# Patient Record
Sex: FEMALE | Race: BLACK OR AFRICAN AMERICAN | NOT HISPANIC OR LATINO | Employment: PART TIME | URBAN - METROPOLITAN AREA
[De-identification: names, ages, dates, MRNs, and addresses within clinical notes are randomized per-mention and may not be internally consistent; named-entity substitution may affect disease eponyms.]

---

## 2017-10-22 ENCOUNTER — OFFICE VISIT - HEALTHEAST (OUTPATIENT)
Dept: FAMILY MEDICINE | Facility: CLINIC | Age: 22
End: 2017-10-22

## 2017-10-22 DIAGNOSIS — R07.81 RIB PAIN ON RIGHT SIDE: ICD-10-CM

## 2017-12-15 ENCOUNTER — PRENATAL OFFICE VISIT - HEALTHEAST (OUTPATIENT)
Dept: MIDWIFE SERVICES | Facility: CLINIC | Age: 22
End: 2017-12-15

## 2017-12-15 ENCOUNTER — RECORDS - HEALTHEAST (OUTPATIENT)
Dept: ADMINISTRATIVE | Facility: OTHER | Age: 22
End: 2017-12-15

## 2017-12-15 DIAGNOSIS — Z34.81 ENCOUNTER FOR SUPERVISION OF OTHER NORMAL PREGNANCY IN FIRST TRIMESTER: ICD-10-CM

## 2017-12-15 LAB
HBV SURFACE AG SERPL QL IA: NEGATIVE
HIV 1+2 AB+HIV1 P24 AG SERPL QL IA: NEGATIVE

## 2017-12-15 ASSESSMENT — MIFFLIN-ST. JEOR: SCORE: 1261.48

## 2017-12-18 LAB — SYPHILIS RPR SCREEN - HISTORICAL: NORMAL

## 2018-01-24 ENCOUNTER — PRENATAL OFFICE VISIT - HEALTHEAST (OUTPATIENT)
Dept: MIDWIFE SERVICES | Facility: CLINIC | Age: 23
End: 2018-01-24

## 2018-01-24 DIAGNOSIS — Z34.82 ENCOUNTER FOR SUPERVISION OF OTHER NORMAL PREGNANCY IN SECOND TRIMESTER: ICD-10-CM

## 2018-01-24 DIAGNOSIS — Z87.59 HISTORY OF POSTPARTUM HEMORRHAGE: ICD-10-CM

## 2018-01-24 ASSESSMENT — MIFFLIN-ST. JEOR: SCORE: 1279.62

## 2018-02-05 ENCOUNTER — HOSPITAL ENCOUNTER (OUTPATIENT)
Dept: ULTRASOUND IMAGING | Facility: CLINIC | Age: 23
Discharge: HOME | End: 2018-02-05
Attending: ADVANCED PRACTICE MIDWIFE

## 2018-02-05 ENCOUNTER — AMBULATORY - HEALTHEAST (OUTPATIENT)
Dept: MIDWIFE SERVICES | Facility: CLINIC | Age: 23
End: 2018-02-05

## 2018-02-05 DIAGNOSIS — Z34.82 ENCOUNTER FOR SUPERVISION OF OTHER NORMAL PREGNANCY IN SECOND TRIMESTER: ICD-10-CM

## 2018-02-07 ENCOUNTER — COMMUNICATION - HEALTHEAST (OUTPATIENT)
Dept: OBGYN | Facility: CLINIC | Age: 23
End: 2018-02-07

## 2018-02-14 ENCOUNTER — PRENATAL OFFICE VISIT - HEALTHEAST (OUTPATIENT)
Dept: MIDWIFE SERVICES | Facility: CLINIC | Age: 23
End: 2018-02-14

## 2018-02-14 DIAGNOSIS — O28.3 ABNORMAL ANTENATAL ULTRASOUND: ICD-10-CM

## 2018-02-14 DIAGNOSIS — Z34.82 ENCOUNTER FOR SUPERVISION OF OTHER NORMAL PREGNANCY IN SECOND TRIMESTER: ICD-10-CM

## 2018-02-14 ASSESSMENT — MIFFLIN-ST. JEOR: SCORE: 1306.84

## 2018-03-14 ENCOUNTER — PRENATAL OFFICE VISIT - HEALTHEAST (OUTPATIENT)
Dept: MIDWIFE SERVICES | Facility: CLINIC | Age: 23
End: 2018-03-14

## 2018-03-14 DIAGNOSIS — Z34.82 ENCOUNTER FOR SUPERVISION OF OTHER NORMAL PREGNANCY IN SECOND TRIMESTER: ICD-10-CM

## 2018-03-14 DIAGNOSIS — O28.3 ABNORMAL ANTENATAL ULTRASOUND: ICD-10-CM

## 2018-03-14 ASSESSMENT — MIFFLIN-ST. JEOR: SCORE: 1329.52

## 2018-03-28 ENCOUNTER — PRENATAL OFFICE VISIT - HEALTHEAST (OUTPATIENT)
Dept: MIDWIFE SERVICES | Facility: CLINIC | Age: 23
End: 2018-03-28

## 2018-03-28 DIAGNOSIS — Z34.83 ENCOUNTER FOR SUPERVISION OF OTHER NORMAL PREGNANCY IN THIRD TRIMESTER: ICD-10-CM

## 2018-03-28 DIAGNOSIS — R73.09 ELEVATED GLUCOSE: ICD-10-CM

## 2018-03-28 DIAGNOSIS — O99.013 ANEMIA DURING PREGNANCY IN THIRD TRIMESTER: ICD-10-CM

## 2018-03-28 LAB
FASTING STATUS PATIENT QL REPORTED: NO
GLUCOSE 1H P 50 G GLC PO SERPL-MCNC: 164 MG/DL (ref 70–139)
HGB BLD-MCNC: 8.6 G/DL (ref 12–16)

## 2018-03-28 ASSESSMENT — MIFFLIN-ST. JEOR: SCORE: 1343.13

## 2018-03-29 LAB — T PALLIDUM AB SER QL: NEGATIVE

## 2018-04-04 ENCOUNTER — AMBULATORY - HEALTHEAST (OUTPATIENT)
Dept: LAB | Facility: CLINIC | Age: 23
End: 2018-04-04

## 2018-04-04 ENCOUNTER — AMBULATORY - HEALTHEAST (OUTPATIENT)
Dept: OBGYN | Facility: CLINIC | Age: 23
End: 2018-04-04

## 2018-04-04 DIAGNOSIS — R73.09 ELEVATED GLUCOSE: ICD-10-CM

## 2018-04-04 DIAGNOSIS — O24.410 DIET CONTROLLED GESTATIONAL DIABETES MELLITUS (GDM) IN THIRD TRIMESTER: ICD-10-CM

## 2018-04-04 LAB
FASTING STATUS PATIENT QL REPORTED: YES
GLUCOSE 1H P 100 G GLC PO SERPL-MCNC: 176 MG/DL
GLUCOSE 2H P 100 G GLC PO SERPL-MCNC: 158 MG/DL
GLUCOSE 3H P 100 G GLC PO SERPL-MCNC: 149 MG/DL
GLUCOSE P FAST SERPL-MCNC: 94 MG/DL

## 2018-04-12 ENCOUNTER — AMBULATORY - HEALTHEAST (OUTPATIENT)
Dept: EDUCATION SERVICES | Facility: CLINIC | Age: 23
End: 2018-04-12

## 2018-04-12 DIAGNOSIS — O24.410 DIET CONTROLLED GESTATIONAL DIABETES MELLITUS (GDM) IN SECOND TRIMESTER: ICD-10-CM

## 2018-04-16 ENCOUNTER — COMMUNICATION - HEALTHEAST (OUTPATIENT)
Dept: ENDOCRINOLOGY | Facility: CLINIC | Age: 23
End: 2018-04-16

## 2018-04-17 ENCOUNTER — OFFICE VISIT - HEALTHEAST (OUTPATIENT)
Dept: EDUCATION SERVICES | Facility: CLINIC | Age: 23
End: 2018-04-17

## 2018-04-24 ENCOUNTER — AMBULATORY - HEALTHEAST (OUTPATIENT)
Dept: EDUCATION SERVICES | Facility: CLINIC | Age: 23
End: 2018-04-24

## 2018-04-24 DIAGNOSIS — O24.410 DIET CONTROLLED GESTATIONAL DIABETES MELLITUS (GDM) IN THIRD TRIMESTER: ICD-10-CM

## 2018-04-25 ENCOUNTER — PRENATAL OFFICE VISIT - HEALTHEAST (OUTPATIENT)
Dept: MIDWIFE SERVICES | Facility: CLINIC | Age: 23
End: 2018-04-25

## 2018-04-25 DIAGNOSIS — O28.3 ABNORMAL ANTENATAL ULTRASOUND: ICD-10-CM

## 2018-04-25 DIAGNOSIS — R73.09 ELEVATED GLUCOSE: ICD-10-CM

## 2018-04-25 DIAGNOSIS — O99.013 ANEMIA DURING PREGNANCY IN THIRD TRIMESTER: ICD-10-CM

## 2018-04-25 DIAGNOSIS — Z34.83 ENCOUNTER FOR SUPERVISION OF OTHER NORMAL PREGNANCY IN THIRD TRIMESTER: ICD-10-CM

## 2018-04-25 DIAGNOSIS — O24.410 DIET CONTROLLED GESTATIONAL DIABETES MELLITUS (GDM) IN THIRD TRIMESTER: ICD-10-CM

## 2018-04-25 LAB — HGB BLD-MCNC: 10.1 G/DL (ref 12–16)

## 2018-04-25 ASSESSMENT — MIFFLIN-ST. JEOR: SCORE: 1352.2

## 2018-05-09 ENCOUNTER — PRENATAL OFFICE VISIT - HEALTHEAST (OUTPATIENT)
Dept: MIDWIFE SERVICES | Facility: CLINIC | Age: 23
End: 2018-05-09

## 2018-05-09 DIAGNOSIS — O24.410 DIET CONTROLLED GESTATIONAL DIABETES MELLITUS (GDM) IN THIRD TRIMESTER: ICD-10-CM

## 2018-05-09 DIAGNOSIS — O99.013 ANEMIA DURING PREGNANCY IN THIRD TRIMESTER: ICD-10-CM

## 2018-05-09 DIAGNOSIS — Z34.83 ENCOUNTER FOR SUPERVISION OF OTHER NORMAL PREGNANCY IN THIRD TRIMESTER: ICD-10-CM

## 2018-05-09 ASSESSMENT — MIFFLIN-ST. JEOR: SCORE: 1334.06

## 2018-05-14 ENCOUNTER — COMMUNICATION - HEALTHEAST (OUTPATIENT)
Dept: ENDOCRINOLOGY | Facility: CLINIC | Age: 23
End: 2018-05-14

## 2018-05-23 ENCOUNTER — PRENATAL OFFICE VISIT - HEALTHEAST (OUTPATIENT)
Dept: MIDWIFE SERVICES | Facility: CLINIC | Age: 23
End: 2018-05-23

## 2018-05-23 DIAGNOSIS — O99.013 ANEMIA DURING PREGNANCY IN THIRD TRIMESTER: ICD-10-CM

## 2018-05-23 DIAGNOSIS — Z34.83 ENCOUNTER FOR SUPERVISION OF OTHER NORMAL PREGNANCY IN THIRD TRIMESTER: ICD-10-CM

## 2018-05-23 DIAGNOSIS — Z87.59 HISTORY OF POSTPARTUM HEMORRHAGE: ICD-10-CM

## 2018-05-23 DIAGNOSIS — O24.410 DIET CONTROLLED GESTATIONAL DIABETES MELLITUS (GDM) IN THIRD TRIMESTER: ICD-10-CM

## 2018-05-23 LAB — HGB BLD-MCNC: 11.6 G/DL (ref 12–16)

## 2018-05-23 ASSESSMENT — MIFFLIN-ST. JEOR: SCORE: 1338.59

## 2018-05-24 ENCOUNTER — AMBULATORY - HEALTHEAST (OUTPATIENT)
Dept: EDUCATION SERVICES | Facility: CLINIC | Age: 23
End: 2018-05-24

## 2018-05-24 DIAGNOSIS — O24.410 DIET CONTROLLED GESTATIONAL DIABETES MELLITUS (GDM) IN THIRD TRIMESTER: ICD-10-CM

## 2018-05-24 LAB
ALLERGIC TO PENICILLIN: NO
GP B STREP DNA SPEC QL NAA+PROBE: NEGATIVE

## 2018-05-30 ENCOUNTER — PRENATAL OFFICE VISIT - HEALTHEAST (OUTPATIENT)
Dept: MIDWIFE SERVICES | Facility: CLINIC | Age: 23
End: 2018-05-30

## 2018-05-30 DIAGNOSIS — O24.410 DIET CONTROLLED GESTATIONAL DIABETES MELLITUS (GDM) IN THIRD TRIMESTER: ICD-10-CM

## 2018-05-30 DIAGNOSIS — Z34.83 ENCOUNTER FOR SUPERVISION OF OTHER NORMAL PREGNANCY IN THIRD TRIMESTER: ICD-10-CM

## 2018-05-30 DIAGNOSIS — O99.013 ANEMIA DURING PREGNANCY IN THIRD TRIMESTER: ICD-10-CM

## 2018-05-30 ASSESSMENT — MIFFLIN-ST. JEOR: SCORE: 1353.33

## 2018-06-06 ENCOUNTER — PRENATAL OFFICE VISIT - HEALTHEAST (OUTPATIENT)
Dept: MIDWIFE SERVICES | Facility: CLINIC | Age: 23
End: 2018-06-06

## 2018-06-06 DIAGNOSIS — Z34.83 ENCOUNTER FOR SUPERVISION OF OTHER NORMAL PREGNANCY IN THIRD TRIMESTER: ICD-10-CM

## 2018-06-06 DIAGNOSIS — O24.410 DIET CONTROLLED GESTATIONAL DIABETES MELLITUS (GDM) IN THIRD TRIMESTER: ICD-10-CM

## 2018-06-06 ASSESSMENT — MIFFLIN-ST. JEOR: SCORE: 1356.74

## 2018-06-13 ENCOUNTER — COMMUNICATION - HEALTHEAST (OUTPATIENT)
Dept: OBGYN | Facility: CLINIC | Age: 23
End: 2018-06-13

## 2018-06-14 ENCOUNTER — HOME CARE/HOSPICE - HEALTHEAST (OUTPATIENT)
Dept: HOME HEALTH SERVICES | Facility: HOME HEALTH | Age: 23
End: 2018-06-14

## 2018-06-15 ENCOUNTER — HOME CARE/HOSPICE - HEALTHEAST (OUTPATIENT)
Dept: HOME HEALTH SERVICES | Facility: HOME HEALTH | Age: 23
End: 2018-06-15

## 2018-06-21 ENCOUNTER — COMMUNICATION - HEALTHEAST (OUTPATIENT)
Dept: MIDWIFE SERVICES | Facility: CLINIC | Age: 23
End: 2018-06-21

## 2018-06-27 ENCOUNTER — OFFICE VISIT - HEALTHEAST (OUTPATIENT)
Dept: MIDWIFE SERVICES | Facility: CLINIC | Age: 23
End: 2018-06-27

## 2018-06-27 ASSESSMENT — MIFFLIN-ST. JEOR: SCORE: 1302.3

## 2018-07-31 ENCOUNTER — OFFICE VISIT - HEALTHEAST (OUTPATIENT)
Dept: MIDWIFE SERVICES | Facility: CLINIC | Age: 23
End: 2018-07-31

## 2018-07-31 ENCOUNTER — HOSPITAL ENCOUNTER (OUTPATIENT)
Dept: ULTRASOUND IMAGING | Facility: CLINIC | Age: 23
Discharge: HOME | End: 2018-07-31
Attending: ADVANCED PRACTICE MIDWIFE

## 2018-07-31 DIAGNOSIS — Z12.4 CERVICAL CANCER SCREENING: ICD-10-CM

## 2018-07-31 DIAGNOSIS — R10.2 POSTPARTUM PERINEAL PAIN: ICD-10-CM

## 2018-07-31 DIAGNOSIS — Z86.32 HISTORY OF GESTATIONAL DIABETES: ICD-10-CM

## 2018-07-31 ASSESSMENT — MIFFLIN-ST. JEOR: SCORE: 1315.91

## 2018-08-08 ENCOUNTER — AMBULATORY - HEALTHEAST (OUTPATIENT)
Dept: LAB | Facility: CLINIC | Age: 23
End: 2018-08-08

## 2018-08-08 DIAGNOSIS — Z86.32 HISTORY OF GESTATIONAL DIABETES: ICD-10-CM

## 2018-08-08 LAB
FASTING STATUS PATIENT QL REPORTED: YES
GLUCOSE 2H P 75 G GLC PO SERPL-MCNC: 106 MG/DL
NON GESTATIONAL GTT FASTING: 89 MG/DL

## 2019-03-13 ENCOUNTER — OFFICE VISIT - HEALTHEAST (OUTPATIENT)
Dept: FAMILY MEDICINE | Facility: CLINIC | Age: 24
End: 2019-03-13

## 2019-03-13 DIAGNOSIS — H66.001 ACUTE SUPPURATIVE OTITIS MEDIA OF RIGHT EAR WITHOUT SPONTANEOUS RUPTURE OF TYMPANIC MEMBRANE, RECURRENCE NOT SPECIFIED: ICD-10-CM

## 2019-03-13 DIAGNOSIS — B37.31 VAGINAL CANDIDIASIS: ICD-10-CM

## 2019-03-13 DIAGNOSIS — N30.01 ACUTE CYSTITIS WITH HEMATURIA: ICD-10-CM

## 2019-03-13 LAB
ALBUMIN UR-MCNC: ABNORMAL MG/DL
APPEARANCE UR: CLEAR
BACTERIA #/AREA URNS HPF: ABNORMAL HPF
BILIRUB UR QL STRIP: NEGATIVE
CLUE CELLS: ABNORMAL
COLOR UR AUTO: YELLOW
GLUCOSE UR STRIP-MCNC: NEGATIVE MG/DL
HGB UR QL STRIP: ABNORMAL
KETONES UR STRIP-MCNC: NEGATIVE MG/DL
LEUKOCYTE ESTERASE UR QL STRIP: NEGATIVE
MUCOUS THREADS #/AREA URNS LPF: ABNORMAL LPF
NITRATE UR QL: NEGATIVE
PH UR STRIP: 6 [PH] (ref 5–8)
RBC #/AREA URNS AUTO: ABNORMAL HPF
SP GR UR STRIP: >=1.03 (ref 1–1.03)
SQUAMOUS #/AREA URNS AUTO: ABNORMAL LPF
TRICHOMONAS, WET PREP: ABNORMAL
UROBILINOGEN UR STRIP-ACNC: ABNORMAL
WBC #/AREA URNS AUTO: ABNORMAL HPF
YEAST, WET PREP: ABNORMAL

## 2019-10-08 ENCOUNTER — OFFICE VISIT - HEALTHEAST (OUTPATIENT)
Dept: MIDWIFE SERVICES | Facility: CLINIC | Age: 24
End: 2019-10-08

## 2019-10-08 ENCOUNTER — HOSPITAL ENCOUNTER (OUTPATIENT)
Dept: ULTRASOUND IMAGING | Facility: CLINIC | Age: 24
Discharge: HOME | End: 2019-10-08
Attending: ADVANCED PRACTICE MIDWIFE

## 2019-10-08 DIAGNOSIS — Z32.00 POSSIBLE PREGNANCY: ICD-10-CM

## 2019-10-08 LAB — HCG UR QL: POSITIVE

## 2019-10-08 ASSESSMENT — MIFFLIN-ST. JEOR: SCORE: 1234.26

## 2019-11-12 ENCOUNTER — COMMUNICATION - HEALTHEAST (OUTPATIENT)
Dept: MIDWIFE SERVICES | Facility: CLINIC | Age: 24
End: 2019-11-12

## 2019-11-26 ENCOUNTER — COMMUNICATION - HEALTHEAST (OUTPATIENT)
Dept: MIDWIFE SERVICES | Facility: CLINIC | Age: 24
End: 2019-11-26

## 2019-11-26 ENCOUNTER — PRENATAL OFFICE VISIT - HEALTHEAST (OUTPATIENT)
Dept: MIDWIFE SERVICES | Facility: CLINIC | Age: 24
End: 2019-11-26

## 2019-11-26 DIAGNOSIS — Z34.80 SUPERVISION OF OTHER NORMAL PREGNANCY, ANTEPARTUM: ICD-10-CM

## 2019-11-26 DIAGNOSIS — Z87.59 HISTORY OF POSTPARTUM HEMORRHAGE: ICD-10-CM

## 2019-11-26 DIAGNOSIS — Z86.32 HISTORY OF GESTATIONAL DIABETES: ICD-10-CM

## 2019-11-26 LAB
BASOPHILS # BLD AUTO: 0 THOU/UL (ref 0–0.2)
BASOPHILS NFR BLD AUTO: 0 % (ref 0–2)
EOSINOPHIL # BLD AUTO: 0 THOU/UL (ref 0–0.4)
EOSINOPHIL NFR BLD AUTO: 0 % (ref 0–6)
ERYTHROCYTE [DISTWIDTH] IN BLOOD BY AUTOMATED COUNT: 14.2 % (ref 11–14.5)
HCT VFR BLD AUTO: 35.3 % (ref 35–47)
HGB BLD-MCNC: 11.3 G/DL (ref 12–16)
HIV 1+2 AB+HIV1 P24 AG SERPL QL IA: NEGATIVE
LYMPHOCYTES # BLD AUTO: 1 THOU/UL (ref 0.8–4.4)
LYMPHOCYTES NFR BLD AUTO: 15 % (ref 20–40)
MCH RBC QN AUTO: 25.6 PG (ref 27–34)
MCHC RBC AUTO-ENTMCNC: 32 G/DL (ref 32–36)
MCV RBC AUTO: 80 FL (ref 80–100)
MONOCYTES # BLD AUTO: 0.3 THOU/UL (ref 0–0.9)
MONOCYTES NFR BLD AUTO: 5 % (ref 2–10)
NEUTROPHILS # BLD AUTO: 5.3 THOU/UL (ref 2–7.7)
NEUTROPHILS NFR BLD AUTO: 80 % (ref 50–70)
PLATELET # BLD AUTO: 238 THOU/UL (ref 140–440)
PMV BLD AUTO: 10.5 FL (ref 8.5–12.5)
RBC # BLD AUTO: 4.42 MILL/UL (ref 3.8–5.4)
WBC: 6.7 THOU/UL (ref 4–11)

## 2019-11-26 ASSESSMENT — EDINBURGH POSTNATAL DEPRESSION SCALE (EPDS): TOTAL SCORE: 0

## 2019-11-26 ASSESSMENT — MIFFLIN-ST. JEOR: SCORE: 1252.41

## 2019-11-27 LAB
ABO/RH(D): NORMAL
ABORH REPEAT: NORMAL
ANTIBODY SCREEN: NEGATIVE
BACTERIA SPEC CULT: NO GROWTH
COLLECTION METHOD: NORMAL
HBA1C MFR BLD: 5.3 % (ref 4.2–6.1)
HBV SURFACE AG SERPL QL IA: NEGATIVE
LEAD BLD-MCNC: NORMAL UG/DL
RUBV IGG SERPL QL IA: POSITIVE
T PALLIDUM AB SER QL: NEGATIVE

## 2019-11-29 LAB — LEAD BLDV-MCNC: <2 UG/DL (ref 0–4.9)

## 2019-12-23 ENCOUNTER — PRENATAL OFFICE VISIT - HEALTHEAST (OUTPATIENT)
Dept: MIDWIFE SERVICES | Facility: CLINIC | Age: 24
End: 2019-12-23

## 2019-12-23 DIAGNOSIS — Z34.80 SUPERVISION OF OTHER NORMAL PREGNANCY, ANTEPARTUM: ICD-10-CM

## 2019-12-23 ASSESSMENT — MIFFLIN-ST. JEOR: SCORE: 1296.86

## 2019-12-24 LAB
BKR LAB AP ABNORMAL BLEEDING: NO
BKR LAB AP BIRTH CONTROL/HORMONES: NORMAL
BKR LAB AP CERVICAL APPEARANCE: NORMAL
BKR LAB AP GYN ADEQUACY: NORMAL
BKR LAB AP GYN INTERPRETATION: NORMAL
BKR LAB AP GYN OTHER FINDINGS: NORMAL
BKR LAB AP HPV REFLEX: NORMAL
BKR LAB AP LMP: NORMAL
BKR LAB AP PATIENT STATUS: NORMAL
BKR LAB AP PREVIOUS ABNORMAL: NORMAL
BKR LAB AP PREVIOUS NORMAL: NORMAL
HIGH RISK?: NO
PATH REPORT.COMMENTS IMP SPEC: NORMAL
RESULT FLAG (HE HISTORICAL CONVERSION): NORMAL

## 2020-01-16 ENCOUNTER — HOSPITAL ENCOUNTER (OUTPATIENT)
Dept: ULTRASOUND IMAGING | Facility: CLINIC | Age: 25
Discharge: HOME | End: 2020-01-16
Attending: ADVANCED PRACTICE MIDWIFE

## 2020-01-16 ENCOUNTER — AMBULATORY - HEALTHEAST (OUTPATIENT)
Dept: MIDWIFE SERVICES | Facility: CLINIC | Age: 25
End: 2020-01-16

## 2020-01-16 DIAGNOSIS — Z34.80 SUPERVISION OF OTHER NORMAL PREGNANCY, ANTEPARTUM: ICD-10-CM

## 2020-01-22 ENCOUNTER — PRENATAL OFFICE VISIT - HEALTHEAST (OUTPATIENT)
Dept: MIDWIFE SERVICES | Facility: CLINIC | Age: 25
End: 2020-01-22

## 2020-01-22 DIAGNOSIS — Z34.80 SUPERVISION OF OTHER NORMAL PREGNANCY, ANTEPARTUM: ICD-10-CM

## 2020-01-22 DIAGNOSIS — Z86.32 HISTORY OF GESTATIONAL DIABETES: ICD-10-CM

## 2020-01-22 ASSESSMENT — MIFFLIN-ST. JEOR: SCORE: 1311.38

## 2020-02-19 ENCOUNTER — PRENATAL OFFICE VISIT - HEALTHEAST (OUTPATIENT)
Dept: MIDWIFE SERVICES | Facility: CLINIC | Age: 25
End: 2020-02-19

## 2020-02-19 DIAGNOSIS — O99.012 ANEMIA DURING PREGNANCY IN SECOND TRIMESTER: ICD-10-CM

## 2020-02-19 DIAGNOSIS — O99.712: ICD-10-CM

## 2020-02-19 DIAGNOSIS — L81.1: ICD-10-CM

## 2020-02-19 DIAGNOSIS — Z86.32 HISTORY OF GESTATIONAL DIABETES: ICD-10-CM

## 2020-02-19 DIAGNOSIS — Z87.59 HISTORY OF POSTPARTUM HEMORRHAGE: ICD-10-CM

## 2020-02-19 DIAGNOSIS — Z34.80 SUPERVISION OF OTHER NORMAL PREGNANCY, ANTEPARTUM: ICD-10-CM

## 2020-02-19 LAB
FASTING STATUS PATIENT QL REPORTED: NO
GLUCOSE 1H P 50 G GLC PO SERPL-MCNC: 116 MG/DL (ref 70–139)
HGB BLD-MCNC: 8.7 G/DL (ref 12–16)

## 2020-02-19 ASSESSMENT — MIFFLIN-ST. JEOR: SCORE: 1324.98

## 2020-02-20 LAB — T PALLIDUM AB SER QL: NEGATIVE

## 2020-03-18 ENCOUNTER — PRENATAL OFFICE VISIT - HEALTHEAST (OUTPATIENT)
Dept: MIDWIFE SERVICES | Facility: CLINIC | Age: 25
End: 2020-03-18

## 2020-03-18 DIAGNOSIS — O99.713: ICD-10-CM

## 2020-03-18 DIAGNOSIS — Z34.80 SUPERVISION OF OTHER NORMAL PREGNANCY, ANTEPARTUM: ICD-10-CM

## 2020-03-18 DIAGNOSIS — L81.1: ICD-10-CM

## 2020-03-18 DIAGNOSIS — Z86.32 HISTORY OF GESTATIONAL DIABETES: ICD-10-CM

## 2020-03-18 DIAGNOSIS — Z87.59 HISTORY OF POSTPARTUM HEMORRHAGE: ICD-10-CM

## 2020-03-18 ASSESSMENT — MIFFLIN-ST. JEOR: SCORE: 1338.59

## 2020-04-01 ENCOUNTER — PRENATAL OFFICE VISIT - HEALTHEAST (OUTPATIENT)
Dept: MIDWIFE SERVICES | Facility: CLINIC | Age: 25
End: 2020-04-01

## 2020-04-01 DIAGNOSIS — O99.013 ANEMIA DURING PREGNANCY IN THIRD TRIMESTER: ICD-10-CM

## 2020-04-01 DIAGNOSIS — Z34.80 SUPERVISION OF OTHER NORMAL PREGNANCY, ANTEPARTUM: ICD-10-CM

## 2020-04-01 DIAGNOSIS — O99.019 ANEMIA DURING PREGNANCY: ICD-10-CM

## 2020-04-01 LAB — HGB BLD-MCNC: 9.3 G/DL (ref 12–16)

## 2020-04-01 ASSESSMENT — MIFFLIN-ST. JEOR: SCORE: 1348.12

## 2020-04-15 ENCOUNTER — OFFICE VISIT - HEALTHEAST (OUTPATIENT)
Dept: MIDWIFE SERVICES | Facility: CLINIC | Age: 25
End: 2020-04-15

## 2020-04-15 DIAGNOSIS — O99.019 ANEMIA DURING PREGNANCY: ICD-10-CM

## 2020-04-15 DIAGNOSIS — O99.713: ICD-10-CM

## 2020-04-15 DIAGNOSIS — Z34.80 SUPERVISION OF OTHER NORMAL PREGNANCY, ANTEPARTUM: ICD-10-CM

## 2020-04-15 DIAGNOSIS — L81.1: ICD-10-CM

## 2020-04-15 ASSESSMENT — EDINBURGH POSTNATAL DEPRESSION SCALE (EPDS): TOTAL SCORE: 0

## 2020-04-20 ENCOUNTER — COMMUNICATION - HEALTHEAST (OUTPATIENT)
Dept: ADMINISTRATIVE | Facility: CLINIC | Age: 25
End: 2020-04-20

## 2020-04-20 DIAGNOSIS — O99.012 ANEMIA DURING PREGNANCY IN SECOND TRIMESTER: ICD-10-CM

## 2020-04-29 ENCOUNTER — PRENATAL OFFICE VISIT - HEALTHEAST (OUTPATIENT)
Dept: MIDWIFE SERVICES | Facility: CLINIC | Age: 25
End: 2020-04-29

## 2020-04-29 DIAGNOSIS — Z86.32 HISTORY OF GESTATIONAL DIABETES: ICD-10-CM

## 2020-04-29 DIAGNOSIS — Z87.59 HISTORY OF POSTPARTUM HEMORRHAGE: ICD-10-CM

## 2020-04-29 DIAGNOSIS — Z34.80 SUPERVISION OF OTHER NORMAL PREGNANCY, ANTEPARTUM: ICD-10-CM

## 2020-04-29 LAB — HGB BLD-MCNC: 10.6 G/DL (ref 12–16)

## 2020-04-29 ASSESSMENT — MIFFLIN-ST. JEOR: SCORE: 1360.36

## 2020-05-01 LAB
ALLERGIC TO PENICILLIN: NO
GP B STREP DNA SPEC QL NAA+PROBE: NEGATIVE

## 2020-05-06 ENCOUNTER — OFFICE VISIT - HEALTHEAST (OUTPATIENT)
Dept: MIDWIFE SERVICES | Facility: CLINIC | Age: 25
End: 2020-05-06

## 2020-05-06 DIAGNOSIS — Z34.80 SUPERVISION OF OTHER NORMAL PREGNANCY, ANTEPARTUM: ICD-10-CM

## 2020-05-06 DIAGNOSIS — O99.019 ANEMIA DURING PREGNANCY: ICD-10-CM

## 2020-05-14 ENCOUNTER — PRENATAL OFFICE VISIT - HEALTHEAST (OUTPATIENT)
Dept: MIDWIFE SERVICES | Facility: CLINIC | Age: 25
End: 2020-05-14

## 2020-05-14 DIAGNOSIS — O99.019 ANEMIA DURING PREGNANCY: ICD-10-CM

## 2020-05-14 DIAGNOSIS — Z34.80 SUPERVISION OF OTHER NORMAL PREGNANCY, ANTEPARTUM: ICD-10-CM

## 2020-05-14 ASSESSMENT — MIFFLIN-ST. JEOR: SCORE: 1361.27

## 2020-05-19 ENCOUNTER — PRENATAL OFFICE VISIT - HEALTHEAST (OUTPATIENT)
Dept: MIDWIFE SERVICES | Facility: CLINIC | Age: 25
End: 2020-05-19

## 2020-05-19 ASSESSMENT — MIFFLIN-ST. JEOR: SCORE: 1361.73

## 2020-05-27 ENCOUNTER — PRENATAL OFFICE VISIT - HEALTHEAST (OUTPATIENT)
Dept: MIDWIFE SERVICES | Facility: CLINIC | Age: 25
End: 2020-05-27

## 2020-05-27 DIAGNOSIS — O48.0 POST-TERM PREGNANCY, 40-42 WEEKS OF GESTATION: ICD-10-CM

## 2020-05-27 DIAGNOSIS — Z87.59 HISTORY OF POSTPARTUM HEMORRHAGE: ICD-10-CM

## 2020-05-27 DIAGNOSIS — O99.019 ANEMIA DURING PREGNANCY: ICD-10-CM

## 2020-05-27 DIAGNOSIS — Z34.80 SUPERVISION OF OTHER NORMAL PREGNANCY, ANTEPARTUM: ICD-10-CM

## 2020-05-27 ASSESSMENT — MIFFLIN-ST. JEOR: SCORE: 1377.6

## 2020-05-29 ENCOUNTER — HOSPITAL ENCOUNTER (OUTPATIENT)
Dept: ULTRASOUND IMAGING | Facility: CLINIC | Age: 25
Discharge: HOME | End: 2020-05-29
Attending: ADVANCED PRACTICE MIDWIFE

## 2020-05-29 DIAGNOSIS — O48.0 POST-TERM PREGNANCY, 40-42 WEEKS OF GESTATION: ICD-10-CM

## 2020-06-01 ENCOUNTER — HOME CARE/HOSPICE - HEALTHEAST (OUTPATIENT)
Dept: HOME HEALTH SERVICES | Facility: HOME HEALTH | Age: 25
End: 2020-06-01

## 2020-06-02 ENCOUNTER — HOME CARE/HOSPICE - HEALTHEAST (OUTPATIENT)
Dept: HOME HEALTH SERVICES | Facility: HOME HEALTH | Age: 25
End: 2020-06-02

## 2020-07-09 ENCOUNTER — OFFICE VISIT - HEALTHEAST (OUTPATIENT)
Dept: MIDWIFE SERVICES | Facility: CLINIC | Age: 25
End: 2020-07-09

## 2020-07-09 ENCOUNTER — COMMUNICATION - HEALTHEAST (OUTPATIENT)
Dept: MIDWIFE SERVICES | Facility: CLINIC | Age: 25
End: 2020-07-09

## 2020-07-09 DIAGNOSIS — B37.2 YEAST INFECTION OF THE SKIN: ICD-10-CM

## 2020-07-09 DIAGNOSIS — R42 DIZZINESS: ICD-10-CM

## 2020-07-09 LAB — HGB BLD-MCNC: 11.7 G/DL (ref 12–16)

## 2020-07-09 ASSESSMENT — EDINBURGH POSTNATAL DEPRESSION SCALE (EPDS): TOTAL SCORE: 0

## 2020-07-09 ASSESSMENT — MIFFLIN-ST. JEOR: SCORE: 1337.23

## 2020-07-11 ENCOUNTER — COMMUNICATION - HEALTHEAST (OUTPATIENT)
Dept: MIDWIFE SERVICES | Facility: CLINIC | Age: 25
End: 2020-07-11

## 2020-09-18 ENCOUNTER — OFFICE VISIT - HEALTHEAST (OUTPATIENT)
Dept: MIDWIFE SERVICES | Facility: CLINIC | Age: 25
End: 2020-09-18

## 2020-09-18 ENCOUNTER — RECORDS - HEALTHEAST (OUTPATIENT)
Dept: ADMINISTRATIVE | Facility: OTHER | Age: 25
End: 2020-09-18

## 2020-09-18 ENCOUNTER — COMMUNICATION - HEALTHEAST (OUTPATIENT)
Dept: MIDWIFE SERVICES | Facility: CLINIC | Age: 25
End: 2020-09-18

## 2020-09-18 DIAGNOSIS — R30.0 DYSURIA: ICD-10-CM

## 2020-09-18 DIAGNOSIS — M54.6 ACUTE MIDLINE THORACIC BACK PAIN: ICD-10-CM

## 2020-09-18 LAB
ALBUMIN UR-MCNC: NEGATIVE MG/DL
APPEARANCE UR: CLEAR
BILIRUB UR QL STRIP: NEGATIVE
COLOR UR AUTO: YELLOW
GLUCOSE UR STRIP-MCNC: NEGATIVE MG/DL
HGB UR QL STRIP: NEGATIVE
KETONES UR STRIP-MCNC: NEGATIVE MG/DL
LEUKOCYTE ESTERASE UR QL STRIP: NEGATIVE
NITRATE UR QL: NEGATIVE
PH UR STRIP: 7 [PH] (ref 5–8)
SP GR UR STRIP: 1.02 (ref 1–1.03)
UROBILINOGEN UR STRIP-ACNC: NORMAL

## 2020-09-18 ASSESSMENT — MIFFLIN-ST. JEOR: SCORE: 1331.79

## 2020-09-29 ENCOUNTER — THERAPY VISIT (OUTPATIENT)
Dept: PHYSICAL THERAPY | Facility: CLINIC | Age: 25
End: 2020-09-29
Payer: COMMERCIAL

## 2020-09-29 DIAGNOSIS — M54.6 PAIN IN THORACIC SPINE: ICD-10-CM

## 2020-09-29 DIAGNOSIS — M54.50 LUMBAGO: Primary | ICD-10-CM

## 2020-09-29 PROCEDURE — 97110 THERAPEUTIC EXERCISES: CPT | Mod: GP | Performed by: PHYSICAL THERAPIST

## 2020-09-29 PROCEDURE — 97161 PT EVAL LOW COMPLEX 20 MIN: CPT | Mod: GP | Performed by: PHYSICAL THERAPIST

## 2020-09-29 NOTE — PROGRESS NOTES
Physical Therapy Initial Evaluation  September 29, 2020     Precautions/Restrictions/MD instructions: PT eval and treat.     Subjective:   Date of Onset: 3 months ago. MD order on 9/18/20.  C/C: bilateral lower thoracic pain. Quality of pain is aching. Patient reports bilateral numbness and tingling down to her feet. Pains are described as constant in nature. Pain is worse: with repetitive activity. Pain is rated 5/10.   History of symptoms: Pains began gradually as the result of the birth of her third child. Since onset, symptoms are unchanged. Previous episodes: after the birth of her second child she experienced the same pain for 3-4 months which disappeared with time. Patient reports that she always carries her child on her right hip.   Worsened by: prolonged standing and sitting, bending, lifting,  Alleviated by: exercises from midwife, rest, lying supine   Pertinent medical/surgical history: Lower thoracic pain after the birth of her second child. Patient denies night pain, unexplained weight loss, significant current illness, recent hospital admission, any regional implanted devices or history of surgery in the area.    Imaging: none. Current occupational status: CNA. Patient's goals are: decrease pain, prolonged standing, household chores. Return to MD:  PRN.       Objective:  Gait:  increased trunk lean to right side and pelvic swing     THORACIC:  Thoracic AROM: (Major, Moderate, Minimal or Nil loss)  Movement Loss Venkat Mod Min Nil Pain   Flexion    x Present mid back    Extension  x          LUMBAR:    Neurological: All results within normal limits unless otherwise noted.    Motor Deficit:  Myotomes L R   L1-2 (hip flexion) 4+/5 4+/5   L3 (knee extension) 5/5 5/5   L4 (ankle DF) 5/5 5/5   L5 (g. toe ext) 5/5 5/5   S1 (ankle PF or knee flex) 5/5 5/5     Sensory Deficit, Reflexes: Intact to light touch sensation in all LE dermatomes.       AROM: (Major, Moderate, Minimal or Nil loss)  Movement Loss Venkat Mod  Min Nil Pain   Flexion   x  Present    Extension   x  present   Side Gliding L  x   tightness   Side Gliding R   x  tightness       Sacroiliac Special Tests  Distraction +   Compression -   Sacral Thrust Unable to press due to upper back pain   Femoral Sheer + right side   ELTON + on right with pain in upper back     Palpation: tightness in bilateral paraspinals in thoracic and lumbar spine      Assessment/Plan:    The patient is a 25 year old female with chief complaint of low thoracic pain.    The patient has the following significant findings with corresponding treatment plan.  Diagnosis 1:  Acute midline thoracic back pain     Pain -  hot/cold therapy and manual therapy  Decreased ROM/flexibility - manual therapy and therapeutic exercise  Decreased joint mobility - manual therapy and therapeutic exercise  Decreased strength - therapeutic exercise and therapeutic activities  Impaired muscle performance - neuro re-education  Decreased function - therapeutic activities  Impaired posture - neuro re-education      Therapy Evaluation Codes:   1) History comprised of:   Personal factors that impact the plan of care:      Past/current experiences, Profession and Time since onset of symptoms.    Comorbidity factors that impact the plan of care are:      None.     Medications impacting care: None.  2) Examination of Body Systems comprised of:   Body structures and functions that impact the plan of care:      Lumbar spine, Sacral illiac joint and Thoracic Spine.   Activity limitations that impact the plan of care are:      Cooking, Lifting, Standing and Working.   Clinical presentation characteristics are:    Stable/Uncomplicated.  3) Presentation comprised of:   Presentation scored as Low complexity with uncomplicated characteristics..  4) Decision-Making    Low complexity using standardized patient assessment instrument and/or measureable assessment of functional outcome.  Cumulative Therapy Evaluation is: Low  complexity.    Previous and current functional limitations:  (See Goal Flow Sheet for this information)    Short term and Long term goals: (See Goal Flow Sheet for this information)     Communication ability:  Patient appears to be able to clearly communicate and understand verbal and written communication and follow directions correctly.  Treatment Explanation - The following has been discussed with the patient: RX ordered/plan of care, anticipated outcomes, and possible risks and side effects.  This patient would benefit from PT intervention to resume normal activities.   Rehab potential is good.    Frequency:  1x week, once daily  Duration:  for 4 weeks tapering to 2x a month over 4 weeks  Discharge Plan: Achieve all LTGs, be independent in home treatment program, and reach maximal therapeutic benefit.    Please refer to the daily flowsheet for treatment today, total treatment time and time spent performing 1:1 timed codes.

## 2020-09-29 NOTE — LETTER
[facility] PHYSICAL [facility]  [address] [place] [address]  [phone]    2020    Re: Bob Olvera   :   1995  MRN:  [id number]   REFERRING PHYSICIAN:   Dena Troy    [facility]    Date of Initial Evaluation:  2020  Visits:  Rxs Used: 1  Reason for Referral:     Lumbago  Pain in thoracic spine    EVALUATION SUMMARY    Precautions/Restrictions/MD instructions: PT eval and treat.   Subjective:   Date of Onset: 3 months ago. MD order on 20.  C/C: bilateral lower thoracic pain. Quality of pain is aching. Patient reports bilateral numbness and tingling down to her feet. Pains are described as constant in nature. Pain is worse: with repetitive activity. Pain is rated 5/10.   History of symptoms: Pains began gradually as the result of the birth of her third child. Since onset, symptoms are unchanged. Previous episodes: after the birth of her second child she experienced the same pain for 3-4 months which disappeared with time. Patient reports that she always carries her child on her right hip.   Worsened by: prolonged standing and sitting, bending, lifting,  Alleviated by: exercises from midwife, rest, lying supine   Pertinent medical/surgical history: Lower thoracic pain after the birth of her second child. Patient denies night pain, unexplained weight loss, significant current illness, recent hospital admission, any regional implanted devices or history of surgery in the area.Imaging: none. Current occupational status: CNA. Patient's goals are: decrease pain, prolonged standing, household chores. Return to MD:  PRN.     Objective:  Gait:  increased trunk lean to right side and pelvic swing   THORACIC:  Thoracic AROM: (Major, Moderate, Minimal or Nil loss)  Movement Loss Venkat Mod Min Nil Pain   Flexion    x Present mid back    Extension  x      LUMBAR:  Neurological: All results within  normal limits unless otherwise noted.  Motor Deficit:  Myotomes L R   L1-2 (hip flexion) 4+/5 4+/5   L3 (knee extension) 5/5 5/5   L4 (ankle DF) 5/5 5/5   L5 (g. toe ext) 5/5 5/5   S1 (ankle PF or knee flex) 5/5 5/5   Sensory Deficit, Reflexes: Intact to light touch sensation in all LE dermatomes.   AROM: (Major, Moderate, Minimal or Nil loss)  Movement Loss Venkat Mod Min Nil Pain   Flexion   x  Present    Extension   x  present   Side Gliding L  x   tightness   Side Gliding R   x  tightness   Sacroiliac Special Tests  Distraction +   Compression -   Sacral Thrust Unable to press due to upper back pain   Femoral Sheer + right side   ELTON + on right with pain in upper back     Palpation: tightness in bilateral paraspinals in thoracic and lumbar spine    Assessment/Plan:    The patient is a 25 year old female with chief complaint of low thoracic pain.    The patient has the following significant findings with corresponding treatment plan.  Diagnosis 1:  Acute midline thoracic back pain     Pain -  hot/cold therapy and manual therapy  Decreased ROM/flexibility - manual therapy and therapeutic exercise  Decreased joint mobility - manual therapy and therapeutic exercise  Decreased strength - therapeutic exercise and therapeutic activities  Impaired muscle performance - neuro re-education  Decreased function - therapeutic activities  Impaired posture - neuro re-education  Therapy Evaluation Codes:   1) History comprised of:   Personal factors that impact the plan of care:      Past/current experiences, Profession and Time since onset of symptoms.    Comorbidity factors that impact the plan of care are:      None.     Medications impacting care: None.  2) Examination of Body Systems comprised of:   Body structures and functions that impact the plan of care:      Lumbar spine, Sacral illiac joint and Thoracic Spine.   Activity limitations that impact the plan of care are:      Cooking, Lifting, Standing and  Working.   Clinical presentation characteristics are:    Stable/Uncomplicated.  3) Presentation comprised of:   Presentation scored as Low complexity with uncomplicated characteristics..  4) Decision-Making    Low complexity using standardized patient assessment instrument and/or measureable assessment of functional outcome.  Cumulative Therapy Evaluation is: Low complexity.  Previous and current functional limitations:  (See Goal Flow Sheet for this information)    Short term and Long term goals: (See Goal Flow Sheet for this information)   Communication ability:  Patient appears to be able to clearly communicate and understand verbal and written communication and follow directions correctly.  Treatment Explanation - The following has been discussed with the patient: RX ordered/plan of care, anticipated outcomes, and possible risks and side effects.  This patient would benefit from PT intervention to resume normal activities.   Rehab potential is good.  Frequency:  1x week, once daily  Duration:  for 4 weeks tapering to 2x a month over 4 weeks  Discharge Plan: Achieve all LTGs, be independent in home treatment program, and reach maximal therapeutic benefit.    Thank you for your referral.    INQUIRIES  Therapist: Chani Carlisle DPT   [facility]  [address] [place] [address]  Phone: [phone]  Fax: [phone]

## 2020-10-13 ENCOUNTER — THERAPY VISIT (OUTPATIENT)
Dept: PHYSICAL THERAPY | Facility: CLINIC | Age: 25
End: 2020-10-13
Payer: COMMERCIAL

## 2020-10-13 DIAGNOSIS — M54.50 LUMBAGO: ICD-10-CM

## 2020-10-13 DIAGNOSIS — M54.6 PAIN IN THORACIC SPINE: ICD-10-CM

## 2020-10-13 PROCEDURE — 97110 THERAPEUTIC EXERCISES: CPT | Mod: GP | Performed by: PHYSICAL THERAPIST

## 2020-10-13 PROCEDURE — 97112 NEUROMUSCULAR REEDUCATION: CPT | Mod: GP | Performed by: PHYSICAL THERAPIST

## 2020-10-19 ENCOUNTER — THERAPY VISIT (OUTPATIENT)
Dept: PHYSICAL THERAPY | Facility: CLINIC | Age: 25
End: 2020-10-19
Payer: COMMERCIAL

## 2020-10-19 DIAGNOSIS — M54.6 PAIN IN THORACIC SPINE: ICD-10-CM

## 2020-10-19 DIAGNOSIS — M54.50 LUMBAGO: ICD-10-CM

## 2020-10-19 PROCEDURE — 97140 MANUAL THERAPY 1/> REGIONS: CPT | Mod: GP | Performed by: PHYSICAL THERAPIST

## 2020-10-19 PROCEDURE — 97112 NEUROMUSCULAR REEDUCATION: CPT | Mod: GP | Performed by: PHYSICAL THERAPIST

## 2020-10-19 PROCEDURE — 97110 THERAPEUTIC EXERCISES: CPT | Mod: GP | Performed by: PHYSICAL THERAPIST

## 2020-10-27 ENCOUNTER — THERAPY VISIT (OUTPATIENT)
Dept: PHYSICAL THERAPY | Facility: CLINIC | Age: 25
End: 2020-10-27
Payer: COMMERCIAL

## 2020-10-27 DIAGNOSIS — M54.6 PAIN IN THORACIC SPINE: ICD-10-CM

## 2020-10-27 DIAGNOSIS — M54.50 LUMBAGO: ICD-10-CM

## 2020-10-27 PROCEDURE — 97112 NEUROMUSCULAR REEDUCATION: CPT | Mod: GP | Performed by: PHYSICAL THERAPIST

## 2020-10-27 PROCEDURE — 97110 THERAPEUTIC EXERCISES: CPT | Mod: GP | Performed by: PHYSICAL THERAPIST

## 2021-01-07 PROBLEM — M54.50 LUMBAGO: Status: RESOLVED | Noted: 2020-09-29 | Resolved: 2021-01-07

## 2021-01-07 PROBLEM — M54.6 PAIN IN THORACIC SPINE: Status: RESOLVED | Noted: 2020-09-29 | Resolved: 2021-01-07

## 2021-01-07 NOTE — PROGRESS NOTES
Discharge Note    Progress reporting period is from initial evaluation date (please see noted date below) to Oct 27, 2020.  No linked episodes      Bob failed to follow up and current status is unknown.  Please see information below for last relevant information on current status.  Patient seen for 4 visits.    SUBJECTIVE  Subjective changes noted by patient:  Bob reports pain is still getting better. She continues to get pain with prolonged sitting, when sitting >1 hour  .  Current pain level is (0/10 at rest, moderate pain when sitting >1 hour ).     Previous pain level was  5/10.   Changes in function:  Yes (See Goal flowsheet attached for changes in current functional level)  Adverse reaction to treatment or activity: None    OBJECTIVE  Changes noted in objective findings: Full and pain free lumbar ROM today     ASSESSMENT/PLAN  Diagnosis: acute thoracic pain    Updated problem list and treatment plan:   Pain - HEP  Decreased ROM/flexibility - HEP  Decreased function - HEP  Decreased strength - HEP  STG/LTGs have been met or progress has been made towards goals:  Yes, please see goal flowsheet for most current information  Assessment of Progress: current status is unknown.    Last current status: Pt is progressing as expected   Self Management Plans:  HEP  I have re-evaluated this patient and find that the nature, scope, duration and intensity of the therapy is appropriate for the medical condition of the patient.  Bob continues to require the following intervention to meet STG and LTG's:  HEP.    Recommendations:  Discharge with current home program.  Patient to follow up with MD as needed.    Please refer to the daily flowsheet for treatment today, total treatment time and time spent performing 1:1 timed codes.

## 2021-04-22 ENCOUNTER — COMMUNICATION - HEALTHEAST (OUTPATIENT)
Dept: MIDWIFE SERVICES | Facility: CLINIC | Age: 26
End: 2021-04-22

## 2021-04-22 ENCOUNTER — RECORDS - HEALTHEAST (OUTPATIENT)
Dept: ADMINISTRATIVE | Facility: OTHER | Age: 26
End: 2021-04-22

## 2021-04-22 ENCOUNTER — PRENATAL OFFICE VISIT - HEALTHEAST (OUTPATIENT)
Dept: MIDWIFE SERVICES | Facility: CLINIC | Age: 26
End: 2021-04-22

## 2021-04-22 DIAGNOSIS — Z86.32 HISTORY OF GESTATIONAL DIABETES: ICD-10-CM

## 2021-04-22 DIAGNOSIS — Z87.59 HISTORY OF POSTPARTUM HEMORRHAGE: ICD-10-CM

## 2021-04-22 DIAGNOSIS — Z87.59 HISTORY OF THIRD DEGREE PERINEAL LACERATION: ICD-10-CM

## 2021-04-22 DIAGNOSIS — Z34.81 ENCOUNTER FOR SUPERVISION OF OTHER NORMAL PREGNANCY, FIRST TRIMESTER: ICD-10-CM

## 2021-04-22 LAB
BASOPHILS # BLD AUTO: 0 THOU/UL (ref 0–0.2)
BASOPHILS NFR BLD AUTO: 0 % (ref 0–2)
EOSINOPHIL # BLD AUTO: 0 THOU/UL (ref 0–0.4)
EOSINOPHIL NFR BLD AUTO: 1 % (ref 0–6)
ERYTHROCYTE [DISTWIDTH] IN BLOOD BY AUTOMATED COUNT: 14.2 % (ref 11–14.5)
HCT VFR BLD AUTO: 33.2 % (ref 35–47)
HGB BLD-MCNC: 10.7 G/DL (ref 12–16)
HIV 1+2 AB+HIV1 P24 AG SERPL QL IA: NEGATIVE
IMM GRANULOCYTES # BLD: 0 THOU/UL
IMM GRANULOCYTES NFR BLD: 0 %
LYMPHOCYTES # BLD AUTO: 1.6 THOU/UL (ref 0.8–4.4)
LYMPHOCYTES NFR BLD AUTO: 30 % (ref 20–40)
MCH RBC QN AUTO: 24.9 PG (ref 27–34)
MCHC RBC AUTO-ENTMCNC: 32.2 G/DL (ref 32–36)
MCV RBC AUTO: 77 FL (ref 80–100)
MONOCYTES # BLD AUTO: 0.3 THOU/UL (ref 0–0.9)
MONOCYTES NFR BLD AUTO: 5 % (ref 2–10)
NEUTROPHILS # BLD AUTO: 3.4 THOU/UL (ref 2–7.7)
NEUTROPHILS NFR BLD AUTO: 63 % (ref 50–70)
PLATELET # BLD AUTO: 265 THOU/UL (ref 140–440)
PMV BLD AUTO: 10.8 FL (ref 8.5–12.5)
RBC # BLD AUTO: 4.29 MILL/UL (ref 3.8–5.4)
WBC: 5.4 THOU/UL (ref 4–11)

## 2021-04-22 ASSESSMENT — EDINBURGH POSTNATAL DEPRESSION SCALE (EPDS): TOTAL SCORE: 0

## 2021-04-22 ASSESSMENT — MIFFLIN-ST. JEOR: SCORE: 1288.7

## 2021-04-23 LAB
ABO/RH(D): NORMAL
ABORH REPEAT: NORMAL
ANTIBODY SCREEN: NEGATIVE
BACTERIA SPEC CULT: NO GROWTH
HBV SURFACE AG SERPL QL IA: NEGATIVE
RUBV IGG SERPL QL IA: POSITIVE
T PALLIDUM AB SER QL: NEGATIVE

## 2021-04-26 ENCOUNTER — AMBULATORY - HEALTHEAST (OUTPATIENT)
Dept: OBGYN | Facility: CLINIC | Age: 26
End: 2021-04-26

## 2021-04-26 ENCOUNTER — HOSPITAL ENCOUNTER (OUTPATIENT)
Dept: ULTRASOUND IMAGING | Facility: CLINIC | Age: 26
Discharge: HOME | End: 2021-04-26
Attending: ADVANCED PRACTICE MIDWIFE
Payer: COMMERCIAL

## 2021-04-26 DIAGNOSIS — Z34.81 ENCOUNTER FOR SUPERVISION OF OTHER NORMAL PREGNANCY, FIRST TRIMESTER: ICD-10-CM

## 2021-04-30 ENCOUNTER — COMMUNICATION - HEALTHEAST (OUTPATIENT)
Dept: ADMINISTRATIVE | Facility: CLINIC | Age: 26
End: 2021-04-30

## 2021-05-27 VITALS
SYSTOLIC BLOOD PRESSURE: 102 MMHG | HEART RATE: 148 BPM | TEMPERATURE: 98.2 F | DIASTOLIC BLOOD PRESSURE: 64 MMHG | RESPIRATION RATE: 18 BRPM

## 2021-05-31 VITALS — BODY MASS INDEX: 24.35 KG/M2 | HEIGHT: 61 IN | WEIGHT: 129 LBS

## 2021-05-31 VITALS — BODY MASS INDEX: 23.79 KG/M2 | WEIGHT: 129 LBS

## 2021-05-31 VITALS — BODY MASS INDEX: 25.11 KG/M2 | HEIGHT: 61 IN | WEIGHT: 133 LBS

## 2021-06-01 ENCOUNTER — AMBULATORY - HEALTHEAST (OUTPATIENT)
Dept: MIDWIFE SERVICES | Facility: CLINIC | Age: 26
End: 2021-06-01

## 2021-06-01 ENCOUNTER — PRENATAL OFFICE VISIT - HEALTHEAST (OUTPATIENT)
Dept: MIDWIFE SERVICES | Facility: CLINIC | Age: 26
End: 2021-06-01

## 2021-06-01 VITALS — WEIGHT: 144 LBS | BODY MASS INDEX: 27.19 KG/M2 | HEIGHT: 61 IN

## 2021-06-01 VITALS — BODY MASS INDEX: 28.32 KG/M2 | WEIGHT: 150 LBS | HEIGHT: 61 IN

## 2021-06-01 VITALS — BODY MASS INDEX: 28.13 KG/M2 | HEIGHT: 61 IN | WEIGHT: 149 LBS

## 2021-06-01 VITALS — HEIGHT: 61 IN | BODY MASS INDEX: 26.06 KG/M2 | WEIGHT: 138 LBS

## 2021-06-01 VITALS — HEIGHT: 61 IN | WEIGHT: 139 LBS | BODY MASS INDEX: 26.24 KG/M2

## 2021-06-01 VITALS — HEIGHT: 61 IN | WEIGHT: 149.25 LBS | BODY MASS INDEX: 28.18 KG/M2

## 2021-06-01 VITALS — WEIGHT: 141 LBS | HEIGHT: 61 IN | BODY MASS INDEX: 26.62 KG/M2

## 2021-06-01 VITALS — BODY MASS INDEX: 27.91 KG/M2 | WEIGHT: 148.9 LBS

## 2021-06-01 VITALS — HEIGHT: 61 IN | WEIGHT: 147 LBS | BODY MASS INDEX: 27.75 KG/M2

## 2021-06-01 VITALS — BODY MASS INDEX: 27.38 KG/M2 | HEIGHT: 61 IN | WEIGHT: 145 LBS

## 2021-06-01 VITALS — BODY MASS INDEX: 28.26 KG/M2 | WEIGHT: 150.8 LBS

## 2021-06-01 VITALS — WEIGHT: 146 LBS | BODY MASS INDEX: 27.56 KG/M2 | HEIGHT: 61 IN

## 2021-06-01 DIAGNOSIS — Z34.81 ENCOUNTER FOR SUPERVISION OF OTHER NORMAL PREGNANCY, FIRST TRIMESTER: ICD-10-CM

## 2021-06-01 DIAGNOSIS — M54.50 LOW BACK PAIN DURING PREGNANCY IN SECOND TRIMESTER: ICD-10-CM

## 2021-06-01 DIAGNOSIS — O26.892 LOW BACK PAIN DURING PREGNANCY IN SECOND TRIMESTER: ICD-10-CM

## 2021-06-01 RX ORDER — FERROUS SULFATE 325(65) MG
1 TABLET ORAL
Qty: 90 TABLET | Refills: 3 | Status: SHIPPED | OUTPATIENT
Start: 2021-06-01 | End: 2021-10-14 | Stop reason: ALTCHOICE

## 2021-06-01 ASSESSMENT — MIFFLIN-ST. JEOR: SCORE: 1311.38

## 2021-06-02 VITALS — WEIGHT: 131.19 LBS | BODY MASS INDEX: 24.59 KG/M2

## 2021-06-02 NOTE — PROGRESS NOTES
"  Assessment:     , 7 2/ by LMP  Hx PPH, with first pregnancy  Hx GDM, second pregnancy  Hx 3rd degree laceration, with second pregnancy     Plan:   1. Discussed working Estimated Date of Delivery: 20. Will get US to confirm dating. Referral given.   2. Oriented to HE CNM care; group and practice info reviewed.   3. 1st trimester teaching: encouraged well-balanced diet, pre-lindsay vitamins, vitamin D3 and omega 3 supplements, daily and walking for exercise. Discussed warning signs and when to call.   4. She will schedule her initial OB visit in 4 weeks.   5. Unisom and vitamin B6 regimen given    Total time spent with patient 20 minutes, >50% counseling, education and coordination of care.   Subjective:     Bob is a 24 y.o. y.o.  who presents for pregnancy confirmation. pregnancy is planned/desired.  She is here with her .  Contraception: none.      Current symptoms also include: fatigue, morning sickness, nausea and positive home pregnancy test.  Reviewed dietary changes to help reduce nausea as well as Unisom and vitamin B6.  She is not wanting to eat very much due to her nausea.  Encouraged small meals more frequently containing some protein.  Also encouraged good hydration.    History of gestational diabetes.  Discussed diet recommendations and exercise to help prevent recurrence of diabetes with this pregnancy.     Patient's last menstrual period was 2019.. She is certain of this date. Menstrual cycles are regular, occuring every 28-30 days.  Last period was normal.      Review of Systems  Denies bleeding, pain or cramping, abnormal vaginal discharge or dysuria.  Objective:     BP 94/56 (Patient Site: Left Arm, Patient Position: Sitting, Cuff Size: Adult Regular)   Pulse 62   Resp 14   Ht 5' 1.25\" (1.556 m)   Wt 123 lb (55.8 kg)   LMP 2019   Breastfeeding? No   BMI 23.05 kg/m     General: alert and no acute distress      Lab Review  Urine HCG: positive      "

## 2021-06-02 NOTE — PATIENT INSTRUCTIONS - HE
Welcome to [facility] and thank you for choosing us for your maternity care provider!  Congratulations!    [facility] Nurse Midwives - Contact information:  Appointment line and to get a hold of CNM in clinic Monday-Friday 8 am - 5 pm:  [phone].  There are some clinics with early start times (1st appointment 7:40 am) and others with evening hours (last appointment 6:20 pm).  Most are typically open from 8 am to 5 pm.    CNM on call answering service: [phone].  Specify your hospital of choice and leave a brief message for CNM;  will then page CNM who is on call at your specified hospital and you should receive a call back with 15 minutes.  Be sure that your ringer is audible and that you can accept blocked calls so that we can get back in touch with you! This number should be reserved for urgent needs if during the day, before 8 am, after 5 pm, weekends, holidays.      Pregnancy: Body Changes  From conception (fertilization) until after the birth of your child, you and your baby will change every day. To help you understand what is happening, we ve outlined how pregnancy begins and some of the changes you may notice.  How Pregnancy Begins  Conception is the union of a sperm and an egg. When it occurs, your baby s genetic makeup is complete, even its sex. Fertilization takes place in the fallopian tube. The fertilized egg then travels down this tube to the uterus (womb). The egg attaches to the lining of the uterus about a week later. There it grows and is nourished.    Your Changing Body  Pregnancy affects almost every part of your body. You may notice some of the following physical and emotional changes:    Your uterus expands outward and upward as your baby grows. You may feel pressure on your bladder, stomach, and other organs.    You may notice skin color changes on your forehead, nose, and cheeks. A dark line may form from your bellybutton down to your pubic area. The skin color around your  nipples and thighs may also change.    Pink stretch marks may appear on your abdomen, breasts, or hips.    Your hair may seem thicker. You lose less hair during pregnancy.    You may feel fine one day and weepy the next. This is caused by changes in your body, such as increased hormones (chemicals that affect the function of certain organs and also your moods).      Adapting to Pregnancy: First Trimester  As your body adjusts, you may have to change or limit your daily activities. You ll need more rest. You may also need to use the energy you have more wisely.  Eat stomach-friendly foods like cottage cheese, crackers, or bread throughout the day.    Your Changing Body  Almost every part of your body is affected as you adapt to pregnancy. The uterus and cervix will begin to soften right away. You may not look very pregnant during the first three months. But you are likely to have some common signs of early pregnancy:    Nausea    Fatigue    Frequent urination    Mood swings    Bloating of the abdomen    Missed or light periods (first trimester bleeding)    Nipple or breast tenderness, breast swelling      It s Not Too Late to Start Good Habits  What matters most is protecting your baby from this moment on. If you smoke, drink alcohol, or use drugs, now is the time to stop. If you need help, talk with your health care provider.    Smoking increases the risk of stillbirth or having a low-birth-weight baby. If you smoke, quit now.    Alcohol and drugs have been linked with miscarriage, birth defects, intellectual disability, and low birth weight. Do not drink alcohol or take drugs.    Tips to Relieve Nausea  Although nausea can occur at any time of the day, it may be worse in the morning. To help prevent nausea:    Eat small, light meals at frequent intervals.    Get up slowly. Eat a few unsalted crackers before you get out of bed.    Drink water with lemon slices.    Eat an ice pop in your favorite flavor.    Ask your  health care provider about taking abelardo or vitamin B6 for nausea and vomiting.    Talk with your health care provider if you take vitamins that upset your stomach.    Work Concerns  The end of the first trimester is a good time to discuss working during pregnancy with your employer. Follow your health care provider s advice if your job requires you to stand for a long time, work with hazardous tools, or even sit at a desk all day. Your workspace, workload, or scheduled hours may need to be adjusted. Perhaps you can change body postures more often or take an extra break.  Advice for Travel  Talk to your health care provider first, but the second trimester may be the best time for any travel. You may be advised to avoid certain trips while you re pregnant. Food and water can be concerns in developing countries. Travel by car is a good choice, as you can stop, get out, and stretch. Bring snacks and water along. Fasten the lap belt below your belly, low over your hips. Also be sure to wear the shoulder harness.  Intimacy  Unless your health care provider tells you to, there is no reason to stop having sex while you re pregnant. You or your partner may notice changes in desire. Desire may be less in the first trimester, due to nausea and fatigue. In the second trimester, sex may be very enjoyable. The third trimester can be a challenge comfort-wise. Try different positions and see what s best for you both.      Pregnancy: Your First Trimester Changes  The first trimester is a time of rapid development for your baby. Because your baby is growing so quickly, it is important that you start a healthy lifestyle right away. By the end of the first trimester, your baby has formed all of its major body organs and weighs just over an ounce.    Month 1 (Weeks 1-4)  The placenta (the organ that nourishes your baby) begins to form. The heart and lungs begin to develop. Your baby is about 1/4 inch long by the end of the first  month.    Month 2 (Weeks 5-8)  All of your baby s major body organs form. The face, fingers, toes, ears, and eyes appear. By the end of the month, your baby is about 1 inch long.    Month 3 (Weeks 9-12)  Your baby can open and close its fists and mouth. The sexual organs begin to form. As the first trimester ends, your baby is about 4 inches long.      Pregnancy: Your Weight  Being a healthy weight is important for both you and your baby. The weight you gain now is not just extra fat. It is also the weight of your baby. And it is the increased blood and fluids to support the baby. A slow, steady rate of gain is best. How much you should gain depends on your weight before getting pregnant. Check with your health care provider to find out what is right for you.    If You Gain Too Much  Gaining too much weight might cause you to feel tired or you could have a harder pregnancy or birth. If you and your health care provider decide you re gaining too much:    Eat fewer fats and sugars. Instead, eat fruit, vegetables, and whole-grain foods.    Drink plenty of water between meals.    Get at least 20 minutes of light exercise, such as walking, each day.    Don t diet. You might not get enough of the nutrients you or your baby needs.    Keep a diet diary to help you gauge what and how much you are eating .    If You re Not Gaining Enough  If you don t gain enough, your baby could be too small or have health problems. Women tend to gain most of their weight in the second and third trimesters. For now:    Eat many types of foods. Make sure you get enough calcium, protein, and carbohydrates.    Don t skip meals.    Eat healthy snacks.    Pick nutrient-dense, high calorie healthy food like trail mix or protein shakes.    See a dietitian for help.    Talk to your healthcare provider if you have had an eating disorder or problems with certain foods.      Pregnancy: Common Questions  There are plenty of myths and  old wives  tales   surrounding pregnancy. You may need help  fact from fiction. On this sheet, you ll find answers to a few common questions. If you have other questions, talk with a midwife.    Will Working Harm My Baby?  In most cases, working throughout your pregnancy is not harmful at all. There may be concerns if the job involves dangerous machinery or chemicals, lifting, or standing for very long periods of time. Talk to your health care provider and employer about your particular job and pregnancy.  Why Can t I Change the Cat Litter Box?  Cats carry a disease called toxoplasmosis. In adult humans, it shows up as a mild infection of the blood and organs. If you are infected during pregnancy, the baby s brain and eyes could be damaged. To be safe, have someone else change the litter. If you must handle it, wear a paper mask over your nose and mouth. Also, wear gloves and wash your hands afterward.  Which Medications Are Safe?  No prescription or over-the-counter drug is safe for everyone all of the time. But sometimes medications are needed. Be sure your health care provider knows you are pregnant. Then use only the medications he or she advises you to take. Please refer to the below resources for further information and discuss concern and questions with your midwife.  Is It True That I Can Overheat My Baby?  Yes. To avoid making your baby too warm:    Don t sit in a jacuzzi. A long, warm bath is fine, but not in water over 100 F.    Exercise less intensely if you feel fatigued. Base your workout on how you feel, not your heart rate. Heart rates aren t a good way to measure effort during pregnancy.  Can I Lift and Carry Safely?  Yes, if your health care provider doesn t tell you otherwise. Learn to lift and carry safely to avoid injury and reduce back pain during pregnancy. To protect your back:    Bend at the knees to bring the load nearer.    Get a good . Test the weight of the load.    Tighten your abdomen.  Exhale as you lift.    Lift with your legs, not with your back.    Carry the load close to your body.    Hold the load so you can see where you are going.  What If I Get Sick?  Most women get sick at least once during pregnancy. Talk with your health care provider if you do. Most likely it will not affect your pregnancy. Get plenty of rest and fluids, and eat what you can. Talk to your health care provider before taking any medications.        HEALTHY PREGNANCY CARE: 10-14 WEEKS PREGNANT     By weeks 10 to 14 of your pregnancy, the placenta has formed inside your uterus. It may be possible to hear your baby's heartbeat with a doppler ultrasound device. Your baby's eyes can and do move. The arms and legs can bend.    GENETIC SCREENING OPTIONS AT [facility]                All testing is optional. We don t recommend or discourage any test; it is totally up to you and your partner. Some couples wish to know their risk of having a baby with a genetic defect and others do not. We will support your decision. Abnormal results may lead to a discussion of options for further testing.    Accurate dating of your pregnancy is important for all testing so an ultrasound may be done prior to referral or testing.    No testing provides certainty; there are false positives and negatives associated with all testing, some more than others.    Most genetic testing is non-invasive (requires only a blood sample and sometimes an ultrasound or both).    It is always wise to check with your insurance carrier before proceeding.    Some testing can be done at our lab and some require a referral.    If you decide to do no testing, the 20 week ultrasound scan, which is a routine or standard ultrasound, has some ability to detect abnormalities in the baby, and identifies obstetric problems.    If you are over 35, you will have the option of a Level II ultrasound, which is a more detailed and targeting scan, that helps detect fetal anomalies as  well as obstetric problems.      If you have a more complex family history of chromosomal abnormalities, a referral to a genetic counselor and/or a Maternal Fetal Medicine specialist, to help identify available tests, may be recommended.    TYPES OF GENETIC TESTING AVAILABLE INCLUDE:    Carrier Screening/Testing for Genetic Conditions    There are many inherited conditions for which testing or carrier testing is available. Carrier screening can test for conditions like Cystic Fibrosis, Thalassemia, Ruben Sachs, Sickle Cell, Hemophilia, Muscular Dystrophy, Gerald s disease and many others.     Cystic Fibrosis (CF) affects both males and females and people from all racial and ethnic groups. However, the disease is most common among Caucasians of Northern  descent. CF is also common among Latinos and American Indians. The disease is less common among  Americans and  Americans. More than 10 million Americans are carriers of a faulty CF gene and many of them don't know that they are CF carriers. One or both parents can be tested any time before or during pregnancy.    Talk to your care provider about your family history and whether you should be screened. A referral to a genetic counselor at [facility] or [facility] can be made by your care provider at any time.    This is also tested for in the [place] Metabolic Screen that your infant receives 24 hours after birth.     Fetal DNA cell Testing: [place] or Innatal (Non-Invasive Prenatal Testing)    At 10 wk or greater, a blood sample can be drawn here at clinic or at any of our referral offices. It will provide highly accurate results with low false positive rates for trisomy 18, trisomy 21 (Down Syndrome), and trisomy 13 (> 99% trisomy detection rate at a false positive rate of <0.1%). Gender identification can also be obtained if desired (98% accuracy).  Can also detect some sex-linked chromosomal abnormalities (80-90% accuracy).   This is often done if one of the other screening tests is abnormal.        1st Trimester Screening:     Everyone has an age related risk of having a baby with a genetic abnormality. This testing provides a risk profile which is better than assigning risk based solely on your age.    Refines your risk of having a baby with a chromosomal abnormality such as Trisomy 21 & 18.    This test with detect a fetus with one of these disorders about 85% of the time.    A thickened nuchal fold can also be associated with cardiac defects.    This test requires a blood collection combined with ultrasound to obtain a measurement of fluid at back of baby s neck (nuchal translucency).    False positive results occur approximately 5% of cases.    An additional blood sample may be necessary in order to calculate your risk of having a baby with a neural tube defect (e.g. spina bifida).  This is called the AFP test (alpha fetal protein).  An ultrasound should be able to  any spinal defect as well.    Follow-up of an abnormal test may include a more extensive ultrasound study and you may be offered an amniocentesis (a small sample of amniotic fluid is withdrawn and studied) for a definitive diagnosis    Quad Screen (4-marker screen)    Between 15 and 21 weeks, a sample of blood can be drawn at our lab to assess your risk of having a baby with Down Syndrome, Trisomy 18 and neural tube defects. Such testing is able to detect these conditions in 80% of cases and the false-positive rate is approximately 5%.     Follow-up of an abnormal test may include a more extensive ultrasound study and you may be offered an amniocentesis (a small sample of amniotic fluid is withdrawn and studied) for a definitive diagnosis      Referrals opportunities include:    [facility],  [facility]  o Offers nuchal translucency ultrasound; does not offer genetic counseling.    [place]  [facility] and [facility]  [facility] ([facility]):   o Approximately an hour long visit includes 30 min with genetic counselor who discusses all testing available and which ones might be beneficial to you based on age, personal and family history.    o Blood will be drawn and the nuchal translucency ultrasound will be discussed and performed if desired. The Free Fetal DNA testing ([place]/[employer]) can be drawn also.  o Targeted or detailed Level II ultrasounds are also available with these perinatology groups.        Breastfeeding: a Healthy Option for You and Your Baby  Consider breastfeeding for the healthiest way to feed your baby. Ask your midwife or physician for more information.     The choice of how you will feed your baby is important.  Before your baby s birth, you ll want to learn about the benefits of breastfeeding.  [facility] have been designated Baby Friendly; an initiative that was created by the World Health Organization and UNICEF.  This helps give you and your baby the best start in feeding their baby.    Why should I breastfeed my baby?    Babies are less likely to develop childhood obesity or diabetes    Babies are less likely to suffer from recurrent ear infections    Babies are less likely to be hospitalized for respiratory conditions    Breast milk is rich in nutrients and antibodies-it is easy to digest    How does it benefit me?    Lowers the risk for diabetes, breast and ovarian cancer and postpartum depression    Moms can lose  baby weight  more quickly    Cost savings - formula can cost well over $1,500 per year    Convenient - no bottles and nipples to sterilize, no measuring and mixing formula    The physical contact with breastfeeding can make babies feel secure, warm and comforted     What about formula?  While you and your baby are staying with us at [facility], we will support whatever feeding choice you make for your baby.    Some important considerations:      The American Academy of  Pediatrics, the World Health Organization, and many more organizations recommend exclusive breastfeeding for 6 months and continued breastfeeding while adding other foods for the first 1-2 years.      Any amount of breastmilk has benefits to both baby and mother.    Giving formula in replacement of breastfeeding can affect mother s milk supply.  If formula is needed, hospital staff will work with you on a plan to help develop your milk supply.    Formula alters the natural growth of good bacteria in the  stomach.     Research has found that first time mothers who offer formula in the hospital have a shorter duration of breastfeeding.    How can I start to prepare?     Start by having a conversation with your medical provider.     Talk with your partner, family and friends.     Attend a prenatal class that includes breastfeeding preparation. Birth and breastfeeding classes are offered by [employer]. Visit [employer] for class information.     After your baby s birth, hospital staff and lactation consultants will help you and your baby get off to a great start with breastfeeding.    As your center of gravity and weight changes, use good body mechanics when changing positions and lifting. For example, use a straight back and your legs for support when lifting instead of bending over. Maintain good posture to prevent straining your muscles. Now is a good time to continue or restart your exercise program. Walking 30-60 minutes daily is an excellent way to keep fit. Yoga and swimming also offer many benefits.    The nausea and fatigue of early pregnancy have usually started to let up, so this is a good time to focus on nutrition. Consider attending a nutrition class. A healthy diet includes about 60 grams of protein each day (3-4 servings of dairy, 2-3 servings of meat/fish/poultry/nuts), 4-6 servings of whole grain foods, and 5-6 servings of fruits and vegetables. Remember to drink 6-8  glasses of water daily.    Watch for warning signs, such as     vaginal bleeding    fluid leaking from your vagina    severe abdominal pain    nausea and vomiting more than 4-5 times a day, or if you are unable to keep anything down    fever more than 100.4 degrees F.       RESOURCES   You can refer to the Starting Out Right book or find it online at http://[email or web address].org/images/stories/maternity/[facility]-Starting-Out-Right.pdf or http://www.[facility].org/images/stories/flipbooks/[facility]-starting-out-right/[facility]-starting-out-right.html#p=8    You can sign up for a weekly parenting e-mail that gives support, tips and advice from health care professionals that starts with pregnancy and continues through the toddler years. To register, go to [email or web address] at any time during your pregnancy.    Breastfeeding:    OUTPATIENT LACTATION RESOURCES    [facility] and [facility]: [email or web address]       -Schedule an appointment with a [facility] CNM who is also a Lactation Consultant by calling [phone]     -Schedule an appointment with a [facility] CNM who is also a Lactation Consultant by calling [phone]. We see women for breastfeeding visits at [facility] and [facility] Tuesday - Thursday.     -Schedule an outpatient appointment with one of the [facility] Lactation Consultants at [facility], or [facility] by calling [phone]    -Attend a baby weigh in at [facility].  Lactation consultants are available to answer questions  [facility]: Tuesdays 1:00 - 2:00  [facility]: Mondays 1:00 - 2:00   [email or web address] [email or web address] [email or web address]    -Attend one of the New Mama groups at [facility] in [facility].  [facility] also offers one-on-one in home and in office lactation consults.   [email or web address]    -Attend a LeLeche League meeting.  Multiple groups in several locations throughout  [facility]. The meetings are no-cost and always informative breastfeeding education session through [employer] La Leche League  Www.lllofmndas.org/    Childbirth and Parenting Education:   [facility]: http://[facility] HospitalGlobal [employer] Media/   (095) 335-KJER  [place]: (education, yoga & wellness) www.nap- Naturally Attached Parentsa.[employer]  Enlightened Mama: www.enlightenedmama.[employer]   Childbirth collective: (Parent topic nights)  www.childbirthcollective.org/  Hypnobabies:  www.hypnobabiestwincities.com/  Hypnobirthing:  Http://hypnobirthing.com/    Book Recommendations:   Yudith Brady's Birthing From Within--first few chapters include a new-age tone, you may prefer to skip it and keep going, because there is good stuff later.  This book recommendation covers emotional preparation, but does cover coping with pain, and use of both pharmacological and nonpharmacological methods.    Dr. Rivera' The Pregnancy Book and The Birth Book--the pregnancy book goes month-by month    Womanly Art of Breastfeeding by La Leche League International   Bestfeeding by Alma Sierra--great pictures    Mothering Your Nursing Toddler, by Sandra Peralta.   Addresses dealing with so many of the challenging behaviors of a nursing toddler.  How Weaning Happens, by La Leche League.  Discusses weaning at all ages, from medically necessary weaning of an infant, all the way up to age 5 (or older), with why/why not, and strategies.  Very empowering book both for deciding to wean and deciding not to.    American College of Nurse-Midwives (ACNM) http://www.midwife.org/; look at the informational handouts at http://www.midwife.org/Share-With-Women     www.mymidwife.org    Mother to Baby (Medication and Herbal guidance in pregnancy): http://www.mothertobaby.org  Toll-Free Hotline: 671.967.5485  LactMed (Medication use while breastfeeding): http://toxnet.nlm.nih.gov/newtoxnet/lactmed.htm    Women's Health.gov:  http://www.womenshealth.gov/a-z-topics/index.html    [facility]  "pregnancy association - http://americanpregnancy.org    Centering Pregnancy (group prenatal care option): http://centeringhealthcare.org    Information about doulas:  Childbirth collective: http://www.childbirthcollective.org/  [facility] [place] ([place]):  www.melissa.org  [facility]  project: http://twincitiesdoulaproject.com/     Early Childhood and Family Education (ECFE):  ECFE offers parents hands-on learning experiences that will nourish a lifetime of teachable moments.  http://ecfe.info/ecfe-home/    [employer] www.[employer]     FDA - Nutrition  www.mypyramid.gov  Under \"For Consumers,\" click on \"pregnant and breastfeeding women.\"      Centers for Disease Control and Prevention (CDC) - Vaccines : http://www.cdc.gov/vaccines/       When researching information on the web, question the validity of websites.  The [employer] .gov, [employer] and[employer] tend to be more reliable information.  If there are a lot of advertisements, be cautious of the information provided. Stay away from blogs and chat rooms please!      Nutrition & supplements:     Prenatal vitamin (those with 600-1000 mcg folic acid and 27 mg of iron are enough).  Take with food or Juice     4-5 servings of dairy or other calcium rich foods (fish, leafy greens, soy) per day - if not, take 500-1000 mg additional calcium (Tums, pills, chews). Take with dairy     Vitamin D3 6526-7509 IU geltab daily.  Take with fattiest meal.  Look for fortified foods also (Dairy, Juice)     2-3 (4) oz servings of fish, seafood, nuts (walnuts & almonds), oils, avocado per week - if not, take Omega 3 Fatty acids: DHA & EDE 8627-5091 mg per day.  Other names: cod liver oil, fish oil. Take with fattiest meal.  Some prenatals have DHA, but typically not a sufficient dose.    Fish: Do not eat shark, swordfish, catia mackerel, or tilefish when you are pregnant or breastfeeding.  They contain high levels of mercury.  Limit white (albacore) tuna to no more than 6 ounces " per week. Http://www.fda.gov/downloads/ForConsumers/ConsumerUpdates/WOS919053.pdf         [facility]  To schedule a tour at either [facility] or [facility], please do so online using the following links:  [facility] - [email or web address]  [facility] - [email or web address]     You are invited to  Meet the [facility] Nurse-Midwives  A way to tour the hospital Labor and Delivery unit and meet the midwives that attend births since you may not have the opportunity to meet them during your prenatal care.  Some sessions are informal meet and greet type social hours, others address a specific concern or topic.    Tuesday, Februrary 12, 2019 7-8pm  [facility]    Wednesday, April 17, 2019 7-8pm  [facility], [facility]    Thursday, August 15, 2019 7-8pm  [facility]    Wednesday November 13, 2019 7-8 pm  [facility], [facility]    Please call [phone] to register

## 2021-06-03 VITALS
SYSTOLIC BLOOD PRESSURE: 94 MMHG | BODY MASS INDEX: 23.22 KG/M2 | HEIGHT: 61 IN | HEART RATE: 62 BPM | RESPIRATION RATE: 14 BRPM | WEIGHT: 123 LBS | DIASTOLIC BLOOD PRESSURE: 56 MMHG

## 2021-06-03 NOTE — PATIENT INSTRUCTIONS - HE
Welcome to [facility] and thank you for choosing us for your maternity care provider!  Congratulations!    [facility] Nurse Midwives - Contact information:  Appointment line and to get a hold of CNM in clinic Monday-Friday 8 am - 5 pm:  [phone].  There are some clinics with early start times (1st appointment 7:40 am) and others with evening hours (last appointment 6:20 pm).  Most are typically open from 8 am to 5 pm.    CNM on call answering service: [phone].  Specify your hospital of choice and leave a brief message for CNM;  will then page CNM who is on call at your specified hospital and you should receive a call back with 15 minutes.  Be sure that your ringer is audible and that you can accept blocked calls so that we can get back in touch with you! This number should be reserved for urgent needs if during the day, before 8 am, after 5 pm, weekends, holidays.      Pregnancy: Body Changes  From conception (fertilization) until after the birth of your child, you and your baby will change every day. To help you understand what is happening, we ve outlined how pregnancy begins and some of the changes you may notice.  How Pregnancy Begins  Conception is the union of a sperm and an egg. When it occurs, your baby s genetic makeup is complete, even its sex. Fertilization takes place in the fallopian tube. The fertilized egg then travels down this tube to the uterus (womb). The egg attaches to the lining of the uterus about a week later. There it grows and is nourished.    Your Changing Body  Pregnancy affects almost every part of your body. You may notice some of the following physical and emotional changes:    Your uterus expands outward and upward as your baby grows. You may feel pressure on your bladder, stomach, and other organs.    You may notice skin color changes on your forehead, nose, and cheeks. A dark line may form from your bellybutton down to your pubic area. The skin color around your  nipples and thighs may also change.    Pink stretch marks may appear on your abdomen, breasts, or hips.    Your hair may seem thicker. You lose less hair during pregnancy.    You may feel fine one day and weepy the next. This is caused by changes in your body, such as increased hormones (chemicals that affect the function of certain organs and also your moods).      Adapting to Pregnancy: First Trimester  As your body adjusts, you may have to change or limit your daily activities. You ll need more rest. You may also need to use the energy you have more wisely.  Eat stomach-friendly foods like cottage cheese, crackers, or bread throughout the day.    Your Changing Body  Almost every part of your body is affected as you adapt to pregnancy. The uterus and cervix will begin to soften right away. You may not look very pregnant during the first three months. But you are likely to have some common signs of early pregnancy:    Nausea    Fatigue    Frequent urination    Mood swings    Bloating of the abdomen    Missed or light periods (first trimester bleeding)    Nipple or breast tenderness, breast swelling      It s Not Too Late to Start Good Habits  What matters most is protecting your baby from this moment on. If you smoke, drink alcohol, or use drugs, now is the time to stop. If you need help, talk with your health care provider.    Smoking increases the risk of stillbirth or having a low-birth-weight baby. If you smoke, quit now.    Alcohol and drugs have been linked with miscarriage, birth defects, intellectual disability, and low birth weight. Do not drink alcohol or take drugs.    Tips to Relieve Nausea  Although nausea can occur at any time of the day, it may be worse in the morning. To help prevent nausea:    Eat small, light meals at frequent intervals.    Get up slowly. Eat a few unsalted crackers before you get out of bed.    Drink water with lemon slices.    Eat an ice pop in your favorite flavor.    Ask your  health care provider about taking abelardo or vitamin B6 for nausea and vomiting.    Talk with your health care provider if you take vitamins that upset your stomach.    Work Concerns  The end of the first trimester is a good time to discuss working during pregnancy with your employer. Follow your health care provider s advice if your job requires you to stand for a long time, work with hazardous tools, or even sit at a desk all day. Your workspace, workload, or scheduled hours may need to be adjusted. Perhaps you can change body postures more often or take an extra break.  Advice for Travel  Talk to your health care provider first, but the second trimester may be the best time for any travel. You may be advised to avoid certain trips while you re pregnant. Food and water can be concerns in developing countries. Travel by car is a good choice, as you can stop, get out, and stretch. Bring snacks and water along. Fasten the lap belt below your belly, low over your hips. Also be sure to wear the shoulder harness.  Intimacy  Unless your health care provider tells you to, there is no reason to stop having sex while you re pregnant. You or your partner may notice changes in desire. Desire may be less in the first trimester, due to nausea and fatigue. In the second trimester, sex may be very enjoyable. The third trimester can be a challenge comfort-wise. Try different positions and see what s best for you both.      Pregnancy: Your First Trimester Changes  The first trimester is a time of rapid development for your baby. Because your baby is growing so quickly, it is important that you start a healthy lifestyle right away. By the end of the first trimester, your baby has formed all of its major body organs and weighs just over an ounce.    Month 1 (Weeks 1-4)  The placenta (the organ that nourishes your baby) begins to form. The heart and lungs begin to develop. Your baby is about 1/4 inch long by the end of the first  month.    Month 2 (Weeks 5-8)  All of your baby s major body organs form. The face, fingers, toes, ears, and eyes appear. By the end of the month, your baby is about 1 inch long.    Month 3 (Weeks 9-12)  Your baby can open and close its fists and mouth. The sexual organs begin to form. As the first trimester ends, your baby is about 4 inches long.      Pregnancy: Your Weight  Being a healthy weight is important for both you and your baby. The weight you gain now is not just extra fat. It is also the weight of your baby. And it is the increased blood and fluids to support the baby. A slow, steady rate of gain is best. How much you should gain depends on your weight before getting pregnant. Check with your health care provider to find out what is right for you.    If You Gain Too Much  Gaining too much weight might cause you to feel tired or you could have a harder pregnancy or birth. If you and your health care provider decide you re gaining too much:    Eat fewer fats and sugars. Instead, eat fruit, vegetables, and whole-grain foods.    Drink plenty of water between meals.    Get at least 20 minutes of light exercise, such as walking, each day.    Don t diet. You might not get enough of the nutrients you or your baby needs.    Keep a diet diary to help you gauge what and how much you are eating .    If You re Not Gaining Enough  If you don t gain enough, your baby could be too small or have health problems. Women tend to gain most of their weight in the second and third trimesters. For now:    Eat many types of foods. Make sure you get enough calcium, protein, and carbohydrates.    Don t skip meals.    Eat healthy snacks.    Pick nutrient-dense, high calorie healthy food like trail mix or protein shakes.    See a dietitian for help.    Talk to your healthcare provider if you have had an eating disorder or problems with certain foods.      Pregnancy: Common Questions  There are plenty of myths and  old wives  tales   surrounding pregnancy. You may need help  fact from fiction. On this sheet, you ll find answers to a few common questions. If you have other questions, talk with a midwife.    Will Working Harm My Baby?  In most cases, working throughout your pregnancy is not harmful at all. There may be concerns if the job involves dangerous machinery or chemicals, lifting, or standing for very long periods of time. Talk to your health care provider and employer about your particular job and pregnancy.  Why Can t I Change the Cat Litter Box?  Cats carry a disease called toxoplasmosis. In adult humans, it shows up as a mild infection of the blood and organs. If you are infected during pregnancy, the baby s brain and eyes could be damaged. To be safe, have someone else change the litter. If you must handle it, wear a paper mask over your nose and mouth. Also, wear gloves and wash your hands afterward.  Which Medications Are Safe?  No prescription or over-the-counter drug is safe for everyone all of the time. But sometimes medications are needed. Be sure your health care provider knows you are pregnant. Then use only the medications he or she advises you to take. Please refer to the below resources for further information and discuss concern and questions with your midwife.  Is It True That I Can Overheat My Baby?  Yes. To avoid making your baby too warm:    Don t sit in a jacuzzi. A long, warm bath is fine, but not in water over 100 F.    Exercise less intensely if you feel fatigued. Base your workout on how you feel, not your heart rate. Heart rates aren t a good way to measure effort during pregnancy.  Can I Lift and Carry Safely?  Yes, if your health care provider doesn t tell you otherwise. Learn to lift and carry safely to avoid injury and reduce back pain during pregnancy. To protect your back:    Bend at the knees to bring the load nearer.    Get a good . Test the weight of the load.    Tighten your abdomen.  Exhale as you lift.    Lift with your legs, not with your back.    Carry the load close to your body.    Hold the load so you can see where you are going.  What If I Get Sick?  Most women get sick at least once during pregnancy. Talk with your health care provider if you do. Most likely it will not affect your pregnancy. Get plenty of rest and fluids, and eat what you can. Talk to your health care provider before taking any medications.        HEALTHY PREGNANCY CARE: 10-14 WEEKS PREGNANT     By weeks 10 to 14 of your pregnancy, the placenta has formed inside your uterus. It may be possible to hear your baby's heartbeat with a doppler ultrasound device. Your baby's eyes can and do move. The arms and legs can bend.    GENETIC SCREENING OPTIONS AT [facility]                All testing is optional. We don t recommend or discourage any test; it is totally up to you and your partner. Some couples wish to know their risk of having a baby with a genetic defect and others do not. We will support your decision. Abnormal results may lead to a discussion of options for further testing.    Accurate dating of your pregnancy is important for all testing so an ultrasound may be done prior to referral or testing.    No testing provides certainty; there are false positives and negatives associated with all testing, some more than others.    Most genetic testing is non-invasive (requires only a blood sample and sometimes an ultrasound or both).    It is always wise to check with your insurance carrier before proceeding.    Some testing can be done at our lab and some require a referral.    If you decide to do no testing, the 20 week ultrasound scan, which is a routine or standard ultrasound, has some ability to detect abnormalities in the baby, and identifies obstetric problems.    If you are over 35, you will have the option of a Level II ultrasound, which is a more detailed and targeting scan, that helps detect fetal anomalies as  well as obstetric problems.      If you have a more complex family history of chromosomal abnormalities, a referral to a genetic counselor and/or a Maternal Fetal Medicine specialist, to help identify available tests, may be recommended.    TYPES OF GENETIC TESTING AVAILABLE INCLUDE:    Carrier Screening/Testing for Genetic Conditions    There are many inherited conditions for which testing or carrier testing is available. Carrier screening can test for conditions like Cystic Fibrosis, Thalassemia, Ruben Sachs, Sickle Cell, Hemophilia, Muscular Dystrophy, Gerald s disease and many others.     Cystic Fibrosis (CF) affects both males and females and people from all racial and ethnic groups. However, the disease is most common among Caucasians of Northern  descent. CF is also common among Latinos and American Indians. The disease is less common among  Americans and  Americans. More than 10 million Americans are carriers of a faulty CF gene and many of them don't know that they are CF carriers. One or both parents can be tested any time before or during pregnancy.    Talk to your care provider about your family history and whether you should be screened. A referral to a genetic counselor at [facility] or [facility] can be made by your care provider at any time.    This is also tested for in the [place] Metabolic Screen that your infant receives 24 hours after birth.     Fetal DNA cell Testing: [place] or Innatal (Non-Invasive Prenatal Testing)    At 10 wk or greater, a blood sample can be drawn here at clinic or at any of our referral offices. It will provide highly accurate results with low false positive rates for trisomy 18, trisomy 21 (Down Syndrome), and trisomy 13 (> 99% trisomy detection rate at a false positive rate of <0.1%). Gender identification can also be obtained if desired (98% accuracy).  Can also detect some sex-linked chromosomal abnormalities (80-90% accuracy).   This is often done if one of the other screening tests is abnormal.        1st Trimester Screening:     Everyone has an age related risk of having a baby with a genetic abnormality. This testing provides a risk profile which is better than assigning risk based solely on your age.    Refines your risk of having a baby with a chromosomal abnormality such as Trisomy 21 & 18.    This test with detect a fetus with one of these disorders about 85% of the time.    A thickened nuchal fold can also be associated with cardiac defects.    This test requires a blood collection combined with ultrasound to obtain a measurement of fluid at back of baby s neck (nuchal translucency).    False positive results occur approximately 5% of cases.    An additional blood sample may be necessary in order to calculate your risk of having a baby with a neural tube defect (e.g. spina bifida).  This is called the AFP test (alpha fetal protein).  An ultrasound should be able to  any spinal defect as well.    Follow-up of an abnormal test may include a more extensive ultrasound study and you may be offered an amniocentesis (a small sample of amniotic fluid is withdrawn and studied) for a definitive diagnosis    Quad Screen (4-marker screen)    Between 15 and 21 weeks, a sample of blood can be drawn at our lab to assess your risk of having a baby with Down Syndrome, Trisomy 18 and neural tube defects. Such testing is able to detect these conditions in 80% of cases and the false-positive rate is approximately 5%.     Follow-up of an abnormal test may include a more extensive ultrasound study and you may be offered an amniocentesis (a small sample of amniotic fluid is withdrawn and studied) for a definitive diagnosis      Referrals opportunities include:    [facility],  [facility]  o Offers nuchal translucency ultrasound; does not offer genetic counseling.    [place]  [facility] and [facility]  [facility] ([facility]):   o Approximately an hour long visit includes 30 min with genetic counselor who discusses all testing available and which ones might be beneficial to you based on age, personal and family history.    o Blood will be drawn and the nuchal translucency ultrasound will be discussed and performed if desired. The Free Fetal DNA testing (Robinson/Verifi) can be drawn also.  o Targeted or detailed Level II ultrasounds are also available with these perinatology groups.        Breastfeeding: a Healthy Option for You and Your Baby  Consider breastfeeding for the healthiest way to feed your baby. Ask your midwife or physician for more information.     The choice of how you will feed your baby is important.  Before your baby s birth, you ll want to learn about the benefits of breastfeeding.  [facility] have been designated Baby Friendly; an initiative that was created by the World Health Organization and UNICEF.  This helps give you and your baby the best start in feeding their baby.    Why should I breastfeed my baby?    Babies are less likely to develop childhood obesity or diabetes    Babies are less likely to suffer from recurrent ear infections    Babies are less likely to be hospitalized for respiratory conditions    Breast milk is rich in nutrients and antibodies-it is easy to digest    How does it benefit me?    Lowers the risk for diabetes, breast and ovarian cancer and postpartum depression    Moms can lose  baby weight  more quickly    Cost savings - formula can cost well over $1,500 per year    Convenient - no bottles and nipples to sterilize, no measuring and mixing formula    The physical contact with breastfeeding can make babies feel secure, warm and comforted     What about formula?  While you and your baby are staying with us at [facility], we will support whatever feeding choice you make for your baby.    Some important considerations:      The American Academy of  Pediatrics, the World Health Organization, and many more organizations recommend exclusive breastfeeding for 6 months and continued breastfeeding while adding other foods for the first 1-2 years.      Any amount of breastmilk has benefits to both baby and mother.    Giving formula in replacement of breastfeeding can affect mother s milk supply.  If formula is needed, hospital staff will work with you on a plan to help develop your milk supply.    Formula alters the natural growth of good bacteria in the  stomach.     Research has found that first time mothers who offer formula in the hospital have a shorter duration of breastfeeding.    How can I start to prepare?     Start by having a conversation with your medical provider.     Talk with your partner, family and friends.     Attend a prenatal class that includes breastfeeding preparation. Birth and breastfeeding classes are offered by [employer]. Visit [employer] for class information.     After your baby s birth, hospital staff and lactation consultants will help you and your baby get off to a great start with breastfeeding.    As your center of gravity and weight changes, use good body mechanics when changing positions and lifting. For example, use a straight back and your legs for support when lifting instead of bending over. Maintain good posture to prevent straining your muscles. Now is a good time to continue or restart your exercise program. Walking 30-60 minutes daily is an excellent way to keep fit. Yoga and swimming also offer many benefits.    The nausea and fatigue of early pregnancy have usually started to let up, so this is a good time to focus on nutrition. Consider attending a nutrition class. A healthy diet includes about 60 grams of protein each day (3-4 servings of dairy, 2-3 servings of meat/fish/poultry/nuts), 4-6 servings of whole grain foods, and 5-6 servings of fruits and vegetables. Remember to drink 6-8  glasses of water daily.    Watch for warning signs, such as     vaginal bleeding    fluid leaking from your vagina    severe abdominal pain    nausea and vomiting more than 4-5 times a day, or if you are unable to keep anything down    fever more than 100.4 degrees F.       RESOURCES   You can refer to the Starting Out Right book or find it online at [email or web address]/images/stories/[email or web address]-Starting-Out-[email or web address] or [email or web address].[email or web address]/images/stories/flipbooks/[facility]-starting-out-right/[facility]-starting-out-right.html#p=8    You can sign up for a weekly parenting e-mail that gives support, tips and advice from health care professionals that starts with pregnancy and continues through the toddler years. To register, go to [email or web address] at any time during your pregnancy.    Breastfeeding:    OUTPATIENT LACTATION RESOURCES    [facility] and clinics: [email or web address]       -Schedule an appointment with a [facility] CNM who is also a Lactation Consultant by calling [phone]     -Schedule an appointment with a [facility] CNM who is also a Lactation Consultant by calling [phone]. We see women for breastfeeding visits at [facility] and [facility].     -Baby Café    Pregnant and interested in breastfeeding?  Need answers to breastfeeding questions?  Want to help breastfeeding moms?  Already breastfeeding and want to meet other moms?    Join us at the Baby Café!    Baby Cafe is a free, drop-in service offering breast-feeding support for pregnant women, breast-feeding mothers and their families.  Come share tips and socialize with other mothers.  Babies and siblings are welcome (no childcare available).    Starting April 2018, Baby Café will be at 4 locations.  Please see below for the Baby Café closest to you! Hmong, Libyan, and South Sudanese which is may be available at some  sites.      [facility]  [address] [place] [address]  1st Wednesday: 10am-12pm    [facility]  [address] [place] [address]  3rd Wednesday 4-6pm    [facility]  [address] [place] [address]  4th Wednesday 10am-12:30pm    [facility]  [address] [place] [address]  4th Wednesdays: 4-6pm    -Attend a baby weigh in at [facility].  Lactation consultants are available to answer questions  [facility]: Tuesdays 1:00 - 2:00  [facility], [facility]: Mondays 1:00 - 2:00   [email or web address]    -Attend one of the New Mama groups at [facility] in [facility].  [facility] also offers one-on-one in home and in office lactation consults.   [email or web address]    -Attend a Анна League meeting.  Multiple groups in several locations throughout the [facility]. The meetings are no-cost and always informative breastfeeding education session through [employer] La Leche [employer]  [email or web address].[email or web address]    Childbirth and Parenting Education:   [facility]: [email or web address]   (077) 868-BABY  [employer]: (education, yoga & wellness) www.[employer]way  [facility]: www.ProximetrymaMoz   Childbirth collective: (Parent topic nights)  www.childbirthcollective.org/  Hypnobabies:  www.hypnobabiestwincities.com/  Hypnobirthing:  Http://hypnobirthing.com/    Book Recommendations:   Yudith Bellevue's Birthing From Within--first few chapters include a new-age tone, you may prefer to skip it and keep going, because there is good stuff later.  This book recommendation covers emotional preparation, but does cover coping with pain, and use of both pharmacological and nonpharmacological methods.    Dr. Rivera' The Pregnancy Book and The Birth Book--the pregnancy book goes month-by month    Womanly Art of Breastfeeding by La Leche League International   Bestfeeding by Alma Sierra--great pictures    Mothering Your Nursing  "Toddler, by Sandra Peralta.   Addresses dealing with so many of the challenging behaviors of a nursing toddler.  How Weaning Happens, by La Leche League.  Discusses weaning at all ages, from medically necessary weaning of an infant, all the way up to age 5 (or older), with why/why not, and strategies.  Very empowering book both for deciding to wean and deciding not to.    American College of Nurse-Midwives (ACNM) http://www.midwife.org/; look at the informational handouts at http://www.midwife.org/Share-With-Women     www.mymidwife.org    Mother to Baby (Medication and Herbal guidance in pregnancy): http://www.mothertobaby.org  Toll-Free Hotline: 515.606.4766  LactMed (Medication use while breastfeeding): http://toxnet.nlm.nih.gov/newtoxnet/lactmed.htm    Women's Health.gov:  http://www.womenshealth.gov/a-z-topics/index.html    American pregnancy association - http://americanpregnancy.org    Centering Pregnancy (group prenatal care option): http://centeringhealthcare.org    Information about doulas:  Childbirth collective: http://www.childbirthcollective.org/  [facility] [place] ([place]):  www.gloria.org  [facility]  project: http://twincitiesdoulaproject.com/     Early Childhood and Family Education (ECFE):  ECFE offers parents hands-on learning experiences that will nourish a lifetime of teachable moments.  http://ecfe.info/ecfe-home/    March of Dimes www.marchofdimes.com     FDA - Nutrition  www.mypyramid.gov  Under \"For Consumers,\" click on \"pregnant and breastfeeding women.\"      Centers for Disease Control and Prevention (CDC) - Vaccines : http://www.cdc.gov/vaccines/       When researching information on the web, question the validity of websites.  The domains .gov, .edu and.org tend to be more reliable information.  If there are a lot of advertisements, be cautious of the information provided. Stay away from blogs and chat rooms please!      Nutrition & supplements:     Prenatal vitamin (those " with 600-1000 mcg folic acid and 27 mg of iron are enough).  Take with food or Juice     4-5 servings of dairy or other calcium rich foods (fish, leafy greens, soy) per day - if not, take 500-1000 mg additional calcium (Tums, pills, chews). Take with dairy     Vitamin D3 5294-6155 IU geltab daily.  Take with fattiest meal.  Look for fortified foods also (Dairy, Juice)     2-3 (4) oz servings of fish, seafood, nuts (walnuts & almonds), oils, avocado per week - if not, take Omega 3 Fatty acids: DHA & EDE 2718-8540 mg per day.  Other names: cod liver oil, fish oil. Take with fattiest meal.  Some prenatals have DHA, but typically not a sufficient dose.    Fish: Do not eat shark, swordfish, catia mackerel, or tilefish when you are pregnant or breastfeeding.  They contain high levels of mercury.  Limit white (albacore) tuna to no more than 6 ounces per week. Http://www.fda.gov/downloads/ForConsumers/ConsumerUpdates/FSA302481.pdf         Touring the [facility]  To schedule a tour at either [place] or [facility], please do so online using the following links:  [facility] - [email or web address]://[email or web address]&[email or web address]&[email or web address]  [facility] - [email or web address]&[email or web address]     You are invited to  Meet the [facility] Nurse-Midwives  A way to tour the hospital Labor and Delivery unit and meet the midwives that attend births since you may not have the opportunity to meet them during your prenatal care.  Some sessions are informal meet and greet type social hours, others address a specific concern or topic.    Tuesday, Februrary 12, 2019 7-8pm  [facility]    Wednesday, April 17, 2019 7-8pm  [facility], Auditorium A    Thursday, August 15, 2019 7-8pm  [facility] [place]    Wednesday November 13, 2019 7-8  pm  [facility], Auditorum A    Please call [phone] to register

## 2021-06-03 NOTE — PROGRESS NOTES
PRENATAL VISIT   FIRST OBSTETRICAL EXAM - OB    Assessment / Impression     First prenatal visit at Unknown    History of postpartum hemorrhage  History of third-degree laceration  History of gestational diabetes    Plan:     - IOB labs drawn., Declines GC/CT screening and  Pap smear screening not performed today, but is due.  Pt declines today due to feeling nauseated.  Please consider at second visit.  -Pt is interested in drawing lead level.  -Patient is not interested in waterbirth. Hep C not drawn today.  -Reviewed prenatal care schedule and discussed routine OB visits versus problem visits and referrals. Also discussed use of ultrasound in pregnancy.  -Reviewed results of early US; reviewed dating. Dating by LMP.   -Optimal nutrition and weight gain discussed. Pregnancy weight gain of 25-35 lbs (BMI 18.5-24.9) encouraged.   -Reviewed importance of regular exercise in pregnancy and help with low back pain and other pregnancy symptoms.   -Discussed importance of taking aprenatal vitamin with the goal of having 400 mcg of folic acid. Additionally taking a Vitamin D supplement (1,000-2,000 IU/day) and an Omega 3/fish oil/DHA is beneficial.   -Anticipatory guidance for common pregnancy questions and concerns reviewed.   -Danger s/sx for this trimester reviewed with patient.  -Reviewed genetic carrier screening. Patient declines: all.  -Reviewed genetic aneuploidy screening options. Patient  declines: NIPTS. Encouraged to call insurance to verify coverage.  -Reviewed MyChart, lab results, and how lab results are disseminated.   -IOB packet given and reviewed with patient, discussed standards of care, scope of practice, clinic and hospital settings, also reviewed clinic versus emergency phone number.   -CNM services and hospital options reviewed; emergency and scheduling phone numbers given to patient.  -Return to clinic 4-6 weeks    Total time spent with patient: 30 minutes, >50% time spent counseling and  coordinating care.    Subjective:      Bob Olvera is a 24 y.o.  here today for her First Obstetrical Exam.  She is a return [facility] patient..  She is here with her  and her son today.  She is feeling poorly.  Vomited 4 times during the night, and does not think it is related to pregnancy.  Denies any diarrhea.  States that she had pain in her abdomen and then she vomited.  Denies any dysuria, frequency or urgency.  Denies abnormal vaginal discharge, odor, irritation or burning.  Denies fevers, chills or aches.  States that most of what she drank she threw up.  Her son had diarrhea this morning and she thinks he is not feeling well either.  Normally, she is able to eat and drink well.  Her diet mainly includes rice, pasta, traditional bread, meat, fruit, milk and eggs.  Does not eat a lot of vegetables.  She is also having some back pain.      Exercise: No scheduled exercise.  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  Safety (seatbelt, gun access, IPV, hx abuse): Denies IPV, wears seatbelt  Tobacco/Alcohol/Drug Use: Denies  EPDS today: 0  Sexual Partners: 1, male  Last Breast Exam: 18    Education level: Some college  Occupation: Nursing assistant  Partners name: Duy    OB History    Para Term  AB Living   3 2 2     2   SAB TAB Ectopic Multiple Live Births         0 2      # Outcome Date GA Lbr Mike/2nd Weight Sex Delivery Anes PTL Lv   3 Current            2 Term 18 39w0d 04:30 / 00:14 7 lb 13 oz (3.544 kg) M Vag-Spont None N NIKI      Birth Comments: 3rd degree lac      Complications: Fetal Intolerance   1 Term 16 42w0d 08:00 / 00:35 7 lb 10 oz (3.459 kg) F Vag-Spont None N NIKI      Birth Comments: PPH due to anterior/periclitoral and perineal lacerations. 2nd degree, BF x 8 months. No PPD.        Expected Date of Delivery: 20 by LMP    Past Medical History:   Diagnosis Date     Encounter for screening for cervical cancer       NIL with  "[facility]     GERD (gastroesophageal reflux disease)      Gestational diabetes     2nd baby only     Past Surgical History:   Procedure Laterality Date     MIDDLE EAR SURGERY  10/10, 2/11     STOMACH SURGERY  age 5     Social History     Tobacco Use     Smoking status: Never Smoker     Smokeless tobacco: Never Used   Substance Use Topics     Alcohol use: No     Drug use: No     Current Outpatient Medications   Medication Sig Dispense Refill     PNV NO.95/FERROUS FUM/FOLIC AC (PRENATAL ORAL) Take by mouth.       No current facility-administered medications for this visit.      No Known Allergies       Pregnancy Risk Factors: History of gestational diabetes, history of postpartum hemorrhage, history of third-degree laceration    Review of Systems  General:  Denies problem  Eyes: Denies problem  Ears/Nose/Throat: Denies problem  Cardiovascular: Denies problem  Respiratory:  Denies problem  Gastrointestinal: Nausea with vomiting 4 times during the night  Genitourinary: Denies problem  Musculoskeletal: Low back pain  Skin: Denies problem  Neurologic: Denies problem  Psychiatric: Denies problem  Endocrine: Denies problem  Heme/Lymphatic: Denies problem   Allergic/Immunologic: Denies problem       Objective:   Objective    Vitals:    11/26/19 0943   BP: 96/57   Pulse: 72   SpO2: 98%   Weight: 127 lb (57.6 kg)   Height: 5' 1.25\" (1.556 m)     Physical Exam:  General Appearance: Alert, cooperative, no distress, appears stated age, appears mildly ill  Head: Normocephalic, without obvious abnormality, atraumatic  Eyes: Conjunctiva/corneas clear, does not wear corrective lenses  Thyroid: not enlarged, symmetric, no tenderness/mass/nodules  Back: Symmetric, no curvature, ROM normal, no CVA tenderness  Lungs: Clear to auscultation bilaterally, respirations unlabored  Heart: Regular rate and rhythm, S1 and S2 normal, no murmur, rub, or gallop  Breasts:  deferred   Abdomen: BS x 4. Gravid to 14  FHT: 150   Pelvic exam: " Declines today due to nausea  Extremities: Extremities normal, atraumatic, no cyanosis or edema  Skin: Skin color, texture, turgor normal, no rashes or lesions.  Scar from stomach surgery midline standing from a couple of inches above her umbilicus  Neurologic: Alert and oriented x 3.    Lab:   Results for orders placed or performed in visit on 10/08/19   Pregnancy (Beta-hCG, Qual), Urine   Result Value Ref Range    Pregnancy Test, Urine Positive (!) Negative

## 2021-06-03 NOTE — TELEPHONE ENCOUNTER
----- Message from NALLELY Henry CNM sent at 11/12/2019  9:44 AM CST -----  Regarding: Pregnancy letter  Would one of you please call Bob and let her know if she needs a letter for her  or insurance for proof of pregnancy, one has been written and it can be found on her My Chart?  Thanks!

## 2021-06-04 VITALS
HEART RATE: 80 BPM | WEIGHT: 140 LBS | SYSTOLIC BLOOD PRESSURE: 86 MMHG | BODY MASS INDEX: 26.43 KG/M2 | HEIGHT: 61 IN | DIASTOLIC BLOOD PRESSURE: 52 MMHG

## 2021-06-04 VITALS
BODY MASS INDEX: 27.56 KG/M2 | WEIGHT: 146 LBS | HEIGHT: 61 IN | HEART RATE: 80 BPM | DIASTOLIC BLOOD PRESSURE: 52 MMHG | SYSTOLIC BLOOD PRESSURE: 98 MMHG

## 2021-06-04 VITALS
TEMPERATURE: 98.4 F | DIASTOLIC BLOOD PRESSURE: 64 MMHG | BODY MASS INDEX: 27.51 KG/M2 | HEIGHT: 61 IN | WEIGHT: 145.7 LBS | HEART RATE: 84 BPM | SYSTOLIC BLOOD PRESSURE: 106 MMHG

## 2021-06-04 VITALS
WEIGHT: 154.6 LBS | SYSTOLIC BLOOD PRESSURE: 94 MMHG | BODY MASS INDEX: 29.19 KG/M2 | HEIGHT: 61 IN | HEART RATE: 76 BPM | DIASTOLIC BLOOD PRESSURE: 64 MMHG

## 2021-06-04 VITALS
HEIGHT: 61 IN | BODY MASS INDEX: 28.47 KG/M2 | DIASTOLIC BLOOD PRESSURE: 54 MMHG | WEIGHT: 150.8 LBS | HEART RATE: 80 BPM | SYSTOLIC BLOOD PRESSURE: 92 MMHG

## 2021-06-04 VITALS
HEIGHT: 61 IN | WEIGHT: 136.8 LBS | HEART RATE: 68 BPM | DIASTOLIC BLOOD PRESSURE: 60 MMHG | BODY MASS INDEX: 25.83 KG/M2 | SYSTOLIC BLOOD PRESSURE: 96 MMHG

## 2021-06-04 VITALS
HEART RATE: 84 BPM | WEIGHT: 148.1 LBS | BODY MASS INDEX: 27.96 KG/M2 | SYSTOLIC BLOOD PRESSURE: 96 MMHG | HEIGHT: 61 IN | DIASTOLIC BLOOD PRESSURE: 52 MMHG

## 2021-06-04 VITALS
DIASTOLIC BLOOD PRESSURE: 64 MMHG | HEIGHT: 61 IN | WEIGHT: 151.1 LBS | SYSTOLIC BLOOD PRESSURE: 100 MMHG | HEART RATE: 72 BPM | BODY MASS INDEX: 28.53 KG/M2

## 2021-06-04 VITALS
DIASTOLIC BLOOD PRESSURE: 62 MMHG | SYSTOLIC BLOOD PRESSURE: 94 MMHG | HEART RATE: 78 BPM | HEIGHT: 61 IN | OXYGEN SATURATION: 98 % | BODY MASS INDEX: 28.51 KG/M2 | WEIGHT: 151 LBS

## 2021-06-04 VITALS
WEIGHT: 143 LBS | HEART RATE: 76 BPM | HEIGHT: 61 IN | DIASTOLIC BLOOD PRESSURE: 56 MMHG | BODY MASS INDEX: 27 KG/M2 | SYSTOLIC BLOOD PRESSURE: 96 MMHG

## 2021-06-04 VITALS
SYSTOLIC BLOOD PRESSURE: 96 MMHG | HEIGHT: 61 IN | HEART RATE: 72 BPM | DIASTOLIC BLOOD PRESSURE: 57 MMHG | OXYGEN SATURATION: 98 % | BODY MASS INDEX: 23.98 KG/M2 | WEIGHT: 127 LBS

## 2021-06-04 NOTE — PROGRESS NOTES
Bob is here today with Duy for her routine prenatal appt at 18 weeks. Has screening ultrasound scheduled for 1/16/19. Accepting of pap smear today, feeling a little better then last time. Feels like the morning sickness is worse with this pregnancy. Has felt quickening already. No concerns today.

## 2021-06-05 VITALS
HEIGHT: 61 IN | WEIGHT: 144.5 LBS | HEART RATE: 68 BPM | BODY MASS INDEX: 27.28 KG/M2 | DIASTOLIC BLOOD PRESSURE: 60 MMHG | SYSTOLIC BLOOD PRESSURE: 96 MMHG

## 2021-06-05 VITALS
DIASTOLIC BLOOD PRESSURE: 66 MMHG | HEIGHT: 61 IN | SYSTOLIC BLOOD PRESSURE: 108 MMHG | BODY MASS INDEX: 25.49 KG/M2 | WEIGHT: 135 LBS | HEART RATE: 62 BPM

## 2021-06-05 NOTE — PROGRESS NOTES
Bob presents to the clinic today by herself.  Feeling wonderful.  Daily fetal movement.  Denies lower abdominal pain, loss of fluid or vaginal bleeding.  20-week fetal anatomy ultrasound results reviewed and WNL.  Second trimester teaching completed.  Danger signs and symptoms reviewed.  All questions answered.  Encouraged to call or return to clinic with any questions, concerns, or as needed.

## 2021-06-06 NOTE — PATIENT INSTRUCTIONS - HE
Patient Education   HEALTHY PREGNANCY CARE: 30-34 WEEKS PREGNANT    You have made it to the final months of your pregnancy. By now, your baby is starting to fill out with some fat under his skin, fuzzy hair on his shoulders, and is gaining 4 to 6 ounces per week.    Discuss any travel plans with your midwife or physician.    Review possible changes in sexuality during later pregnancy and discuss these with your midwife or physician, as well as your partner. Alternative love-making positions may be more comfortable.    Discuss labor and delivery issues with your midwife or physician. If you had a  birth in the past, discuss a trial of labor with your midwife or physician. He or she may ask that you sign a consent form, if you wish to have a vaginal birth after  (). Ask your midwife or physician to explain your options for managing pain during your labor and delivery. Sometimes, during the birth process, an episiotomy may need to be cut in the vagina to make the opening bigger or let the baby come out quicker. You may want to discuss the episiotomy and how often it is needed with your midwife or physician.    Plan for your baby's care by selecting a child health care provider (Family physician, Pediatrician, or Pediatric Nurse Practitioner). Practice installing an infant car seat correctly in the car. Ask for car seat information as needed and make sure it is safe and will work in the car your baby will ride in. You will need a car seat to bring your baby home from the hospital. Check the procedure for adding your baby to your health care plan. Review your decision about circumcision and ask for any information you need. As you buy and receive items for your baby, don't put a baby walker on your list. Walkers can be dangerous and can cause serious injury to your child. A safer option is a saucer-type play station, since it doesn't allow baby to travel across the floor.    Discuss your choices and  plans for birth control with your midwife or physician. Women who are breastfeeding can still become pregnant. Use a birth control method if you want to lower your pregnancy risk. Talk to your midwife or physician if you are considering permanent birth control, such as tubal ligation or Essure. You may need to complete a consent form 30 days prior to delivery in order to have this done after you deliver.    Continue to watch for signs and symptoms of preeclampsia:     Sudden swelling of your face, hands, or feet     New vision problems such as blurring, double vision, or flashing lights    A severe headache not relieved with acetaminophen (Tylenol)    Sharp or stabbing pain in your right or middle upper abdomen    Watch for signs and symptoms of premature labor:     Regular contractions. This means having about 6 or more within 1 hour, even after you have had a glass of water and are resting.     A backache that starts and stops regularly.    An increase or change in vaginal discharge, such as heavy, mucus-like, watery, or bloody discharge.     Your water breaks or leaks.    If you have any of the above symptoms or any other concerns, call your provider or their clinic staff at [facility] at Phone: [phone]. If it's after clinic hours, physician patients should call the [facility] at [phone] ([phone]); midwife patients should call their answering service at [phone].    How can you care for yourself at home?   You can refer to the Starting Out Right book or find it online at http://www.[email or web address]/images/stories/maternity/[email or web address]-Starting-Out-Right.pdf or http://www.[facility].org/images/stories/flipbooks/[facility]-starting-out-right/[facility]-starting-out-right.html#p=8     You can sign up for a weekly parenting e-mail that gives support, tips and advice from health care professionals that starts with pregnancy and continues through the toddler years. To register, go to  [email or web address] at any time during your pregnancy.     Patient Education   HEALTHY PREGNANCY CARE: 34-36 WEEKS PREGNANT    Your baby is gaining about an ounce each day, so healthy nutrition and rest are still very important. Learn about late pregnancy symptoms, such as constipation and backaches. Drinking more fluids and eating more fiber can relieve constipation. The pelvic tilt and a heating pad can ease backaches.    Continue to watch for signs and symptoms of preeclampsia:     Sudden swelling of your face, hands, or feet     New vision problems such as blurring, double vision, or flashing lights    A severe headache not relieved with acetaminophen (Tylenol)    Sharp or stabbing pain in your right or middle upper abdomen    You're almost done with your pregnancy but it's still too soon to have your baby. The goal is to have your baby after 37 weeks, so watch for signs and symptoms of premature labor:     Regular contractions. This means having about 6 or more within 1 hour, even after you have had a glass of water and are resting.     A backache that starts and stops regularly.    An increase or change in vaginal discharge, such as heavy, mucus-like, watery, or bloody discharge.     Your water breaks or leaks.    You will be tested for group B streptococcus (GBS), a type of bacteria found in 10-30% of pregnant women. A woman with GBS can pass it to her baby during delivery. Most babies who get GBS from their mothers do not have any problems, but some babies get very ill and need to be hospitalized for antibiotic treatment. Treating you with antibiotics during labor and delivery helps to prevent infection in your baby.    Review information on postpartum care that you will need after the baby is born.  Discuss your choices and plans for birth control with your midwife or physician.     Postpartum Care  During the days and weeks after the delivery of your baby (postpartum period), your body will change as  "it returns to its nonpregnant condition. As with pregnancy changes, postpartum changes are different for every woman.    Physical changes after childbirth  The changes in your body may include:    Contractions called afterpains shrink the uterus for several days after childbirth. Shrinking of the uterus to its prepregnancy size may take 6 to 8 weeks.    Sore muscles (especially in the arms, neck, or jaw) are common after childbirth. This is because of the hard work of labor and pushing your baby out. The soreness should go away in a few days.    Bleeding and vaginal discharge (lochia) may last for 2 to 8 weeks, and can come and go for about 2 months.    Vaginal soreness, including pain, discomfort, and numbness, is common after vaginal birth. Soreness may be worse if you had a perineal tear or episiotomy.    If you had a  birth (), you may have pain in your lower abdomen and may need pain medicine for 1 to 2 weeks.    Breast engorgement is common around the 3rd or 4th day after delivery, when the breasts begin to fill with milk. This can cause discomfort and swelling. If you are breast feeding, the best treatment is to feed your baby often or pump the milk out. You can also use warm compresses. If you are not breast feeding, placing ice packs on your breasts, taking a hot shower, or using warm compresses may relieve the discomfort.    Be aware of postpartum depression    \"Baby blues\" are common for the first 1 to 2 weeks after birth. You may cry or feel sad or irritable for no reason.     For some women, these feelings last longer and are more intense. This is called postpartum depression.     If your symptoms last for more than a few weeks or you feel very depressed, ask your midwife or physician for help.     Postpartum depression can be treated. Support groups and counseling can help, but sometimes medication is needed.     Discuss follow-up appointments with your midwife or physician, as well. " He or she will usually want to see you 6 weeks after the birth of your baby, sooner if you are having problems.    If you have questions about any symptoms you are experiencing or any other concerns, call your provider or their clinic staff at [facility] at Phone: [phone]. If it's after clinic hours, physician patients should call the Care Connection at [phone] ([phone]); midwife patients should call their answering service at [phone].    How can you care for yourself at home?   You can refer to the Starting Out Right book or find it online at http://www.[facility].org/images/stories/http://www.[facility].org/images/stories/maternity/[facility]-[email or web address]-Right.pdf or http://www.[facility].org/images/stories/flipbooks/[facility]-starting-out-right/[facility]-starting-out-right.html#p=8     You can sign up for a weekly parenting e-mail that gives support, tips and advice from health care professionals that starts with pregnancy and continues through the toddler years. To register, go to [email or web address] at any time during your pregnancy.    Making Early Breastfeeding or Chestfeeding Work: What's Important?  Breastfeeding/chestfeeding is important!     It helps keep babies healthy.    Parents who breastfeed/chestfeed have lower risks of breast and ovarian cancer.    It's convenient: the milk is always ready and warm, and there is nothing to mix or prepare for feeding.    Formula is harder for your baby to digest.    It helps you bond with your baby and protects against postpartum depression.  Lots of early skin-to-skin contact with your baby    Place your naked baby with baby's belly against your bare chest. Cover baby's back with a blanket    Start skin-to-skin right after birth, as soon as you are ready    Skin-to-skin:  ? Helps keep baby warm  ? Improves baby's oxygen and blood sugar levels  ? Helps your uterus contract and bleed less  ? Helps baby feel calm and  "comforted  ? Helps you feel close to baby  ? Helps get breastfeeding started. Being close makes latching on easier, and baby may move over to the nipple and latch without help  ? Baby breastfeeds better and longer when skin-to-skin  Placing baby well for good attachment to the breast or chest    Hold your baby close with baby's tummy touching your tummy.    Wait for baby to open mouth wide, then bring baby onto the breast/chest.    Baby should take a big mouthful of breast/chest, not just the nipple. This helps baby get more milk, and the suckling should feel comfortable.    When baby is latched well to the breast/chest, nipples aren't cracked or painful.  Keeping baby near (called \"rooming-in\" at the hospital)    Baby sleeps better and cries less when birth parent is near. Your room is quiet.    We will place your baby safely on their back in their bassinette. This lets you practice safe sleep for your baby while keeping them at your bedside.    Baby feeds more often, which means your milk supply increases faster, and your baby loses less weight.    Parents have an easier time getting to know and bonding with baby.    Parents feel much more confident about baby care and breastfeeding/chestfeeding.    Maternity staff can help at any time.  Feeding on cue    Feeding on cue simply means feeding whenever your baby shows signs of hunger    Crying is a late hunger sign. Feed baby whenever baby wants for as long as baby wants.    Feeding signs: mouth movements, sticking the tongue out, rooting (baby turns toward chest and may open mouth), hand-to-mouth movements    Advantages of feeding on cue:  ? Frequent breastfeeding/chestfeeding in the first weeks after birth gives you a good milk supply for months to come.  ? Babies settle into a relaxing feed more quickly. Babies enjoy feedings more when they don't have to cry to be fed.  ? Feeding is comfort as well as nutrition. Newborns love constant closeness and feeding and " "can't be held \"too much\" or \"spoiled.\"  ? Newborns need small frequent feedings in the first days of life. Just one to three teaspoons fill a new baby's stomach.  ? Responding to feeding cues helps babies gain weight.  Feeding only human milk in the first six months    Colostrum is the first milk that baby gets at birth. The amount of colostrum matches the baby's stomach, so it will not be overfilled.    The small volumes ready at birth are also easier for baby to handle.    All babies lose weight in the first few days. This is simply \"water weight.\"    Introducing food or fluids other than human milk too early can cause problems for breastfeeding/chestfeeding and for your baby's health.    Feeding only human milk maximizes the protection against disease and infections.    Your body knows how much milk to make by how often your baby feeds. If you give your baby formula, your body may not know how much milk to make.    For informational purposes only. Not to replace the advice of your health care provider. Adapted with permission from \"Getting Started with Infant Care and Breastfeeding,\" by ENRIQUE Wray, BANDAR, Kristine Kwon, RN, IBCLC, Yasmin Kaiser MD, BANDAR, and Rosalba Wilkinson MD, IBCLC. [place] [employer], 2017. Clinically reviewed by [facility]. [place] 801648 - 05/19.        [facility] Resources       Research shows that human milk offers the best  nutrition and protection for babies. So at [facility],  we care for families and babies in a way that  promotes, teaches and supports lactation. We  support all breastfeeding, chestfeeding and human  milk feeding families, as well as families who can t  breastfeed / chestfeed or who choose not to do so.  A mother or caregiver s own milk is best for a baby,  but if you are unable to breastfeed / chestfeed, we  may suggest pasteurized donor human milk for your  baby while in the hospital.    Lactation support    The " following [facility] offer  individual outpatient lactation consults. Some  locations offer phone or group lactation consults  with certified lactation consultants. Call to confirm  services and for information and appointments. You  may wish to call your insurance company first to see  if they will cover the cost of a consult.    [facility]: [phone]    [facility]: [phone]    [facility]: [phone]  Offers [place] First Days program, which includes  group lactation visits and one free information session  about delivering your baby at a designated [facility] hospital and the importance and benefits  of breastfeeding and human milk. These group visits  also help patients with feeding concerns and offer  information about other postpartum topics.                For informational purposes only. Not to replace the advice of your health care  provider. Copyright   2005 [facility]. All rights reserved. retsCloud 364942 - REV .   [facility] ([place]):  [phone]    [facility] ([place]):  [phone] or [phone]    [facility]):  [phone]    [facility]  ([place]): [phone]    [facility] ([place]):  [phone] or [phone]  Includes follow-up visits for caregivers of babies  discharged from the  intensive care unit  (NICU) at [facility].    [facility]):  [phone]    [facility] ([facility],  [facility], primary care clinics):  [phone]  Also offers weight checks, feeding discussions and support with a lactation consultant as a part of free, weekly support group.    [facility]: [phone]  Also offers a free weekly support group.                                                                                                                                                                                                       (continued)   You may also call [facility] at [phone]  for information about [facility] and [facility]  locations that offer lactation support. For information  about breastfeeding / chestfeeding and childbirth  classes in the [facility], go to [facility] at [email or web address]. For [facility], go to [email or web address] and click on  Classes and Events at the bottom of the page. Or, call  [phone].    Supplies    You can get breast pumps from the Birthplace nurses  at [facility] or [facility]  nurses at [facility]. Call your health  insurance to see if they will cover the cost of the  pump. Tell your nurse you d like a pump. They will  help you fill out the right paperwork. The pump will  be ready for you when you leave the hospital.    If you decide to get a pump after you leave the  hospital, you can get one from our partners at  [facility]. [facility] carries a range of  feeding supplies and pumps. Please call your health  insurance to see if they will cover the cost of the  pump. Then call [facility]  to find out what supplies and pumps are available.  Some stores may deliver the pump to your home.    [facility]:  [email or web address] Other lactation services    [facility]: [phone] (24 hours a day)  [email or web address]  Offers support for breastfeeding / chestfeeding and  human milk feeding families. Call to find a group in  your area.    [facility]:  [phone]  www.womenshealth.gov/breastfeeding  Offers a breastfeeding / chestfeeding information  line in English and Bel[place]ian.    WIC (Women, Infants and Children) Program:  [phone]  Offers breastfeeding /  chestfeeding counseling. Call  to find an office near you.    Milk banking    [facility]  [email or web address]  [phone]  Consider freezing your extra collected milk to donate  to babies in need. Email or call for information.                           If you are deaf or hard of hearing, please let us know. We provide many free services including  sign language interpreters, oral interpreters, TTYs, telephone amplifiers, note takers and written materials.

## 2021-06-06 NOTE — PROGRESS NOTES
"Bob presents to the clinic with her , daughter and son.  Overall, doing well.  Reports occasional headaches which are relieved with taking a nap.  She has experienced worse visual acuity but denies \"seeing stars\" or scotomata.  Recommend a thorough eye exam after delivery.  Labs today: 1 hour GCT, RPR and hemoglobin which is low 8.7 g/dL.  Recommend ferrous sulfate 325 mg 1 p.o. 3 times daily, continue prenatal vitamin, and encourage iron rich foods.  Please offer Tdap and redraw hemoglobin in 4 weeks.  Total weight gain WNL.  Baby is active, and she denies regular uterine contractions, loss of fluid, or vaginal bleeding.   labor precautions and danger signs and symptoms reviewed.  All questions answered.  Encouraged daily fetal movement counting and to call or return to clinic with any questions, concerns, or as needed.  "

## 2021-06-07 ENCOUNTER — AMBULATORY - HEALTHEAST (OUTPATIENT)
Dept: MIDWIFE SERVICES | Facility: CLINIC | Age: 26
End: 2021-06-07

## 2021-06-07 ENCOUNTER — HOSPITAL ENCOUNTER (OUTPATIENT)
Dept: ULTRASOUND IMAGING | Facility: CLINIC | Age: 26
Discharge: HOME | End: 2021-06-07
Attending: MIDWIFE
Payer: COMMERCIAL

## 2021-06-07 DIAGNOSIS — Z34.81 ENCOUNTER FOR SUPERVISION OF OTHER NORMAL PREGNANCY, FIRST TRIMESTER: ICD-10-CM

## 2021-06-07 NOTE — TELEPHONE ENCOUNTER
Return call to patient.  No answer.  Left message stating the prescription she requested has been sent to her pharmacy.  Advised that patient call back with any further questions or concerns.

## 2021-06-07 NOTE — TELEPHONE ENCOUNTER
Pt just ran out of her iron pills yesterday and is wondering if she should have something called in to pharmacy or should she wait till next visit at 36 weeks? Please call her to let her know

## 2021-06-07 NOTE — PROGRESS NOTES
Subjective:   Bob is here by herself for a routine prenatal visit at 36w2d.  She has no concerns and is feeling well.  Fetal movement is normal. Interval weight gain is approriate. Patient is experiencing no LOV, vaginal bleeding, HA or vision changes, s/s preeclampsia.     Pt does have a car seat. Pt registered for the hospital.     She is working at [employer]. Following care guidelines.   She is washing her hands frequently, cleaning surfaces at home and limiting social contact to avoid exposure from coronavirus. Denies fever, cough, shortness of breath, any contact with anyone with coronavirus-like symptoms. She is not able to work from home. She is hoping for two months off after birth.     Reviewed COVID19 hospital policies including limit of only one support person in the hospital (and that person is not allowed to leave and come back), no family visiting after the birth, early discharge if possible.       Objective:  See flowsheet.     Assessment:  1. Supervision of other normal pregnancy, antepartum  Group B Strep Screen by PCR    Hemoglobin       Plan:  1.  Group B strep and Hgb obtained today. Is taking iron but it makes her stomach upset.   2.  Fetal maturity and expectations for fetal movement in the third trimester, how and when to do fetal kick counts and when to call the CNM and contact information reviewed.  3.  Discussed Covid updates.    4.  Anticipatory guidance, signs of symptoms of labor or SROM, third trimester warning signs, when to call and CNM contact information reviewed.   5.  Encouraged to schedule weekly visits to/through her EDB.     6. Signed [place] paperwork. She has one copy with her.   7.  Fetal position verified by bedside sonogram.    NALLELY Umana,SILVA  4/28/2020 10:11 AM

## 2021-06-07 NOTE — PROGRESS NOTES
oBb is here alone today.  Feeling well.  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  They have no visitors and are not asking caregivers to mask. They are screening patients daily for illness.  Advised not to care for patients who are ill at this time.  Her  is not working.  She began taking iron 2 weeks ago, so we will check Hgb today.  Taking it three times a day.  Discussed slow controlled pushing, side lying position for birth, and perineal support to help reduce risk of repeat 3rd degree laceration. Completed 32 week checklist.  Hoping for unmedicated birth.  Feeling active FM.

## 2021-06-07 NOTE — PROGRESS NOTES
"Bob Olvera is a 25 y.o. female who is being evaluated via a billable telephone visit.      The patient has been notified of following:     \"This telephone visit will be conducted via a call between you and your physician/provider. We have found that certain health care needs can be provided without the need for a physical exam.  This service lets us provide the care you need with a short phone conversation.  If a prescription is necessary we can send it directly to your pharmacy.  If lab work is needed we can place an order for that and you can then stop by our lab to have the test done at a later time.    Telephone visits are billed at different rates depending on your insurance coverage. During this emergency period, for some insurers they may be billed the same as an in-person visit.  Please reach out to your insurance provider with any questions.    If during the course of the call the physician/provider feels a telephone visit is not appropriate, you will not be charged for this service.\"    Patient has given verbal consent to a Telephone visit? Yes    Patient would like to receive their AVS by AVS Preference: Julisa.    Additional provider notes:  Bob Olvera was contacted by phone for her routine prenatal appt at 34w2d gestation. Reviewed Hgb results: Plan to repeat Hgb at 36 weeks. Feeling baby move, no cramping, change in vaginal discharge or concerns about anything.Questions about obtaining breast pump answered.  Has next appt scheduled for 4/29/20. Discussed GBS testing at that visit.Reviewed recommendation for daily iron supplementation for all pregnant women at this time, she is currently supplementing three times daily and will continue; given list of options for supplementation  and comfort measures to prevent constipation. We discussed current recommendations for prenatal appointments with some inperson visits and telephone visits when appropriate. Reviewed changes to hospital policies regarding " COVID-19 and the maternity unit. Reviewed how to reach CNM with non-urgent and urgent concerns between visits.     Phone call duration: 15 minutes  Flora Cortes DNP,NALLELY,CNSANCHEZ Campos LPN

## 2021-06-07 NOTE — PATIENT INSTRUCTIONS - HE
[facility] Nurse Midwives - Contact information:  Appointment line and to get a hold of CNM in clinic Monday-Friday 8 am - 5 pm:  [phone].  There are some clinics with early start times (1st appointment 7:40 am) and others with evening hours (last appointment 6:20 pm).  Most are typically open from 8 am to 5 pm.    CNM on call answering service: [phone].  Specify your hospital of choice and leave a brief message for CNM;  will then page CNM who is on call at your specified hospital and you should receive a call back with 15 minutes.  Be sure that your ringer is audible and that you can accept blocked calls so that we can get back in touch with you! This number should be reserved for urgent needs if during the day, before 8 am, after 5 pm, weekends, holidays.    Contact the on-call CNM with warning signs, such as:    vaginal bleeding     Vaginal discharge and itching or pain and burning during urination    Leg/calf pain or swelling on one side    severe abdominal pain    nausea and vomiting more than 4-5 times a day, or if you are unable to keep anything down    fever more than 100.4 degrees F.        You are invited to  Meet the [facility] Nurse-Midwives  A way to tour the hospital Labor and Delivery unit and meet the midwives that attend births since you may not have the opportunity to meet them during your prenatal care.  Some sessions are informal meet and greet type social hours, others address a specific concern or topic.    Tuesday, Februrary 12, 2019 7-8pm  [facility]    Wednesday, April 17, 2019 7-8pm  [facility]    Thursday, August 15, 2019 7-8pm  [facility]    Wednesday November 13, 2019 7-8 pm  [facility]    Please call [phone] to register          Touring the Maternity Care Center  To schedule a tour at either [facility] or [facility], please do so online using  "the following links:  [facility] - [email or web address]  [facility] - [email or web address]      Pre-registration for Hospital Stay:  Sometime betweeen 30-37 weeks, it is recommended that you \"pre-register\" for your upcoming hospital stay on our website:    [email or web address]    Breastfeeding and Birth Control  How do I decide what birth control method is best for me while I am breastfeeding?  Choosing a method of birth control is very personal. First, answer the following questions:      Do you want to have more children?    How much spacing between births do you want for your children?    Do you smoke or have you had any health problems, such as liver disease or a blood clot?  Talk about the answers to each of these questions with your health care provider to help you choose the best method for you.    Can I use breastfeeding as my birth control?  Using breastfeeding as your birth control (the lactational amenorrhea method) can be a good way to keep from getting pregnant in the first months after the baby is born. Each time your baby nurses, your body releases a hormone called prolactin, which stops your body from making the hormones that cause you to ovulate (release an egg). If you are not ovulating, you cannot get pregnant.    The lactational amenorrhea method works only if:    you have not started your period yet.    you are breastfeeding only and not giving your baby any other food or drink.    you are breastfeeding at least every 4 hours during the day and every 6 hours at night.    your baby is less than 6 months old.  When any 1 of these 4 things is not happening, you no longer have good protection from getting pregnant, and you should use another form of birth control.    What birth control " methods are safe for me to use while I breastfeed?    Methods without hormones  Methods without hormones do not affect you, your baby, or your breastfeeding.  Methods without hormones that are the most effective    The copper intrauterine contraceptive device (IUD) (ParaGard) is a small, T-shaped device that is in- serted into your uterus (womb) through the vagina and cervix. The copper IUD lasts for 10 years.    Sterilization (getting your tubes tied or your partner having a vasectomy) is very effective, but it is per- manent. You should choose sterilization only if you do not want to have more children.  A method without hormones that is effective    The lactational amenorrhea method described above is effective for the first 6 months.  Methods without hormones that are less effective    Natural family planning is monitoring your body for signs of ovulation and not having sex when you think you are ovulating. This method is reliable only if you are having regular periods every month.    Barrier methods (condoms, diaphragms, sponges, and spermicides) are used at the time you have sex. These methods are effective only if you use them correctly every time.    Methods with hormones  Birth control methods that use hormones can be used while you are breastfeeding. They may have a small effect on lowering the amount of milk you make. All hormones will get into your breast milk in very small amounts, but there is no known harm to your baby from this small amount of hormone in breast milk.only methodsmethods use only 1 hormone, called progestin. You can start them right after your baby is born or wait 4 to 6 weeks to make sure your milk supply is good.     Progestin-only pills ( minipills ): If you like to take pills every day, you can use the minipill. In order forpill to work well, you have to take 1 at the same time each day. When you stop breastfeeding, you should start pills that have both estrogen and progestin  because they are better at keeping you from get- ting pregnant.     Progestin IUD (Mirena): The progestin IUD is shaped and inserted into the uterus like the copper IUD. It works for up to 5 years. Both IUDs are usually inserted 4 to 6 weeks after the baby is born.    Progestin implant (Nexplanon): The progestin implant is a small matchstick-sized flexible narciso. It is placed into the fatty tissue in the back of your arm. It works for up to 3 years.    Progestin shot (Depo-Provera): The progestin shot is given every 3 months.    estrogen and progestin methods  These methods use 2 hormones, called estrogen and progestin.     These methods increase your risk of a blood clot, which is already higher than normal after you have a baby. You should not use them until your baby is at least 6 weeks old. The combined methods are not recommended as the first choice for women who are breastfeeding. If a combined method is the one that you feel will be best for you to prevent getting pregnant, these methods are okay to use while breastfeeding.     Combined birth control pills: You take a pill each day.    Vaginal ring (NuvaRing): The ring is worn in the vagina for 3 weeks then left out for 1 week before youin a new ring.    Patch (Ortho Evra): The patch is placed on your skin and changed every week for 3 weeks then left off forweek before putting a new patch on a different area of your skin.    Pediatric Care Providers at [facility]:    Choosing the right provider is one of the most important decisions you ll make about your health care. We can help you find the right one. Remember, you re looking for a provider you can trust and work with to improve your health and well-being, so take time to think about what you need. Depending on how complicated your health care needs are, you may need to see more than one type of provider.    Primary Care Providers: You ll see a primary care provider first for most health issues. They ll work  with you to get your recommended screenings, help you manage chronic conditions, and refer you to other types of providers if you need them. Your primary care provider may be called a family physician or doctor, internist, general practitioner, nurse practitioner, or physician s assistant. Your child or teenager s provider may be called a pediatrician.  Specialists: You ll see a specialist for certain services or to treat specific conditions. Specialists include cardiologists, oncologists, psychologists, allergists, podiatrists, and orthopedists. You may need a referral from your primary care provider before you go to a specialist in order for your health plan to pay for your visit.\pardHere are some tips for finding a provider where you live:  If you already have a provider you like and want to keep working with, call their office and ask if they accept your coverage.  Call your insurance company or state Medicaid and CHIP program. Look at their website or check your member handbook to find providers in your network who take your health coverage.  Ask your friends or family if they have providers they like and use these tools to compare health care providers in your area.    Family Medicine at [facility]: https://www.[facility].org/clinics/family-medicine.html    Many of our families enjoy all seeing the same doctor, who comes to know the whole family very well. We base our practice on the knowledge of the patient in the context of family and community.  WHY CHOOSE A FAMILY MEDICINE PHYSICIAN?  Ability of the whole family to see the same doctor  Focus on the whole person, including physical and emotional health  A personal relationship with their doctor that is nurtured over time  Respect for individual and family beliefs and values  No need to change primary care providers when a certain age is reached  Coordination of care when other health care services are needed    Pediatrics at [facility]:    [email or web address].[facility].[email or web address]/clinics/[email or web address].html    Through a teaching affiliation with the [facility], [facility] staff keeps current on new developments in the field of pediatrics.     Everything we do centers around caring for children. We place special emphasis on wellness and prevention.pediatric care team includes a team of pediatricians and certified nurse practitioners who provide care to pediatric and adolescent patients ages 0 to 18, and some up to the age of 26. We offer preventive health maintenance for healthy children as well the diagnosis and treatment of common and chronic illnesses and injuries. In addition, we also offer several pediatric specialists who focus on adolescent health issues and developmental and behavioral issues.    Circumcision  What is circumcision?  At birth, baby boys have loose skin that covers the head of the penis. This skin is called the foreskin. When all or part of the foreskin of the penis is cut off, this is called circumcision.  Why is circumcision done?  Circumcision is done for many reasons including Zoroastrian, cultural, looks, and health. Some Zoroastrian groups circumcise all boys as a ephraim-based practice. Many people in the [place] choose to circumcise their baby boys because they believe it is culturally normal. It is not a common practice in [place], [place], or [place].  Some parents choose circumcision so that their son will have a penis that looks like his father s if the father was also circumcised. Other people choose circumcision because they believe it is  or will protect the boy or man from infection or cancer later in life.  Does circumcision protect against infection or cancer?  Circumcision does seem to protect against some types of infection or cancer. Cancer of the penis is one type of cancer that circumcision may prevent. However, cancer of the penis is very rare. One hundred thousand circumcisions would need to  be done to prevent one case of cancer of the penis. Circumcision may also decrease the chance of some sexually transmitted infections, such as HIV and human papilloma virus (HPV). See the next page for more information on the risks and benefits of circumcision.  What happens during a circumcision?  Babies born in the hospital are usually circumcised before they go home. Health care providers also perform circumcisions in their offices and clinics within a few weeks after birth.  Spiritism circumcisions are most often done at home or in a Rastafarian.  Before the circumcision is performed, some providers give an injection (shot) of a small amount of anesthetic (numbing medicine) at the base of the penis to block the pain or put an anesthetic cream on the penis to numb the area that will be cut.  There are 2 different ways to do a circumcision. In one type, a clamp placed around the head of the penis cuts off the blood supply to the foreskin, and the foreskin above the clamp is cut off. The clamp is left on the penis until the area heals and it falls off a few days later. In another type of circumcision, the foreskin is cut off with scissors or a scalpel.  After the circumcision, petroleum jelly and sometimes gauze may be put over the area of the penis where the skin was removed. This protects the end of the penis while it heals.  Can I keep my son s penis  if it is circumcised?  Regular washing with soap and water will keep any penis clean. Circumcision does not make the penis . Uncircumcised boys do need to be taught to clean beneath their foreskin, just like they need to be taught to wash their hands or brush their teeth.  How do I decide if I should have my son circumcised?  The American Academy of Pediatrics (AAP) says that  circumcision may have health benefits. They do not recommend circumcision for all boys as a routine procedure. The AAP recommends that you talk to your health care provider  to decide if circumcision is the right choice for your family. You may also wish to discuss the question with your family or .  What are the risks and benefits of circumcision?  We do not have a lot of good scientific information about the health risks or benefits of circumcision.  Possible Risks:  Very few baby boys (less than 1 in 100) will have a problem after circumcision, such as bleeding or mild infection of the penis. These problems are usually not serious and are easy to treat.  Less common problems are:    Removal of too much or too little foreskin  Some rare problems are:    Narrowing of the opening of the penis, which can cause problems with urination    Removal of part of the penis or death of some of the other skin on the penis   Infection that spreads to other parts of the body  People used to think babies did not really feel pain. Now we know that they do. Many baby boys appear to feel a lot of pain during circumcision if anesthesia is not used.  We do not know if circumcision affects sexual function or sensation.  Possible Benefits:    Less risk for some kinds of cancers, like cancer of the penis    Fewer urinary tract (bladder or kidney) infections for babies    Less risk for some sexually transmitted infections, such as HIV, herpes, and HPV     May protect female sexual partners from some sexually transmitted infections    For More Information  American Academy of Family Physicians: Circumcision  http://familydoctor.org/familydoctor/en/pregnancy-newborns/caring-for-newborns/infant- care/circumcision.html  MedlinePlus: Circumcision (includes a slide show on the procedure)  www.nlm.nih.gov/medlineplus/circumcision.html  American Academy of Pediatrics: Policy statement on circumcision  Http://pediatrics.aappublications.org/content/130/3/e756.abstract      Childbirth and Parenting Education:   [facility] center: http://Contests4Causes/   [phone]  [place]: (education,  yoga & wellness) www.[employer]  Enlightened Mama: www.enlightenedmama.com   Childbirth collective: (Parent topic nights)  www.childbirthcollective.org/  Hypnobabies:  www.hypnobabiestwincities.com/  Hypnobirthing:  Http://hypnobirthing.com/    Book Recommendations:   Yudith Annapolis's Birthing From Within--first few chapters include a new-age tone, you may prefer to skip it and keep going, because there is good stuff later.  This book recommendation covers emotional preparation, but does cover coping with pain, and use of both pharmacological and nonpharmacological methods.    Dr. Rivera' The Pregnancy Book and The Birth Book--the pregnancy book goes month-by month    Womanly Art of Breastfeeding by La Leche League International   Bestfeeding by Alma Sierra--great pictures    Mothering Your Nursing Toddler, by Sandra Peralta.   Addresses dealing with so many of the challenging behaviors of a nursing toddler.  How Weaning Happens, by La Leche League.  Discusses weaning at all ages, from medically necessary weaning of an infant, all the way up to age 5 (or older), with why/why not, and strategies.  Very empowering book both for deciding to wean and deciding not to.    American College of Nurse-Midwives (ACNM) http://www.midwife.org/; look at the informational handouts at http://www.midwife.org/Share-With-Women     www.mymidwife.org    Mother to Baby (Medication and Herbal guidance in pregnancy): http://www.mothertobaby.org  Toll-Free Hotline: 796.395.2076  LactMed (Medication use while breastfeeding): http://toxnet.nlm.nih.gov/newtoxnet/lactmed.htm    Women's Health.gov:  http://www.womenshealth.gov/a-z-topics/index.html    American pregnancy association - http://americanpregnancy.org    Centering Pregnancy (group prenatal care option): http://centeringhealthcare.org    Information about doulas:  Childbirth collective: http://www.childbirthcollective.org/  Doulas of [place] ([place]EL[place]):  www.melissa.org  [facility]  " project: http://MailMagtiesdoulaproject.com/     Early Childhood and Family Education (ECFE):  ECFE offers parents hands-on learning experiences that will nourish a lifetime of teachable moments.  http://ecfe.info/ecfe-home/    March of Dimes www.Weekend-a-gogo     FDA - Nutrition  www.mypyramid.gov  Under \"For Consumers,\" click on \"pregnant and breastfeeding women.\"      Centers for Disease Control and Prevention (CDC) - Vaccines : http://www.cdc.gov/vaccines/       When researching information on the web, question the validity of websites.  The MiTÃº .gov, .edu and.org tend to be more reliable information.  If there are a lot of advertisements, be cautious of the information provided. Stay away from blogs and chat rooms please!   "

## 2021-06-07 NOTE — PATIENT INSTRUCTIONS - HE
Current changes in labor and delivery due to Covid 19 precautions:    -When you arrive at the hospital for birth you are asked to enter through the front door of the hospital instead of the Emergency Room, someone will escort you to labor and delivery.     -Waterbirth is not permitted at this time, you may still labor in the tub in your room    -Nitrous oxide use is not permitted at this time, to reduce possible viral spread on the unit    -You may have one person with you in labor, that person must stay in the room with you for the duration of your stay.  If they leave they will not be allowed back into the hospital    -Please bring all items into the room with you that you will need for your stay including the carseat to take your baby home    -You may bring food into your room in a cooler    -Your birth partner may order food from the cafeteria but will need to pay with a credit card    -You may order food from an outside source and it will be brought to your room by a staff member    -If you forgot something at home, you may have someone deliver to the hospital entrance and a staff member will bring it to you    -Your and your birth partner are encouraged to wear a cloth mask while in the hospital     -Starting on 4- all women presenting to labor and delivery for birth will have a Covid 19 nasal swab completed.     https://www.cdc.gov/coronavirus/2019-ncov/prepare/pregnancy-breastfeeding.html    Frequently Asked Questions and Answers: Coronavirus Disease 2019 (COVID-19) and Pregnancy    Pregnant Women   What is the risk to pregnant women of getting COVID-19? Is it easier for pregnant women to become ill with the disease? If they become infected, will they be more sick than other people?  We do not currently know if pregnant women have a greater chance of getting sick from COVID-19 than the general public nor whether they are more likely to have serious illness as a result. Pregnant women experience  changes in their bodies that may increase their risk of some infections. With viruses from the same family as COVID-19, and other viral respiratory infections, such as influenza, women have had a higher risk of developing severe illness. It is always important for pregnant women to protect themselves from illnesses.  How can pregnant women protect themselves from getting COVID-19?  Pregnant women should do the same things as the general public to avoid infection. You can help stop the spread of COVID-19 by taking these actions:    Cover your cough (using your elbow is a good technique)     Avoid people who are sick     Clean your hands often using soap and water or alcohol-based hand   You can find additional information on preventing COVID-19 disease at [facility] s (Prevention for 2019 Novel Coronavirus).  Can COVID-19 cause problems for a pregnancy?  We do not know at this time if COVID-19 would cause problems during pregnancy or affect the health of the baby after birth.    During Pregnancy or Delivery   Can COVID-19 be passed from a pregnant woman to the fetus or ?  We still do not know if a pregnant woman with COVID-19 can pass the virus that causes COVID-19 to her fetus or baby during pregnancy or delivery. No infants born to mothers with COVID-19 have tested positive for the COVID-19 virus. In these cases, which are a small number, the virus was not found in samples of amniotic fluid or breastmilk.    Infants   If a pregnant woman has COVID-19 during pregnancy, will it hurt the baby?  We do not know at this time what if any risk is posed to infants of a pregnant woman who has COVID-19. There have been a small number of reported problems with pregnancy or delivery (e.g.  birth) in babies born to mothers who tested positive for COVID-19 during their pregnancy. However, it is not clear that these outcomes were related to maternal infection.    Breastfeeding   Interim Guidance on Breastfeeding  for a Mother Confirmed or Under Investigation For COVID-19  This interim guidance is intended for women who are confirmed to have COVID-19 or are persons-under-investigation (PUI) for COVID-19 and are currently breastfeeding. This interim guidance is based on what is currently known about COVID-19 and the transmission of other viral respiratory infections. CDC will update this interim guidance as needed as additional information becomes available. For breastfeeding guidance in the immediate postpartum setting, refer to Interim Considerations for Infection Prevention and Control of 2019 Coronavirus Disease 2019 (COVID-19) in Inpatient Obstetric Healthcare Settings.  Transmission of COVID-19 through breast milk  Much is unknown about how COVID-19 is spread. Person-to-person spread is thought to occur mainly via respiratory droplets produced when an infected person coughs or sneezes, similar to how influenza (flu) and other respiratory pathogens spread. In limited studies on women with COVID-19 and another coronavirus infection, Severe Acute Respiratory Syndrome (SARS-CoV), the virus has not been detected in breast milk; however we do not know whether mothers with COVID-19 can transmit the virus via breast milk.  CDC breastfeeding guidance for other infectious illnesses  Breast milk provides protection against many illnesses. There are rare exceptions when breastfeeding or feeding expressed breast milk is not recommended. CDC has no specific guidance for breastfeeding during infection with similar viruses like SARS-CoV or  Respiratory Syndrome (MERS-CoV).  Outside of the immediate postpartum setting, CDC recommends that a mother with flu continue breastfeeding or feeding expressed breast milk to her infant while taking precautions to avoid spreading the virus to her infant.  Guidance on breastfeeding for mothers with confirmed COVID-19 or under investigation for COVID-19  Breast milk is the best source of  nutrition for most infants. However, much is unknown about COVID-19. Whether and how to start or continue breastfeeding should be determined by the mother in coordination with her family and healthcare providers. A mother with confirmed COVID-19 or who is a symptomatic PUI should take all possible precautions to avoid spreading the virus to her infant, including washing her hands before touching the infant and wearing a face mask, if possible, while feeding at the breast. If expressing breast milk with a manual or electric breast pump, the mother should wash her hands before touching any pump or bottle parts and follow recommendations for proper pump cleaning after each use. If possible, consider having someone who is well feed the expressed breast milk to the infant.  Page last reviewed: March 17, 2020   Content source: National Center for Immunization and Respiratory Diseases (NCIRD), Division of Viral Diseases         [facility] - Contact information:  Appointment line and to get a hold of CNM in clinic Monday-Friday 8 am - 5 pm:  [phone].  There are some clinics with early start times (1st appointment 7:40 am) and others with evening hours (last appointment 6:20 pm).  Most are typically open from 8 am to 5 pm.    CNM on call answering service: [phone].  Specify your hospital of choice and leave a brief message for CNM;  will then page CNM who is on call at your specified hospital and you should receive a call back with 15 minutes.  Be sure that your ringer is audible and that you can accept blocked calls so that we can get back in touch with you! This number should be reserved for urgent needs if during the day, before 8 am, after 5 pm, weekends, holidays.    Contact the on-call CNM with warning signs, such as:    vaginal bleeding     Vaginal discharge and itching or pain and burning during urination    Leg/calf pain or swelling on one side    severe abdominal pain    nausea  and vomiting more than 4-5 times a day, or if you are unable to keep anything down    fever more than 100.4 degrees F.   Immunizations:  http://www.cdc.gov/vaccines/schedules/easy-to-read/child.html      Postpartum Depression  The first weeks of caring for a  baby are more than a full-time job. Although it is often a happy time, your feelings and moods may not be what you expected. Many women experience  baby blues.  Here are some tips to help you understand when feelings of sadness are normal, and when you should call your health care provider.    What are the baby blues?  As many as 3 of every 4 women will have short periods of mood swings, crying, or feeling cranky or restless during the first weeks after birth. These feelings can be worse when you are tired or anxious. Women who have the baby blues often say they feel like crying but don t know why. Baby blues usually happen in the first or second week postpartum (after you give birth) and last less than a week. If you are not sleeping, becoming more upset, don t feel like you can take care of your baby, or your sadness lasts 2 weeks or more, call your health care provider.    What is postpartum depression?  About one in every 5 women will develop depression during the first few months postpartum that may be mild, moderate, or severe. Women who have postpartum depression may have some of these symptoms:    Feeling guilty     Not able to enjoy your baby and feeling like you are not bonding with your baby      Not able to sleep, even when the baby is sleeping    Sleeping too much and feeling too tired to get out of bed    Feeling overwhelmed and not able to do what you need to during the day    Not able to concentrate    Don t feel like eating    Feeling like you are not normal or not yourself anymore    Not able to make decisions    Feeling like a failure as a mother    Feeling lonely or all alone    Thinking your baby might be better off without you  If  you have any of these symptoms, call your health care provider!    Which symptoms of postpartum depression are dangerous?  Sometimes a woman with postpartum depression will have thoughts of harming herself or her baby. If you find yourself thinking about hurting yourself or your baby, call your health care provider immediately.    MOTHERHOOD: THE EARLY DAYS  You prepare for the birth of your baby for many months during pregnancy, and then the first months at home after your baby is born can be a quiet, gentle time of getting to know this new person who has come to live in your home. But for most women it is not all quiet or sweet. And for some women it is a very hard time.  What Can I Expect in the First Few Months After the Baby Comes?  New mothers and their families face many challenges in the first few months:    Your body and your hormones have to get back to normal.    You and the baby will be learning to breastfeed.    Babies only sleep a few hours at a time. The entire family will have a hard time getting enough sleep.    You and your family need to learn how to parent this new family member.    If you have a partner, you have to figure out how to stay together as a couple and maybe even start to have sex again.    You may have to figure out how to keep from getting pregnant again right away.    You may need to return to work and find day care.    How Long Will it Take for My Body to Get Back to Normal?  Some changes will occur quickly. Others will not occur as quickly.    Your uterus, cervix, and vagina will all shrink to their nonpregnant size in about 2 weeks. Your vagina may be tender and dry for a few months--especially if you are breastfeeding.    If you had stitches or hemorrhoids, your   bottom   will be sore for 2 weeks or more.    For some women who have problems urinating, it can take several months for you to be able to hold your urine when you cough or sneeze or suddenly  something  heavy.    Your breast milk will   come in   2 to 3 days after the birth of your baby. It will take 6 to 8 weeks for you and the baby to get the hang of breastfeeding and find a pattern. During these first weeks, you can have engorged breasts at times and often leak milk.    Your stomach and intestines all have to fall back into place. You may have a lot of gas for a few weeks.    You may be constipated--especially if you are breastfeeding.    Your stretched stomach muscles can recover in a few weeks, but for some women it takes longer--6 months or a year--to recover.    If you had a  delivery, you may have pain or numbness around the incision for 6 months or more.    Losing the weight you gained during pregnancy will probably take 6 months to a year. Have patience! It took 40 weeks to get here. Give yourself 40 weeks to get back.    What Can I Expect When My Hormones Change?  About 75% of all women will get the   blues.   This usually starts about 3 days after the birth of your baby. You may cry easily and feel very, very tired. A few women become very depressed. If you had a  delivery or your new baby was sick, you are at a higher risk for depression.  Call your health care provider right away if you cannot care for yourself or your baby, if you feel very nervous or worried, if you cannot stop crying, or if you are having thoughts of hurting yourself or your baby.    Taking Care of Yourself  While you are still pregnant:    Talk with your partner and your family about the time ahead. Arrange for someone to help you during the first weeks at home if you can.    Talk with your health care provider about birth control options and make a plan before the baby comes.    If you are worried about how to parent a , take parenting classes. You will learn a lot about how babies act and you will make some friends who are going through the same thing at the same time. Most [facility] have these  classes.    Arrange for someone to help with baby care if you can.  After the baby comes:    Ask for help. Let other people do the cooking and cleaning and run the house. Focus on yourself and your baby.    Sleep whenever you can. Try not to be tempted to   get some things done   when the baby sleeps. This is your time to sleep, too.    Drink lots of water. You will need at least 6 big glasses of water everyday to avoid constipation and make enough breast milk. Every time you sit down to breastfeed, have a big glass of water with you to drink while you are nursing.    Eat lots of vegetables and fruit. You will need lots of vitamins and fiber to help your body get back to normal. This will also help you avoid constipation.    Go outside and walk. Babies can go outside even if it is very cold. Fresh air and sunshine will do you both good.    Take sitz baths. Put about 6 inches of warm water in your bathtub and sit in there for 15 minutes 2 to 3 times a day. This will help your   bottom   heal more quickly. It will also give you 15 minutes of private time!    Talk to other mothers. Join a new parents group. Call Jeremy and go to [facility] meetings if you are breastfeeding.     With your partner:    Keep talking. Share the experience.    Spend time alone. Even a 30-minute walk can be a date.    Start a birth control method. You can get pregnant before you even have a period. It is very important to use birth control if you do not want to get pregnant again right away.    When you have sex, use a lubricant. A lot of lubricant! Take it slow.  The first few months after a baby comes can be a lot like floating in a jar of honey--very sweet and cordova, but very sticky, too. Take time to enjoy the good parts. Remind yourself that this time will pass. Bon voyage!    FOR MORE INFORMATION  For questions about depression during and after  pregnancy:  http://www.womenshealth.gov/publications/our-publications/fact-sheet/depression-pregnancy.html   After birth: The first 6 weeks:  http://www.[email or web address]/Post-Birth-and-Recovery   Breastfeeding resources:  http://www.[email or web address]/health-info/getting-breastfeeding-off-to-a-good-start/    HEALTHY PREGNANCY CARE: 37 to 41 WEEKS PREGNANT    Talk with your midwife or physician about when to call with signs of labor    Regular uterine contractions that are getting closer together and/or stronger    If you think your water has broken or is leaking    Bleeding from the vagina like a period (bloody vaginal discharge is normal)    If you are not feeling your baby move    Make plans for transportation and  as needed for when you are going to the hospital.    Your midwife or physician may offer to check your cervix for changes.     Ask your health care provider about vaccinations you may need following delivery. By now, you should have received a Tdap immunization to protect against pertussis or whooping cough. Fathers and family members who will be in close contact with the baby should also receive a Tdap shot at least two weeks before the expected birth of the baby if they have not had a Td (tetanus) shot for at least two years.    If you are past your due date, discuss the next steps leading to delivery with your midwife or physician. If you don't start labor on your own by 41 or 42 weeks, your midwife or physician may recommend giving you medicines to ripen your cervix and start labor.    Preparing for your baby: Tell your midwife or physician how you plan to feed your baby (breast or bottle), who you have chosen to do pediatric care for your baby, and if you have a boy, whether you have chosen to have him circumcised. You will need a car seat correctly installed in your vehicle to bring your baby home. As you start to set up the nursery at home for your baby, make sure the crib is  safe. The mattress needs to fit snugly against the edges of the crib. If you can fit a soda can between the bars, they are too far apart and can allow the baby's head to caught between them.    Learn about infant care and feeding, including information about infant CPR. We recommend that you put your baby to sleep on his or her back to reduce the chance of Sudden Infant Death Syndrome (SIDS). To maintain a healthy environment in which your child can grow, it's best to keep your home smoke-free. By preparing ahead, your transition into parenthood will go smoothly for you and your baby.    Your midwife or physician will want to see you for a checkup 2 to 6 weeks after delivery.    If you have questions about any symptoms you are experiencing or any other concerns, call your provider or their clinic staff at [facility] at Phone: [phone]. If it's after clinic hours, physician patients should call the [facility] at [phone] ([phone]); midwife patients should call their answering service at [phone].    How can you care for yourself at home?   You can refer to the Starting Out Right book or find it online at http://www.[facility].org/images/stories/maternity/[facility]-Starting-Out-Right.pdf or http://www.[facility].org/images/stories/flipbooks/[facility]-starting-out-right/[facility]-starting-out-right.html#p=8     You can sign up for a weekly parenting e-mail that gives support, tips and advice from health care professionals that starts with pregnancy and continues through the toddler years. To register, go to [email or web address] at any time during your pregnancy.        Making Plans for Feeding My Baby    By this point, you probably have read a lot about feeding your baby.  Breastfeed or formula? Each mother s decision is her own and [facility] respects you and your choices. We ve gathered information on both breastfeeding and formula feeding to help with your decision.  Talking with your physician or nurse-midwife can also help in your decision.  However you plan to feed your baby, [facility] encourage rooming in with your baby, skin-to-skin contact and feeding your baby based on his or her cues.    Skin-to-skin contact  Being close to mom helps your baby adjust to life outside of the womb.  It helps your baby regulate their body temperature, heart rate, and breathing.  Your baby will usually be placed skin-to-skin immediately following birth or as soon as possible, if medical intervention is needed.    Rooming-In  Having your baby stay with you in your room is called  rooming-in .  Keeping your baby in your room helps you to learn how to care for your baby by getting to know your baby s cues, body rhythms and sleep cycle.       Cue-based feeding  Cues (signals) are baby s way of telling you what he or she wants.  When you learn your infant s cues, you know how to care for and feed your baby.   Feeding cues are the licking and smacking of lips, bringing their fist to their mouth, and a reflex called  rooting - where baby turns and opens his or her mouth, searching for the breast or bottle.  Crying is a late feeding cue.  Babies can feed frequently, often at least 8 times in 24 hours.  Breastfeeding facts  Breast milk is the best source of nutrition for your baby and is available at birth. In the first couple of days, your milk volume is already starting to increase, though it may not be noticeable. Breastfeed frequently to increase your milk supply. Within three to five days, you will begin to notice larger milk volumes. An increase in breast size, heaviness and firmness are often described as the milk  coming in.  Frequent breastfeeding can help breasts from getting overly firm and painful. You will know the baby is getting enough milk if your baby is having wet and dirty diapers and gaining weight.     If your goal is to exclusively breastfeed, it is  important to not use any formula or artificial nipples (including bottles and pacifiers) while your baby is learning to breastfeed.  While it may seem like an  easy  option to give your baby a bottle, formula should only be given if there is a medical reason for your baby to have it.    Positioning and attachment   Get comfortable.  Use pillows as needed to support your arms and baby.  Hold baby close at the level of your breast, facing you in a tummy to tummy position.  Skin to skin helps with this.  Position the baby with his or her nose by the nipple.  There should be a straight line from baby s ear to shoulder to hips.  Tickle your baby s lips or wait for baby to open mouth wide, bring baby to breast by leading with the chin.  Aim the nipple at the roof of baby s mouth.  A rapid sucking pattern is followed by longer, drawing pattern with occasional swallows heard.  When baby is correctly latched, your nipple and much of the areola are pulled well into baby s mouth.      Returning to work or school  Focus on a good start to breastfeeding.  Many women continue to provide breastmilk for their baby when they return to work or school.  Making plans about where to pump and store milk can make the transition go well.  Talk with other mothers who have also returned to work or school for tips and support.  Your employer s Human Resource department may be a resource as well.     Returning to work or school: (continued)     babies can mean fewer  sick  days for you.    A quality breast pump will also save time and add comfort.  Check with your insurance prior to giving birth for breast pump coverage.  Many insurance companies include a pump within your benefits.    Wait until your baby is at least three weeks old to introduce a bottle for the first time.  Have someone besides you give the bottle.    Breastfeed when you are with your baby. Reserve your bottles of breast milk for when you are away.     Your breasts  will need to be  emptied  either by your baby or a pump.  Plan to pump at least twice in an eight hour day.    If you cannot pump at work, continue breastfeeding at home. Any amount of breast milk is worth giving to your baby.    Formula feeding facts  If you are planning to use formula to feed your baby, you will want to make some preparations ahead of time. Talk to your doctor or nurse-midwife about what type of formula to use. Some are iron-fortified, meaning they have extra iron in them. You will want to purchase formula and bottles before your baby is born to be sure you are ready after you return from the hospital. [facility] do not provide formula samples to take home.    Be sure to follow formula mixing directions closely. Regular milk in the dairy case at the grocery store should not be given to babies under 1 year old. Baby formula is sold in several forms including:    Ready-to-use. This is the most expensive, but no mixing is necessary.    Concentrated liquid. This is less expensive than ready-to-use and you mix with water.    Powder. This is the least expensive. You mix one level scoop of powdered formula with two ounces of water and stir well.    Most babies need 2.5 ounces of formula per pound of body weight each day. This means an 8-pound baby may drink about 20 ounces of formula a day; however, this is just an estimate. The most important thing is to pay attention to your baby s cues.  If your baby is always fussy, needs more iron or has certain food allergies, your physician may suggest you change your baby s formula to a different kind.     How do I warm my baby s bottles?  You may feed your baby a bottle without warming it first. It is OK for the breast milk or formula to be cool or room temperature. If your baby seems to prefer it warmed, you can put the filled bottle in a container of warm water and let it stand for a few minutes. Check the temperature of the liquid on your skin  before feeding it to your baby; to be sure it isn t too hot. Do not heat bottles in the microwave. Microwaves heat food and liquids unevenly, and this can cause hot spots that can burn your baby.    How do I clean and sterilize bottles?  Sterilize bottles and nipples before you use them for the first time. You can do this by putting them in boiling water for 5 minutes. After that first time, you can wash them in hot and soapy water. Rinse them carefully to be sure there is no soap left on them. You can also wash them in the .    [facility] Connection [phone] ([phone])    Share with Women\Babs I in Labor?  What is labor? Labor is the work that your body does to birth your baby. Your uterus (the womb) contracts(tightens). The contractions(labor pains) push your baby down onto your cervix(the opening ofyour uterus). Thispressure causesyour cervix to open. When your cervix iscompletely open (10 centimeters dilated), you will push your baby through your vagina and out into the world.  What do contractions feel like? When contractionsfirst start, theyusually feellike cramps duringyour period. Sometimesyoufeelpain in your back. Mostoften,contractions feel like muscles pulling painfully in your lower belly. At first, the contractions will probably be 15 to 20 minutes apart.They maybe irregular and will not feel too painful. As labor goes on, the contractionsget stronger,closer together, more consistent, and more painful.  How do I time the contractions? When the contractionsseem to be coming regularly, youshould start to time them.You time your contractions by counting the number of minutes from the start of one contraction to the start of the next contraction.  What should I do during early labor when the contractions start? If it is night andyoucan sleep, do so. If it happensduring the day, there are some things you can do to take care of yourself at home: Walk. If the painsyou are having are reallabor, walking  will makethecontractionscome closer together and they will be stronger,but you will be able to cope with them better if you are standing or moving around. If the contractions are early labor ones that come andgo (sometimes called false labor), walkingcan make them go away. Take a shower or bath. This will help you relax. Eat. Labor is a big event.Your body needs a lot of energy to be effective.Eat whatever you feel like eating. Drink water. Not drinking enough water can cause contractions to not be as effectiveas theyshould be.You need to be well hydrated (drinking enoughwater) to help your body work well during labor. Take a na p. If youfeel tired, lay down on your side and get all the rest you can. It helps to be rested when you go intoactive labor. Do something you enjoy. Spend time with family. Watch a movie. Distraction will help you relax. Get a massage. If your labor is in your back, a strong massage on your lower back may feel very good. Getting a foot massage or having a partner rub your feet can also be very relaxing. Don t panic. You can do this. Your body was made for this. You are strong!  When should I call my health care provider if I think I am in labor? Your contractions have been 5 minutes or less apart for at least an hour. Your contractions are becoming so painful youcannot walk or talk during one. You think your amniotic sac (bag of patton) breaks. You may have a big gush of amniotic fluid (water) or just fluid that runs down your legs when you walk or move or change position.  Are there other reasons to call my health care provider? If you are concerned about anything, don t hesitate to call your health care provider.You should definitely call your health care provider or go to the hospital if:  It is 3 weeks or more before your due date, and you are having contractions.  You have vaginal bleeding that is more than your period, soaks your underwear, or runs down your legs.  You have sudden  severe pain that does not go away with rest.  Your baby has not movedfor several hours.  You are leaking greenish fluid.    For More Information: http://onlinelibrary.lomax.com/doi/10.1111/jmwh.55001/epdf     US Department of Health and Human Services: Signs oflabor,labor stages, and types of birth  http://womenshealth.gov/pregnancy/childbirth-beyond/labor-birth.html#a      Childbirth and Parenting Education:   [facility]: http://SunSun LightingLos Alamitos Medical CenterSuperSolver.com/   [phone] [phone]  [place]: (education, yoga & wellness) www.Avacen  Enlightened Mama: www.Pikum   Childbirth collective: (Parent topic nights)  www.childbirthcollective.org/  Hypnobabies:  www.hypnobabiestwincities.Tradyo/  Hypnobirthing:  Http://hypnobirthing.Tradyo/    Book Recommendations:   Yudith Abreu's Birthing From Within--first few chapters include a new-age tone, you may prefer to skip it and keep going, because there is good stuff later.  This book recommendation covers emotional preparation, but does cover coping with pain, and use of both pharmacological and nonpharmacological methods.    Dr. Rivera' The Pregnancy Book and The Birth Book--the pregnancy book goes month-by month    Womanly Art of Breastfeeding by La Leche League International   Bestfeeding by Alma Sierra--great pictures    Mothering Your Nursing Toddler, by Sandra Peralta.   Addresses dealing with so many of the challenging behaviors of a nursing toddler.  How Weaning Happens, by La Leche League.  Discusses weaning at all ages, from medically necessary weaning of an infant, all the way up to age 5 (or older), with why/why not, and strategies.  Very empowering book both for deciding to wean and deciding not to.    American College of Nurse-Midwives (ACNM) http://www.midwife.org/; look at the informational handouts at http://www.midwife.org/Share-With-Women     www.mymidwife.org    Mother to Baby (Medication and Herbal guidance in pregnancy):  "http://www.mothertobaby.org  Toll-Free Hotline: 651.250.6912  LactMed (Medication use while breastfeeding): http://toxnet.nlm.nih.gov/newtoxnet/lactmed.htm    Women's Health.gov:  http://www.womenshealth.gov/a-z-topics/index.html    American pregnancy association - http://americanpregnancy.org    Centering Pregnancy (group prenatal care option): http://centeringhealthcare.org    Information about doulas:  Childbirth collective: http://www.childbirthcollective.org/  [facility] [place] (MELISSA):  www.melissa.org  [employer] [facility]  project: http://MoonfryetiesdoZhejiang X[employer]proSupremex.Jabong.com/     Early Childhood and Family Education (ECFE):  ECFE offers parents hands-on learning experiences that will nourish a lifetime of teachable moments.  http://ecfe.info/ecfe-home/    March of Dimes www.WikiRealty     FDA - Nutrition  www.mypyramid.gov  Under \"For Consumers,\" click on \"pregnant and breastfeeding women.\"      Centers for Disease Control and Prevention (CDC) - Vaccines : http://www.cdc.gov/vaccines/       When researching information on the web, question the validity of websites.  The [employer] .gov, .edu and.org tend to be more reliable information.  If there are a lot of advertisements, be cautious of the information provided. Stay away from blogs and chat rooms please!       Virtual Breastfeeding Support:    During this time of isolation, breastfeeding families need even more community!  Here are some area organizations offering virtual support groups for breastfeeding:      [employer] Support Group, Tuesdays at 10:30 am   Run by NICOLASA Mcginnis of [facility]   Go to [facility] Facebook page and click on \"events\" for link   https://www.facebook.com/events/334685909089441/    [facility] Milk Hour, Thursdays at 2:30 pm    Run by NICOLASA Hope   Go to [facility] FB page and send message to get " link   https://www.Green [employer].com/healthfoundations/    [facility]/[place] holding virtual meetings the first Tuesday of each month, 8-9 pm, and the   Third Saturday, 10 - 11 am.  Go to [facility] and [place] FB page; message to get link [email or web address]_[email or web address]    [employer] offers a Lactation Lounge every Friday 12pm - 1pm, run by Shelly Mix [facility] Leader   Sign up via link at [employer]ogenics/cbe-lactation   https://www.Histogenics/cbe-lactation    [facility] is offering virtual support groups every Monday, 10:30 am - 12 pm, run by nurse NICOLASA   Https://www.[email or web address]/events/668684516715124/    Prenatal Breastfeeding Classes:        [employer] is offering virtual breastfeeding and  care classes:  https://www.Histogenics/education-workshops    BirthEd childbirth and breastfeeding education offering virtual prenatal breastfeeding classes  https://www.birthedmn.com/workshops

## 2021-06-08 NOTE — PROGRESS NOTES
Bob is here alone today.  She is feeling well.  No new questions today.  Desires prescription for breast pump which was given to her today.  Feeling active FM.  Concerned about not knowing where to go if she comes in through the front door in labor.  Reassured that there would be somebody there 24 hours a day that could help direct her to the second floor.  Bob is washing her hands frequently, cleaning surfaces at home and limiting social contact to avoid exposure from coronavirus. Denies fever, cough, shortness of breath, any contact with anyone with coronavirus-like symptoms, travel to high risk areas. She is  a stay-at-home mother.  Reviewed COVID19 hospital policies including limit of only one support person in the hospital (and that person is not allowed to leave and come back), no family visiting after the birth, early discharge if possible. Patients and visitors will be asked to wear masks and patients will be tested for COVID 19 upon admission or prior to scheduled birth.  Reviewed our recommendation that Bob take an iron supplement daily to boost her iron stores prior to birth as we anticipate a shortage of blood products due to COVID19 pandemic.  She is currently doing this due to anemia of pregnancy.

## 2021-06-08 NOTE — PROGRESS NOTES
Bob presents to the clinic by herself.  Eager for baby's arrival!  Denies regular uterine contractions, loss of fluid or vaginal bleeding.  Baby is active.  No concerns.  GBS NEGATIVE, and 36-week hemoglobin 10.6 g/dL.  Iron supplementation 3 times daily.  Term labor precautions and danger signs and symptoms reviewed.  All questions answered.  Encouraged daily fetal movement counting and to call or return to clinic with any questions, concerns, or as needed.

## 2021-06-08 NOTE — PROGRESS NOTES
Bob presents by herself.  All is well!  NST/BPP for post term pregnancy fetal surveillance scheduled for Jay, May 31 at 10 AM on [facility].  Reviewed benefits and risks for induction of labor versus expectant management and patient desires the latter.  Baby is active, and she denies regular uterine contractions, loss of fluid or vaginal bleeding.  Term labor precautions and danger signs and symptoms reviewed.  All questions answered.  Encouraged daily fetal movement counting and to call or return to clinic with any questions, concerns, or as needed.

## 2021-06-09 NOTE — TELEPHONE ENCOUNTER
Return call received from patient.  Normal HGB result and CNM recommendations communicated to patient.  Patient verbalizes understanding to all.  She offers no further questions or concerns at this time.

## 2021-06-09 NOTE — TELEPHONE ENCOUNTER
Patient requested phone call with HGB results from today.  Telephone call to patient.  Spoke to patient's spouse.  Patient is not currently available to speak.  Asked spouse to have patient to call back.  Need to communicate normal HGB result.

## 2021-06-09 NOTE — PROGRESS NOTES
Routine Postpartum Visit      Subjective:       Bob is a 25 y.o. year old female who presents to clinic today for a postpartum visit.  She is currently 6 weeks postpartum of a  birth.  The birth happened on 20.  The baby was 40 6/7 weeks gestation at the time of the birth.  She had an unmedicated birth, but her infant was admitted to the special care nursery for support with oxygenation and also received antibiotics until infection was ruled out.    Notes she has been feeling dizzy and has a headache.  Ear infection diagnosed yesterday.  She was prescribed drops but has not gotten them yet.  States does not drink any water.  Recommended increasing fluid intake.  Requests hgb test today.    Feeling itching and pain on right foot.  On exam, is reddened with peeling skin consistent with fungal infection.  No other sxms and no one in the home has athlete's foot that she knows of.    C/O back pain in low back on both sides of spine.  Advised regular stretching and exercise.  If not improvement with regular exercise, would recommend PT eval.    Bob's postpartum course has been uncomplicated.  Baby Anjali's  course has been uncomplicated.     Feelings about how her birth went:  It was my hardest one, but I am glad it is over.  Bleeding Status:  Bled for 2-3 weeks.  Currently has no bleeding.  Sleeping Status:  Gets up every 4 hours  Eating:  Healthy meals   Bowel/Bladder Function:  Bowel function is normal. Bladder function is normal.    Exercise:  Has not started but wants to  [place]:  has not resumed intercourse.    Pain:  Slight pain at base of episiotomy   Contraceptive Plans:  Planned contraception method is condoms.    Baby's Feeding Method:  Baby is feeding by breast.    Mental Health/Mood Status:  Feeling well emotionally.  [place]  Depression Scale score of 0 today.    Work Plans:  Will be returning to work.  Is curious if we need to update [facility] paperwork with actual .  Has  "concerns about going back to work with COVID, but needs [employer].      Review of Systems  -A 12 point comprehensive review of systems was negative except as noted above.  -Prenatal history and intrapartum course were also reviewed today.        Objective:         Vitals:    20 1303   BP: 106/64   Pulse: 84   Temp: 98.4  F (36.9  C)   TempSrc: Oral   Weight: 145 lb 11.2 oz (66.1 kg)   Height: 5' 1.25\" (1.556 m)       Physical Exam:    General Appearance: Pleasant, articulate, well-groomed, well-nourished female.  Not in any apparent distress.    Neck: Supple, symmetrical, no adenopathy.  Thyroid:  Not enlarged, symmetric, no tenderness/mass/nodules  Back: no CVA tenderness  Breasts: Symmetrical, non-tender.  No masses, adenopathy, or nipple discharge.  Abdomen: Soft, non-tender, no masses  Pelvic:  -External genitalia:  Normal hair distribution, no lesion.  Birth laceration site healing well.  Small area of healing tissue at base of episiotomy site without discharge or redness.    -Urethral opening: Without lesions, or tenderness.   -Bladder: Without masses, or tenderness.  -Vagina: Pink, rugated, normal-appearing discharge at introitus.  -Bimanual: Deferred  Extremities: Extremities no edema, without varicosities.      Assessment/Plan:     1)  Normal postpartum visit  2)  Contraceptive plans include:  condoms.  Educated about this method.      3)  General postpartum education completed including:  Self-care of physical and emotional needs in this new postpartum period, return of fertility, resumption of intercourse, discussion of general health maintenance, sleep needs, required support of family/friends/community, and watching for signs/symptoms of  mood and anxiety disorders.  4)  Hgb  5) If back pain continues with regular stretching and exercise (resources given to her) would recommend PT referral.     TT = 40 minutes of time of which >50% on education/counseling/care-coordination                   "

## 2021-06-09 NOTE — TELEPHONE ENCOUNTER
Patient and the midwife wanted this writer to call [facility] HR to see if they needed updated paperwork with patient's actual date of delivery    Message left to call back  [facility] phone # [phone]

## 2021-06-09 NOTE — PATIENT INSTRUCTIONS - HE
Back and pelvic pain is common in pregnancy and may be the result of many different causes.  One of the ways to reduce pain is to exercise and stretch certain muscle groups.  Below are videos you can follow that may help reduce or relieve pain in pregnancy.    Pregnancy Stretches   https://www.[employer] Cay[employer] Holdingsube.com/watch?v=nQvDyDddF-g&vance=desktop    Exercises to Relieve Back Pain in Pregnancy  Https://www.[employer] Cay[employer]ube.com/watch?v=_Qed8z9xMtM&vance=desktop    Prenatal Yoga for Lower Back   Https://www.[employer]ube.com/watch?v=bwaLCEIb3q8&vance=desktop    Stretches for Pelvic Pain in Pregnancy  Https://www.[employer]ube.com/watch?v=zdnhIWtfk6r&vance=SingleFeedop    Leona Dietz CNM                POSTPARTUM INFORMATION AND RESOURCES:    Congratulations on the birth of your baby. We have gathered together some information on staying healthy in the postpartum time including information on exercise, nutrition and mental health. We have also listed some local and national resources for lactation support and mental health support.    If you have questions or concerns and need to speak with a midwife immediately, please call the on-call midwife at [phone].    If you have a non-emergent question or would like to schedule a follow up appointment, please call the clinic midwife at [phone].    Thank you for sharing your birth experience with the [facility].  Congratulations on the birth of your baby!    ---------------------------------------------------------------------------------------------------------  EXERCISE & NUTRITION:     Getting and Staying Active: A Healthy You!   -Why should I exercise? Exercise, being physically active, is very important for all women. Being active can help you:   Lose or maintain weight   Have more energy   Sleep better   Enjoy sex more   Relieve stress and think better   Lower the chance you will have heart disease, high blood pressure, and diabetes   Strengthen your bones and muscles   Have fewer hot  flashes if you are older   -How active do I have to be to get the bene?ts of exercise?  Studies show that as little as 15 minutes of moderate exercise--like fast walking or dancing--3 times a week can improve the health of your heart. Ifyou want to get the best benefits from exercise, try to increase your physical activity to at least 30 minutes, 5 times a week. If you have a serious health problem,be sure to talk with your health care provider before starting an exercise program.   Https://[facility]/  Http://www.[employer]/    @PelvicGuru1  @MyPelvicFloorMuscles  @The.Vagina.Whisperer    Taking Care of your Health: Health Care Maintenance Screening recommendations   In all the things women do, taking care of everything and everybody else, they sometimes forget to take care of themselves. This handout reviews the health screenings and vaccines that are recommended for women of all ages. Talk with yourhealth care provider about which tests and vaccines you need. The chart below lists the screening tests and vaccinations recommended for healthy women who do not have a high risk for most diseases.   The recommendations in thischart are guidelines only. For some tests and vaccines, the chart says you should talk to your health care provider.This is because the best recommendations for you depend on your personal health history. Your health care provider may suggest more frequent testing or additional tests ifyou havea higher chance of getting some diseases     Planning Your Family: Developing a Reproductive Life Plan   Planning ahead can help you avoid getting pregnant when you don t want to be pregnant and also be in good health if and when you do decide to become pregnant. Many women have at least one pregnancy in their lives, even if it was not planned. In fact, about half of all pregnancies are not planned. Getting pregnant when you did not plan it can be a problem, or it can turn into a happy event.  "Planning pregnancy leads to healthier pregnancies, healthier mothers, and healthier families.   Although many women want to have a family, not everyone wants to have children. More and more women are childless by choice (also known as childfree). Whether to have children is a personal choice that only you can make. It s okay not to want children! If you never want to get pregnant, it is important to make sure you always use very effective birth control, such as an intrauterine device, the birth control implant, female sterilization (having your tubes tied), or your partner having a vasectomy.     Reliable resources:   ?   American College of Nurse-Midwives (ACNM) http://www.midwife.org/; look at the informational handouts at http://www.midwife.org/Share-With-Women   ??   Women's Health.gov:   http://www.womenshealth.gov/a-z-topics/index.html   FDA - Nutrition ?www.mypyramid.gov? Under \"For Consumers,\" click on \"pregnant and breastfeeding women.\"   ?   Vaccines : Centers for Disease Control and Prevention (CDC) http://www.cdc.gov/vaccines/   ?   [facility] www.Round TopProte[facility] Diagnosticsinic.com   ?   When researching information on the web, question the validity of websites.? The domains .gov, .Travel Likes.net and.org tend to be more reliable information.? If there are a lot of advertisements, be cautious of the information provided. Stay away from blogs and chat rooms please!   ?   ?   Nutrition and supplements:   Daily multivitamin vitamin (with 400 - 1000 mcg folic acid).? Take with food as needed.   ?   4-5 servings of dairy or other calcium rich foods (fish, leafy greens, soy)?per day - if not, take 500-1000 mg additional calcium (Tums, pills, chews). Take with dairy.   ?   Vitamin D3 4000 IU geltab daily. Vitamin D rich foods: Cod Liver Oil (1Tbsp), [place], Mackerel, Tuna, fortified milk and yogurt, Beef and liver, sardines, egg, fortified cereals, swiss cheese.? Take supplements with fattiest meal.?   ?   2-3 (4) oz servings of fish, " "seafood, nuts (walnuts & almonds), oils, avocado per week - if not, take Omega 3 Fatty acids: DHA & EDE 2203-6322 mg per day. ?Other names: cod liver oil, fish oil. Take with fattiest meal.   ?   ?---------------------------------------------------------------------------------------------------------  POSTPARTUM & LACTATION RESOURCES :    Virtual Breastfeeding Support:    During this time of isolation, breastfeeding families need even more community!  Here are some area organizations offering virtual support groups for breastfeeding:      [facility] Support Group,  at 10:30 am   Run by NICOLASA Mcginnis of [employer] Baby Whisperer Lactation Consultants   Go to [employer] Baby Whispe[employer] Lactation Consultants Facebook page and click on \"events\" for link   https://www.Extreme [employer].com/events/[email or web address]/    [facility] Milk Hour,  at 2:30 pm    Run by NICOLASA Hope   Go to [facility] FB page and send message to get link   https://www.[employer].com/healthfoundations/    [facility] holding virtual meetings the first Tuesday of each month, 8-9 pm, and the   Third Saturday, 10 - 11 am.  Go to [facility] [place] FB page; message to get link [email or web address]/?hc_location=[facility]    [facility] offers a Lactation Lounge every Friday 12pm - 1pm, run by Fredo Cesar Leader   Sign up via link at [employer]/cbe-lactation   https://www.[employer]/cbe-lactation    [facility] is offering virtual support groups every Monday, 10:30 am - 12 pm, run by nurse NICOLASA   Https://www.facebook.com/events/[email or web address]/    Prenatal Breastfeeding Classes:        [employer] is offering virtual breastfeeding and  care classes:  https://www.[employer].QoL Meds/education-workshops    BirthEd childbirth and breastfeeding education offering virtual prenatal breastfeeding " classes  https://www.birthedmn.com/workshops    Breastfeeding:   OUTPATIENT LACTATION RESOURCES   -Schedule an appointment with a [facility] TORIEM who is also a Lactation Consultant by calling [phone]. Leona Bolden IBCLC, CNM at [facility] on Monday, Sadie Yee CNM at [facility] on Tuesdays and [facility] on Thursdays.   [facility] Lactation Clinics located at [facility]   Call: [phone].     Inpatient support     Outpatient appointments     Telephone consultation     Breast-feeding classes available through [employer]     [facility] health improvement partnership:       Baby Café    Pregnant and interested in breastfeeding?  Need answers to breastfeeding questions?  Want to help breastfeeding moms?  Already breastfeeding and want to meet other moms?    Join us at the Baby Café!    Baby Cafe is a free, drop-in service offering breast-feeding support for pregnant women, breast-feeding mothers and their families.  Come share tips and socialize with other mothers.  Babies and siblings are welcome (no childcare available).    Starting April 2018, Baby Café will be at 4 locations.  Please see below for the Baby Café closest to you!      [facility]  [address] [place] [address]  1st Wednesday: 10am-12pm    [address] [address]  3rd Wednesday 4-6pm    [address] [address]  4th Wednesday 10am-12:30pm    [facility] [employer]  [address] [place] [address]  4th Wednesdays: 4-6pm      Hmong, Polish, and [place] citizen which is may be available at some sites.    For more information, please contact: [email or web address] [email or web address]. or [phone]    -[facility]:  www.[employer] Resources   -Attend a baby weigh in at [facility]. Lactation consultants are available to answer questions   [facility]: Tuesdays  "1:00 - 2:00   [facility]: Mondays 1:00 - 2:00   -Attend a New Mamma group or a Second Time Mama group at [place].     -Enlightened Mama: www.enlightenedmama.com   -Attend one of the New Mama groups at [facility] in [facility]. [facility] also offers one-on-one in home and in office lactation consults.     -[facility]remy: www.home.org/   -Attend a LeLeche League meeting. Multiple groups in several locations throughout the [facility]. The meetings are no-cost and always informative breastfeeding education session through [employer] La Leche League    Held at [facility] the second Thursday of each month at 7pm    - Information on medication use while breastfeeding: http://toxnet.nlm.nih.gov/newtoxnet/lactmed.htm     Other Online Breastfeeding Resources:     [facility]altheast.org/baby sign up for free online weekly e-mail     healtheast.org/maternity     Breastfeedingmadesimple.com     Llli.org (La Leche League)     Normalfed.com     Womenshealth.gov/breastfeeding     Workandpump.com     "[employer]    https://MiniBrake.[employer]/abcs/   Click on \"Learn More About Attachment\"    -New Parent Connection Drop-In:  In collaboration with [facility], Early Childhood Family Education (ECFE), a program of [address], offers ongoing classes for new parents in their infants.  The connection classes offer support and resources to parents during the exciting and challenging period of transition following the birth of her baby.  Parents and babies (birth to 6 months of age) may join the group at any time.  Baby's birth to 3 months meet Tuesdays, from 1230 to 1:30 PM  Babies 3-6 months meet Tuesdays, from 4 to 5 PM    -Writer.ly New Mama Group: www.PingCo.com/free-new-mama-group  -Attend Dizko Samurais Free New Mama. Groups located at three locations:  [facility] and [place]. Sign up online.       Additional Resources:?   -American College of Nurse-Midwives (ACNM) " http://www.midwife.org/; look at the informational handouts at http://www.midwife.org/Share-With-Women  www.mymidwife.org   ?   -Women's Health.gov:   http://www.womenshealth.gov/a-z-topics/index.html   ?   -Early Childhood and Family Education (ECFE):   ECFE offers parents hands-on learning experiences that will nourish a lifetime of teachable moments.   http://ecfe.info/ecfe-home/       -----------------------------------------------------------------------------------------------------------   (Before, during and after pregnancy)   MOOD DISORDER RESOURCES :    Are you feeling sad or depressed?     Do you feel more irritable or angry with those around you?     Are you having difficulty bonding with your baby?     Do you feel anxious or panicky?     Are you having problems with eating or sleeping?     Are you having upsetting thoughts that you can t get out of your mind?     Do you feel as if you are  out of control  or  going crazy ?     Do you feel like you never should have become a mother?     Are you worried that you might hurt your baby or yourself?    Any of these symptoms, and many more, could indicate that you have a form of  mood or anxiety disorder, such as postpartum depression. While many women experience some mild mood changes during or after the birth of a child, 15 to 20% of women experience more significant symptoms of depression or anxiety. Please know that with informed care you can prevent a worsening of these symptoms and can fully recover. There is no reason to continue to suffer.  Women of every culture, age, income level and race can develop  mood and anxiety disorders. Symptoms can appear any time during pregnancy and the first 12 months after childbirth. There are effective and well-researched treatment options to help you recover. Although the term  postpartum depression  is most often used, there are actually several forms of illness that women may  "experience.    Resources:    -Pregnancy and Postpartum Support [place]: www.[facility].org  Who We Are: We are a group of mental health &  practitioners, service organizations, and mother volunteers who provides services to those struggling with a pregnancy, loss, or postpartum mood disorder through the Helpline, professional training, our resource list and website.  What We Do: We provide support, advocacy, awareness, and training about  mental health in [place].     Community Resource List:   This is a list of  resources within our community.   http://ppsupportmn.org/communityresourcelist    PSYCHOTHERAPISTS/CONSULTANTS   This is a list of licensed mental health professionals who have advanced clinical skills in the treatment of postpartum mood and anxiety disorders (PMADs).   Http://Trendslideupportmn.org/mentalhealthproviderresourcelist   INTEGRATIVE MEDICINE PRACTITIONERS:   This is a list of licensed providers who practice Integrative Medicine: a form of treatment that combines alternative medicine with evidence based medicine in an effort to treat the  whole person  (the person, not just the disease).   http://Trendslideupportmn.org/integrativemedicineproviders    PSYCHIATRIC CARE   This is a list of licensed Psychiatrists who have advanced clinical skills in the treatment and medication management for PMADs and lactating mothers.   Http://[facility].org/psychiatryproviderresroucelist   Click on the links below to find detailed profiles and contact information of providers who have been screened and approved as having advanced training in the area of PMADs. Please note: [facility] does not endorse a specific provider.   The list is in alphabetical order by city. You can also search for providers by city or zip code using the search box. Please click on the \"Show\" box to the upper right to advance to the next page. You may also call our Helpline at [phone] if you would like for our " Helpline volunteer to find a provider for you.    -Postpartum Depression Support Group:   Weekly groups at no cost through [facility] [place], , 1:30-3:00 p.m., at [facility], [address] [facility], [address]. [place], , 1:30-3:00 p.m., at [facility], [address] WElijah To register for the group or get more information, call [phone].   -Postpartum Depression Support Group:   Outpatient Intensive Treatment program for women with  mood disorders [facility] Mother[facility]     -[facility]  Resource Guide     Women s Mental Health at [facility]  This website provides a range of current information including discussion of new research findings in women s mental health and how such investigations inform day-to-day clinical practice. Despite the growing number of studies being conducted in women s health, the clinical implications of such work are frequently controversial, leaving patients with questions regarding the most appropriate path to follow. Providing these resources to patients and their doctors so that individual clinical decisions can be made in a thoughtful and collaborative fashion dovetails with the mission of our Center.    https://womensmentalhealth.org/      If you re having thoughts of harming yourself or your baby, it is vital to get support immediately. Call 911 or go to the nearest E.R.     TOLL-FREE / NATIONWIDE EMERGENCY ASSISTANCE   Immediate Emergency:  911   National Suicide Prevention Lifeline:   1-707.656.3971   Suicide Prevention Hotline:   5-065-NQYWQDX   National Postpartum Depression Hotline:   -PPD-MOMS   Postpartum Support International (PSI)   PPD Helpline: (not a 24-hour hotline)   1-494.976.3640

## 2021-06-11 NOTE — PROGRESS NOTES
"Problem Visit    [unfilled]    Subjective:    Bob Olvera is a 25 y.o. female who presents for evaluation of dysuria and mid back pain that persists after pregnancy. Bob reports she experienced pelvic pain and back pain in her late pregnancy and early postpartum, her mid back pain persists now 3 months postpartum and she desires referral.   Also offers that she has pain when she empties her bladder and has a history of UTI prior to her last pregnancy. She notes urgency to void and no urine every time, she has some odor to her urine but with increased hydration she notes improvement to the odor.   She reports low pelvic pain when she presses on her lower right quadrant. Endorses constipation that is chronic, she has a BM about 3 times a week and is often firm and scant. She sometimes relieves this with diet and hydration but this is present all the time for her.     ROS:   General: Denies fevers, chills, night sweats.  Skin: Denies new rashes or lesions.  HEENT: Denies headache, visual disturbance, throat swelling or difficulty swallowing.  CV: Denies chest pain, palpitations or shortness of breath.  GI: Denies N/V/D, blood in stool or constipation.  : Denies dysuria or polyuria.  Genital: Denies discharge.  MSK: Denies joint pain, muscles aches or difficulty ambulating.  Neurologic: Denies difficulty   Endocrine: Denies hot/cold intolerance, sweating, polyuria.  Psychiatric: Denies depression or anxiety.      Objective:    BP 96/60   Pulse 68   Ht 5' 1.25\" (1.556 m)   Wt 144 lb 8 oz (65.5 kg)   Breastfeeding Yes   BMI 27.08 kg/m      Physical Exam:  General Appearance: Alert, cooperative, no distress, appears stated age  Skin: Skin color, texture, turgor normal, no rashes or lesions.  Throat: Lips, mucosa, and tongue normal; teeth and gums normal  HEENT: grossly normal; otoscopic and opthalmic exam not performed.   Neck: Supple, symmetrical, trachea midline, no adenopathy  Respiratory: Normal respiratory " effort  Cardiovascular: No peripheral edema.  Musculoskeletal: No digital cyanosis. Normal gait and station. Moves all limbs freely.  Neuro: Cranial nerves II-XII grossly intact.  Psych: Intact judgment and insight. OX3 and conversational.  Heart: Regular rhythm, no murmurs.  Lungs: Clear to auscultation b/l, no wheezes, crackles, or rales.  Abdomen: Bowel sounds X 4 quadrants, tenderness to palpation in lower right quadrant that is not guarded but is referred to her lower left quadrant.   Pelvic exam: uterus is mobile, non-tender and size wnl. Declined speculum exam.  Rectal: deferred.  MSK: Normal gait.  Lymphatic: no lymph edema.    Assessment/Plan:  -Dysuria: lab for macroscopic urine analysis wnl and no UC indicated.  -constipation: advised on dietary management and medication management to clear any built up stool causing abdominal/pelvic tenderness. Given written information and discussed.   -back pain: physical therapy referral provided today, [place] near to [facility] and provided referral to [facility].   -Advised to return to clinic if symptoms do not resolve with measures above.   Total time with patient 15 minutes with >50% on education, counseling and coordination of care.    Dena Troy, DNP, APRN, CNM

## 2021-06-13 NOTE — PROGRESS NOTES
Assessment:     1. Rib pain on right side            Plan:     This seems to be musculoskeletal, possibly costochondritis. Recommend taking Tylenol as needed for discomfort.  Follow-up if symptoms are getting worse or not improving.    Subjective:       22 y.o. female who is 5 and half weeks gestation presents for evaluation of a 1-2 day history of right-sided rib pain.  She denies any known injury.  It bothers her when she moves her arm in particular.  No shortness of breath or cough.  She has not had a fever.  She has not tried taking anything for the pain.  No rashes that she has noticed.  Denies any urinary symptoms.  No abdominal pain.    The following portions of the patient's history were reviewed and updated as appropriate: allergies, current medications, past family history, past medical history, past social history, past surgical history and problem list.    Review of Systems  A 12 point comprehensive review of systems was negative except as noted.     Objective:      /66  Pulse 65  Temp 98.3  F (36.8  C) (Oral)   Resp 16  Wt 129 lb (58.5 kg)  LMP 09/13/2017  SpO2 99%  Breastfeeding? No  BMI 23.79 kg/m2  General appearance: alert, appears stated age and cooperative  Back: Mild tenderness noted over her eighth and ninth right posterior rib.  No overlying skin changes.  No bruising seen or erythema.  Lungs: clear to auscultation bilaterally  Heart: regular rate and rhythm, S1, S2 normal, no murmur, click, rub or gallop  Abdomen: soft, non-tender; bowel sounds normal; no masses,  no organomegaly     This note has been dictated using voice recognition software. Any grammatical or context distortions are unintentional and inherent to the software

## 2021-06-14 NOTE — PROGRESS NOTES
PRENATAL VISIT   FIRST OBSTETRICAL EXAM - OB    Assessment / Impression     First prenatal visit at 13w2d    History PPH due to lacerations    Plan:     -IOB labs drawn. Accepts GC/CT screening today (last neg ). Pap not indicated, due at PP exam. Accepts Flu vaccine today.  -Pt is low risk and declines lead level screening today.  -Patient is not interested in waterbirth.  Hep C not drawn today.  -Reviewed prenatal care schedule  -Optimal nutrition and weight gain discussed. Pregnancy weight gain of 25-35 lbs (BMI 18.5-24.9) encouraged.   -Anticipatory guidance for common pregnancy questions and concerns reviewed.   -Danger s/sx for this trimester reviewed with patient. And contact information given.  -Reviewed genetic screening options with patient, patient declines first trimester screening.  -early US not indicated.   -IOB packet given and reviewed with patient.  -CNM services and hospital options reviewed; emergency and scheduling phone numbers given to patient.  -Return to clinic 4-6w    Total time spent with patient: 40 minutes, >50% time spent counseling and coordinating care.    Subjective:    Bob Olvera is a 22 y.o.  here today for her First Obstetrical Exam.  This was a planned pregnancy. Attempted x 1 cycles. Patient's last menstrual period was 2017.; Menses cycle typically 28 days. LMP was normal.   Is not currently breastfeeding.    lifetime x 8 months, no concerns  Feeding Plans Breastfeeding.    Education level: HS  Occupation: [employer]  Partners name: Duy    OB History    Para Term  AB Living   2 1 1   1   SAB TAB Ectopic Multiple Live Births      0 1      # Outcome Date GA Lbr Mike/2nd Weight Sex Delivery Anes PTL Lv   2 Current            1 Term 16 42w0d 08:00 / 00:35 7 lb 10 oz (3.459 kg) F Vag-Spont None N NIKI      Birth Comments: PPH due to anterior/periclitoral and perineal lacerations. BF x 8 months. No PPD.           Expected Date of Delivery:  "6/20/2018, by Last Menstrual Period    Past Medical History:   Diagnosis Date     Encounter for screening for cervical cancer      2015 NIL with [facility]     GERD (gastroesophageal reflux disease)      Past Surgical History:   Procedure Laterality Date     MIDDLE EAR SURGERY  10/10, 2/11     STOMACH SURGERY  age 5     Social History   Substance Use Topics     Smoking status: Never Smoker     Smokeless tobacco: Never Used     Alcohol use No     Current Outpatient Prescriptions   Medication Sig Dispense Refill     PNV NO.95/FERROUS FUM/FOLIC AC (PRENATAL ORAL) Take by mouth.       No current facility-administered medications for this visit.      No Known Allergies          Pregnancy Risk Factors: None    Review of Systems  General:  Denies problem  Eyes: Denies problem  Ears/Nose/Throat: Denies problem  Cardiovascular: Denies problem  Respiratory:  Denies problem  Gastrointestinal:  Denies problem  Genitourinary: Denies problem  Musculoskeletal:  Denies problem  Skin: Denies problem  Neurologic: Denies problem  Psychiatric: Denies problem  Endocrine: Denies problem  Heme/Lymphatic: Denies problem   Allergic/Immunologic: Denies problem       Objective:   Objective    Vitals:    12/15/17 1014   BP: 98/64   Pulse: 64   Weight: 129 lb (58.5 kg)   Height: 5' 1.25\" (1.556 m)     Physical Exam:  General Appearance: Alert, cooperative, no distress, appears stated age  Head: Normocephalic, without obvious abnormality, atraumatic  Eyes: Conjunctiva/corneas clear, does not wear corrective lenses  Neck: Supple, symmetrical, trachea midline, no adenopathy  Thyroid: not enlarged, symmetric, no tenderness/mass/nodules  Back: Symmetric, no curvature, ROM normal, no CVA tenderness  Lungs: Clear to auscultation bilaterally, respirations unlabored  Heart: Regular rate and rhythm, S1 and S2 normal, no murmur, rub, or gallop  Breasts: No breast masses, tenderness, asymmetry, or nipple discharge. Nipples are  everted.  Abdomen: Soft, " non-tender, no masses.   FHT: 160   Pelvic exam: declined. GC/CT vaginal sample obtained with q-tip swab.      Extremities: Extremities normal, atraumatic, no cyanosis or edema  Skin: Skin color, texture, turgor normal, no rashes or lesions  Lymph nodes: Cervical, supraclavicular, and axillary nodes normal  Neurologic: Alert and oriented x 3.    Lab:   Results for orders placed or performed in visit on 10/30/16   Hemoglobin   Result Value Ref Range    Hemoglobin 12.7 12.0 - 16.0 g/dL

## 2021-06-15 NOTE — PROGRESS NOTES
"Bob is here with her  and daughter for her ORTIZ visit at 19 weeks. The professional interpretor cancelled, however Bob speaks some English, and was able to communicate well. She states she is feeling well, no complaints. She mentioned she has some nausea while brushing her teeth in the morning, and it passes quickly. She eating three meals per day and snacks, and takes her PNV \"when she remembers\". She states she has been feeling the baby move for the past 2 weeks. Denies LOF, vaginal bleeding, and cramps. Reviewed the Quad screen, and she and her  decline. Routine ultrasound ordered, encouraged to go next week when she is 20 weeks. Reviewed danger signs and when to call. RTC in 4 weeks, or sooner if concerns arise.    I was present for the evaluation, physical assessment and plan of care with ELIZABETH Humphreys  "

## 2021-06-16 NOTE — PROGRESS NOTES
PRENATAL VISIT   FIRST OBSTETRICAL EXAM - OB    Assessment / Impression     First prenatal visit at 13w4d    Short pregnancy interval  History of postpartum hemorrhage first birth  History of third-degree laceration first birth  History of gestational diabetes second pregnancy A1GDM  History macrosomic , last baby 4000g    Plan:       - IOB labs drawn. and  Pap smear screening not indicated and not performed today  -Pt is low risk and not interested in drawing lead level.  -Patient is not interested in waterbirth. Hep C not drawn today.  -Reviewed prenatal care schedule and discussed routine OB visits versus problem visits and referrals. Also discussed use of ultrasound in pregnancy.  -ordered early US; reviewed dating. Dating by LMP.   -Optimal nutrition and weight gain discussed. Pregnancy weight gain of 15-25 lbs (BMI 25.0-29.9) encouraged.  Preeclampsia risk factors:    High risk factors (1+): NONE    Moderate risk factors (2+): socioeconomic risks (, low SES)    Based on her risk factors, Bob is NOT at high risk of preeclampsia. Low-dose aspirin prophylaxis is NOT recommended for prevention of preeclampsia.  -Antepartum VTE risk factors absent.  -Reviewed importance of regular exercise in pregnancy and help with low back pain and other pregnancy symptoms.   -Discussed importance of taking aprenatal vitamin with the goal of having 400 mcg of folic acid. Additionally taking a Vitamin D supplement (1,000-2,000 IU/day) and an Omega 3/fish oil/DHA is beneficial. Research also supports taking 150 mcg of Iodine when pregnant.  -Anticipatory guidance for common pregnancy questions and concerns reviewed.   -Danger s/sx for this trimester reviewed with patient.  -Reviewed genetic carrier screening. Patient declines: all.  -Reviewed genetic aneuploidy screening options. Patient  declines: all. Encouraged to call insurance to verify coverage if desires.  -The following referrals were given  to patient for follow up: none.   -Reviewed MyChart, lab results, and how lab results are disseminated.   -IOB packet given and reviewed with patient, discussed standards of care, scope of practice, clinic and hospital settings, also reviewed clinic versus emergency phone number.   -Discussed baby [facility] hospitals, policies, and recommendations regarding breastfeeding as well as professional and community support. Discussed the benefits of breastfeeding including: decreased childhood obesity or diabetes, decreased recurrence of ear infections, and decreased chance of hospitalization for respiratory conditions  Additionally, giving  formula instead of breast milk can affect the mother's supply. And formula alters the natural growth of good bacteria in the 's stomach.   -CNM services and hospital options reviewed; emergency and scheduling phone numbers given to patient.  -Return to clinic 4-6 weeks    Time spent:  Chart review/Pre-charting on 21: 2 minutes  Face-to-face visit: 25 minutes with >50% on education, counseling and coordination of care.   Documentation:  10 minutes  Total time spent on day of service:  37 minutes       Subjective:      Bob Olvera is a 26 y.o.  here today for her First Obstetrical Exam.  She is a return [facility] patient.. Had early pregnancy N/v and now resolved. Will fast for Ramadan unless she feels unwell, has been feeling fine. Notes increase in back pain this pregnancy, reports one area in particular that is sore. Expresses that she felt her last birth was difficult and inquired whether we might discuss family planning for more spacing after this pregnancy.       Exercise: none  Safety (seatbelt, gun access, IPV, hx abuse): negative screen  Tobacco/Alcohol/Drug Use: none  EPDS today: 0  Sexual Partners: 1 male, spouse  Last Breast Exam: 1 year ago       lifetime x 6-10 months with first two; last was 3 months  Feeding Plans  Breastfeeding.    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  Partners name: Duy    OB History    Para Term  AB Living   4 3 3     3   SAB TAB Ectopic Multiple Live Births         0 3      # Outcome Date GA Lbr Mike/2nd Weight Sex Delivery Anes PTL Lv   4 Current            3 Term 20 40w6d 13:08 / 00:09 8 lb 14.2 oz (4.03 kg) F Vag-Spont None N NIKI      Birth Comments: episiotomy, fetal intolerance      Complications: Dysfunctional Labor   2 Term 18 39w0d 04:30 / 00:14 7 lb 13 oz (3.544 kg) M Vag-Spont None N NIKI      Birth Comments: 3rd degree lac      Complications: Fetal Intolerance   1 Term 16 42w0d 08:00 / 00:35 7 lb 10 oz (3.459 kg) F Vag-Spont None N NIKI      Birth Comments: PPH due to anterior/periclitoral and perineal lacerations. 2nd degree, BF x 8 months. No PPD.        Expected Date of Delivery: 10/24/2021, by Last Menstrual Period    Past Medical History:   Diagnosis Date     Encounter for screening for cervical cancer       NIL with [facility]     GERD (gastroesophageal reflux disease)      Gestational diabetes     2nd baby only     Past Surgical History:   Procedure Laterality Date     MIDDLE EAR SURGERY  10/10,      STOMACH SURGERY  age 5     Social History     Tobacco Use     Smoking status: Never Smoker     Smokeless tobacco: Never Used   Substance Use Topics     Alcohol use: No     Drug use: No     No current outpatient medications on file.     No current facility-administered medications for this visit.      No Known Allergies          Pregnancy Risk Factors: see problem list    Review of Systems  General:  Denies problem  Eyes: Denies problem  Ears/Nose/Throat: Denies problem  Cardiovascular: Denies problem  Respiratory:  Denies problem  Gastrointestinal:  Denies problem  Genitourinary: Denies problem  Musculoskeletal:  Denies problem  Skin: Denies problem  Neurologic: Denies  "problem  Psychiatric: Denies problem  Endocrine: Denies problem  Heme/Lymphatic: Denies problem   Allergic/Immunologic: Denies problem       Objective:   Objective    Vitals:    04/22/21 1052   BP: 108/66   Pulse: 62   Weight: 135 lb (61.2 kg)   Height: 5' 1.25\" (1.556 m)     Physical Exam:  General Appearance: Alert, cooperative, no distress, appears stated age  Head: Normocephalic, without obvious abnormality, atraumatic  Eyes: Conjunctiva/corneas clear, does not wear corrective lenses  Neck: Supple, symmetrical, trachea midline, no adenopathy  Thyroid: not enlarged, symmetric, no tenderness/mass/nodules  Back: Symmetric, no curvature, ROM normal, no CVA tenderness  Lungs: Clear to auscultation bilaterally, respirations unlabored  Heart: Regular rate and rhythm, S1 and S2 normal, no murmur, rub, or gallop  Breasts:  deferred   Abdomen: Soft, non-tender, no masses. Gravid to 13  FHT: 156 bpm   Pelvic exam: Not indicated   Extremities: Extremities normal, atraumatic, no cyanosis or edema  Skin: Skin color, texture, turgor normal, no rashes or lesions  Lymph nodes: Cervical, supraclavicular, and axillary nodes normal  Neurologic: Alert and oriented x 3.    Lab:   Results for orders placed or performed in visit on 09/18/20   Urinalysis Macroscopic   Result Value Ref Range    Color, UA Yellow Colorless, Yellow, Straw, Light Yellow    Clarity, UA Clear Clear    Glucose, UA Negative Negative    Bilirubin, UA Negative Negative    Ketones, UA Negative Negative    Specific Gravity, UA 1.025 1.005 - 1.030    Blood, UA Negative Negative    pH, UA 7.0 5.0 - 8.0    Protein, UA Negative Negative mg/dL    Urobilinogen, UA 0.2 E.U./dL 0.2 E.U./dL, 1.0 E.U./dL    Nitrite, UA Negative Negative    Leukocytes, UA Negative Negative          "

## 2021-06-16 NOTE — PATIENT INSTRUCTIONS - HE
"Back and pelvic pain is common in pregnancy and may be the result of many different causes.  One of the ways to reduce pain is to exercise and stretch certain muscle groups.  Below are videos you can follow that may help reduce or relieve pain in pregnancy.    Pregnancy Stretches   https://www.[email or web address]/watch?v=nQvDyDddF-g&vance=desktop    Exercises to Relieve Back Pain in Pregnancy  Https://www.[email or web address]/watch?v=_Qed8z9xMtM&vance=desktop    Prenatal Yoga for Lower Back   Https://www.inSparqube.com/watch?v=[email or web address]&vance=desktop    Stretches for Pelvic Pain in Pregnancy  Https://www.[email or web address]/watch?v=[email or web address]&vance=Reveektop    Dena Troy          Welcome to [facility]   and thank you for choosing us for your maternity care provider!  Congratulations!    Meet the Midwives from [facility]  You are invited to an informational meet and greet with [facility]'s [facility] Certified Nurse-Midwives. Our free \"Meet the Midwives\" event is a great opportunity to learn about our midwives' philosophy and experience, the hospitals where we can assist with your birth, and answer questions you may have. Partners, friends, and family are welcome to attend. Currently, this is a virtual event.  Date  First Tuesday of every month at 7 pm.    Link to next (live) meeting  [email or web address]  To Join by Telephone (audio only) Call:   [phone] Phone Conference ID: [phone]#    Contact information:  Appointment line and to get a hold of CNM in clinic Monday-Friday 8 am - 5 pm:  [phone].  There are some clinics with early start times (1st appointment 7:40 am) and others with evening hours (last appointment 6:20 pm).  Most are typically open from 8 am to 5 pm.    CNM on call answering service: [phone].  Specify your hospital of choice and leave a brief message for CNM;  will then " page SILVA who is on call at your specified hospital and you should receive a call back with 15 minutes.  Be sure that your ringer is audible and that you can accept blocked calls so that we can get back in touch with you! This number should be reserved for urgent needs if during the day, before 8 am, after 5 pm, weekends, holidays.      Pregnancy: Body Changes  From conception (fertilization) until after the birth of your child, you and your baby will change every day. To help you understand what is happening, we ve outlined how pregnancy begins and some of the changes you may notice.  How Pregnancy Begins  Conception is the union of a sperm and an egg. When it occurs, your baby s genetic makeup is complete, even its sex. Fertilization takes place in the fallopian tube. The fertilized egg then travels down this tube to the uterus (womb). The egg attaches to the lining of the uterus about a week later. There it grows and is nourished.    Your Changing Body  Pregnancy affects almost every part of your body. You may notice some of the following physical and emotional changes:    Your uterus expands outward and upward as your baby grows. You may feel pressure on your bladder, stomach, and other organs.    You may notice skin color changes on your forehead, nose, and cheeks. A dark line may form from your bellybutton down to your pubic area. The skin color around your nipples and thighs may also change.    Pink stretch marks may appear on your abdomen, breasts, or hips.    Your hair may seem thicker. You lose less hair during pregnancy.    You may feel fine one day and weepy the next. This is caused by changes in your body, such as increased hormones (chemicals that affect the function of certain organs and also your moods).      Adapting to Pregnancy: First Trimester  As your body adjusts, you may have to change or limit your daily activities. You ll need more rest. You may also need to use the energy you have more  wisely.  Eat stomach-friendly foods like cottage cheese, crackers, or bread throughout the day.    Your Changing Body  Almost every part of your body is affected as you adapt to pregnancy. The uterus and cervix will begin to soften right away. You may not look very pregnant during the first three months. But you are likely to have some common signs of early pregnancy:    Nausea    Fatigue    Frequent urination    Mood swings    Bloating of the abdomen    Missed or light periods (first trimester bleeding)    Nipple or breast tenderness, breast swelling      It s Not Too Late to Start Good Habits  What matters most is protecting your baby from this moment on. If you smoke, drink alcohol, or use drugs, now is the time to stop. If you need help, talk with your health care provider.    Smoking increases the risk of stillbirth or having a low-birth-weight baby. If you smoke, quit now.    Alcohol and drugs have been linked with miscarriage, birth defects, intellectual disability, and low birth weight. Do not drink alcohol or take drugs.    Tips to Relieve Nausea  Although nausea can occur at any time of the day, it may be worse in the morning. To help prevent nausea:    Eat small, light meals at frequent intervals.    Get up slowly. Eat a few unsalted crackers before you get out of bed.    Drink water with lemon slices.    Eat an ice pop in your favorite flavor.    Ask your health care provider about taking abelardo or vitamin B6 for nausea and vomiting.    Talk with your health care provider if you take vitamins that upset your stomach.    Work Concerns  The end of the first trimester is a good time to discuss working during pregnancy with your employer. Follow your health care provider s advice if your job requires you to stand for a long time, work with hazardous tools, or even sit at a desk all day. Your workspace, workload, or scheduled hours may need to be adjusted. Perhaps you can change body postures more often or  take an extra break.  Advice for Travel  Talk to your health care provider first, but the second trimester may be the best time for any travel. You may be advised to avoid certain trips while you re pregnant. Food and water can be concerns in developing countries. Travel by car is a good choice, as you can stop, get out, and stretch. Bring snacks and water along. Fasten the lap belt below your belly, low over your hips. Also be sure to wear the shoulder harness.  Intimacy  Unless your health care provider tells you to, there is no reason to stop having sex while you re pregnant. You or your partner may notice changes in desire. Desire may be less in the first trimester, due to nausea and fatigue. In the second trimester, sex may be very enjoyable. The third trimester can be a challenge comfort-wise. Try different positions and see what s best for you both.      Pregnancy: Your First Trimester Changes  The first trimester is a time of rapid development for your baby. Because your baby is growing so quickly, it is important that you start a healthy lifestyle right away. By the end of the first trimester, your baby has formed all of its major body organs and weighs just over an ounce.    Month 1 (Weeks 1-4)  The placenta (the organ that nourishes your baby) begins to form. The heart and lungs begin to develop. Your baby is about 1/4 inch long by the end of the first month.    Month 2 (Weeks 5-8)  All of your baby s major body organs form. The face, fingers, toes, ears, and eyes appear. By the end of the month, your baby is about 1 inch long.    Month 3 (Weeks 9-12)  Your baby can open and close its fists and mouth. The sexual organs begin to form. As the first trimester ends, your baby is about 4 inches long.      Pregnancy: Your Weight  Being a healthy weight is important for both you and your baby. The weight you gain now is not just extra fat. It is also the weight of your baby. And it is the increased blood and  fluids to support the baby. A slow, steady rate of gain is best. How much you should gain depends on your weight before getting pregnant. Check with your health care provider to find out what is right for you.    If You Gain Too Much  Gaining too much weight might cause you to feel tired or you could have a harder pregnancy or birth. If you and your health care provider decide you re gaining too much:    Eat fewer fats and sugars. Instead, eat fruit, vegetables, and whole-grain foods.    Drink plenty of water between meals.    Get at least 20 minutes of light exercise, such as walking, each day.    Don t diet. You might not get enough of the nutrients you or your baby needs.    Keep a diet diary to help you gauge what and how much you are eating .    If You re Not Gaining Enough  If you don t gain enough, your baby could be too small or have health problems. Women tend to gain most of their weight in the second and third trimesters. For now:    Eat many types of foods. Make sure you get enough calcium, protein, and carbohydrates.    Don t skip meals.    Eat healthy snacks.    Pick nutrient-dense, high calorie healthy food like trail mix or protein shakes.    See a dietitian for help.    Talk to your healthcare provider if you have had an eating disorder or problems with certain foods.      Pregnancy: Common Questions  There are plenty of myths and  old wives  tales  surrounding pregnancy. You may need help  fact from fiction. On this sheet, you ll find answers to a few common questions. If you have other questions, talk with a midwife.    Will Working Harm My Baby?  In most cases, working throughout your pregnancy is not harmful at all. There may be concerns if the job involves dangerous machinery or chemicals, lifting, or standing for very long periods of time. Talk to your health care provider and employer about your particular job and pregnancy.  Why Can t I Change the Cat Litter Box?  Cats carry a  disease called toxoplasmosis. In adult humans, it shows up as a mild infection of the blood and organs. If you are infected during pregnancy, the baby s brain and eyes could be damaged. To be safe, have someone else change the litter. If you must handle it, wear a paper mask over your nose and mouth. Also, wear gloves and wash your hands afterward.  Which Medications Are Safe?  No prescription or over-the-counter drug is safe for everyone all of the time. But sometimes medications are needed. Be sure your health care provider knows you are pregnant. Then use only the medications he or she advises you to take. Please refer to the below resources for further information and discuss concern and questions with your midwife.  Is It True That I Can Overheat My Baby?  Yes. To avoid making your baby too warm:    Don t sit in a jacuzzi. A long, warm bath is fine, but not in water over 100 F.    Exercise less intensely if you feel fatigued. Base your workout on how you feel, not your heart rate. Heart rates aren t a good way to measure effort during pregnancy.  Can I Lift and Carry Safely?  Yes, if your health care provider doesn t tell you otherwise. Learn to lift and carry safely to avoid injury and reduce back pain during pregnancy. To protect your back:    Bend at the knees to bring the load nearer.    Get a good . Test the weight of the load.    Tighten your abdomen. Exhale as you lift.    Lift with your legs, not with your back.    Carry the load close to your body.    Hold the load so you can see where you are going.  What If I Get Sick?  Most women get sick at least once during pregnancy. Talk with your health care provider if you do. Most likely it will not affect your pregnancy. Get plenty of rest and fluids, and eat what you can. Talk to your health care provider before taking any medications.        HEALTHY PREGNANCY CARE: 10-14 WEEKS PREGNANT     By weeks 10 to 14 of your pregnancy, the placenta has formed  inside your uterus. It may be possible to hear your baby's heartbeat with a doppler ultrasound device. Your baby's eyes can and do move. The arms and legs can bend.    GENETIC SCREENING OPTIONS AT [facility]                All testing is optional. We don t recommend or discourage any test; it is totally up to you and your partner. Some couples wish to know their risk of having a baby with a genetic defect and others do not. We will support your decision. Abnormal results may lead to a discussion of options for further testing.    Accurate dating of your pregnancy is important for all testing so an ultrasound may be done prior to referral or testing.    No testing provides certainty; there are false positives and negatives associated with all testing, some more than others.    Most genetic testing is non-invasive (requires only a blood sample and sometimes an ultrasound or both).    It is always wise to check with your insurance carrier before proceeding.    Some testing can be done at our lab and some require a referral.    If you decide to do no testing, the 20 week ultrasound scan, which is a routine or standard ultrasound, has some ability to detect abnormalities in the baby, and identifies obstetric problems.    If you are over 35, you will have the option of a Level II ultrasound, which is a more detailed and targeting scan, that helps detect fetal anomalies as well as obstetric problems.      If you have a more complex family history of chromosomal abnormalities, a referral to a genetic counselor and/or a Maternal Fetal Medicine specialist, to help identify available tests, may be recommended.    TYPES OF GENETIC TESTING AVAILABLE INCLUDE:    Carrier Screening/Testing for Genetic Conditions    There are many inherited conditions for which testing or carrier testing is available. Carrier screening can test for conditions like Cystic Fibrosis, Thalassemia, Ruben Sachs, Sickle Cell, Hemophilia, Muscular  Dystrophy, [place] s disease and many others.     Cystic Fibrosis (CF) affects both males and females and people from all racial and ethnic groups. However, the disease is most common among Caucasians of Northern  descent. CF is also common among Latinos and American Indians. The disease is less common among  Americans and  Americans. More than 10 million Americans are carriers of a faulty CF gene and many of them don't know that they are CF carriers. One or both parents can be tested any time before or during pregnancy.    Talk to your care provider about your family history and whether you should be screened. A referral to a genetic counselor at [facility] or [facility] can be made by your care provider at any time.    This is also tested for in the  Metabolic Screen that your infant receives 24 hours after birth.     Fetal DNA cell Testing: [place] or Innatal (Non-Invasive Prenatal Testing)    At 10 wk or greater, a blood sample can be drawn here at clinic or at any of our referral offices. It will provide highly accurate results with low false positive rates for trisomy 18, trisomy 21 (Down Syndrome), and trisomy 13 (> 99% trisomy detection rate at a false positive rate of <0.1%). Gender identification can also be obtained if desired (98% accuracy).  Can also detect some sex-linked chromosomal abnormalities (80-90% accuracy).  This is often done if one of the other screening tests is abnormal.        1st Trimester Screening:     Everyone has an age related risk of having a baby with a genetic abnormality. This testing provides a risk profile which is better than assigning risk based solely on your age.    Refines your risk of having a baby with a chromosomal abnormality such as Trisomy 21 & 18.    This test with detect a fetus with one of these disorders about 85% of the time.    A thickened nuchal fold can also be associated with cardiac defects.    This test  requires a blood collection combined with ultrasound to obtain a measurement of fluid at back of baby s neck (nuchal translucency).    False positive results occur approximately 5% of cases.    An additional blood sample may be necessary in order to calculate your risk of having a baby with a neural tube defect (e.g. spina bifida).  This is called the AFP test (alpha fetal protein).  An ultrasound should be able to  any spinal defect as well.    Follow-up of an abnormal test may include a more extensive ultrasound study and you may be offered an amniocentesis (a small sample of amniotic fluid is withdrawn and studied) for a definitive diagnosis    Quad Screen (4-marker screen)    Between 15 and 21 weeks, a sample of blood can be drawn at our lab to assess your risk of having a baby with Down Syndrome, Trisomy 18 and neural tube defects. Such testing is able to detect these conditions in 80% of cases and the false-positive rate is approximately 5%.     Follow-up of an abnormal test may include a more extensive ultrasound study and you may be offered an amniocentesis (a small sample of amniotic fluid is withdrawn and studied) for a definitive diagnosis      Referrals opportunities include:    [facility],  [facility], [facility]  o Offers nuchal translucency ultrasound; does not offer genetic counseling.    [place]  [facility] and [facility] ([facility]):   o Approximately an hour long visit includes 30 min with genetic counselor who discusses all testing available and which ones might be beneficial to you based on age, personal and family history.    o Blood will be drawn and the nuchal translucency ultrasound will be discussed and performed if desired. The Free Fetal DNA testing ([place]) can be drawn also.  o Targeted or detailed Level II ultrasounds are also available with these perinatology groups.        Breastfeeding: a Healthy Option for You and  Your Baby  Consider breastfeeding for the healthiest way to feed your baby. Ask your midwife or physician for more information.     The choice of how you will feed your baby is important.  Before your baby s birth, you ll want to learn about the benefits of breastfeeding.  [facility] have been designated Baby Friendly; an initiative that was created by the World Health Organization and UNICEF.  This helps give you and your baby the best start in feeding their baby.    Why should I breastfeed my baby?    Babies are less likely to develop childhood obesity or diabetes    Babies are less likely to suffer from recurrent ear infections    Babies are less likely to be hospitalized for respiratory conditions    Breast milk is rich in nutrients and antibodies-it is easy to digest    How does it benefit me?    Lowers the risk for diabetes, breast and ovarian cancer and postpartum depression    Moms can lose  baby weight  more quickly    Cost savings - formula can cost well over $1,500 per year    Convenient - no bottles and nipples to sterilize, no measuring and mixing formula    The physical contact with breastfeeding can make babies feel secure, warm and comforted     What about formula?  While you and your baby are staying with us at [facility], we will support whatever feeding choice you make for your baby.    Some important considerations:      The American Academy of Pediatrics, the World Health Organization, and many more organizations recommend exclusive breastfeeding for 6 months and continued breastfeeding while adding other foods for the first 1-2 years.      Any amount of breastmilk has benefits to both baby and mother.    Giving formula in replacement of breastfeeding can affect mother s milk supply.  If formula is needed, hospital staff will work with you on a plan to help develop your milk supply.    Formula alters the natural growth of good bacteria in the  stomach.     Research has found  that first time mothers who offer formula in the hospital have a shorter duration of breastfeeding.    How can I start to prepare?     Start by having a conversation with your medical provider.     Talk with your partner, family and friends.     Attend a prenatal class that includes breastfeeding preparation. Birth and breastfeeding classes are offered by [facility]. Visit [employer] for class information.     After your baby s birth, hospital staff and lactation consultants will help you and your baby get off to a great start with breastfeeding.    As your center of gravity and weight changes, use good body mechanics when changing positions and lifting. For example, use a straight back and your legs for support when lifting instead of bending over. Maintain good posture to prevent straining your muscles. Now is a good time to continue or restart your exercise program. Walking 30-60 minutes daily is an excellent way to keep fit. Yoga and swimming also offer many benefits.    The nausea and fatigue of early pregnancy have usually started to let up, so this is a good time to focus on nutrition. Consider attending a nutrition class. A healthy diet includes about 60 grams of protein each day (3-4 servings of dairy, 2-3 servings of meat/fish/poultry/nuts), 4-6 servings of whole grain foods, and 5-6 servings of fruits and vegetables. Remember to drink 6-8 glasses of water daily.    Watch for warning signs, such as     vaginal bleeding    fluid leaking from your vagina    severe abdominal pain    nausea and vomiting more than 4-5 times a day, or if you are unable to keep anything down    fever more than 100.4 degrees F.       RESOURCES   You can refer to the Starting Out Right book or find it online at [email or web address] or  [email or web address]    You can sign up for a weekly parenting e-mail that gives support, tips and advice from health care professionals that starts with pregnancy and continues through the toddler years. To register, go to [email or web address] at any time during your pregnancy.    Breastfeeding:    OUTPATIENT LACTATION RESOURCES     -Schedule an appointment with a [facility] Nurse Midwives [facility] SILVA who is also a Lactation Consultant by calling [phone]. We see women for breastfeeding visits at [place], [place] and [facility].     -Baby Café    Pregnant and interested in breastfeeding?  Need answers to breastfeeding questions?  Want to help breastfeeding moms?  Already breastfeeding and want to meet other moms?    Join us at the Baby Café!    Baby Cafe is a free, drop-in service offering breast-feeding support for pregnant women, breast-feeding mothers and their families.  Come share tips and socialize with other mothers.  Babies and siblings are welcome (no childcare available).    Starting April 2018, Baby Café will be at 4 locations.  Please see below for the Baby Café closest to you! Hmong, Cook Islander, and Cuban which is may be available at some sites.      [facility]  [address] [place] [address]  1st Wednesday: 10am-12pm    [facility]  [address] [place] [address]  3rd Wednesday 4-6pm    [address] [address]  4th Wednesday 10am-12:30pm    [facility]  [address] [place] [address]  4th Wednesdays: 4-6pm    -Attend a baby weigh in at [facility].  Lactation consultants are available to answer questions  [place]: Tuesdays 1:00 - 2:00  [facility]: Mondays 1:00 - 2:00   [email or web address]    -Attend one of the New Mama groups at [facility] in   [place].  [facility] [employer] also offers one-on-one in home and in office lactation consults.   [email or web address]    -Attend a Dash Medrano meeting.  Multiple groups in several locations throughout the [facility]. The meetings are no-cost and always informative breastfeeding education session through [employer]  [email or web address]     Held at [facility] the second Thursday of each month at 7pm    Childbirth and Parenting Education:   [facility]: http://[facility]EN[facility] State HospitalPreferred [employer]/   [phone]  [place]: (education, yoga & wellness) www.[employer]ise [employer] Systems  Enlightened Mama: www.Atritech   Childbirth collective: (Parent topic nights)  www.childbirthcollective.org/  Hypnobabies:  www.hypnDigiwinSoftbiestTechnorati.Medsphere Systems/  Hypnobirthing:  Http://hypnIDvergerthing.Medsphere Systems/  The Birth Hour: https://bideo.com/online-childbirth-class/    APPS and Podcasts:   Ovia  Glow Nurture  The Birth Hour (for birth stories)   Birthful   Expectful   The Longest Shortest Time  PregnancyPodcast Felicia Nails  Book Recommendations:   Yudith [place]'s Birthing From Within--first few chapters include a new-age tone, you may prefer to skip it and keep going, because there is good stuff later.  This book recommendation covers emotional preparation, but does cover coping with pain, and use of both pharmacological and nonpharmacological methods.    Dr. Rivera' The Pregnancy Book and The Birth Book--the pregnancy book goes month-by month    Womanly Art of Breastfeeding by La Leche [employer]   Bestfeeding by Alma Sierra--great pictures    Mothering Your Nursing Toddler, by Sandra Peralta.   Addresses dealing with so many of the challenging behaviors of a nursing toddler.  How Weaning Happens, by La Leche League.  Discusses weaning at all ages, from medically necessary weaning of an infant, all the way up to age 5 (or older), with why/why not, and strategies.  Very empowering book both for deciding to  "wean and deciding not to.    American College of Nurse-Midwives (ACNM) http://www.midwife.org/; look at the informational handouts at http://www.midwife.org/Share-With-Women     www.mymidwife.org    Mother to Baby (Medication and Herbal guidance in pregnancy): http://www.mothertobaby.org  Toll-Free Hotline: 519.648.8179  LactMed (Medication use while breastfeeding): http://toxnet.nlm.nih.gov/newtoxnet/lactmed.htm    Women's Health.gov:  http://www.womenshealth.gov/a-z-topics/index.html    American pregnancy association - http://americanpregnancy.org    Centering Pregnancy (group prenatal care option): http://centeringhealthcare.org    Information about doulas:  Childbirth collective: http://www.childbirthcollective.org/  [facility] [place] ([place]):  www.melissa.org  [facility]  project: http://Change Lanecitiesdoulaproject.com/     Early Childhood and Family Education (ECFE):  ECFE offers parents hands-on learning experiences that will nourish a lifetime of teachable moments.  http://ecfe.info/ecfe-home/    [employer] www.[place]     FDA - Nutrition  www.mypyramid.gov  Under \"For Consumers,\" click on \"pregnant and breastfeeding women.\"      Centers for Disease Control and Prevention (CDC) - Vaccines : http://www.cdc.gov/vaccines/       When researching information on the web, question the validity of websites.  The domains .gov, .edu and.org tend to be more reliable information.  If there are a lot of advertisements, be cautious of the information provided. Stay away from blogs and chat rooms please!      Nutrition & supplements:     Prenatal vitamin (those with 600-1000 mcg folic acid and 27 mg of iron are enough).  Take with food or Juice     4-5 servings of dairy or other calcium rich foods (fish, leafy greens, soy) per day - if not, take 500-1000 mg additional calcium (Tums, pills, chews). Take with dairy     Vitamin D3 6179-9587 IU geltab daily.  Take with fattiest meal.  Look for fortified foods " also (Dairy, Juice)     2-3 (4) oz servings of fish, seafood, nuts (walnuts & almonds), oils, avocado per week - if not, take Omega 3 Fatty acids: DHA & EDE 9735-5719 mg per day.  Other names: cod liver oil, fish oil. Take with fattiest meal.  Some prenatals have DHA, but typically not a sufficient dose.    Fish: Do not eat shark, swordfish, catia mackerel, or tilefish when you are pregnant or breastfeeding.  They contain high levels of mercury.  Limit white (albacore) tuna to no more than 6 ounces per week. Http://www.fda.gov/downloads/ForConsumers/ConsumerUpdates/VVD175335.pdf         Touring the Maternity Care Center  At this time we are offering a virtual tour of the Maternity Care Centers at both [facility] and [facility]:   [facility]: https://www.Funding Profiles.Endosense/Locations/[facility]/Maternity-Care-Center  [place]:   https://Hi-Lo Lodgeorg/overarching-[facility]/the-birthplace/tours  https://www.Geswind/[facility]/Maternity-Care-Center/#virtual_tour  When in person tours become available, registrations is required. To schedule a tour at either [place] or [facility], please do so online using the following links:  [facility] - [email or web address]  [facility] - [email or web address]&[email or web address]&[email or web address]     You are invited to  Meet the ealth [facility] Nurse Midwives [facility]    Virtual:   https://www.[facility].org/classes-and-events/meet-the-midwives-from-[facility]    A way to tour the hospital Labor and Delivery unit and meet the midwives in our group was postponed at the start of hospital restrictions following COVID-19. We will resume these when able.    Please call [phone] for ongoing updates.

## 2021-06-16 NOTE — PROGRESS NOTES
Bob presents to the clinic today with her  Duy and toddler daughter.  All is well!  Sleeping about 9-10 hours per night with occasional awakenings to void.  Eating well.  Total weight gain within normal limits, and interval weight gain 6 pounds in 3 weeks.  20 week fetal anatomy survey ultrasound was reviewed in its entirety again.  Patient declines follow-up ultrasound for intracardiac echogenic focus (solitary finding).  Denies lower abdominal cramping, loss of fluid or vaginal bleeding.  Occasionally feels increased vaginal pressure after voiding.  Second trimester teaching completed.  All questions answered.  Encouraged to call or return to clinic with any questions, concerns, or as needed.

## 2021-06-16 NOTE — PROGRESS NOTES
Bob since to the clinic today with her  Duy.  All is well!  Baby is active, and she denies regular uterine contractions, loss of fluid or vaginal bleeding.  Plans to return to clinic on 3/28/2018 for 1 hour GCT, RPR and hemoglobin.  Plan to discuss and offer pertussis immunization after reviewing  benefits at that visit.  Total weight gain 19 pounds, within normal limits; 5 pound weight gain in a four-week interval.  Patient states she needs proof of a TB skin test, and one was performed at her place of employment in 2017.   labor precautions and danger signs and symptoms reviewed.  All questions answered.  Encouraged to call or return to clinic with any questions, concerns, or as needed.

## 2021-06-17 NOTE — PROGRESS NOTES
Bob presents to the clinic today by herself.  1 hour GCT, hemoglobin and RPR performed today.  1 hour GCT elevated at 164 mg/dL, and a fasting 3 hour GTT was ordered for next week, 2018.  Hemoglobin resulted low at 8.6 g/dL, and slow iron was recommended 3 times a day.  Additionally iron rich food consumption was encouraged.  Plan to recheck hemoglobin in 4 weeks.  Further iron deficiency anemia lab work and IV iron would be indicated if the hemoglobin at that time was less than 9 g/dL.  Patient accepts pertussis immunization today and understands  benefits.  Baby is active, and she denies regular uterine contractions, loss of fluid or vaginal bleeding.   labor precautions and danger signs and symptoms reviewed.  All questions answered.  Encouraged daily fetal movement counting and to call or return to clinic with any questions, concerns, or as needed.

## 2021-06-17 NOTE — PATIENT INSTRUCTIONS - HE
Patient Instructions by Vania Mojica APRN, CNM at 1/22/2020 10:00 AM     Author: Vania Mojica APRN, CNM Service: -- Author Type: Midwife    Filed: 1/22/2020 10:31 AM Encounter Date: 1/22/2020 Status: Signed    : Vania Mojica APRN, CNM (Midwife)         Patient Education   HEALTHY PREGNANCY CARE: 22-26 WEEKS PREGNANT    You are finishing your second trimester. Your baby is developing rapidly. At this stage, babies have a sense of balance, can respond to touch, and are recognizing parent voices.  Your baby will be moving around more now.  You may notice [place] contractions now, which are painless and prepare the uterus for the delivery.    Nutrition: During this time, you may find that your nausea and fatigue are gone. Overall, you may feel better and have more energy than you did in your first trimester. Be sure you are getting enough calcium and iron in your diet. Your prenatal vitamins cannot supply all of the nutrients you need, so continue to eat 3-4 servings of dairy foods and 2-3 servings of meat/fish/poultry/nuts every day. Foods high in iron include: red meats, eggs, dark green vegetables, dark yellow vegetables, nuts, kidney beans and chickpeas. Some cereals are fortified with iron, so look at the food labels for 100% of the daily requirement for iron.     Discuss your work situation with your midwife or physician as needed. If you stand for long periods of time, you may need to make changes and take breaks.    [place] for childbirth and parenting classes, including an infant CPR class. Breastfeeding classes are recommended too.    Plan for the gestational diabetes screening between weeks 24-28. You can eat normally before that visit; we would suggest making sure you have protein foods, but not a lot of carbohydrates or sugary foods.    Your blood type was determined at your first OB appointment. If you are Rh negative, you should discuss the need for a Rhogam shot with  "your midwife or physician. This would be administered around 28 weeks if necessary.    How can you care for yourself at home?   You can refer to the Starting Out Right book or find it online at [email or web address][facility][email or web address] or [email or web address]-[email or web address]-out-[email or web address]=8     You can sign up for a weekly parenting e-mail that gives support, tips and advice from health care professionals that starts with pregnancy and continues through the toddler years. To register, go to [email or web address] at any time during your pregnancy.    Watch for the warning signs of premature labor:   \" Dull, low backache  \" Contractions of the uterus, menstrual-like cramps  \" Abdominal cramping with or without diarrhea  \" More pelvic pressure  \" Increase or change in vaginal discharge.     Continue to watch for signs and symptoms of preeclampsia:   \" Sudden swelling of your face, hands, or feet   \" New vision problems such as blurring, double vision, or flashing lights  \" A severe headache not relieved with acetaminophen (Tylenol)  \" Sharp or stabbing pain in your right or middle upper abdomen        Remember that labor doesn't have to hurt. Never hesitate to call your midwife or physician or their staff at [facility] at Phone: [phone] for any one of these warning signs - or if something just doesn't feel right. If it's after clinic hours, physician patients should call the [facility] at [phone] ([phone]); midwife patients should call their answering service at [phone].    BREASTFEEDING TIPS FOR NEW MOMS     Importance of skin-to-skin contact:  ? Gets breastfeeding off to a good start  ? Keeps baby warm and is great for bonding  ? Provides calming effects and helps to stabilize breathing and  blood sugar  ? Helps the uterus to contract and bleed less    Importance of " "feeding whenever baby shows signs of  being hungry, \"feeding on cue\":  ? Helps create a good milk supply appropriate to the babys needs  ? Less breastfeeding complications such as engorgement or  low supply  ? Helps baby get enough milk  ? Milk supply is determined by how often baby nurses and empties  the breast.  ? Feeding is for comfort as well as nutrition    Importance of good latch (positioning and attaching  baby properly at breast):  ? Helps prevent sore nipples  ? Helps ensure baby gets enough milk and supports moms breast  milk supply    Risks of giving baby supplements other  than moms breastmilk  Breastfeeding alone is recommended for the first 6 months, if not it:  ? Can make baby more prone to illness  ? Can make baby less satisfied at breast (wanting larger amounts or  faster flow)  ? Reduces milk supply    Importance of rooming-in:  ? Increases parent confidence while mother is supported by the  hospital staff  ? Caregivers learn how to care for baby and recognize feeding cues  ? Enables feeding whenever baby needs to eat  ? Baby is comforted with mom and learns to recognize caregivers    Avoiding use of pacifiers:  ? Use of pacifiers in the first weeks can make it difficult to make a full  milk supply  ? May interfere with baby learning to latch well      2017 [facility] [place] [id number]. [place] [id number]. DOD 11.17         Breastfeeding   Why Its Important and What to Expect     Your breast milk is the best food for your baby--and  breastfeeding can help you be healthy as well.    Though breastfeeding is natural, it is a learned process for both mother and baby. To prepare, there are things you can learn--and do--before your baby is born.    ? Learn the benefits of breastfeeding.    ? Understand the basic process.    ? Know what to expect in the hospital.    ? Arrange breastfeeding support for the first few  weeks after birth.    ? Take a breastfeeding class (see back page).    ? Talk to your midwife, nurse or doctor if " you have  Questions.    The benefits of breastfeeding    Human milk changes to meet the needs of a growing baby. It is all a baby needs for the first six months of life.    In fact, babies who receive only human milk for the  first six months are less likely to develop colds, the  flu, colic, asthma, ear infections, food allergies and  diarrhea (loose, watery stools). They may be less likely      to be overweight as children, and they are less likely to develop diabetes later in life. Some studies also show that infants have a higher IQ if they are .    Breastfeeding can:    ? Help you and your baby develop a special bond--  and make you feel proud that you can feed your  Baby.    ? Reduce the total amount of blood you will lose  after delivery.    ? Help your uterus return to its non-pregnant size.    ? Reduce the risk of Sudden Infant Death Syndrome (SIDS).    ? Help you lose your pregnancy weight more quickly.    ? Help delay the return of your monthly periods.    ? Lower your risk of some breast and ovarian  cancers--as well as osteoporosis (bone loss)--later in life.    ? Save you more than $300 per month. (This  includes the cost of formula and medical bills.  Formula-fed babies get sick more often.)          If you are deaf or hard of hearing, please let us know. We provide many free services including  sign language interpreters, oral interpreters, TTYs, telephone amplifiers, note takers and written materials.        How to breastfeed    Skin-to-skin contact    Hold your baby on your chest skin-to-skin right after  birth. Skin contact calms your baby, steadies their  breathing and keeps your baby warm. Your baby will be alert and will likely want to feed within the first hour after birth.    Babies are born with reflexes that help them  breastfeed. Your body will be ready with early milk  (called colostrum), so you will have all the milk your  baby needs for that first feeding. Your nurse will help  you get started.    Keep your baby with you and breastfeed whenever  your baby is hungry. Offering the breast early and  often helps your body keep making lots of milk.    How to position your baby    There are many positions for breastfeeding.              No matter which position you choose, support your  babys back, shoulders and neck. The head and body should be in a straight line, and the entire body should face the breast. Your baby should be able to tilt the head back easily. Your baby shouldnt have to reach out to feed. Also make sure your babys nose is level with your nipple. This way, your baby will find it easier to attach to your breast.    Finally, get comfortable. Use pillows to support your  body. Dont lean over or slump to reach your baby.  Once your baby is attached to the breast, its okay to change your position slightly.    You will feel a bit of a tugging at first, but you should  never feel pain. If you do, ask your nurse or lactation expert for help. She will teach you how to latch your baby onto the breast in a way that feels more comfortable.    Breastfeeding in the hospital    For the first three days after birth, your body will  produce early milk called colostrum. This milk is  full of calories and antibodies to help keep your baby healthy. It is all your baby needs for the first few days.    Remember, babies do not eat anything while inside  you. Right after birth, your baby will only need a little bit of milk (about 1 teaspoon per feeding) to get the digestive system working well. Your baby will not need any formula--your body will make the right amount of milk.    Breastfeed whenever your baby shows signs of hunger. (See next page for a list of signs.) Crying is a late sign of hunger.    Babies often lose weight in the days after birth. This  is normal. By two weeks of age, your baby should be back to their birth weight. Your care team will watch your babys weight carefully.    If you  are unable to breastfeed in the hospital, your  care team may suggest pasteurized donor human milk for your baby.               The Most Important Points to Remember    ? Your breast milk is the perfect food for your  baby. Breastfeeding has a lot of health benefits  for you as well.    ? Hold your baby skin-to-skin as soon as possible  after birth. Do this for as long as you can. Even  if your baby doesnt go to the breast right away,  skin-to-skin contact helps your body make  more milk. This lets you get an early start on  Breastfeeding.    ? Learn how to position your baby at the breast.  This will help your baby feed well, and it will  keep you comfortable.    ? Feed your baby whenever your baby wants to  eat. You are feeding a baby, not a clock!    ? Signs that your baby is ready to eat include:  starting to wake up, chewing on fists, moving  the face from side to side, opening and closing  the mouth, sticking out the tongue and turning  toward the breast when held. Crying is a late  sign of hunger, so look for the earlier signals  that your baby makes.    ? Feed your baby only human milk for at least  six months. The World Health Organization  recommends breastfeeding for the first year,  noting it is a letty baby who is  for  the first two years.    ? While in the hospital, plan to keep your baby  with you at all times, except for certain medical  procedures. This is an important time for you  and your baby to get to know each other and  practice breastfeeding.     Common questions    Below are questions that many women have about  breastfeeding. You will find further information in the  childbirth book your care team gave you. If you have more questions, speak with your midwife, nurse or doctor.    How do I involve my partner, family and  friends and get their help and support?    Sometimes family and friends dont understand why  you want to breastfeed. Perhaps they themselves  didnt breastfeed, or  they werent  as infants. Tell them about the benefits of breastfeeding and how important it is to feed only human milk for the first six months or so. If you feed often, you will make plenty of milk to help your baby grow, fight illness and get the best start possible.    After two to four weeks--once your baby is feeding  well and your milk supply is well established--your  partner and others can feed the baby your milk from  a cup, dropper or bottle. You can remove milk from  your breast (by hand or pump) and store it for later  use. This way, your baby will have your milk even  when youre away.    Remind everyone that there are lots of ways to help  that dont involve feeding: making meals, caring for  your other children, comforting the baby if they cries, changing diapers, running errands and more.    Is breastfeeding painful?    Not usually. There are ways to prevent pain and to  treat it if it happens.    The best ways to avoid pain are to feed your baby  often, use a good position and correctly latch your  baby onto the breast. These help prevent the two  most common sources of pain: sore nipples and  engorgement (overly full breasts). You will learn more about these topics in a breastfeeding class and in the hospital after your baby is born.         How much time does it take to breastfeed?    Some new mothers feel that all they do is breastfeed. In the early weeks, a baby eats 8 to 12 times per day. Sometimes babies will cluster feedings close together. At other times, there are longer stretches between feedings.    Remember, your newborns stomach will be about  the size of a walnut. Your baby wont eat much at each feeding. If you feed your baby often in the first days, your baby--and your milk supply--will grow. Your baby will eat more at each session, and you will need to nurse less often. Within a few weeks, most women find that breastfeeding is easier and takes less time than formula  feeding.    How will I know if my baby is getting  enough milk?    Your body will start making milk as soon as your  baby is born. The more often you put your baby to  breast, the more milk you will make. You should avoid pacifiers for the first several weeks until breastfeeding is well established--you want your baby to do all their sucking at the breast. This will help you make more milk.    There are signs that your baby is getting enough milk. You will learn these signs over time. For example:    ? You will count wet and soiled diapers, because  these show how much milk your baby is getting.    ? Your baby will seem satisfied after feedings.    ? Your baby will grow and gain weight after the first  few days.    Are there reasons why a woman shouldnt  breastfeed her baby?    There are a few medical concerns that prevent  breastfeeding. In mothers, these include being HIVpositive, having active or untreated TB (tuberculosis)  and using street drugs or some medicines. These  mothers usually choose infant formula for their  Babies.    A few women choose not to breastfeed for personal  reasons. But most mothers can breastfeed. If you are not sure if its okay to breastfeed, ask your care team.    Getting support    Before your baby is born, get as much information  as you can. Sign up for a breastfeeding class. Most  childbirth classes discuss breastfeeding, but a special class will give more in-depth information.    ? In the [facility]: Go to [facility]  Center at [employer].    ? In [facility]: Go to [email or web address].  Click on Classes-and Events at the bottom of the  page, then search for breastfeeding. Or, call [phone].    Remember, help is available from your hospital, clinic, lactation experts or online. If you need help after leaving the hospital:    ? Call your babys clinic. (If you dont have a clinic,  call [phone] and ask for a referral.)    ? Call a lactation consultant.  (If you need help  finding a location that offers lactation support, call  [phone].)    ? Call [employer] (24 hours a  day) at [phone] [[phone]].    ? Call the Women, Infants and Children (WIC)  program at 1-301.212.2928.    ? Call the National Womens Health Information  Center (English and Yoruba) at 1-743.994.4371 or  go to www.womenshealth.gov/breastfeeding.    ? Go to [employer].       For informational purposes only. Not to replace the advice of your health care provider. Copyright   2010 [employer]  [facility]. All rights reserved. Clinically reviewed by [employer] [facility]. [employer] [id number] - REV 06/18.     Patient Education   HEALTHY PREGNANCY CARE: 26-30 WEEKS PREGNANT    You are now in your last trimester of pregnancy. Your baby is growing rapidly, can open and close her eyelids, sometimes get hiccups, and you'll probably feel her moving around more often. Your baby has breathing movements and is gaining about one ounce each day. You may notice heartburn and leg cramps. Your back may ache as your body gets used to your baby's size and length.    Discuss your work situation with your midwife or physician as needed. If you stand for long periods of time, you may need to make changes and take breaks.    Pre-register online at the hospital where your baby will be born ([email or web address])     Be aware of your baby's activity level. You may be asked to do daily fetal movement counts. Contact your midwife or physician about any decreased movement.    You may have been tested for gestational diabetes today. If you are RH negative, you may have had an additional test and a Rhogam injection.    Consider receiving a Tdap vaccination to protect your baby from Pertussis/whooping cough.    If you are considering tubal ligation, discuss this with your midwife or physician. You will need to have an  appointment with the obstetrician who will do the surgery to discuss the risks, benefits, and alternatives, and to sign the consent. This can be discussed at your next visit.    Continue to watch for any signs or symptoms of premature labor:     Regular contractions. This means having about 6 or more within 1 hour, even after you have had a glass of water and are resting.     A backache that starts and stops regularly.    An increase or change in vaginal discharge, such as heavy, mucus-like, watery, or bloody discharge.     Your water breaks or leaks.    Continue to watch for signs and symptoms of preeclampsia:     Sudden swelling of your face, hands, or feet     New vision problems such as blurring, double vision, or flashing lights    A severe headache not relieved with acetaminophen (Tylenol)    Sharp or stabbing pain in your right or middle upper abdomen    If you have any of the above symptoms or any other concerns, call your midwife or physician or their clinic staff at [facility] at Phone: [phone]. If it's after clinic hours, physician patients should call the [facility] at [phone] ([phone]); midwife patients should call their answering service at [phone].    How can you care for yourself at home?   You can refer to the Starting Out Right book or find it online at [email or web address] or [email or web address]/images/[email or web address]/flipbooks/[facility]-starting-out-right/[facility]-starting-out-right.html#p=8     You can sign up for a weekly parenting e-mail that gives support, tips and advice from health care professionals that starts with pregnancy and continues through the toddler years. To register, go to [email or web address] at any time during your pregnancy.    BREASTFEEDING TIPS FOR NEW MOMS     Importance of skin-to-skin contact:  ? Gets breastfeeding off to a good start  ? Keeps baby warm and is great  "for bonding  ? Provides calming effects and helps to stabilize breathing and  blood sugar  ? Helps the uterus to contract and bleed less    Importance of feeding whenever baby shows signs of  being hungry, \"feeding on cue\":  ? Helps create a good milk supply appropriate to the babys needs  ? Less breastfeeding complications such as engorgement or  low supply  ? Helps baby get enough milk  ? Milk supply is determined by how often baby nurses and empties  the breast.  ? Feeding is for comfort as well as nutrition    Importance of good latch (positioning and attaching  baby properly at breast):  ? Helps prevent sore nipples  ? Helps ensure baby gets enough milk and supports moms breast  milk supply    Risks of giving baby supplements other  than moms breastmilk  Breastfeeding alone is recommended for the first 6 months, if not it:  ? Can make baby more prone to illness  ? Can make baby less satisfied at breast (wanting larger amounts or  faster flow)  ? Reduces milk supply    Importance of rooming-in:  ? Increases parent confidence while mother is supported by the  hospital staff  ? Caregivers learn how to care for baby and recognize feeding cues  ? Enables feeding whenever baby needs to eat  ? Baby is comforted with mom and learns to recognize caregivers    Avoiding use of pacifiers:  ? Use of pacifiers in the first weeks can make it difficult to make a full  milk supply  ? May interfere with baby learning to latch well      2017 [facility] [place] [id number].  [id number]. [facility] 11.17           Breastfeeding   Why Its Important and What to Expect     Your breast milk is the best food for your baby--and  breastfeeding can help you be healthy as well.    Though breastfeeding is natural, it is a learned process for both mother and baby. To prepare, there are things you can learn--and do--before your baby is born.    ? Learn the benefits of breastfeeding.    ? Understand the basic process.    ? Know what to expect in the hospital.    ? Arrange " breastfeeding support for the first few  weeks after birth.    ? Take a breastfeeding class (see back page).    ? Talk to your midwife, nurse or doctor if you have  Questions.    The benefits of breastfeeding    Human milk changes to meet the needs of a growing baby. It is all a baby needs for the first six months of life.    In fact, babies who receive only human milk for the  first six months are less likely to develop colds, the  flu, colic, asthma, ear infections, food allergies and  diarrhea (loose, watery stools). They may be less likely      to be overweight as children, and they are less likely to develop diabetes later in life. Some studies also show that infants have a higher IQ if they are .    Breastfeeding can:    ? Help you and your baby develop a special bond--  and make you feel proud that you can feed your  Baby.    ? Reduce the total amount of blood you will lose  after delivery.    ? Help your uterus return to its non-pregnant size.    ? Reduce the risk of Sudden Infant Death Syndrome (SIDS).    ? Help you lose your pregnancy weight more quickly.    ? Help delay the return of your monthly periods.    ? Lower your risk of some breast and ovarian  cancers--as well as osteoporosis (bone loss)--later in life.    ? Save you more than $300 per month. (This  includes the cost of formula and medical bills.  Formula-fed babies get sick more often.)          If you are deaf or hard of hearing, please let us know. We provide many free services including  sign language interpreters, oral interpreters, TTYs, telephone amplifiers, note takers and written materials.        How to breastfeed    Skin-to-skin contact    Hold your baby on your chest skin-to-skin right after  birth. Skin contact calms your baby, steadies their  breathing and keeps your baby warm. Your baby will be alert and will likely want to feed within the first hour after birth.    Babies are born with reflexes that help  them  breastfeed. Your body will be ready with early milk  (called colostrum), so you will have all the milk your  baby needs for that first feeding. Your nurse will help you get started.    Keep your baby with you and breastfeed whenever  your baby is hungry. Offering the breast early and  often helps your body keep making lots of milk.    How to position your baby    There are many positions for breastfeeding.              No matter which position you choose, support your  babys back, shoulders and neck. The head and body should be in a straight line, and the entire body should face the breast. Your baby should be able to tilt the head back easily. Your baby shouldnt have to reach out to feed. Also make sure your babys nose is level with your nipple. This way, your baby will find it easier to attach to your breast.    Finally, get comfortable. Use pillows to support your  body. Dont lean over or slump to reach your baby.  Once your baby is attached to the breast, its okay to change your position slightly.    You will feel a bit of a tugging at first, but you should  never feel pain. If you do, ask your nurse or lactation expert for help. She will teach you how to latch your baby onto the breast in a way that feels more comfortable.    Breastfeeding in the hospital    For the first three days after birth, your body will  produce early milk called colostrum. This milk is  full of calories and antibodies to help keep your baby healthy. It is all your baby needs for the first few days.    Remember, babies do not eat anything while inside  you. Right after birth, your baby will only need a little bit of milk (about 1 teaspoon per feeding) to get the digestive system working well. Your baby will not need any formula--your body will make the right amount of milk.    Breastfeed whenever your baby shows signs of hunger. (See next page for a list of signs.) Crying is a late sign of hunger.    Babies often lose weight in the  days after birth. This  is normal. By two weeks of age, your baby should be back to their birth weight. Your care team will watch your babys weight carefully.    If you are unable to breastfeed in the hospital, your  care team may suggest pasteurized donor human milk for your baby.             The Most Important Points to Remember    ? Your breast milk is the perfect food for your  baby. Breastfeeding has a lot of health benefits  for you as well.    ? Hold your baby skin-to-skin as soon as possible  after birth. Do this for as long as you can. Even  if your baby doesnt go to the breast right away,  skin-to-skin contact helps your body make  more milk. This lets you get an early start on  Breastfeeding.    ? Learn how to position your baby at the breast.  This will help your baby feed well, and it will  keep you comfortable.    ? Feed your baby whenever your baby wants to  eat. You are feeding a baby, not a clock!    ? Signs that your baby is ready to eat include:  starting to wake up, chewing on fists, moving  the face from side to side, opening and closing  the mouth, sticking out the tongue and turning  toward the breast when held. Crying is a late  sign of hunger, so look for the earlier signals  that your baby makes.    ? Feed your baby only human milk for at least  six months. The World Health Organization  recommends breastfeeding for the first year,  noting it is a letty baby who is  for  the first two years.    ? While in the hospital, plan to keep your baby  with you at all times, except for certain medical  procedures. This is an important time for you  and your baby to get to know each other and  practice breastfeeding.     Common questions    Below are questions that many women have about  breastfeeding. You will find further information in the  childbirth book your care team gave you. If you have more questions, speak with your midwife, nurse or doctor.    How do I involve my partner, family  and  friends and get their help and support?    Sometimes family and friends dont understand why  you want to breastfeed. Perhaps they themselves  didnt breastfeed, or they werent  as infants. Tell them about the benefits of breastfeeding and how important it is to feed only human milk for the first six months or so. If you feed often, you will make plenty of milk to help your baby grow, fight illness and get the best start possible.    After two to four weeks--once your baby is feeding  well and your milk supply is well established--your  partner and others can feed the baby your milk from  a cup, dropper or bottle. You can remove milk from  your breast (by hand or pump) and store it for later  use. This way, your baby will have your milk even  when youre away.    Remind everyone that there are lots of ways to help  that dont involve feeding: making meals, caring for  your other children, comforting the baby if they cries, changing diapers, running errands and more.    Is breastfeeding painful?    Not usually. There are ways to prevent pain and to  treat it if it happens.    The best ways to avoid pain are to feed your baby  often, use a good position and correctly latch your  baby onto the breast. These help prevent the two  most common sources of pain: sore nipples and  engorgement (overly full breasts). You will learn more about these topics in a breastfeeding class and in the hospital after your baby is born.         How much time does it take to breastfeed?    Some new mothers feel that all they do is breastfeed. In the early weeks, a baby eats 8 to 12 times per day. Sometimes babies will cluster feedings close together. At other times, there are longer stretches between feedings.    Remember, your newborns stomach will be about  the size of a walnut. Your baby wont eat much at each feeding. If you feed your baby often in the first days, your baby--and your milk supply--will grow. Your baby will eat  more at each session, and you will need to nurse less often. Within a few weeks, most women find that breastfeeding is easier and takes less time than formula feeding.    How will I know if my baby is getting  enough milk?    Your body will start making milk as soon as your  baby is born. The more often you put your baby to  breast, the more milk you will make. You should avoid pacifiers for the first several weeks until breastfeeding is well established--you want your baby to do all their sucking at the breast. This will help you make more milk.    There are signs that your baby is getting enough milk. You will learn these signs over time. For example:    ? You will count wet and soiled diapers, because  these show how much milk your baby is getting.    ? Your baby will seem satisfied after feedings.    ? Your baby will grow and gain weight after the first  few days.    Are there reasons why a woman shouldnt  breastfeed her baby?    There are a few medical concerns that prevent  breastfeeding. In mothers, these include being HIVpositive, having active or untreated TB (tuberculosis)  and using street drugs or some medicines. These  mothers usually choose infant formula for their  Babies.    A few women choose not to breastfeed for personal  reasons. But most mothers can breastfeed. If you are not sure if its okay to breastfeed, ask your care team.    Getting support    Before your baby is born, get as much information  as you can. Sign up for a breastfeeding class. Most  childbirth classes discuss breastfeeding, but a special class will give more in-depth information.    ? In the [facility]: Go to [facility] at [employer].    ? In [facility]: Go to www.[employer].org.  Click on Classes-and Events at the bottom of the  page, then search for breastfeeding. Or, call [phone].    Remember, help is available from your hospital, clinic, lactation experts or online. If you need help  after leaving the hospital:    ? Call your babys clinic. (If you dont have a clinic,  call [phone] and ask for a referral.)    ? Call a lactation consultant. (If you need help  finding a location that offers lactation support, call  [phone].)    ? Call [employer] (24 hours a  day) at 0-588-YL-RDHMO [1-124.572.5510].    ? Call the Women, Infants and Children (WIC)  program at 1-142.315.1396.    ? Call the National Womens Health Information  Center (English and Grenadian) at 1-232.490.6598 or  go to www.womenshealth.gov/breastfeeding.    ? Go to [employer].     For informational purposes only. Not to replace the advice of your health care provider. Copyright   2010 [facility]. All rights reserved. Clinically reviewed by [facility]. Bass Manager [id number] - REV 06/18.

## 2021-06-17 NOTE — PROGRESS NOTES
Bob presents to the clinic today with her , Duy.  All is well!  Birth plan reviewed.  Regularly checking fasting and 1 hour postprandial blood sugars.  Upon review of recorded readings since 4/15/2018, fasting blood glucose within normal limits except for 1 reading at 97 mg/dL. 1 hour postprandial measurements all under 140 mg/dL except for 5 measurements (171, 153, 143, 140, 148).  Working 8 hours per week: 4 hours on Saturday and 4 hours on .  Reviewed recent hemoglobin at 10.6 g/dL. Encouraged to continue p.o. iron supplementation and an iron rich food list was given.  Baby is active, and she denies regular uterine contractions, loss of fluid or vaginal bleeding.  Reports occasional [place] contractions usually in the evening never more than 4 within an hour.  Encouraged adequate p.o. fluid hydration: 96 ounces/day.  Next visit: Hemoglobin and GBS RV culture.   labor and danger signs and symptoms reviewed.  All questions answered.  Encouraged daily fetal movement counting and to call or return to clinic with any questions, concerns, or as needed.

## 2021-06-17 NOTE — PROGRESS NOTES
Orders only: GDM educator referral ordered. Attempted contact to pt, VM left requesting call back.

## 2021-06-17 NOTE — PATIENT INSTRUCTIONS - HE
Patient Instructions by Nilda Arevalo PA-C at 3/13/2019 12:00 PM     Author: Nilda Arevalo PA-C Service: -- Author Type: Physician Assistant    Filed: 3/13/2019  1:23 PM Encounter Date: 3/13/2019 Status: Signed    : Nilda Arevalo PA-C (Physician Assistant)       Urine    Analysis of your urine showed signs of a urinary tract infection.     Take the prescribed antibiotics for the entire course, even if symptoms improve.  You should expect improvement to begin in 24 hours. In the meantime, continue to drink plenty of fluids.    Reasons to come back for re-evaluation:  - Develop a fever, back or flank pain, or nausea and vomiting  - If symptoms have not completely improved after 72 hours  - Recurrent symptoms within a few weeks after treatment has concluded    Ears    You were seen today for an infection of the middle ear, also called otitis media.    Treatment:  - Use antibiotics as prescribed until completion, even if symptoms improve  - May use tylenol or ibuprofen for pain and discomfort  - Should notice symptom improvement in the next 36-48 hours    When to come back sooner for re-evaluation?  - If symptoms have not begun improving after 72 hours of taking antibiotics  - Develop a fever or current fever worsens  - Become short of breath  - Neck stiffness  - Difficulty swallowing     Patient Education     Vaginal Infection: Yeast (Candidiasis)  Yeast infection occurs when yeast in the vagina increase and attacks the vaginal tissues. Yeast is a type of fungus. These infections are often caused by a type of yeast called Candida albicans. Other species of yeast can also cause infections. Factors that may make infection more likely include recent antibiotic use, douching, or increased sex. Yeast infections are more common in women who have diabetes, or are obese or pregnant, or have a weak immune system.  Symptoms of yeast infection    Clumpy or thin, white discharge, which may look like cottage  cheese    No odor or minimal odor    Severe vaginal itching or burning    Burning with urination    Swelling, redness of vulva    Pain during sex  Treating yeast infection  Yeast infection is treated with a vaginal antifungal cream. In some cases, antifungal pills are prescribed instead. During treatment:    Finish all of your medicine, even if your symptoms go away.    Apply the cream before going to bed. Lie flat after applying so that it doesn't drip out.    Do not douche or use tampons.    Don't rely on a diaphragm or condoms, since the cream may weaken them.    Avoid intercourse if advised by your healthcare provider.     Should I treat a yeast infection myself?  Discuss with your healthcare provider whether you should use over-the-counter medicines to treat a yeast infection. Self-treatment may depend on whether:    You've had a yeast infection in the past.    You're at risk for STDs.  Call your healthcare provider if symptoms do not go away or come back after treatment.   Date Last Reviewed: 3/1/2017    2956-6247 [employer]. [address] [place] [address], [place] [address]. All rights reserved. This information is not intended as a substitute for professional medical care. Always follow your healthcare professional's instructions.

## 2021-06-17 NOTE — PROGRESS NOTES
[facility] GDM Care Plan    Assessment: pt seen for initial visit.  and 2 year old daughter are present as well. BG was 89 mg/dl in clinic, about 3 hours after breakfast.     Plan: f/u next week as scheduled     Provider: Dena Troy CNM  Provider's Diagnosis (per referral form): Gestational (648.83)  Weight:    OGTT:   Results for orders placed or performed in visit on 04/04/18   Glucose, Gestational Challenge 2 Hour   Result Value Ref Range    Glucose, 2 Hour 158 (H) 60-<155 mg/dL   Glucose, Gestational Challenge 1 Hour   Result Value Ref Range    Glucose, 1 Hour 176 60-<180 mg/dL   Glucose, Gestational Challenge Fasting   Result Value Ref Range    Glucose, Fasting 94 60-<95 mg/dL    Patient Fasting > 8hrs? Yes    Glucose, Gestational Challenge 3 Hour   Result Value Ref Range    Glucose, 3 hour 149 (H) 60-<140 mg/dL     EDC: Estimated Date of Delivery: 6/20/18   Pregnancy #: 2  Previous GDM: No  Medications:   Current Outpatient Prescriptions:      acetone, urine, test (ACETONE, URINE, TEST) Strp, Use 1 each As Directed every morning., Disp: 50 strip, Rfl: 2     blood glucose test (CONTOUR NEXT STRIPS) strips, Use 1 each As Directed 4 (four) times a day., Disp: 150 strip, Rfl: 3     generic lancets, Use 1 each As Directed 4 (four) times a day., Disp: 100 each, Rfl: 2     PNV NO.95/FERROUS FUM/FOLIC AC (PRENATAL ORAL), Take by mouth., Disp: , Rfl:     BG monitoring goals: Fasting <95; 1 hour post start of meal <140. Test 4 x per day.  Check fasting a.m. ketones: Yes  GDM meal pattern/carb counting taught per guidelines: Yes  Goals: Nutrition: GDM meal plan  Activity:  Walking after meals when able/staying active  Monitoring:  BG 4x/day as directed, ketones every morning    F/u in 1 week to assess BG and food log     DIABETES CARE PLAN AND EDUCATION RECORD    Gestational Diabetes Disease Process/Preconception Care/Management During Pregnancy/Postpartum:    Meter (per above goals): Discussed, Literature provided  and Patient returned demonstration    Nutrition Management    Weight: Assessed, Discussed and Literature provided  Portions/Balance: Assessed, Discussed and Literature provided  Carb ID/Count: Assessed, Discussed and Literature provided  Label Reading: Assessed, Discussed and Literature provided  Menu Planning: Assessed, Discussed and Literature provided  Dining Out: Assessed, Discussed and Literature provided  Physical Activity: Discussed and Literature provided    Acute Complications: Prevent, Detect, Treat:    Goal Setting and Problem Solving: Assessed and Discussed  Barriers: Assessed and Discussed  Psychosocial Adjustments: Assessed and Discussed      Time: 60  Visit Type: GDM Individual   Visit #: 1

## 2021-06-17 NOTE — TELEPHONE ENCOUNTER
9:45AM  Returned call, no answer. Message left I would try back in 1-2 hours or she can call back sooner if desired.     10:50AM  Second attempt and was able to connect with Bob. She confirms her daughter, 11 months old, is receiving care at the hospital for COVID-19 infection but they anticipate discharge today or tomorrow as she is doing well. Bob is currently 14w5d pregnant with her 4th child. She denies symptoms of COVID-19 and is wearing a mask around her daughter but as expected is unable to isolate from her as she is caring for her currently. Disc recommendation for testing in approximately 4-5 days. Enc to be aware of symptoms of COVID-19 and to call with fever, shortness of breath, or cough. Enc to connect with peds providers prior to hospital discharge to review quarantine recommendations and f/u for daughter. Was seen last week for IOB so does not plan an in=person CMM visit for at least 3-4 weeks. For safety, encouraged her to notify schedulers of her exposures and any positive results for her when scheduling. All questions answered and pt agrees with plan.     Next CNM visit is     NALLELY Edwards, SILVA, IBCLC  [facility]  Midwifery

## 2021-06-17 NOTE — PROGRESS NOTES
Bob presents to the clinic today alone.  Seen by CDE both on 2018 and 2018 secondary to diagnosis of GDM A1.  Both fasting and 1 hour postprandial blood sugar measurements have been within normal limits.  Baby is active, and she denies regular uterine contractions, loss of fluid or vaginal bleeding.  Complaining of occasional dizziness; recommend rechecking hemoglobin today, continuing iron supplementation and increasing p.o. fluids.  Plans for  to see [facility] pediatrics and plans circumcision.  Preregistration form completed.   labor precautions and danger signs and symptoms reviewed.  All questions answered.  Encouraged daily fetal movement counting and to call or return to clinic with any questions, concerns, or as needed.

## 2021-06-17 NOTE — TELEPHONE ENCOUNTER
Pls call pt asap, her dtr was exposed to Covid and is in the hospital. Pls call to discuss risks to pt herself.

## 2021-06-18 NOTE — PROGRESS NOTES
Bob Olvera is here with her  Duy for her 37w0d for her routine ob appointment. Went over lab results GBS NEGATIVE & hemoglobin 11.6. Patient requests pharmacy be changed [facility] on [address]. She is feeling baby move normally & denies any ctx, vaginal bleeding or loss of fluid. She knows to call O/C CNM with signs of labor, SROM or with any concerns. She has no concerns as this time. She states most of her BS has been good, she had 6 elevated post-prandial BS in the month of May (see below) when eating sugary cereals (frosted flakes, honey nut cheerios). Encouraged patient to not eating sugary cereals. Discussed weight gain which is WNL. She states the diabetic educator Susy told her she no longer needs to see her unless she has 3 high blood sugar readings within 1 week. Went over readings on patient's glucometer between May 7th and today, patient is doing well with fasting (44-90) & 1 hour post-prandial BS (; 141, 181, 160, 158, 159, 153). Patient has no questions about labor and delivery. Leopold's vertex. FHT & Fundal height WNL. Patient plans to return to care in 1 week for routine ob appointment.  Term labor precautions and danger signs and symptoms reviewed.  All questions answered.  Encouraged daily fetal movement counting and to call or return to clinic with any questions, concerns, or as needed.    I was present for the evaluation, assessment and plan of care with Mei YE student

## 2021-06-18 NOTE — PATIENT INSTRUCTIONS - HE
Patient Instructions by Dena Troy APRN, CNM at 9/18/2020  3:40 PM     Author: Dena Troy APRN, CNM Service: -- Author Type: Midwife    Filed: 9/18/2020  5:00 PM Encounter Date: 9/18/2020 Status: Signed    : Dena Troy APRN, CNM (Midwife)       Patient Education     Constipation (Adult)  Constipation means that you have bowel movements that are less frequent than usual. Stools often become very hard and difficult to pass.  Constipation is very common. At some point in life it affects almost everyone. Since everyone's bowel habits are different, what is constipation to one person may not be to another. Your healthcare provider may do tests to diagnose constipation. It depends on what he or she finds when evaluating you.    Symptoms of constipation include:    Abdominal pain    Bloating    Vomiting    Painful bowel movements    Itching, swelling, bleeding, or pain around the anus  Causes  Constipation can have many causes. These include:    Diet low in fiber    Too much dairy    Not drinking enough liquids    Lack of exercise or physical activity. This is especially true for older adults.    Changes in lifestyle or daily routine, including pregnancy, aging, work, and travel    Frequent use or misuse of laxatives    Ignoring the urge to have a bowel movement or delaying it until later    Medicines, such as certain prescription pain medicines, iron supplements, antacids, certain antidepressants, and calcium supplements    Diseases like irritable bowel syndrome, bowel obstructions, stroke, diabetes, thyroid disease, Parkinson disease, hemorrhoids, and colon cancer  Complications  Potential complications of constipation can include:    Hemorrhoids    Rectal bleeding from hemorrhoids or anal fissures (skin tears)    Hernias    Dependency on laxatives    Chronic constipation    Fecal impaction    Bowel obstruction or perforation  Home care  All treatment should be done after talking with  your healthcare provider. This is especially true if you have another medical problems, are taking prescription medicines, or are an older adult. Treatment most often involves lifestyle changes. You may also need medicines. Your healthcare provider will tell you which will work best for you. Follow the advice below to help avoid this problem in the future.  Lifestyle changes  These lifestyle changes can help prevent constipation:    Diet. Eat a high-fiber diet, with fresh fruit and vegetables, and reduce dairy intake, meats, and processed foods    Fluids. It's important to get enough fluids each day. Drink plenty of water when you eat more fiber. If you are on diet that limits the amount of fluid you can have, talk about this with your healthcare provider.    Regular exercise. Check with your healthcare provider first.  Medicines  Take any medicines as directed. Some laxatives are safe to use only every now and then. Others can be taken on a regular basis. Talk with your doctor or pharmacist if you have questions.  Prescription pain medicines can cause constipation. If you are taking this kind of medicine, ask your healthcare provider if you should also take a stool softener.  Medicines you may take to treat constipation include:    Fiber supplements    Stool softeners    Laxatives    Enemas    Rectal suppositories  Follow-up care  Follow up with your healthcare provider if symptoms don't get better in the next few days. You may need to have more tests or see a specialist.  Call 911  Call 911 if any of these occur:    Trouble breathing    Stiff, rigid abdomen that is severely painful to touch    Confusion    Fainting or loss of consciousness    Rapid heart rate    Chest pain  When to seek medical advice  Call your healthcare provider right away if any of these occur:    Fever of 100.4 F (38 C) or higher, or as directed by your healthcare provider    Failure to resume normal bowel movements    Pain in your abdomen or  back gets worse    Nausea or vomiting    Swelling in your abdomen    Blood in the stool    Black, tarry stool    Involuntary weight loss    Weakness  Date Last Reviewed: 12/30/2015 2000-2017 [employer]. [address] [place] [address], [place] [address]. All rights reserved. This information is not intended as a substitute for professional medical care. Always follow your healthcare professional's instructions.

## 2021-06-18 NOTE — PROGRESS NOTES
Bob presents to the clinic today with her  Duy.  Feeling good overall.  Forgot to bring blood sugar readings for the last 2 weeks but recalls that 5-6 2 hour postprandial measurements were above 140 mg/dL.  Encouraged to bring blood sugar readings next week.  2 week interval weight gain: 1 pound.  Total weight gain within normal limits at 21 pounds.  Baby is active, and she denies regular uterine contractions, loss of fluid or vaginal bleeding.  GBS RV culture and hemoglobin obtained today.  Prescription for ferrous sulfate 325 mg 1 p.o. 3 times daily, #90 per patient request prior to hemoglobin results: 11.6 g/dL..   labor precautions and danger signs and symptoms reviewed.  Sent to patient's preferred pharmacy.  All questions answered.  Encouraged daily fetal movement counting and to call or return to clinic with any questions, concerns, or as needed.

## 2021-06-18 NOTE — PATIENT INSTRUCTIONS - HE
Patient Instructions by Vania Mojica APRN, CNM at 2/19/2020  8:40 AM     Author: Vania Mojica APRN, CNM Service: -- Author Type: Midwife    Filed: 2/19/2020  9:08 AM Encounter Date: 2/19/2020 Status: Signed    : Vania Mojica APRN, CNM (Midwife)         Patient Education   HEALTHY PREGNANCY CARE: 26-30 WEEKS PREGNANT    You are now in your last trimester of pregnancy. Your baby is growing rapidly, can open and close her eyelids, sometimes get hiccups, and you'll probably feel her moving around more often. Your baby has breathing movements and is gaining about one ounce each day. You may notice heartburn and leg cramps. Your back may ache as your body gets used to your baby's size and length.    Discuss your work situation with your midwife or physician as needed. If you stand for long periods of time, you may need to make changes and take breaks.    Pre-register online at the hospital where your baby will be born ([email or web address])     Be aware of your baby's activity level. You may be asked to do daily fetal movement counts. Contact your midwife or physician about any decreased movement.    You may have been tested for gestational diabetes today. If you are RH negative, you may have had an additional test and a Rhogam injection.    Consider receiving a Tdap vaccination to protect your baby from Pertussis/whooping cough.    If you are considering tubal ligation, discuss this with your midwife or physician. You will need to have an appointment with the obstetrician who will do the surgery to discuss the risks, benefits, and alternatives, and to sign the consent. This can be discussed at your next visit.    Continue to watch for any signs or symptoms of premature labor:     Regular contractions. This means having about 6 or more within 1 hour, even after you have had a glass of water and are resting.     A backache that starts and  "stops regularly.    An increase or change in vaginal discharge, such as heavy, mucus-like, watery, or bloody discharge.     Your water breaks or leaks.    Continue to watch for signs and symptoms of preeclampsia:     Sudden swelling of your face, hands, or feet     New vision problems such as blurring, double vision, or flashing lights    A severe headache not relieved with acetaminophen (Tylenol)    Sharp or stabbing pain in your right or middle upper abdomen    If you have any of the above symptoms or any other concerns, call your midwife or physician or their clinic staff at [facility] at Phone: [phone]. If it's after clinic hours, physician patients should call the [facility] at [phone] ([phone]); midwife patients should call their answering service at [phone].    How can you care for yourself at home?   You can refer to the Starting Out Right book or find it online at [email or web address] or [email or web address]#p=8     You can sign up for a weekly parenting e-mail that gives support, tips and advice from health care professionals that starts with pregnancy and continues through the toddler years. To register, go to [email or web address] at any time during your pregnancy.    BREASTFEEDING TIPS FOR NEW MOMS     Importance of skin-to-skin contact:  ? Gets breastfeeding off to a good start  ? Keeps baby warm and is great for bonding  ? Provides calming effects and helps to stabilize breathing and  blood sugar  ? Helps the uterus to contract and bleed less    Importance of feeding whenever baby shows signs of  being hungry, \"feeding on cue\":  ? Helps create a good milk supply appropriate to the babys needs  ? Less breastfeeding complications such as engorgement or  low supply  ? Helps baby get enough milk  ? Milk supply " is determined by how often baby nurses and empties  the breast.  ? Feeding is for comfort as well as nutrition    Importance of good latch (positioning and attaching  baby properly at breast):  ? Helps prevent sore nipples  ? Helps ensure baby gets enough milk and supports moms breast  milk supply    Risks of giving baby supplements other  than moms breastmilk  Breastfeeding alone is recommended for the first 6 months, if not it:  ? Can make baby more prone to illness  ? Can make baby less satisfied at breast (wanting larger amounts or  faster flow)  ? Reduces milk supply    Importance of rooming-in:  ? Increases parent confidence while mother is supported by the  hospital staff  ? Caregivers learn how to care for baby and recognize feeding cues  ? Enables feeding whenever baby needs to eat  ? Baby is comforted with mom and learns to recognize caregivers    Avoiding use of pacifiers:  ? Use of pacifiers in the first weeks can make it difficult to make a full  milk supply  ? May interfere with baby learning to latch well      2017 tg [id number]. SW [id number]. DOD 11.17           Breastfeeding   Why Its Important and What to Expect     Your breast milk is the best food for your baby--and  breastfeeding can help you be healthy as well.    Though breastfeeding is natural, it is a learned process for both mother and baby. To prepare, there are things you can learn--and do--before your baby is born.    ? Learn the benefits of breastfeeding.    ? Understand the basic process.    ? Know what to expect in the hospital.    ? Arrange breastfeeding support for the first few  weeks after birth.    ? Take a breastfeeding class (see back page).    ? Talk to your midwife, nurse or doctor if you have  Questions.    The benefits of breastfeeding    Human milk changes to meet the needs of a growing baby. It is all a baby needs for the first six months of life.    In fact, babies who receive only human milk for the  first six months are  less likely to develop colds, the  flu, colic, asthma, ear infections, food allergies and  diarrhea (loose, watery stools). They may be less likely      to be overweight as children, and they are less likely to develop diabetes later in life. Some studies also show that infants have a higher IQ if they are .    Breastfeeding can:    ? Help you and your baby develop a special bond--  and make you feel proud that you can feed your  Baby.    ? Reduce the total amount of blood you will lose  after delivery.    ? Help your uterus return to its non-pregnant size.    ? Reduce the risk of Sudden Infant Death Syndrome (SIDS).    ? Help you lose your pregnancy weight more quickly.    ? Help delay the return of your monthly periods.    ? Lower your risk of some breast and ovarian  cancers--as well as osteoporosis (bone loss)--later in life.    ? Save you more than $300 per month. (This  includes the cost of formula and medical bills.  Formula-fed babies get sick more often.)          If you are deaf or hard of hearing, please let us know. We provide many free services including  sign language interpreters, oral interpreters, TTYs, telephone amplifiers, note takers and written materials.        How to breastfeed    Skin-to-skin contact    Hold your baby on your chest skin-to-skin right after  birth. Skin contact calms your baby, steadies their  breathing and keeps your baby warm. Your baby will be alert and will likely want to feed within the first hour after birth.    Babies are born with reflexes that help them  breastfeed. Your body will be ready with early milk  (called colostrum), so you will have all the milk your  baby needs for that first feeding. Your nurse will help you get started.    Keep your baby with you and breastfeed whenever  your baby is hungry. Offering the breast early and  often helps your body keep making lots of milk.    How to position your baby    There are many positions for  breastfeeding.              No matter which position you choose, support your  babys back, shoulders and neck. The head and body should be in a straight line, and the entire body should face the breast. Your baby should be able to tilt the head back easily. Your baby shouldnt have to reach out to feed. Also make sure your babys nose is level with your nipple. This way, your baby will find it easier to attach to your breast.    Finally, get comfortable. Use pillows to support your  body. Dont lean over or slump to reach your baby.  Once your baby is attached to the breast, its okay to change your position slightly.    You will feel a bit of a tugging at first, but you should  never feel pain. If you do, ask your nurse or lactation expert for help. She will teach you how to latch your baby onto the breast in a way that feels more comfortable.    Breastfeeding in the hospital    For the first three days after birth, your body will  produce early milk called colostrum. This milk is  full of calories and antibodies to help keep your baby healthy. It is all your baby needs for the first few days.    Remember, babies do not eat anything while inside  you. Right after birth, your baby will only need a little bit of milk (about 1 teaspoon per feeding) to get the digestive system working well. Your baby will not need any formula--your body will make the right amount of milk.    Breastfeed whenever your baby shows signs of hunger. (See next page for a list of signs.) Crying is a late sign of hunger.    Babies often lose weight in the days after birth. This  is normal. By two weeks of age, your baby should be back to their birth weight. Your care team will watch your babys weight carefully.    If you are unable to breastfeed in the hospital, your  care team may suggest pasteurized donor human milk for your baby.             The Most Important Points to Remember    ? Your breast milk is the perfect food for your  baby.  Breastfeeding has a lot of health benefits  for you as well.    ? Hold your baby skin-to-skin as soon as possible  after birth. Do this for as long as you can. Even  if your baby doesnt go to the breast right away,  skin-to-skin contact helps your body make  more milk. This lets you get an early start on  Breastfeeding.    ? Learn how to position your baby at the breast.  This will help your baby feed well, and it will  keep you comfortable.    ? Feed your baby whenever your baby wants to  eat. You are feeding a baby, not a clock!    ? Signs that your baby is ready to eat include:  starting to wake up, chewing on fists, moving  the face from side to side, opening and closing  the mouth, sticking out the tongue and turning  toward the breast when held. Crying is a late  sign of hunger, so look for the earlier signals  that your baby makes.    ? Feed your baby only human milk for at least  six months. The World Health Organization  recommends breastfeeding for the first year,  noting it is a letty baby who is  for  the first two years.    ? While in the hospital, plan to keep your baby  with you at all times, except for certain medical  procedures. This is an important time for you  and your baby to get to know each other and  practice breastfeeding.     Common questions    Below are questions that many women have about  breastfeeding. You will find further information in the  childbirth book your care team gave you. If you have more questions, speak with your midwife, nurse or doctor.    How do I involve my partner, family and  friends and get their help and support?    Sometimes family and friends dont understand why  you want to breastfeed. Perhaps they themselves  didnt breastfeed, or they werent  as infants. Tell them about the benefits of breastfeeding and how important it is to feed only human milk for the first six months or so. If you feed often, you will make plenty of milk to help your  baby grow, fight illness and get the best start possible.    After two to four weeks--once your baby is feeding  well and your milk supply is well established--your  partner and others can feed the baby your milk from  a cup, dropper or bottle. You can remove milk from  your breast (by hand or pump) and store it for later  use. This way, your baby will have your milk even  when youre away.    Remind everyone that there are lots of ways to help  that dont involve feeding: making meals, caring for  your other children, comforting the baby if they cries, changing diapers, running errands and more.    Is breastfeeding painful?    Not usually. There are ways to prevent pain and to  treat it if it happens.    The best ways to avoid pain are to feed your baby  often, use a good position and correctly latch your  baby onto the breast. These help prevent the two  most common sources of pain: sore nipples and  engorgement (overly full breasts). You will learn more about these topics in a breastfeeding class and in the hospital after your baby is born.         How much time does it take to breastfeed?    Some new mothers feel that all they do is breastfeed. In the early weeks, a baby eats 8 to 12 times per day. Sometimes babies will cluster feedings close together. At other times, there are longer stretches between feedings.    Remember, your newborns stomach will be about  the size of a walnut. Your baby wont eat much at each feeding. If you feed your baby often in the first days, your baby--and your milk supply--will grow. Your baby will eat more at each session, and you will need to nurse less often. Within a few weeks, most women find that breastfeeding is easier and takes less time than formula feeding.    How will I know if my baby is getting  enough milk?    Your body will start making milk as soon as your  baby is born. The more often you put your baby to  breast, the more milk you will make. You should avoid pacifiers  for the first several weeks until breastfeeding is well established--you want your baby to do all their sucking at the breast. This will help you make more milk.    There are signs that your baby is getting enough milk. You will learn these signs over time. For example:    ? You will count wet and soiled diapers, because  these show how much milk your baby is getting.    ? Your baby will seem satisfied after feedings.    ? Your baby will grow and gain weight after the first  few days.    Are there reasons why a woman shouldnt  breastfeed her baby?    There are a few medical concerns that prevent  breastfeeding. In mothers, these include being HIVpositive, having active or untreated TB (tuberculosis)  and using street drugs or some medicines. These  mothers usually choose infant formula for their  Babies.    A few women choose not to breastfeed for personal  reasons. But most mothers can breastfeed. If you are not sure if its okay to breastfeed, ask your care team.    Getting support    Before your baby is born, get as much information  as you can. Sign up for a breastfeeding class. Most  childbirth classes discuss breastfeeding, but a special class will give more in-depth information.    ? In [facility]: Go to [facility] at [employer].    ? In [facility]: Go to [email or web address].  Click on Classes-and Events at the bottom of the  page, then search for breastfeeding. Or, call [phone].    Remember, help is available from your hospital, clinic, lactation experts or online. If you need help after leaving the hospital:    ? Call your babys clinic. (If you dont have a clinic,  call [phone] and ask for a referral.)    ? Call a lactation consultant. (If you need help  finding a location that offers lactation support, call  [phone].)    ? Call [employer] [place] (24 hours a  day) at 0-585-HX-LECHE [1-213.450.2139].    ? Call the Women, Infants and Children  (WIC)  program at 1-969.501.5301.    ? Call the National Womens Health Information  Center (English and Citizen of [place]) at 1-913.802.2572 or  go to www.womenshealth.gov/breastfeeding.    ? Go to [employer].     For informational purposes only. Not to replace the advice of your health care provider. Copyright   2010 [employer]. All rights reserved. Clinically reviewed by [facility]. [employer] 610908 - REV .       Patient Education   HEALTHY PREGNANCY CARE: 30-34 WEEKS PREGNANT    You have made it to the final months of your pregnancy. By now, your baby is starting to fill out with some fat under his skin, fuzzy hair on his shoulders, and is gaining 4 to 6 ounces per week.    Discuss any travel plans with your midwife or physician.    Review possible changes in sexuality during later pregnancy and discuss these with your midwife or physician, as well as your partner. Alternative love-making positions may be more comfortable.    Discuss labor and delivery issues with your midwife or physician. If you had a  birth in the past, discuss a trial of labor with your midwife or physician. He or she may ask that you sign a consent form, if you wish to have a vaginal birth after  (). Ask your midwife or physician to explain your options for managing pain during your labor and delivery. Sometimes, during the birth process, an episiotomy may need to be cut in the vagina to make the opening bigger or let the baby come out quicker. You may want to discuss the episiotomy and how often it is needed with your midwife or physician.    Plan for your baby's care by selecting a child health care provider (Family physician, Pediatrician, or Pediatric Nurse Practitioner). Practice installing an infant car seat correctly in the car. Ask for car seat information as needed and make sure it is safe and will work in the car your baby will ride in. You will need a car seat to  bring your baby home from the hospital. Check the procedure for adding your baby to your health care plan. Review your decision about circumcision and ask for any information you need. As you buy and receive items for your baby, don't put a baby walker on your list. Walkers can be dangerous and can cause serious injury to your child. A safer option is a saucer-type play station, since it doesn't allow baby to travel across the floor.    Discuss your choices and plans for birth control with your midwife or physician. Women who are breastfeeding can still become pregnant. Use a birth control method if you want to lower your pregnancy risk. Talk to your midwife or physician if you are considering permanent birth control, such as tubal ligation or Essure. You may need to complete a consent form 30 days prior to delivery in order to have this done after you deliver.    Continue to watch for signs and symptoms of preeclampsia:     Sudden swelling of your face, hands, or feet     New vision problems such as blurring, double vision, or flashing lights    A severe headache not relieved with acetaminophen (Tylenol)    Sharp or stabbing pain in your right or middle upper abdomen    Watch for signs and symptoms of premature labor:     Regular contractions. This means having about 6 or more within 1 hour, even after you have had a glass of water and are resting.     A backache that starts and stops regularly.    An increase or change in vaginal discharge, such as heavy, mucus-like, watery, or bloody discharge.     Your water breaks or leaks.    If you have any of the above symptoms or any other concerns, call your provider or their clinic staff at [facility] at Phone: [phone]. If it's after clinic hours, physician patients should call the [facility] at [phone] ([phone]); midwife patients should call their answering service at [phone].    How can you care for yourself at home?   You can refer  to the Starting Out Right book or find it online at [email or web address] or [email or web address]-starting-out-[email or web address]-starting-out-right.html#p=8     You can sign up for a weekly parenting e-mail that gives support, tips and advice from health care professionals that starts with pregnancy and continues through the toddler years. To register, go to [email or web address] at any time during your pregnancy.

## 2021-06-18 NOTE — PROGRESS NOTES
2-week Postpartum Visit:     Assessment:   1.  Status post normal spontaneous vaginal birth, 2 weeks postpartum  2.  Status post third-degree perineal laceration, repair by Dr. Kim Renteria, healing well  3.  Status post periclitoral laceration, repaired by Dr. Kim Renteria, healing well  4.  Breast-feeding  5.  Left mid abdominal pain, intermittent: Etiology likely musculoskeletal    Plan:   -Reviewed warning signs of postpartum depression.   -Warning signs of breast infections of mastitis and thrush reviewed.   -Breastfeeding support resource list and contact info given, including [facility] and [facility].   -Encouraged to continue with PNV, Vitamin D and DHA supplements.   -Reviewed warning signs of pelvic pain, excessive bleeding or abdominal pain, fever/chills, or signs of breast infection.   -Labs today: None  -RTC if left mid abdominal pain (mostly likely musculoskeletal) persists or worsens.  -RTC in 4 weeks for her 6 week postpartum exam or sooner as needed.     TT with patient 40 mns, >50% time spent in counseling or coordination of care.     Subjective:   Bob is a 24 yo, here for her 6 week postpartum exam. She is integrating birth experience/care and transition well. Bleeding and uterine cramping is minimal, no clots.   Reports normal bowel and bladder functioning.  Since delivery, reports intermittent sharp pain of the left mid abdomen.  Cannot associate it with any particular movement, time of day, relationship to eating or bowel movement.  Not worsening.  PDS score 0/30. Reports good family/friend support.  Breastfeeding well-established. Feeding q 2-3 hours.   Patient was invited to return to clinic at 2 weeks postpartum due to third-degree perineal laceration at birth repaired by Dr. Kim Renteria.  Patient states discomfort is minimal.  Sitz baths daily.  Denies fever, chills, increasing pain, purulent or malodorous discharge on Kotex pad,  increasing vaginal bleeding, clots or dysuria.  Has not resumed intercourse. Denies pain or concerns.      Review of Systems  Pertinent items are noted in HPI.      Objective:     Physical Exam:  General: Pleasant, articulate, well-groomed, well-nourished female.  Not in any apparent distress.  Abdomen: Soft, nontender, no masses, negative CVAT.  Unable to elicit/reproduce reported left mid abdominal pain.  External genitalia: Normal hair distribution, no lesions.  Third-degree perineal laceration appears intact and to be healing well.  There is no evidence of erythema, edema or streaking.  There is a small amount of yellow vaginal discharge.  Periclitoral sutures intact without signs of infection.  Urethral opening: Without lesions, or tenderness.   Lower extremities: no significant edema.  Bimanual deferred today.

## 2021-06-19 NOTE — PROGRESS NOTES
6-week Postpartum Visit:     Assessment:   Normal postpartum exam at ~ 6 weeks.  Lactating mother  Contraceptive counseling- condoms  Cervical cancer screening-declined at this time due to pelvic pain  Prolonged bleeding  Perineal pain status post third-degree perineal laceration  Gestational diabetes    Plan:   1. Adjustment to parenting, self care and importance of a support system discussed. Postpartum education given including: postpartum mood changes and postpartum depression. [place]  Mental Health Resource [place] given for future reference prn.   2. Return of fertility discussed. Plans condoms for contraception. Education given on this method. Resumption of intercourse reviewed with possible changes in libido and vaginal lubrication while nursing.  3. Discussed resumption of exercise and normal timing of return to pre-pregnancy weight. Postpartum physical activity reviewed and encouraged modified abdominal crunches and Kegels daily. Encouraged integrating exercise, such as walking 20-30mns daily.   4.  Nutrition and supplements reviewed.  Advised continuation of a prenatal or multivitamin, also Vitamin D3 2000 IU geltab daily and an omega 3 fatty acid supplement.  5. Reviewed warning signs of pelvic pain, excessive bleeding or abdominal pain, fever/chills, or signs of breast infection.   6. Breastfeeding support resource list and contact info given, including [facility], [facility], local LLL groups, and new moms groups. Warning signs of breast infections (mastitis and thrush) reviewed.  7.  Order placed for pelvic ultrasound to evaluate for retained products of conception or any other cause for prolonged vaginal bleeding.  8.  Discussed exam findings showing normal well-healed perineum and labial lacerations.  Pelvic floor exercises encouraged.  Also encouraged daily warm baths.  Discussed option for pelvic floor physical therapy or a referral to OB/GYN for  "further evaluation with persistent pelvic pain.  Patient declines at this time but will call if desired.  9.  Discussed recommendation for 2 hr GTT due to history of gestational diabetes.  Patient will return for a fasting lab only visit. Also, recommended yearly fasting blood sugar.    -RTC for cervical cancer screening or sooner as needed.     TT with patient 40 mns, >50% time spent in counseling or coordination of care.     Subjective:    Bob Olvera is a 23 y.o. female who presents for postpartum visit. She is 6 weeks pstpartum following a NSVB.  I have fully reviewed the prenatal and intrapartum course. The delivery was at Term Her baby boy is named Austin and weighed 7 lbs 13 oz at birth. Reflections on her birth include: pleased with experience. She has one concern and that is her bleeding. She is continuing to have to change her pad 2-3 times per day. She describes red bleeding without clots. Denies cramping or abdominal pain. Some persistent pelvic pain. Baby's course has been stable. Baby is breastfeeding. Bowel function is normal. Bladder function is normal. She has not resumed intercourse. Desired contraception: condoms. Appetite is normal. Reports sleeping fairly well. [place]  Depression Scale postpartum depression screening score: 0.  Reports good family and friend support.    She has not resumed regular exercise. Patient will stay at home for 4-5 months.     Review of Systems  -A 12 point comprehensive review of systems was negative except as noted above.  -Prenatal history and intrapartum course were also reviewed today.    Objective:      Vitals:    18 1051   BP: 96/60   Pulse: 80   Weight: 141 lb (64 kg)   Height: 5' 1.25\" (1.556 m)       Physical Exam:  General Appearance: Pleasant, articulate, well-groomed, well-nourished female.  Not in any apparent distress.   Breasts: Engorgement resolved/Lactating.  Nipples intact with no cracking.  Abdomen: Soft, non-tender. No masses. " Diastasis  1 fb.  Pelvic: External genitalia normal without lesions or irritation.  Labial laceration well-healed, no edema, erythema or open areas.  Perineum well-healed, small thin line of gray scan tissue present.  No open areas, no edema, no erythema.  Due to discomfort with external examination, patient declined internal exam including speculum exam, Pap smear, bimanual exam.  Extremities: Extremities normal without varicosities or edema    Last Pap: 8/14/15. Results were: normal

## 2021-06-19 NOTE — LETTER
Letter by Leona Dietz APRN, CNM at      Author: Leona Dietz APRN, CNM Service: -- Author Type: --    Filed:  Encounter Date: 11/12/2019 Status: Signed         November 12, 2019     Patient: Bob Olvera   YOB: 1995   Date of Visit: 10/8/2019       To Whom It May Concern:    Bob Olvera was seen in our clinic on 10/8/19 for pregnancy confirmation.  Her expected due date is 5/24/20. This was confirmed by early ultrasound.    If you have any questions or concerns, please don't hesitate to call.    Sincerely,        Electronically signed by Leona Dietz CNM, [facility]

## 2021-06-25 NOTE — PROGRESS NOTES
Bob is here by herself for her routine prenatal appt at 19w2d gestation. Reviewed hemoglobin results . Reviewed recommendation for daily iron supplementation for all pregnant women at this time, she is not currently supplementing and will begin once daily; given list of options for supplementation  and comfort measures to prevent constipation. Has screening ultrasound scheduled for next Monday 6/7/21. Has some low abdominal and low back pain with activity it gets worse. Advised to get a maternity support belt, order placed on chart will check at DME to see if they have one available. Advised to make appt in 4-5 weeks. We discussed current recommendations for prenatal appointments with some inperson visits and telephone visits when appropriate. Reviewed changes to hospital policies regarding COVID-19 and the maternity unit. Her one year old daughter tested positive for COVID-19 the end of April and was hospitalized for 5 days. Her middle son was positive but asymptomatic, everyone else at home tested negative. Reviewed how to reach CNM with non-urgent and urgent concerns between visits.

## 2021-06-26 NOTE — PROGRESS NOTES
Progress Notes by Susy Smith RD at 4/24/2018  8:30 AM     Author: Susy Smith RD Service: -- Author Type: Registered Dietitian    Filed: 4/24/2018  8:51 AM Encounter Date: 4/24/2018 Status: Attested    : Susy Smith RD (Registered Dietitian) Cosigner: Dena Troy APRN, CNM at 4/25/2018 10:15 AM    Attestation signed by Dena Troy APRN, CNM at 4/25/2018 10:15 AM    I reviewed visit notes and will plan for routine prenatal care with GDM monitoring as recommended.                Assessment: Bob is here for follow up gestational diabetes visit.  and 2 year old daughter are present as well.  Ketones negative.  She is eating 3 meals and 2 snacks each day - she has made effort to decrease portion sizes. Trying to do more walking.   She has had a cold this past week,  weight is down by 1.5 lbs.   Reports active baby.    FBS:  81, 78, 86, 89 mg/dl  1 hr. BF:  137, 124, 118, 123  1 hr L:  106, 93, 109, 131,  1 hr D:  125, 106, 138, 103 mg/dl         Plan: Encouraged to increase higher fat snacks, nuts.  To test FBS and one hour after each meal.  To call if 3 or more readings are elevated in one week.   Follow up in 3-4 weeks.to test BG levels.       Provider: Dena Troy CNM  Provider's Diagnosis (per referral form): Gestational (648.83)  Weight:  148.5 lbs   OGTT:             Results for orders placed or performed in visit on 04/04/18   Glucose, Gestational Challenge 2 Hour   Result Value Ref Range      Glucose, 2 Hour 158 (H) 60-<155 mg/dL   Glucose, Gestational Challenge 1 Hour   Result Value Ref Range      Glucose, 1 Hour 176 60-<180 mg/dL   Glucose, Gestational Challenge Fasting   Result Value Ref Range      Glucose, Fasting 94 60-<95 mg/dL      Patient Fasting > 8hrs? Yes      Glucose, Gestational Challenge 3 Hour   Result Value Ref Range      Glucose, 3 hour 149 (H) 60-<140 mg/dL       EDC: Estimated Date of Delivery: 6/20/18  -  [facility]  Pregnancy #: 2  Previous GDM: No  Medications:   Current Outpatient Prescriptions:   ?  acetone, urine, test (ACETONE, URINE, TEST) Strp, Use 1 each As Directed every morning., Disp: 50 strip, Rfl: 2  ?  blood glucose test (CONTOUR NEXT STRIPS) strips, Use 1 each As Directed 4 (four) times a day., Disp: 150 strip, Rfl: 3  ?  generic lancets, Use 1 each As Directed 4 (four) times a day., Disp: 100 each, Rfl: 2  ?  PNV NO.95/FERROUS FUM/FOLIC AC (PRENATAL ORAL), Take by mouth., Disp: , Rfl:       BG monitoring goals: Fasting <95; 1 hour post start of meal <140. Test 4 x per day.  Check fasting a.m. ketones: Yes  GDM meal pattern/carb counting taught per guidelines: Yes  Goals: Nutrition: GDM meal plan  Activity:  Walking after meals when able/staying active  Monitoring:  BG 4x/day as directed, ketones every morning          DIABETES CARE PLAN AND EDUCATION RECORD      Gestational Diabetes Disease Process/Preconception Care/Management During Pregnancy/Postpartum:      Meter (per above goals): Discussed, Literature provided and Patient returned demonstration  - competent     May start using Relion meter as insurance doesn't cover glucose testing strips.      Nutrition Management      Weight: Assessed, Discussed and Literature provided  Portions/Balance: Assessed, Discussed and Literature provided  Carb ID/Count: Assessed, Discussed and Literature provided  Label Reading: Assessed, Discussed and Literature provided  Menu Planning: Assessed, Discussed and Literature provided  Dining Out: Assessed, Discussed and Literature provided  Physical Activity: Discussed and Literature provided      Acute Complications: Prevent, Detect, Treat:      Goal Setting and Problem Solving: Assessed and Discussed  Barriers: Assessed and Discussed  Psychosocial Adjustments: Assessed and Discussed          Time: 30  Visit Type: GDM Individual   Visit #: 3

## 2021-06-26 NOTE — PROGRESS NOTES
Progress Notes by Susy Smith RD at 4/17/2018 10:30 AM     Author: Susy Smith RD Service: -- Author Type: Registered Dietitian    Filed: 4/17/2018 11:12 AM Encounter Date: 4/17/2018 Status: Attested    : Susy Smith RD (Registered Dietitian) Cosigner: Dena Troy APRN, CNM at 4/19/2018  8:30 AM    Attestation signed by Dena Troy APRN, CNM at 4/19/2018  8:30 AM    Reviewed visit information and plan of care for QID monitoring and DME office visit follow up in 1 week. Dena Troy DNP, [facility]                  Assessment: Bob is here for follow up gestational diabetes visit.  and 2 year old daughter are present as well. She had denial from insurance company for glucose testing strips.  She purchased 70 Contour strips on her own.   Ketones negative.  She is eating 3 meals and 2 snacks each day.   Does not eat bedttime snack.  Trying to do more walking.    Likes to eat njera (carbohydrate) with meals.   She describes a large portions.   She has been eating honey nut cheerios for breakfast and hasn't been testing 1 hour after.      FBS:  97, 86, 89 mg/dl  1 hr. BF:  112  1 hr L:  153, 126  1 hr D:  102, 175, 115 mg/dl         Plan: Reviewed recommendations for testing BG levels and limiting carbohydrates - discussed portions size for njera:   FBS and one hour after each meal.   Encouraged her to get bedtime snack.   To continue to test BG levels.   Appt made for next week.     Provider: Dena Troy CNM  Provider's Diagnosis (per referral form): Gestational (648.83)  Weight:    OGTT:         Results for orders placed or performed in visit on 04/04/18   Glucose, Gestational Challenge 2 Hour   Result Value Ref Range     Glucose, 2 Hour 158 (H) 60-<155 mg/dL   Glucose, Gestational Challenge 1 Hour   Result Value Ref Range     Glucose, 1 Hour 176 60-<180 mg/dL   Glucose, Gestational Challenge Fasting   Result Value Ref Range     Glucose, Fasting  94 60-<95 mg/dL     Patient Fasting > 8hrs? Yes     Glucose, Gestational Challenge 3 Hour   Result Value Ref Range     Glucose, 3 hour 149 (H) 60-<140 mg/dL      EDC: Estimated Date of Delivery: 6/20/18  - [facility]  Pregnancy #: 2  Previous GDM: No  Medications:   Current Outpatient Prescriptions:   ?  acetone, urine, test (ACETONE, URINE, TEST) Strp, Use 1 each As Directed every morning., Disp: 50 strip, Rfl: 2  ?  blood glucose test (CONTOUR NEXT STRIPS) strips, Use 1 each As Directed 4 (four) times a day., Disp: 150 strip, Rfl: 3  ?  generic lancets, Use 1 each As Directed 4 (four) times a day., Disp: 100 each, Rfl: 2  ?  PNV NO.95/FERROUS FUM/FOLIC AC (PRENATAL ORAL), Take by mouth., Disp: , Rfl:      BG monitoring goals: Fasting <95; 1 hour post start of meal <140. Test 4 x per day.  Check fasting a.m. ketones: Yes  GDM meal pattern/carb counting taught per guidelines: Yes  Goals: Nutrition: GDM meal plan  Activity:  Walking after meals when able/staying active  Monitoring:  BG 4x/day as directed, ketones every morning     F/u in 1 week to assess BG and food log      DIABETES CARE PLAN AND EDUCATION RECORD     Gestational Diabetes Disease Process/Preconception Care/Management During Pregnancy/Postpartum:     Meter (per above goals): Discussed, Literature provided and Patient returned demonstration     Nutrition Management     Weight: Assessed, Discussed and Literature provided  Portions/Balance: Assessed, Discussed and Literature provided  Carb ID/Count: Assessed, Discussed and Literature provided  Label Reading: Assessed, Discussed and Literature provided  Menu Planning: Assessed, Discussed and Literature provided  Dining Out: Assessed, Discussed and Literature provided  Physical Activity: Discussed and Literature provided     Acute Complications: Prevent, Detect, Treat:     Goal Setting and Problem Solving: Assessed and Discussed  Barriers: Assessed and Discussed  Psychosocial Adjustments: Assessed and  Discussed        Time: 30  Visit Type: GDM Individual   Visit #: 2

## 2021-06-26 NOTE — PROGRESS NOTES
Progress Notes by Susy Smith RD at 5/24/2018  9:00 AM     Author: Susy Smith RD Service: -- Author Type: Registered Dietitian    Filed: 5/24/2018  9:29 AM Encounter Date: 5/24/2018 Status: Attested    : Susy Smith RD (Registered Dietitian) Cosigner: Dena Troy APRN, CNM at 5/24/2018  1:10 PM    Attestation signed by Dena Troy APRN, CNM at 5/24/2018  1:10 PM    I read the above progress note; routine prenatal care as scheduled.                 Assessment: Bob is here for follow up gestational diabetes visit.  is also present.. She is 36+ weeks gestation.  She is eating 3 meals and 2 snacks each day.  She states she feels like she is getting enough to eat.  Walking as able.  Reports active baby.   Her weight is down 3 lbs since 4/17/2018.  Ketones negative.  She had called in with elevated post breakfast readings.  She is doing a great job in getting BG testing done.     FBS:  79, 75, 76, 81, 90 82 mg/dl  1 hr. BF:  158, (cereal), 91, 129, 138, 159 (apple juice), 107, 118, 108, 121 mg/dl  1 hr L:  160 (cereal), 104, 115, 101 , 128, 97 mgdl  1 hr D:  118, 80, 91, 92, 119 mg/dl      BF:  bread/cheese/ milk    snack:  fruit or cracker    Lunch:  rice/meat/vegetable    snack:  fruit          dinner:  fruit/  bread/ meat/         snack:  fruit  drinks milk throughout day to help with heart burn.      Plan: Reviewed nutrition guidelines and discussed higher fat snacks, she like pecans and will try that with her fruit as snack.   To test FBS and one hour after each meal.  No other follow up appointment made at this time.  She agreed to call if 3 more readings are elevated in one week.   Discussed follow up BG screening guidelines following pregnancy.   Discusses nutrition during breastfeeding.        Provider: Dena Troy CNM  Provider's Diagnosis (per referral form): Gestational (648.83)  Weight:  147.9 lbs     OGTT:                 Results for  orders placed or performed in visit on 04/04/18   Glucose, Gestational Challenge 2 Hour   Result Value Ref Range      Glucose, 2 Hour 158 (H) 60-<155 mg/dL   Glucose, Gestational Challenge 1 Hour   Result Value Ref Range      Glucose, 1 Hour 176 60-<180 mg/dL   Glucose, Gestational Challenge Fasting   Result Value Ref Range      Glucose, Fasting 94 60-<95 mg/dL      Patient Fasting > 8hrs? Yes      Glucose, Gestational Challenge 3 Hour   Result Value Ref Range      Glucose, 3 hour 149 (H) 60-<140 mg/dL       EDC: Estimated Date of Delivery: 6/20/18  - Bari  (boy)  Pregnancy #: 2  Previous GDM: No  Medications:   Current Outpatient Prescriptions:   ?  acetone, urine, test (ACETONE, URINE, TEST) Strp, Use 1 each As Directed every morning., Disp: 50 strip, Rfl: 2  ?  blood glucose test (CONTOUR NEXT STRIPS) strips, Use 1 each As Directed 4 (four) times a day., Disp: 150 strip, Rfl: 3  ?  generic lancets, Use 1 each As Directed 4 (four) times a day., Disp: 100 each, Rfl: 2  ?  PNV NO.95/FERROUS FUM/FOLIC AC (PRENATAL ORAL), Take by mouth., Disp: , Rfl:       BG monitoring goals: Fasting <95; 1 hour post start of meal <140. Test 4 x per day.  Check fasting a.m. ketones: Yes  GDM meal pattern/carb counting taught per guidelines: Yes  Goals: Nutrition: GDM meal plan  Activity:  Walking after meals when able/staying active - yes  Monitoring:  BG 4x/day as directed, ketones every morning- yes          DIABETES CARE PLAN AND EDUCATION RECORD      Gestational Diabetes Disease Process/Preconception Care/Management During Pregnancy/Postpartum:      Meter (per above goals): Discussed, Literature provided and Patient returned demonstration  - competent   Using Relion meter as insurance doesn't cover glucose testing strips.      Nutrition Management      Weight: Assessed, Discussed and Literature provided  Portions/Balance: Assessed, Discussed and Literature provided - encouraged higher fat snacks  Carb ID/Count: Assessed,  Discussed and Literature provided  Label Reading: Assessed, Discussed and Literature provided  Menu Planning: Assessed, Discussed and Literature provided  Dining Out: Assessed, Discussed and Literature provided  Physical Activity: Discussed and Literature provided      Acute Complications: Prevent, Detect, Treat:      Goal Setting and Problem Solving: Assessed and Discussed  Barriers: Assessed and Discussed  Psychosocial Adjustments: Assessed and Discussed          Time: 30  Visit Type: GDM Individual   Visit #: 4

## 2021-06-27 NOTE — PROGRESS NOTES
Progress Notes by Nilda Arevalo PA-C at 3/13/2019 12:00 PM     Author: Nilda Arevalo PA-C Service: -- Author Type: Physician Assistant    Filed: 3/13/2019  2:07 PM Encounter Date: 3/13/2019 Status: Signed    : Nilda Arevalo PA-C (Physician Assistant)         Assessment:       Otitis media, right.  Acute cystitis.  Vaginal candidiasis.    Medical Decision Making  Patient initially refused pelvic exam from myself, but accepted to have female provider perform. Dr. Josselyn Stephen was kind enough to assist. She reports loose white discharge and mild erythematous irritation at the urethral opening. Otherwise normal appear vaginal mucosa and pelvic exam. Spent greater than 25 minutes with the patient with 50% of face-to-face time spent counseling the patient.      Plan:       Antifungal per orders.  Augmentin.  Drink plenty of fluids.  OTC analgesics dicussed.  Patient elected to forego trail of nasal steroids at this time.  In process urine culture - will contact if results negative or showing resistance to Augmentin.  Discussed signs of worsening symptoms and when to follow-up with PCP if no symptom improvement.      Patient Instructions     Urine    Analysis of your urine showed signs of a urinary tract infection.     Take the prescribed antibiotics for the entire course, even if symptoms improve.  You should expect improvement to begin in 24 hours. In the meantime, continue to drink plenty of fluids.    Reasons to come back for re-evaluation:  - Develop a fever, back or flank pain, or nausea and vomiting  - If symptoms have not completely improved after 72 hours  - Recurrent symptoms within a few weeks after treatment has concluded    Ears    You were seen today for an infection of the middle ear, also called otitis media.    Treatment:  - Use antibiotics as prescribed until completion, even if symptoms improve  - May use tylenol or ibuprofen for pain and discomfort  - Should notice symptom  improvement in the next 36-48 hours    When to come back sooner for re-evaluation?  - If symptoms have not begun improving after 72 hours of taking antibiotics  - Develop a fever or current fever worsens  - Become short of breath  - Neck stiffness  - Difficulty swallowing     Patient Education     Vaginal Infection: Yeast (Candidiasis)  Yeast infection occurs when yeast in the vagina increase and attacks the vaginal tissues. Yeast is a type of fungus. These infections are often caused by a type of yeast called Candida albicans. Other species of yeast can also cause infections. Factors that may make infection more likely include recent antibiotic use, douching, or increased sex. Yeast infections are more common in women who have diabetes, or are obese or pregnant, or have a weak immune system.  Symptoms of yeast infection    Clumpy or thin, white discharge, which may look like cottage cheese    No odor or minimal odor    Severe vaginal itching or burning    Burning with urination    Swelling, redness of vulva    Pain during sex  Treating yeast infection  Yeast infection is treated with a vaginal antifungal cream. In some cases, antifungal pills are prescribed instead. During treatment:    Finish all of your medicine, even if your symptoms go away.    Apply the cream before going to bed. Lie flat after applying so that it doesn't drip out.    Do not douche or use tampons.    Don't rely on a diaphragm or condoms, since the cream may weaken them.    Avoid intercourse if advised by your healthcare provider.     Should I treat a yeast infection myself?  Discuss with your healthcare provider whether you should use over-the-counter medicines to treat a yeast infection. Self-treatment may depend on whether:    You've had a yeast infection in the past.    You're at risk for STDs.  Call your healthcare provider if symptoms do not go away or come back after treatment.   Date Last Reviewed: 3/1/2017    [id number] [employer]. [address] [place] [address]. All rights reserved. This information is not intended as a substitute for professional medical care. Always follow your healthcare professional's instructions.               Subjective:       Bob Olvera is a 24 y.o. female here for evaluation of multiple complaints including ear pain, throat pain, headaches, and abdominal pain. Onset of abdominal pain started 1 week ago. Pain is located in the suprapubic region and is described as sharp and constant. Associated symptoms include vaginal itchiness and dysuria. Patient denies vaginal discharge, urinary frequency, and hematuria. Patients sore throat and right-sided ear pain started 3 days ago with gradual worsening sine then. Associated symptoms include chills, and headaches. Patient denies cough. No medications used for treatment. Patient has had previous surgery on right ear.    The following portions of the patient's history were reviewed and updated as appropriate: allergies, current medications and problem list.    Review of Systems  Pertinent items are noted in HPI.     Allergies  No Known Allergies      Objective:       BP 98/63 (Patient Site: Right Arm, Patient Position: Sitting, Cuff Size: Adult Regular)   Pulse 86   Temp 98.4  F (36.9  C) (Oral)   Resp 14   Wt 131 lb 3 oz (59.5 kg)   SpO2 96%   Breastfeeding? Yes   BMI 24.59 kg/m    General appearance: alert, appears stated age, cooperative, no distress and non-toxic  Head: Normocephalic, without obvious abnormality, atraumatic  Ears: right: TM intact with purulen fluid and bulging, no erythema. Left: TM intact with no fluid, bulging, or erythema. External ears normal.  Nose: no discharge  Throat: no tonsil sweling, erythema, or exudate; MMM, lips and tongue normal  Neck: mild anterior cervical adenopathy and supple, symmetrical, trachea midline  Lungs: clear to auscultation bilaterally and no rhonchi, rales, or wheezing  Heart: regular rate and  rhythm, S1, S2 normal, no murmur, click, rub or gallop  Abdomen: soft, non-tender; bowel sounds normal; no masses,  no organomegaly  Skin: Skin color, texture, turgor normal. No rashes or lesions     Lab Results    Recent Results (from the past 24 hour(s))   Wet Prep, Vaginal   Result Value Ref Range    Yeast Result Yeast Seen (!) No yeast seen    Trichomonas No Trichomonas seen No Trichomonas seen    Clue Cells, Wet Prep No Clue cells seen No Clue cells seen   Urinalysis-UC if Indicated   Result Value Ref Range    Color, UA Yellow Colorless, Yellow, Straw, Light Yellow    Clarity, UA Clear Clear    Glucose, UA Negative Negative    Bilirubin, UA Negative Negative    Ketones, UA Negative Negative    Specific Gravity, UA >=1.030 1.005 - 1.030    Blood, UA Trace (!) Negative    pH, UA 6.0 5.0 - 8.0    Protein, UA 30 mg/dL (!) Negative mg/dL    Urobilinogen, UA 1.0 E.U./dL 0.2 E.U./dL, 1.0 E.U./dL    Nitrite, UA Negative Negative    Leukocytes, UA Negative Negative    Bacteria, UA Few (!) None Seen hpf    RBC, UA 0-2 None Seen, 0-2 hpf    WBC, UA 0-5 None Seen, 0-5 hpf    Squam Epithel, UA 0-5 None Seen, 0-5 lpf    Mucus, UA Many (!) None Seen lpf       I personally reviewed these results and discussed findings with the patient.

## 2021-07-03 NOTE — ADDENDUM NOTE
Addendum Note by Yesi Thomason APRN, CNM at 7/31/2018 12:55 PM     Author: Yesi Thomason APRN, CNM Service: -- Author Type: Midwife    Filed: 7/31/2018 12:55 PM Encounter Date: 7/31/2018 Status: Signed    : Yesi Thomason APRN, CNM (Midwife)    Addended by: YESI THOMASON on: 7/31/2018 12:55 PM        Modules accepted: Orders

## 2021-07-04 ENCOUNTER — DOCUMENTATION ONLY (OUTPATIENT)
Dept: ADMINISTRATIVE | Facility: OTHER | Age: 26
End: 2021-07-04

## 2021-07-05 NOTE — PROGRESS NOTES
This encounter was created as part of manual pregnancy episode data conversion for the single EHR project. The following information (where applicable) was manually abstracted from the [facility] Epic instance on July 4, 2021: pregnancy episode name/date, dating, episode encounter linking, pregravid weight, number of fetuses, and pregnancy overview and plan.     Antonella Mcclelland RN   Clinical Informatics

## 2021-07-06 VITALS
HEIGHT: 61 IN | SYSTOLIC BLOOD PRESSURE: 104 MMHG | HEART RATE: 76 BPM | DIASTOLIC BLOOD PRESSURE: 60 MMHG | WEIGHT: 140 LBS | BODY MASS INDEX: 26.43 KG/M2

## 2021-07-14 PROBLEM — O99.013 ANEMIA DURING PREGNANCY IN THIRD TRIMESTER: Status: RESOLVED | Noted: 2018-03-28 | Resolved: 2018-05-30

## 2021-07-14 PROBLEM — O99.019 ANEMIA DURING PREGNANCY: Status: RESOLVED | Noted: 2020-04-01 | Resolved: 2020-07-09

## 2021-07-14 PROBLEM — Z34.80 ENCOUNTER FOR SUPERVISION OF OTHER NORMAL PREGNANCY: Status: RESOLVED | Noted: 2017-12-15 | Resolved: 2018-06-14

## 2021-07-14 PROBLEM — Z34.90 PREGNANT: Status: RESOLVED | Noted: 2020-05-30 | Resolved: 2020-05-30

## 2021-07-14 PROBLEM — Z34.80 SUPERVISION OF OTHER NORMAL PREGNANCY, ANTEPARTUM: Status: RESOLVED | Noted: 2019-11-26 | Resolved: 2020-07-09

## 2021-07-14 PROBLEM — Z30.09 ENCOUNTER FOR CONTRACEPTIVE PLANNING: Status: RESOLVED | Noted: 2020-06-01 | Resolved: 2020-09-18

## 2021-07-14 PROBLEM — O24.410 DIET CONTROLLED GESTATIONAL DIABETES MELLITUS (GDM) IN THIRD TRIMESTER: Status: RESOLVED | Noted: 2018-04-04 | Resolved: 2018-06-14

## 2021-07-14 PROBLEM — O99.713: Status: RESOLVED | Noted: 2020-02-19 | Resolved: 2020-07-09

## 2021-07-14 PROBLEM — R10.2 POSTPARTUM PERINEAL PAIN: Status: RESOLVED | Noted: 2018-07-31 | Resolved: 2019-10-07

## 2021-07-14 PROBLEM — L81.1: Status: RESOLVED | Noted: 2020-02-19 | Resolved: 2020-07-09

## 2021-07-14 PROBLEM — Z34.90 PREGNANT: Status: RESOLVED | Noted: 2018-06-13 | Resolved: 2018-06-14

## 2021-07-14 PROBLEM — O28.3 ABNORMAL ANTENATAL ULTRASOUND: Status: RESOLVED | Noted: 2018-02-07 | Resolved: 2018-06-14

## 2021-08-09 PROBLEM — Z87.59 HISTORY OF THIRD DEGREE PERINEAL LACERATION: Status: ACTIVE | Noted: 2018-06-13

## 2021-08-09 PROBLEM — O99.019 ANEMIA AFFECTING PREGNANCY: Status: ACTIVE | Noted: 2021-04-22

## 2021-08-09 NOTE — PROGRESS NOTES
"28 week OB  S: Feels well.  Noting occasional ctx with back pain.  Discussed PTL and when to call.  Has tried iron orally, but unable to tolerate stomach upset and heartburn.  She eats mainly rice, pasta, cultural bread, meats, milk, kale and broccoli, and beets.  Baby active.  Denies uterine cramping, vaginal bleeding or leaking of fluid.  She is interested in long-term contraception, but is unsure whether she wants to use hormones.  Had a fairly long discussion about IUDs, and other hormonal methods along with their side effects.  Written information was given for LARCs and depo per her request.  O: Vitals: BP 96/42 (BP Location: Right arm, Patient Position: Sitting, Cuff Size: Adult Regular)   Pulse 68   Ht 1.556 m (5' 1.25\")   Wt 66.7 kg (147 lb)   LMP 01/17/2021   Breastfeeding No   BMI 27.55 kg/m    BMI= Body mass index is 27.55 kg/m .  Exam:  Constitutional: healthy, alert and no distress  Respiratory: respirations even and unlabored  Gastrointestinal: Abdomen soft, non-tender. Fundus measures appropriate for gestational age. Fetal heart tones hear without difficulty and within normal limits  : Deferred  Psychiatric: mentation appears normal and affect normal/bright  A:     ICD-10-CM    1. Encounter for supervision of other normal pregnancy, first trimester  Z34.81 Glucose tolerance gest screen 1 hour     Treponema Abs w Reflex to RPR and Titer     Hemoglobin   2. Anemia affecting pregnancy in third trimester  O99.013 Ferritin     Iron and iron binding capacity     Iron and iron binding capacity     Ferritin     P: GCT/repeat hgb and RPR today, handout provided.  Tdap given; reviewed CDC recommendations and partner/family vaccination recommended as well.  Need for Rhogam? No.   Discussed patient options for postpartum contraception. Patient is planning LARC or depo likely  Encouraged patient to call with any questions or concerns.  Return to clinic 4 weeks    Leona Dietz CNM "

## 2021-08-11 ENCOUNTER — PRENATAL OFFICE VISIT (OUTPATIENT)
Dept: MIDWIFE SERVICES | Facility: CLINIC | Age: 26
End: 2021-08-11
Payer: COMMERCIAL

## 2021-08-11 VITALS
HEIGHT: 61 IN | HEART RATE: 68 BPM | BODY MASS INDEX: 27.75 KG/M2 | WEIGHT: 147 LBS | SYSTOLIC BLOOD PRESSURE: 96 MMHG | DIASTOLIC BLOOD PRESSURE: 42 MMHG

## 2021-08-11 DIAGNOSIS — Z34.81 ENCOUNTER FOR SUPERVISION OF OTHER NORMAL PREGNANCY, FIRST TRIMESTER: Primary | ICD-10-CM

## 2021-08-11 DIAGNOSIS — O99.013 ANEMIA AFFECTING PREGNANCY IN THIRD TRIMESTER: ICD-10-CM

## 2021-08-11 LAB
FERRITIN SERPL-MCNC: 3 NG/ML (ref 10–130)
GLUCOSE 1H P 50 G GLC PO SERPL-MCNC: 132 MG/DL (ref 70–129)
HGB BLD-MCNC: 8.7 G/DL (ref 11.7–15.7)
IRON SATN MFR SERPL: 5 % (ref 20–50)
IRON SERPL-MCNC: 26 UG/DL (ref 42–175)
TIBC SERPL-MCNC: 518 UG/DL (ref 313–563)
TRANSFERRIN SERPL-MCNC: 414 MG/DL (ref 212–360)

## 2021-08-11 PROCEDURE — 90715 TDAP VACCINE 7 YRS/> IM: CPT | Performed by: ADVANCED PRACTICE MIDWIFE

## 2021-08-11 PROCEDURE — 82952 GTT-ADDED SAMPLES: CPT | Performed by: ADVANCED PRACTICE MIDWIFE

## 2021-08-11 PROCEDURE — 84466 ASSAY OF TRANSFERRIN: CPT | Performed by: ADVANCED PRACTICE MIDWIFE

## 2021-08-11 PROCEDURE — 86780 TREPONEMA PALLIDUM: CPT | Performed by: ADVANCED PRACTICE MIDWIFE

## 2021-08-11 PROCEDURE — 90471 IMMUNIZATION ADMIN: CPT | Performed by: ADVANCED PRACTICE MIDWIFE

## 2021-08-11 PROCEDURE — 36415 COLL VENOUS BLD VENIPUNCTURE: CPT | Performed by: ADVANCED PRACTICE MIDWIFE

## 2021-08-11 PROCEDURE — 85018 HEMOGLOBIN: CPT | Performed by: ADVANCED PRACTICE MIDWIFE

## 2021-08-11 PROCEDURE — 82728 ASSAY OF FERRITIN: CPT | Performed by: ADVANCED PRACTICE MIDWIFE

## 2021-08-11 PROCEDURE — 99207 PR PRENATAL VISIT: CPT | Performed by: ADVANCED PRACTICE MIDWIFE

## 2021-08-11 PROCEDURE — 83540 ASSAY OF IRON: CPT | Performed by: ADVANCED PRACTICE MIDWIFE

## 2021-08-11 RX ORDER — DIPHENHYDRAMINE HYDROCHLORIDE 50 MG/ML
50 INJECTION INTRAMUSCULAR; INTRAVENOUS
Status: CANCELLED
Start: 2021-08-11

## 2021-08-11 RX ORDER — ALBUTEROL SULFATE 90 UG/1
1-2 AEROSOL, METERED RESPIRATORY (INHALATION)
Status: CANCELLED
Start: 2021-08-11

## 2021-08-11 RX ORDER — EPINEPHRINE 1 MG/ML
0.3 INJECTION, SOLUTION, CONCENTRATE INTRAVENOUS EVERY 5 MIN PRN
Status: CANCELLED | OUTPATIENT
Start: 2021-08-11

## 2021-08-11 RX ORDER — METHYLPREDNISOLONE SODIUM SUCCINATE 125 MG/2ML
125 INJECTION, POWDER, LYOPHILIZED, FOR SOLUTION INTRAMUSCULAR; INTRAVENOUS
Status: CANCELLED
Start: 2021-08-11

## 2021-08-11 RX ORDER — ALBUTEROL SULFATE 0.83 MG/ML
2.5 SOLUTION RESPIRATORY (INHALATION)
Status: CANCELLED | OUTPATIENT
Start: 2021-08-11

## 2021-08-11 RX ORDER — NALOXONE HYDROCHLORIDE 0.4 MG/ML
0.2 INJECTION, SOLUTION INTRAMUSCULAR; INTRAVENOUS; SUBCUTANEOUS
Status: CANCELLED | OUTPATIENT
Start: 2021-08-11

## 2021-08-11 RX ORDER — MEPERIDINE HYDROCHLORIDE 25 MG/ML
25 INJECTION INTRAMUSCULAR; INTRAVENOUS; SUBCUTANEOUS EVERY 30 MIN PRN
Status: CANCELLED | OUTPATIENT
Start: 2021-08-11

## 2021-08-11 ASSESSMENT — MIFFLIN-ST. JEOR: SCORE: 1343.13

## 2021-08-11 NOTE — PATIENT INSTRUCTIONS
[facility] Nurse Midwives [facility]  Contact information:  Appointment line and to get a hold of CNM in clinic Monday-Friday 8 am - 5 pm:  [phone].  There are some clinics with early start times (1st appointment 7:40 am) and others with evening hours (last appointment 6:20 pm).  Most are typically open from 8 am to 5 pm.    CNM on call answering service: [phone].  Specify your hospital of choice and leave a brief message for CNM;  will then page CNM who is on call at your specified hospital and you should receive a call back with 15 minutes.  Be sure that your ringer is audible and that you can accept blocked calls so that we can get back in touch with you! This number should be reserved for urgent needs if during the day, before 8 am, after 5 pm, weekends, holidays.    Contact the on-call CNM with warning signs, such as:    vaginal bleeding     Vaginal discharge and itching or pain and burning during urination    Leg/calf pain or swelling on one side    severe abdominal pain    nausea and vomiting more than 4-5 times a day, or if you are unable to keep anything down    fever more than 100.4 degrees F.      You are invited to  Meet the [facility] Nurse Midwives [facility]    A way to tour the hospital Labor and Delivery unit and meet the midwives in our group was postponed at the start of hospital restrictions following COVID-19. We will resume these when able and virtual options may be available in the future.     Please call [phone] for ongoing updates.        Touring the Maternity Care Center  At this time we are offering a virtual tour of the Maternity Care Centers at both [facility] and [facility] [place] [facility]:   [facility]: [email or web address]://[email or web address]/Locations/Acqmpveqp-Zsnpiv-Uudsna/Maternity-Care-Center  [facility]:    https://[employer].org/overarching-care/the-birthplace/tours  https://www.[employer].org/Locations/[facility]/Maternity-Care-Center/#virtual_tour  When in person tours become available, registrations is required. To schedule a tour at either [place] or [facility], please do so online using the following links:  [facility] - [email or web address] Access [email or web address]/[email or web address]&[email or web address]&[email or web address]  [facility] - [email or web address]&[email or web address]       DAILY FETAL MOVEMENT COUNTING    One of the best ways to keep track of a healthy baby is to notice its movements. Healthy babies are very active. However, some perfectly normal babies may sleep quietly as long as 60 minutes without moving. Babies who are having problems are sluggish and move less. Counting these movements can provide your nurse-midwife with a warning of developing problems.    You should begin this counting at the around your 7th to 8th month of your pregnancy (28-32 weeks) if you are ever concerned that there is less movement than normal for your baby.     INSTRUCTIONS    1.  If able, drink 2 glasses of fluid and lie down on one side.  Put your hand on your abdomen.  2. Count 10 separate times that the baby moves. A movement may be a kick, turn, or flip of the baby.  3.  Record the time you feel the 10th movement. When you count the 10th movement, stop counting.  4.  If one hour passes with less than 10 movements, drink a large glass of fruit juice. Then count for the another hour. If you do not reach 10 movements, call the midwives    REMEMBER      The baby may move all 10 times in an hour or less.    The baby may take up to five hours to move 10 times.    The important thing is to know what is normal for your baby so you can tell your nurse-midwife when something different is happening.    CALL THE NURSE-MIDWIFE  IF:      You do not feel 10 movements in five hours.    You have not felt the baby move all day.    It is taking longer and longer each day to get the 10th movement.      Pregnancy: Your Third Trimester Changes  How You Are Changing  Your body is preparing for the birth of your baby. Some of the most common changes are listed below. If you have any questions or concerns, ask your midwife.    You'll gain more weight from fluids, extra blood, and fat deposits. Your baby will gain an average of an ounce per day (a half a pound a week)!    Your breasts will grow as your body gets ready to feed the baby. They may be more tender. You may also notice a slight yellow or white discharge from the nipples.    Discharge from your vagina may increase. This is normal.    You might see some skin color changes on your forehead, cheeks, or nose. Most of these will go away after you deliver.  How Your Baby Is Growing    Month 7  Your baby is about 14 inches long. If born prematurely (too early), your baby would likely survive with special care.   Month 8  Your baby is building up body fat. Baby kicks strongly, but is getting too big to move around much.   Month 9  Your baby weighs nearly 7 pounds and is about 18-20 inches long. In other words, any day now...       UNDERSTANDING  LABOR    Going into labor before your 37th week of pregnancy is called  labor.  labor can cause your baby to be born too soon. This can lead to a number of health problems that may affect your baby. From 28-35 weeks, Patients are advised to be evaluated at [facility] since they have a  Intensive Care Unit (NICU).  -Before labor, the cervix is thick and closed.  -In  labor, the cervix begins to efface (thin) and dilate (open) over a short period of time    Are You At Risk?  Any pregnant woman can have  labor. It may start for no reason. But these risk factors can increase your chances:    Past   labor or past early birth    Smoking and drug or alcohol use during pregnancy    Multiple fetuses (twins or more)    Problems with the shape of the uterus    Bleeding during the pregnancy    The Dangers of  Birth  A baby born too soon may have health problems. This is because the baby didn't have enough time to mature. The baby then is at risk of:    Not breastfeeding well    Having immature lungs    Bleeding in the brain    Dying    Reaching Term  Your goal is to get as close to term as you can before giving birth. The closer you get to term, the higher your chance of having a healthy baby. Work with your healthcare provider. Together, you can take steps that may keep you from giving birth too early.    Symptoms of  Labor  If you believe you're having  labor, contact the midwives right away. But contractions alone don't mean you're in  labor. What matters more are changes in your cervix (the lower end of the uterus).   Symptoms of  labor include:    five or more contractions per hour    Strong & frequent contractions    Constant menstrual-like cramping    Low-back pain        Mucous or bloody vaginal discharge    Bleeding or spotting in the second or third trimester    Evaluating  Labor  Your midwife will try to find out whether you're in  labor or whether you're just having contractions.She may watch you for a few hours. The following tests may be done:    Pelvic exam to see if your cervix has effaced (thinned) and dilated (opened)    Uterine activity monitoring to detect contractions    Fetal monitoring to check the health of your baby    Ultrasound to check your baby's size and position    Caring for Yourself At Home  If you have contractions  but your cervix is still thick and closed, the midwife may ask you to do the following at home:    Drink plenty of water.    Do fewer activities.    Rest in bed on your side.    Avoid intercourse and nipple  stimulation.    When to Call Your Midwife    Five or more contractions per hour    Bag of water breaks    Bleeding or spotting     If You Need Hospital Care   labor often requires that you have hospital care and complete bed rest. You may have an IV (intravenous) line to get fluids. And you may be given pills or an injection to help prevent contractions. Finally, you may receive medication (corticosteroids) that helps your baby's lungs mature more quickly.    Syphilis Screening:   The [place] Department of Health ([facility]) provided new recommendations for screening during pregnancy. If you are pregnant, [facility] recommends testing three times during your pregnancy: at your first visit, 28 weeks, and after delivery.   Syphilis is:     Caused by a bacteria spread by sexual contact    Rising among [place] women of child-bearing age  Syphilis can:     Be passed on to infants during pregnancy or during delivery and can be life threatening    Be cured. There are ways to protect yourself and your babies.        HEALTHY PREGNANCY CARE: 26-30 WEEKS PREGNANT    You are now in your last trimester of pregnancy. Your baby is growing rapidly, can open and close her eyelids, sometimes get hiccups, and you'll probably feel her moving around more often. Your baby has breathing movements and is gaining about one ounce each day. You may notice heartburn and leg cramps. Your back may ache as your body gets used to your baby's size and length.    Discuss your work situation with your midwife or physician as needed. If you stand for long periods of time, you may need to make changes and take breaks.    Pre-register after 30 weeks online at the hospital where your baby will be born [email or web address]      Be aware of your baby's activity level. You may be asked to do daily fetal movement counts. Contact your midwife or physician about any decreased movement.    You may have been tested for gestational  diabetes today. If you are RH negative, you may have had an additional test and a Rhogam injection.    Consider receiving a Tdap vaccination to protect your baby from Pertussis/whooping cough.    Baby Feeding in the Hospital: Information, Support and Resources    As you prepare for the birth of your child, you will want to consider options for feeding your baby including breast-feeding and/or baby formula. The American Academy of Pediatrics recommends exclusive breast-feeding for the first six months (although any amount of breast-feeding is beneficial).  However, we also understand that breast-feeding is a personal choice and not for everyone. Whether or not you choose to breast-feed, your decision will be respected by our staff.    There are numerous benefits of breast-feeding; here are a few to consider:    Provides antibodies to protect your baby from infections and diseases    The cost: formula can cost over $1,500 per year    Convenience, no warming up or sterilizing bottles and supplies    The physical contact with breastfeeding can make babies feel secure, warm and comforted    What ever my feeding choice, what can I expect after I deliver my baby?    Your baby will usually be placed skin-to-skin immediately following birth. The skin to skin contact between you and your baby will be a special and memorable time. The bonding and attachment comforts your baby and has a positive effect on baby's brain development.     Having your baby  room in  with you also helps you start to learn your baby's body rhythms and sleep cycle.      You will also begin to learn your baby's cues (signals) that he or she is ready to feed.    When do I start to feed my baby?  As soon as possible after your baby's birth, you will be encouraged to begin feeding.  In the first couple of weeks, your baby will eat often.  Breastfeeding babies usually eat at least 8 times in 24 hours.  Babies fed formula usually eat at least 7 times in 24  hours.      Breast-feeding tips:    Get comfortable and use pillows for support.    Have your baby at the level of your breast, facing you,  tummy to tummy .      Touch your nipple to your baby's lips so you baby's mouth opens wide (rooting reflex).  Aim the nipple toward the roof of your baby's mouth. When your baby opens his or her mouth, pull your baby toward your breast to help your baby  latch on  to your nipple and much of the areola area.    Hand expressing your breast milk can assist with latching your baby to your breast, if needed.    Ask for help, breastfeeding may seem awkward or uncomfortable at first, this is normal. There are numerous resources available at [facility], Clinics and beyond.     If your goal is to exclusively breastfeed, avoid using any formula or artificial nipples (including bottles and pacifiers) while you are your baby are learning to breastfeed unless there is a medical reason.       Mixing breastfeeding and formula can interfere with how you begin building your milk supply.  It can impact how you and your baby  learn  to breastfeeding together and alter the natural growth of  good  bacteria in your baby's stomach.    Delay a pacifier or a bottle in the first few weeks until breastfeeding is well established. This is often around 3 weeks of age.    Ask your nurse to show you how to hand express.   Breast milk can be kept in the refrigerator or freezer for later use.    Hospital and Clinic  Resources:  -Schedule an appointment with a [facility] CNM who is also a Lactation Consultant by calling [phone]     -Schedule a clinic appointment with a [facility] CNM with dedicated clinic hours for breastfeeding assistance by calling [phone]. Breastfeeding clinic visits are at [facility] on Mondays, [facility] on Tuesdays and [facility] on Thursdays.     -Baby Café    Pregnant and interested in breastfeeding?  Need answers to breastfeeding questions?  Want  to help breastfeeding moms?  Already breastfeeding and want to meet other moms?    Join us at the Baby Café!    Baby Cafe is a free, drop-in service offering breast-feeding support for pregnant women, breast-feeding mothers and their families.  Come share tips and socialize with other mothers.  Babies and siblings are welcome (no childcare available).    Starting April 2018, Baby Café will be at 4 locations.  Please see below for the Baby Café closest to you!    [facility]  [address] [place] [address]  1st Wednesday: 10am-12pm    [facility]  [address] [place] [address]  3rd Wednesday 4-6pm    [facility]  [address] [place] [address]  4th Wednesday 10am-12:30pm    [facility]  [address] [place] [address]  4th Wednesdays: 4-6pm      Hmong, Turkish, and Afghan which is may be available at some sites.    For more information, please contact: [email or web address] [email or web address]. or [phone]    -Attend a baby weigh in at [facility].  Lactation consultants are available to answer questions  [facility]: Tuesdays 1:00 - 2:00  [facility]: Mondays 1:00 - 2:00  www.[facility] HospitalPrintechnologics.GoodThreads    -Attend one of the New Mama groups at [facility] in [facility].  [facility] also offers one-on-one in home and in office lactation consults.   www.[facility].[place] [employer]    -Attend a Dash Medrano meeting.  Multiple groups in several locations throughout the [facility]. The meetings are no-cost and always informative breastfeeding education session through [employer] La Leche League  Www.home.org/  Medication use while breastfeeding: http://toxnet.nlm.nih.gov/newtoxnet/lactmed.htm     Online Resources:    [facility].org/baby sign up for free online weekly e-mail    [facility]ast.org/maternity    Breastfeedingmadesimple.com    Llli.org (La Leche  Marcela)    Normalfed.com    Womenshealth.gov/breastfeeding    Workandpump.com    Breast-feeding Supplies & Pumps:  Talk to your insurance provider or WIC (Women, Infants and Children) to learn more about options available to you. Recent health insurance changes may include additional coverage for supplies and pumps.    Public Health:  Women, Infants and Children Nutrition program (WIC): provides breast-feeding support and education in addition to formal feeding moms. 478-OTT-5000 or http://www.health.[facility].us/divs/fh/wic    Family Health Home Visiting: [facility] Nurse home visits are available. Talk to your provider to see if you qualify. Most [facility] have a program available.    Additional Resources:  La Leche League is an international, nonprofit, nonsectarian organization offering information, education, and support to mothers who want to breast-feed their babies. Local groups offer phone help and monthly meetings. Visit [employer].DCITS or MobAppCreator.DCITS and us the  Find local support  drop down menu or click on the  Resources  tab.    [place] Breastfeeding Resources: 3-123-033-BABY (4145) toll free    National Breastfeeding Help Line trained breastfeeding peer counselors can help answer common breast-feeding questions by phone. Monday-Friday: English/Monegasque  6-037- [phone] toll free, 1-886.326.1545 (TTY)    [facility]: [phone] ([phone])      Resources:    Childbirth and Parenting Education:   [facility]: http://[facility] Center West BranchApto.CloudSwitch/   ([phone]-VHUA  [place]: (education, yoga & wellness) www.UmBio.CloudSwitch  Enlightened Mama: www.enlightenedmama.com   Childbirth collective: (Parent topic nights)  www.childbirthcollective.org/  Hypnobabies:  www.hypnobabiestEmbrace.com/  Hypnobirthing:  Http://hypnobirthing.com/  The Birth Hour: https://Vita Sound.CloudSwitch/online-childbirth-class/    APPS and Podcasts:   Ovia  Glow Nurture  The Birth Hour (for birth stories)   Birthful   Expectful    The Longest Shortest Time  PregnancyPodcast Felicia Nails    Book Recommendations:   Yudith Brady's Birthing From Within--first few chapters include a new-age tone, you may prefer to skip it and keep going, because there is good stuff later.  This book recommendation covers emotional preparation, but does cover coping with pain, and use of both pharmacological and nonpharmacological methods.    Dr. Rivera' The Pregnancy Book and The Birth Book--the pregnancy book goes month-by month    Womanly Art of Breastfeeding by La Leche League International   Bestfeeding by Alma Sierra--great pictures    Mothering Your Nursing Toddler, by Sandra Peralta.   Addresses dealing with so many of the challenging behaviors of a nursing toddler.  How Weaning Happens, by La Leche League.  Discusses weaning at all ages, from medically necessary weaning of an infant, all the way up to age 5 (or older), with why/why not, and strategies.  Very empowering book both for deciding to wean and deciding not to.    American College of Nurse-Midwives (ACNM) http://www.midwife.org/; look at the informational handouts at http://www.midwife.org/Share-With-Women     www.mymidwife.org    Mother to Baby (Medication and Herbal guidance in pregnancy): http://www.mothertobaby.org  Toll-Free Hotline: 977.441.6274  LactMed (Medication use while breastfeeding): http://toxnet.nlm.nih.gov/newtoxnet/lactmed.htm    Women's Health.gov:  http://www.womenshealth.gov/a-z-topics/index.html    American pregnancy association - http://americanpregnancy.org    Centering Pregnancy (group prenatal care option): http://centeringhealthcare.org    Information about doulas:  Childbirth collective: http://www.childbirthcollective.org/  Doulas of [place] (MELISSA):  www.melissa.org  [place]  project: http://twincitiesdoulaproject.com/     Early Childhood and Family Education (ECFE):  ECFE offers parents hands-on learning experiences that will nourish a lifetime of  "teachable moments.  http://ecfe.info/ecfe-home/    March of Dimes www.Vyu.Agilence     FDA - Nutrition  www.mypyramid.gov  Under \"For Consumers,\" click on \"pregnant and breastfeeding women.\"      Centers for Disease Control and Prevention (CDC) - Vaccines : http://www.cdc.gov/vaccines/       When researching information on the web, question the validity of websites.  The Voz.io .gov, [employer] andTriggerMailorg tend to be more reliable information.  If there are a lot of advertisements, be cautious of the information provided. Stay away from blogs and chat rooms please!             Virtual Breastfeeding Support:    During this time of isolation, breastfeeding families need even more community!  Here are some area organizations offering virtual support groups for breastfeeding:      Latch Cafe Support Group, Tuesdays at 10:30 am   Run by NICOLASA Mcginnis of [employer] [facility] Lactation [facility]   Go to [employer]ispe[employer]act[employer] Facebook page and click on \"events\" for link   https://www.Currently.com/events/[email or web address]/    [facility] Milk Hour, Thursdays at 2:30 pm    Run by NICOLASA Hope   Go to [facility] Birth [place] [facility] FB page and send message to get link   https://www.Currently.com/healthfoundations/    Fredo Medrano [facility] holding virtual meetings the first Tuesday of each month, 8-9 pm, and the   Third Saturday, 10 - 11 am.  Go to La Leche League [facility] FB page; message to get link [email or web address].Currently.Agilence/LLLofGoldenValcynthia/?hc_location=[facility]    [facility] offers a Lactation Lounge every Friday 12pm - 1pm, run by Fredo Cesar Leader   Sign up via link at eCozy/cbe-lactation   https://www.eCozy/cbe-lactation    [facility] is offering virtual support groups every Monday, 10:30 am - 12 pm, run by nurse " IBCLC   Https://www.Freebee.[email or web address]/events/[email or web address]/    Prenatal Breastfeeding Classes:        [employer] is offering virtual breastfeeding and  care classes:  https://www.[employer]./education-workshops    BirthEd childbirth and breastfeeding education offering virtual prenatal breastfeeding classes  https://www.Change Collectivemn.Circlefive/workshops

## 2021-08-11 NOTE — NURSING NOTE
Prior to immunization administration, verified patients identity using patient s name and date of birth. Please see Immunization Activity for additional information.     Screening Questionnaire for Adult Immunization    Are you sick today?   No   Do you have allergies to medications, food, a vaccine component or latex?   No   Have you ever had a serious reaction after receiving a vaccination?   No   Do you have a long-term health problem with heart, lung, kidney, or metabolic disease (e.g., diabetes), asthma, a blood disorder, no spleen, complement component deficiency, a cochlear implant, or a spinal fluid leak?  Are you on long-term aspirin therapy?   No   Do you have cancer, leukemia, HIV/AIDS, or any other immune system problem?   No   Do you have a parent, brother, or sister with an immune system problem?   No   In the past 3 months, have you taken medications that affect  your immune system, such as prednisone, other steroids, or anticancer drugs; drugs for the treatment of rheumatoid arthritis, Crohn s disease, or psoriasis; or have you had radiation treatments?   No   Have you had a seizure, or a brain or other nervous system problem?   No   During the past year, have you received a transfusion of blood or blood    products, or been given immune (gamma) globulin or antiviral drug?   No   For women: Are you pregnant or is there a chance you could become       pregnant during the next month?   Yes   Have you received any vaccinations in the past 4 weeks?   No     Immunization questionnaire was positive for at least one answer.  Patient Pregnant        Per orders of Leona Dietz CNM, injection of Tdap given by Vinicio Land CMA. Patient instructed to remain in clinic for 15 minutes afterwards, and to report any adverse reaction to me immediately.       Screening performed by Vinicio Land CMA on 8/11/2021 at 1:29 PM.

## 2021-08-12 LAB — T PALLIDUM AB SER QL: NEGATIVE

## 2021-08-15 ENCOUNTER — HEALTH MAINTENANCE LETTER (OUTPATIENT)
Age: 26
End: 2021-08-15

## 2021-08-19 ENCOUNTER — INFUSION THERAPY VISIT (OUTPATIENT)
Dept: INFUSION THERAPY | Facility: CLINIC | Age: 26
End: 2021-08-19
Attending: ADVANCED PRACTICE MIDWIFE
Payer: COMMERCIAL

## 2021-08-19 VITALS
SYSTOLIC BLOOD PRESSURE: 105 MMHG | HEART RATE: 81 BPM | RESPIRATION RATE: 16 BRPM | TEMPERATURE: 97.7 F | OXYGEN SATURATION: 98 % | DIASTOLIC BLOOD PRESSURE: 65 MMHG

## 2021-08-19 DIAGNOSIS — O99.013 ANEMIA AFFECTING PREGNANCY IN THIRD TRIMESTER: Primary | ICD-10-CM

## 2021-08-19 PROCEDURE — 250N000011 HC RX IP 250 OP 636: Performed by: ADVANCED PRACTICE MIDWIFE

## 2021-08-19 PROCEDURE — 258N000003 HC RX IP 258 OP 636: Performed by: ADVANCED PRACTICE MIDWIFE

## 2021-08-19 PROCEDURE — 96365 THER/PROPH/DIAG IV INF INIT: CPT

## 2021-08-19 RX ORDER — DIPHENHYDRAMINE HYDROCHLORIDE 50 MG/ML
50 INJECTION INTRAMUSCULAR; INTRAVENOUS
Status: CANCELLED
Start: 2021-08-21

## 2021-08-19 RX ORDER — METHYLPREDNISOLONE SODIUM SUCCINATE 125 MG/2ML
125 INJECTION, POWDER, LYOPHILIZED, FOR SOLUTION INTRAMUSCULAR; INTRAVENOUS
Status: CANCELLED
Start: 2021-08-21

## 2021-08-19 RX ORDER — NALOXONE HYDROCHLORIDE 0.4 MG/ML
0.2 INJECTION, SOLUTION INTRAMUSCULAR; INTRAVENOUS; SUBCUTANEOUS
Status: DISCONTINUED | OUTPATIENT
Start: 2021-08-19 | End: 2021-08-19 | Stop reason: HOSPADM

## 2021-08-19 RX ORDER — ALBUTEROL SULFATE 90 UG/1
1-2 AEROSOL, METERED RESPIRATORY (INHALATION)
Status: CANCELLED
Start: 2021-08-21

## 2021-08-19 RX ORDER — DIPHENHYDRAMINE HYDROCHLORIDE 50 MG/ML
50 INJECTION INTRAMUSCULAR; INTRAVENOUS
Status: DISCONTINUED | OUTPATIENT
Start: 2021-08-19 | End: 2021-08-19 | Stop reason: HOSPADM

## 2021-08-19 RX ORDER — METHYLPREDNISOLONE SODIUM SUCCINATE 125 MG/2ML
125 INJECTION, POWDER, LYOPHILIZED, FOR SOLUTION INTRAMUSCULAR; INTRAVENOUS
Status: DISCONTINUED | OUTPATIENT
Start: 2021-08-19 | End: 2021-08-19 | Stop reason: HOSPADM

## 2021-08-19 RX ORDER — ALBUTEROL SULFATE 0.83 MG/ML
2.5 SOLUTION RESPIRATORY (INHALATION)
Status: CANCELLED | OUTPATIENT
Start: 2021-08-21

## 2021-08-19 RX ORDER — ALBUTEROL SULFATE 0.83 MG/ML
2.5 SOLUTION RESPIRATORY (INHALATION)
Status: DISCONTINUED | OUTPATIENT
Start: 2021-08-19 | End: 2021-08-19 | Stop reason: HOSPADM

## 2021-08-19 RX ORDER — NALOXONE HYDROCHLORIDE 0.4 MG/ML
0.2 INJECTION, SOLUTION INTRAMUSCULAR; INTRAVENOUS; SUBCUTANEOUS
Status: CANCELLED | OUTPATIENT
Start: 2021-08-21

## 2021-08-19 RX ORDER — ALBUTEROL SULFATE 90 UG/1
1-2 AEROSOL, METERED RESPIRATORY (INHALATION)
Status: DISCONTINUED | OUTPATIENT
Start: 2021-08-19 | End: 2021-08-19 | Stop reason: HOSPADM

## 2021-08-19 RX ORDER — EPINEPHRINE 1 MG/ML
0.3 INJECTION, SOLUTION INTRAMUSCULAR; SUBCUTANEOUS EVERY 5 MIN PRN
Status: DISCONTINUED | OUTPATIENT
Start: 2021-08-19 | End: 2021-08-19 | Stop reason: HOSPADM

## 2021-08-19 RX ORDER — EPINEPHRINE 1 MG/ML
0.3 INJECTION, SOLUTION INTRAMUSCULAR; SUBCUTANEOUS EVERY 5 MIN PRN
Status: CANCELLED | OUTPATIENT
Start: 2021-08-21

## 2021-08-19 RX ORDER — MEPERIDINE HYDROCHLORIDE 50 MG/ML
25 INJECTION INTRAMUSCULAR; INTRAVENOUS; SUBCUTANEOUS EVERY 30 MIN PRN
Status: CANCELLED | OUTPATIENT
Start: 2021-08-21

## 2021-08-19 RX ORDER — MEPERIDINE HYDROCHLORIDE 50 MG/ML
25 INJECTION INTRAMUSCULAR; INTRAVENOUS; SUBCUTANEOUS EVERY 30 MIN PRN
Status: DISCONTINUED | OUTPATIENT
Start: 2021-08-19 | End: 2021-08-19 | Stop reason: HOSPADM

## 2021-08-19 RX ADMIN — IRON SUCROSE 200 MG: 20 INJECTION, SOLUTION INTRAVENOUS at 13:21

## 2021-08-19 NOTE — PROGRESS NOTES
Infusion Nursing Note:  Bob Olvera presents today for  Iron infusion  Patient seen by provider today: No   present during visit today: Not Applicable.    Note:Education on venofer infusion provided with discharge instructions. Pt. Tolerated infusion well..      Intravenous Access:  Peripheral IV placed.    Treatment Conditions:  Not Applicable.      Post Infusion Assessment:  Patient tolerated infusion without incident.       Discharge Plan:   Patient and/or family verbalized understanding of discharge instructions and all questions answered.  Patient discharged in stable condition accompanied by: self.      Ale Angel RN

## 2021-08-20 ENCOUNTER — TELEPHONE (OUTPATIENT)
Facility: CLINIC | Age: 26
End: 2021-08-20

## 2021-08-20 NOTE — TELEPHONE ENCOUNTER
Questions answered. Plan to follow up at next routine prenatal appt. Pt was taking oral iron but it gave her heartburn so she stopped taking it. Discussed other options for iron supplementation.

## 2021-08-20 NOTE — TELEPHONE ENCOUNTER
Pt had her 1st iron infusion yesterday but she feels like she has questions about is this harmful to the baby? She would like to discuss these questions

## 2021-08-31 ENCOUNTER — INFUSION THERAPY VISIT (OUTPATIENT)
Dept: INFUSION THERAPY | Facility: CLINIC | Age: 26
End: 2021-08-31
Payer: COMMERCIAL

## 2021-08-31 VITALS
HEART RATE: 89 BPM | RESPIRATION RATE: 16 BRPM | SYSTOLIC BLOOD PRESSURE: 112 MMHG | TEMPERATURE: 97.9 F | DIASTOLIC BLOOD PRESSURE: 64 MMHG | OXYGEN SATURATION: 98 %

## 2021-08-31 DIAGNOSIS — O99.013 ANEMIA AFFECTING PREGNANCY IN THIRD TRIMESTER: Primary | ICD-10-CM

## 2021-08-31 PROCEDURE — 96365 THER/PROPH/DIAG IV INF INIT: CPT

## 2021-08-31 PROCEDURE — 258N000003 HC RX IP 258 OP 636: Performed by: ADVANCED PRACTICE MIDWIFE

## 2021-08-31 PROCEDURE — 250N000011 HC RX IP 250 OP 636: Performed by: ADVANCED PRACTICE MIDWIFE

## 2021-08-31 RX ORDER — NALOXONE HYDROCHLORIDE 0.4 MG/ML
0.2 INJECTION, SOLUTION INTRAMUSCULAR; INTRAVENOUS; SUBCUTANEOUS
Status: DISCONTINUED | OUTPATIENT
Start: 2021-08-31 | End: 2021-08-31 | Stop reason: HOSPADM

## 2021-08-31 RX ORDER — EPINEPHRINE 1 MG/ML
0.3 INJECTION, SOLUTION INTRAMUSCULAR; SUBCUTANEOUS EVERY 5 MIN PRN
Status: CANCELLED | OUTPATIENT
Start: 2021-09-02

## 2021-08-31 RX ORDER — DIPHENHYDRAMINE HYDROCHLORIDE 50 MG/ML
50 INJECTION INTRAMUSCULAR; INTRAVENOUS
Status: DISCONTINUED | OUTPATIENT
Start: 2021-08-31 | End: 2021-08-31 | Stop reason: HOSPADM

## 2021-08-31 RX ORDER — NALOXONE HYDROCHLORIDE 0.4 MG/ML
0.2 INJECTION, SOLUTION INTRAMUSCULAR; INTRAVENOUS; SUBCUTANEOUS
Status: CANCELLED | OUTPATIENT
Start: 2021-09-02

## 2021-08-31 RX ORDER — MEPERIDINE HYDROCHLORIDE 50 MG/ML
25 INJECTION INTRAMUSCULAR; INTRAVENOUS; SUBCUTANEOUS EVERY 30 MIN PRN
Status: CANCELLED | OUTPATIENT
Start: 2021-09-02

## 2021-08-31 RX ORDER — MEPERIDINE HYDROCHLORIDE 50 MG/ML
25 INJECTION INTRAMUSCULAR; INTRAVENOUS; SUBCUTANEOUS EVERY 30 MIN PRN
Status: DISCONTINUED | OUTPATIENT
Start: 2021-08-31 | End: 2021-08-31 | Stop reason: HOSPADM

## 2021-08-31 RX ORDER — METHYLPREDNISOLONE SODIUM SUCCINATE 125 MG/2ML
125 INJECTION, POWDER, LYOPHILIZED, FOR SOLUTION INTRAMUSCULAR; INTRAVENOUS
Status: CANCELLED
Start: 2021-09-02

## 2021-08-31 RX ORDER — ALBUTEROL SULFATE 0.83 MG/ML
2.5 SOLUTION RESPIRATORY (INHALATION)
Status: DISCONTINUED | OUTPATIENT
Start: 2021-08-31 | End: 2021-08-31 | Stop reason: HOSPADM

## 2021-08-31 RX ORDER — ALBUTEROL SULFATE 90 UG/1
1-2 AEROSOL, METERED RESPIRATORY (INHALATION)
Status: CANCELLED
Start: 2021-09-02

## 2021-08-31 RX ORDER — EPINEPHRINE 1 MG/ML
0.3 INJECTION, SOLUTION INTRAMUSCULAR; SUBCUTANEOUS EVERY 5 MIN PRN
Status: DISCONTINUED | OUTPATIENT
Start: 2021-08-31 | End: 2021-08-31 | Stop reason: HOSPADM

## 2021-08-31 RX ORDER — ALBUTEROL SULFATE 90 UG/1
1-2 AEROSOL, METERED RESPIRATORY (INHALATION)
Status: DISCONTINUED | OUTPATIENT
Start: 2021-08-31 | End: 2021-08-31 | Stop reason: HOSPADM

## 2021-08-31 RX ORDER — ALBUTEROL SULFATE 0.83 MG/ML
2.5 SOLUTION RESPIRATORY (INHALATION)
Status: CANCELLED | OUTPATIENT
Start: 2021-09-02

## 2021-08-31 RX ORDER — METHYLPREDNISOLONE SODIUM SUCCINATE 125 MG/2ML
125 INJECTION, POWDER, LYOPHILIZED, FOR SOLUTION INTRAMUSCULAR; INTRAVENOUS
Status: DISCONTINUED | OUTPATIENT
Start: 2021-08-31 | End: 2021-08-31 | Stop reason: HOSPADM

## 2021-08-31 RX ORDER — DIPHENHYDRAMINE HYDROCHLORIDE 50 MG/ML
50 INJECTION INTRAMUSCULAR; INTRAVENOUS
Status: CANCELLED
Start: 2021-09-02

## 2021-08-31 RX ADMIN — IRON SUCROSE 200 MG: 20 INJECTION, SOLUTION INTRAVENOUS at 13:43

## 2021-08-31 RX ADMIN — SODIUM CHLORIDE 250 ML: 9 INJECTION, SOLUTION INTRAVENOUS at 13:44

## 2021-08-31 NOTE — PROGRESS NOTES
Infusion Nursing Note:  Bob Olvera presents today for infusion    Patient seen by provider today: No   present during visit today: Not Applicable.    Note: Pt c/o of some burning of arm, IV rate slowed , warm blanket applied , and saline infused with ti.      Intravenous Access:  Peripheral IV placed.    Treatment Conditions:  Not Applicable.      Post Infusion Assessment:  Patient tolerated infusion        Discharge Plan:   Patient discharged in stable condition accompanied by: self.      Kavitha TINAJERO RN

## 2021-09-03 ENCOUNTER — INFUSION THERAPY VISIT (OUTPATIENT)
Dept: INFUSION THERAPY | Facility: CLINIC | Age: 26
End: 2021-09-03
Payer: COMMERCIAL

## 2021-09-03 ENCOUNTER — TELEPHONE (OUTPATIENT)
Dept: MIDWIFE SERVICES | Facility: CLINIC | Age: 26
End: 2021-09-03

## 2021-09-03 VITALS
RESPIRATION RATE: 16 BRPM | HEART RATE: 68 BPM | OXYGEN SATURATION: 98 % | TEMPERATURE: 97.8 F | DIASTOLIC BLOOD PRESSURE: 61 MMHG | SYSTOLIC BLOOD PRESSURE: 93 MMHG

## 2021-09-03 DIAGNOSIS — O99.013 ANEMIA AFFECTING PREGNANCY IN THIRD TRIMESTER: Primary | ICD-10-CM

## 2021-09-03 PROCEDURE — 96365 THER/PROPH/DIAG IV INF INIT: CPT

## 2021-09-03 PROCEDURE — 258N000003 HC RX IP 258 OP 636: Performed by: ADVANCED PRACTICE MIDWIFE

## 2021-09-03 PROCEDURE — 250N000011 HC RX IP 250 OP 636: Performed by: ADVANCED PRACTICE MIDWIFE

## 2021-09-03 RX ORDER — ALBUTEROL SULFATE 0.83 MG/ML
2.5 SOLUTION RESPIRATORY (INHALATION)
Status: DISCONTINUED | OUTPATIENT
Start: 2021-09-03 | End: 2021-09-03 | Stop reason: HOSPADM

## 2021-09-03 RX ORDER — MEPERIDINE HYDROCHLORIDE 50 MG/ML
25 INJECTION INTRAMUSCULAR; INTRAVENOUS; SUBCUTANEOUS EVERY 30 MIN PRN
Status: CANCELLED | OUTPATIENT
Start: 2021-09-04

## 2021-09-03 RX ORDER — ALBUTEROL SULFATE 0.83 MG/ML
2.5 SOLUTION RESPIRATORY (INHALATION)
Status: CANCELLED | OUTPATIENT
Start: 2021-09-04

## 2021-09-03 RX ORDER — MEPERIDINE HYDROCHLORIDE 50 MG/ML
25 INJECTION INTRAMUSCULAR; INTRAVENOUS; SUBCUTANEOUS EVERY 30 MIN PRN
Status: DISCONTINUED | OUTPATIENT
Start: 2021-09-03 | End: 2021-09-03 | Stop reason: HOSPADM

## 2021-09-03 RX ORDER — ALBUTEROL SULFATE 90 UG/1
1-2 AEROSOL, METERED RESPIRATORY (INHALATION)
Status: CANCELLED
Start: 2021-09-04

## 2021-09-03 RX ORDER — EPINEPHRINE 1 MG/ML
0.3 INJECTION, SOLUTION INTRAMUSCULAR; SUBCUTANEOUS EVERY 5 MIN PRN
Status: DISCONTINUED | OUTPATIENT
Start: 2021-09-03 | End: 2021-09-03 | Stop reason: HOSPADM

## 2021-09-03 RX ORDER — EPINEPHRINE 1 MG/ML
0.3 INJECTION, SOLUTION INTRAMUSCULAR; SUBCUTANEOUS EVERY 5 MIN PRN
Status: CANCELLED | OUTPATIENT
Start: 2021-09-04

## 2021-09-03 RX ORDER — NALOXONE HYDROCHLORIDE 0.4 MG/ML
0.2 INJECTION, SOLUTION INTRAMUSCULAR; INTRAVENOUS; SUBCUTANEOUS
Status: DISCONTINUED | OUTPATIENT
Start: 2021-09-03 | End: 2021-09-03 | Stop reason: HOSPADM

## 2021-09-03 RX ORDER — DIPHENHYDRAMINE HYDROCHLORIDE 50 MG/ML
50 INJECTION INTRAMUSCULAR; INTRAVENOUS
Status: DISCONTINUED | OUTPATIENT
Start: 2021-09-03 | End: 2021-09-03 | Stop reason: HOSPADM

## 2021-09-03 RX ORDER — METHYLPREDNISOLONE SODIUM SUCCINATE 125 MG/2ML
125 INJECTION, POWDER, LYOPHILIZED, FOR SOLUTION INTRAMUSCULAR; INTRAVENOUS
Status: DISCONTINUED | OUTPATIENT
Start: 2021-09-03 | End: 2021-09-03 | Stop reason: HOSPADM

## 2021-09-03 RX ORDER — NALOXONE HYDROCHLORIDE 0.4 MG/ML
0.2 INJECTION, SOLUTION INTRAMUSCULAR; INTRAVENOUS; SUBCUTANEOUS
Status: CANCELLED | OUTPATIENT
Start: 2021-09-04

## 2021-09-03 RX ORDER — ALBUTEROL SULFATE 90 UG/1
1-2 AEROSOL, METERED RESPIRATORY (INHALATION)
Status: DISCONTINUED | OUTPATIENT
Start: 2021-09-03 | End: 2021-09-03 | Stop reason: HOSPADM

## 2021-09-03 RX ORDER — METHYLPREDNISOLONE SODIUM SUCCINATE 125 MG/2ML
125 INJECTION, POWDER, LYOPHILIZED, FOR SOLUTION INTRAMUSCULAR; INTRAVENOUS
Status: CANCELLED
Start: 2021-09-04

## 2021-09-03 RX ORDER — DIPHENHYDRAMINE HYDROCHLORIDE 50 MG/ML
50 INJECTION INTRAMUSCULAR; INTRAVENOUS
Status: CANCELLED
Start: 2021-09-04

## 2021-09-03 RX ADMIN — SODIUM CHLORIDE 250 ML: 9 INJECTION, SOLUTION INTRAVENOUS at 14:13

## 2021-09-03 RX ADMIN — IRON SUCROSE 200 MG: 20 INJECTION, SOLUTION INTRAVENOUS at 14:14

## 2021-09-03 NOTE — PROGRESS NOTES
Infusion Nursing Note:  Bob Olvera presents today for iron infusion  Patient seen by provider today: No   present during visit today: Not Applicable.    Note: Pt states no change in her symptoms from last visit.      Intravenous Access:  Peripheral IV placed.    Treatment Conditions:  Results reviewed, labs MET treatment parameters, ok to proceed with treatment.      Post Infusion Assessment:  Patient tolerated infusion without incident.       Discharge Plan:   Patient discharged in stable condition accompanied by: self.      Kavitha TINAJERO RN

## 2021-09-03 NOTE — TELEPHONE ENCOUNTER
Pls ask CNM to call pt re Covid vaccine. She is wondering if there is any time during pregnancy that she should not get the vaccine? Pt was informed we do not give it at the midwife clinics.

## 2021-09-04 NOTE — TELEPHONE ENCOUNTER
Called patient, LVM stating COVID vaccine is recommended throughout pregnancy, and providing reassurance is quite likely safe at this point in pregnancy (32w6d), advised to call [phone] with any further questions.    NALLELY Hollis CNM [facility]  9/4/2021 8:20 AM

## 2021-09-08 ENCOUNTER — PRENATAL OFFICE VISIT (OUTPATIENT)
Dept: MIDWIFE SERVICES | Facility: CLINIC | Age: 26
End: 2021-09-08
Payer: COMMERCIAL

## 2021-09-08 VITALS
BODY MASS INDEX: 28.83 KG/M2 | DIASTOLIC BLOOD PRESSURE: 50 MMHG | HEIGHT: 61 IN | HEART RATE: 80 BPM | WEIGHT: 152.7 LBS | SYSTOLIC BLOOD PRESSURE: 96 MMHG

## 2021-09-08 DIAGNOSIS — Z34.81 ENCOUNTER FOR SUPERVISION OF OTHER NORMAL PREGNANCY, FIRST TRIMESTER: ICD-10-CM

## 2021-09-08 DIAGNOSIS — O99.013 ANEMIA AFFECTING PREGNANCY IN THIRD TRIMESTER: ICD-10-CM

## 2021-09-08 DIAGNOSIS — Z23 NEED FOR PROPHYLACTIC VACCINATION AND INOCULATION AGAINST INFLUENZA: Primary | ICD-10-CM

## 2021-09-08 LAB
ERYTHROCYTE [DISTWIDTH] IN BLOOD BY AUTOMATED COUNT: 20.6 % (ref 10–15)
HCT VFR BLD AUTO: 30.4 % (ref 35–47)
HGB BLD-MCNC: 9 G/DL (ref 11.7–15.7)
MCH RBC QN AUTO: 22.1 PG (ref 26.5–33)
MCHC RBC AUTO-ENTMCNC: 29.6 G/DL (ref 31.5–36.5)
MCV RBC AUTO: 75 FL (ref 78–100)
PLATELET # BLD AUTO: 241 10E3/UL (ref 150–450)
RBC # BLD AUTO: 4.07 10E6/UL (ref 3.8–5.2)
WBC # BLD AUTO: 7.7 10E3/UL (ref 4–11)

## 2021-09-08 PROCEDURE — 99207 PR PRENATAL VISIT: CPT | Performed by: MIDWIFE

## 2021-09-08 PROCEDURE — 90686 IIV4 VACC NO PRSV 0.5 ML IM: CPT | Performed by: MIDWIFE

## 2021-09-08 PROCEDURE — 85027 COMPLETE CBC AUTOMATED: CPT | Performed by: MIDWIFE

## 2021-09-08 PROCEDURE — 36415 COLL VENOUS BLD VENIPUNCTURE: CPT | Performed by: MIDWIFE

## 2021-09-08 PROCEDURE — 90471 IMMUNIZATION ADMIN: CPT | Performed by: MIDWIFE

## 2021-09-08 ASSESSMENT — MIFFLIN-ST. JEOR: SCORE: 1368.98

## 2021-09-08 NOTE — PROGRESS NOTES
Bob is here with Duy. Feeling well, but very tired.  15 month old daughter still wakes at night 2-3 times, which keeps her awake.  She has also initiated iron infusions for anemia.  Recheck of CBC today to evaluate hgb.  Bob states if hgb is not improving, she will likely decline the remainder of her infusions. Discussed safety of infusion, indications for use and benefit of full 5 regimen.  She is feeling normal fetal movement and random UC's, no pattern of UC's noted.  Discussed COVID vaccine and recommendations during pregnancy. Encouraged Bob that evidence shows us that COVID vaccine is safe when administered at any point in pregnancy, and that she can get vaccinated now. Reviewed how to obtain vaccine. Reviewed PTL s/sx, as well as danger s/sx to watch for.  Bob prefers a phone call to review her CBC result when available.  Return to clinic in 2 weeks or sooner if needed.  Next iron infusion scheduled for tomorrow.

## 2021-09-09 ENCOUNTER — INFUSION THERAPY VISIT (OUTPATIENT)
Dept: INFUSION THERAPY | Facility: CLINIC | Age: 26
End: 2021-09-09
Attending: ADVANCED PRACTICE MIDWIFE
Payer: COMMERCIAL

## 2021-09-09 DIAGNOSIS — O99.013 ANEMIA AFFECTING PREGNANCY IN THIRD TRIMESTER: Primary | ICD-10-CM

## 2021-09-09 PROCEDURE — 258N000003 HC RX IP 258 OP 636: Performed by: ADVANCED PRACTICE MIDWIFE

## 2021-09-09 PROCEDURE — 96365 THER/PROPH/DIAG IV INF INIT: CPT

## 2021-09-09 PROCEDURE — 250N000011 HC RX IP 250 OP 636: Performed by: ADVANCED PRACTICE MIDWIFE

## 2021-09-09 RX ORDER — NALOXONE HYDROCHLORIDE 0.4 MG/ML
0.2 INJECTION, SOLUTION INTRAMUSCULAR; INTRAVENOUS; SUBCUTANEOUS
Status: DISCONTINUED | OUTPATIENT
Start: 2021-09-09 | End: 2021-09-09 | Stop reason: HOSPADM

## 2021-09-09 RX ORDER — DIPHENHYDRAMINE HYDROCHLORIDE 50 MG/ML
50 INJECTION INTRAMUSCULAR; INTRAVENOUS
Status: DISCONTINUED | OUTPATIENT
Start: 2021-09-09 | End: 2021-09-09 | Stop reason: HOSPADM

## 2021-09-09 RX ORDER — EPINEPHRINE 1 MG/ML
0.3 INJECTION, SOLUTION INTRAMUSCULAR; SUBCUTANEOUS EVERY 5 MIN PRN
Status: DISCONTINUED | OUTPATIENT
Start: 2021-09-09 | End: 2021-09-09 | Stop reason: HOSPADM

## 2021-09-09 RX ORDER — ALBUTEROL SULFATE 0.83 MG/ML
2.5 SOLUTION RESPIRATORY (INHALATION)
Status: CANCELLED | OUTPATIENT
Start: 2021-09-10

## 2021-09-09 RX ORDER — DIPHENHYDRAMINE HYDROCHLORIDE 50 MG/ML
50 INJECTION INTRAMUSCULAR; INTRAVENOUS
Status: CANCELLED
Start: 2021-09-10

## 2021-09-09 RX ORDER — ALBUTEROL SULFATE 90 UG/1
1-2 AEROSOL, METERED RESPIRATORY (INHALATION)
Status: DISCONTINUED | OUTPATIENT
Start: 2021-09-09 | End: 2021-09-09 | Stop reason: HOSPADM

## 2021-09-09 RX ORDER — ALBUTEROL SULFATE 90 UG/1
1-2 AEROSOL, METERED RESPIRATORY (INHALATION)
Status: CANCELLED
Start: 2021-09-10

## 2021-09-09 RX ORDER — MEPERIDINE HYDROCHLORIDE 50 MG/ML
25 INJECTION INTRAMUSCULAR; INTRAVENOUS; SUBCUTANEOUS EVERY 30 MIN PRN
Status: CANCELLED | OUTPATIENT
Start: 2021-09-10

## 2021-09-09 RX ORDER — METHYLPREDNISOLONE SODIUM SUCCINATE 125 MG/2ML
125 INJECTION, POWDER, LYOPHILIZED, FOR SOLUTION INTRAMUSCULAR; INTRAVENOUS
Status: DISCONTINUED | OUTPATIENT
Start: 2021-09-09 | End: 2021-09-09 | Stop reason: HOSPADM

## 2021-09-09 RX ORDER — NALOXONE HYDROCHLORIDE 0.4 MG/ML
0.2 INJECTION, SOLUTION INTRAMUSCULAR; INTRAVENOUS; SUBCUTANEOUS
Status: CANCELLED | OUTPATIENT
Start: 2021-09-10

## 2021-09-09 RX ORDER — MEPERIDINE HYDROCHLORIDE 50 MG/ML
25 INJECTION INTRAMUSCULAR; INTRAVENOUS; SUBCUTANEOUS EVERY 30 MIN PRN
Status: DISCONTINUED | OUTPATIENT
Start: 2021-09-09 | End: 2021-09-09 | Stop reason: HOSPADM

## 2021-09-09 RX ORDER — EPINEPHRINE 1 MG/ML
0.3 INJECTION, SOLUTION INTRAMUSCULAR; SUBCUTANEOUS EVERY 5 MIN PRN
Status: CANCELLED | OUTPATIENT
Start: 2021-09-10

## 2021-09-09 RX ORDER — METHYLPREDNISOLONE SODIUM SUCCINATE 125 MG/2ML
125 INJECTION, POWDER, LYOPHILIZED, FOR SOLUTION INTRAMUSCULAR; INTRAVENOUS
Status: CANCELLED
Start: 2021-09-10

## 2021-09-09 RX ORDER — ALBUTEROL SULFATE 0.83 MG/ML
2.5 SOLUTION RESPIRATORY (INHALATION)
Status: DISCONTINUED | OUTPATIENT
Start: 2021-09-09 | End: 2021-09-09 | Stop reason: HOSPADM

## 2021-09-09 RX ADMIN — IRON SUCROSE 200 MG: 20 INJECTION, SOLUTION INTRAVENOUS at 14:35

## 2021-09-09 NOTE — PROGRESS NOTES
Infusion Nursing Note:  Juliawild Olvera presents today for iron infusion.    Patient seen by provider today: No   present during visit today: Not Applicable.    Note: Pt states that she doesn't feel any different then past visits.      Intravenous Access:  Peripheral IV placed.    Treatment Conditions:  Results reviewed, labs MET treatment parameters, ok to proceed with treatment.      Post Infusion Assessment:  Patient tolerated infusion without incident.       Discharge Plan:   Patient discharged in stable condition accompanied by: self.      Kavitha TINAJERO RN

## 2021-09-22 ENCOUNTER — TELEPHONE (OUTPATIENT)
Facility: CLINIC | Age: 26
End: 2021-09-22

## 2021-09-22 ENCOUNTER — PRENATAL OFFICE VISIT (OUTPATIENT)
Dept: MIDWIFE SERVICES | Facility: CLINIC | Age: 26
End: 2021-09-22
Payer: COMMERCIAL

## 2021-09-22 VITALS
BODY MASS INDEX: 28.94 KG/M2 | DIASTOLIC BLOOD PRESSURE: 50 MMHG | SYSTOLIC BLOOD PRESSURE: 90 MMHG | WEIGHT: 153.3 LBS | HEIGHT: 61 IN | HEART RATE: 76 BPM

## 2021-09-22 DIAGNOSIS — Z34.81 ENCOUNTER FOR SUPERVISION OF OTHER NORMAL PREGNANCY, FIRST TRIMESTER: Primary | ICD-10-CM

## 2021-09-22 DIAGNOSIS — O99.013 ANEMIA AFFECTING PREGNANCY IN THIRD TRIMESTER: ICD-10-CM

## 2021-09-22 DIAGNOSIS — O99.013 ANEMIA AFFECTING PREGNANCY IN THIRD TRIMESTER: Primary | ICD-10-CM

## 2021-09-22 LAB
ERYTHROCYTE [DISTWIDTH] IN BLOOD BY AUTOMATED COUNT: 23.3 % (ref 10–15)
HCT VFR BLD AUTO: 32.7 % (ref 35–47)
HGB BLD-MCNC: 10 G/DL (ref 11.7–15.7)
MCH RBC QN AUTO: 23.4 PG (ref 26.5–33)
MCHC RBC AUTO-ENTMCNC: 30.6 G/DL (ref 31.5–36.5)
MCV RBC AUTO: 77 FL (ref 78–100)
PLATELET # BLD AUTO: 185 10E3/UL (ref 150–450)
RBC # BLD AUTO: 4.27 10E6/UL (ref 3.8–5.2)
WBC # BLD AUTO: 7.5 10E3/UL (ref 4–11)

## 2021-09-22 PROCEDURE — 36415 COLL VENOUS BLD VENIPUNCTURE: CPT | Performed by: MIDWIFE

## 2021-09-22 PROCEDURE — 99207 PR PRENATAL VISIT: CPT | Performed by: MIDWIFE

## 2021-09-22 PROCEDURE — 85027 COMPLETE CBC AUTOMATED: CPT | Performed by: MIDWIFE

## 2021-09-22 ASSESSMENT — MIFFLIN-ST. JEOR: SCORE: 1371.7

## 2021-09-22 NOTE — TELEPHONE ENCOUNTER
Pt requesting Rx for liquid iron supplement.  Is unable to take tablets.  Has received several IV iron infusions, but cancelled her last appt and doesn't thinks she will reschedule.  Encouraged patient to complete full series of infusions.  Discussed rationale for improving iron in pregnancy.  Reviewed high iron foods.  Rx for liquid iron sent to preferred pharmacy.  Encouraged patient to check with insurance re: coverage of this medication.

## 2021-09-23 NOTE — PROGRESS NOTES
Bob is here for THIAGO.  Feeling well, feeling some fatigue. Reports regular fetal movement and denies s/sx pTL or uterine contractions.  Has attended 4 IV iron infusions and is feeling frustration that her hgb has not increased significantly.  Reports she will not return for last IV iron infusion due to minimal change in lab work and the bruising it is causing.  Discussed importance of increasing hgb prior to delivery due to anticipated blood loss with delivery.  Reviewed importance of oral intake, she states she is inconsistent with taking oral iron due to difficulty swallowing pills. Reviewed option for liquid iron and Bob agrees to try Floridex 10 mL BID.  We discussed her last birth which this writer attended. Reviewed events of the delivery including episiotomy and resuscitation of infant, as well as the fear she felt when the room was quiet while NNP and RT were working with baby.  She expresses feeling trauma after her birth, which this writer acknowledged and provided therapeutic and empathetic listening.  Bob expresses gratitude that her baby is healthy and thriving. Reviewed preparing for this next birth, and encouraged her to continue asking questions or debriefing with CNM's around her experience.  Bob is grateful for the time to discuss.   Reviewed need for GBS testing next visit.  Bob would like her hgb checked today, CBC was collected.  Reviewed when to call CNM On call and danger s/sx.  Return to clinic in one week for weekly visits.

## 2021-09-28 PROBLEM — Z34.83 ENCOUNTER FOR SUPERVISION OF OTHER NORMAL PREGNANCY IN THIRD TRIMESTER: Status: ACTIVE | Noted: 2021-04-22

## 2021-09-28 NOTE — PATIENT INSTRUCTIONS
"[facility] Nurse Midwives - Contact information:  Appointment line and to get a hold of CNM in clinic Monday-Friday 8 am - 5 pm:  [phone].  There are some clinics with early start times (1st appointment 7:40 am) and others with evening hours (last appointment 6:20 pm).  Most are typically open from 8 am to 5 pm.    CNM on call answering service: [phone].  Specify your hospital of choice and leave a brief message for CNM;  will then page CNM who is on call at your specified hospital and you should receive a call back with 15 minutes.  Be sure that your ringer is audible and that you can accept blocked calls so that we can get back in touch with you! This number should be reserved for urgent needs if during the day, before 8 am, after 5 pm, weekends, holidays.    Contact the on-call CNM with warning signs, such as:    vaginal bleeding     Vaginal discharge and itching or pain and burning during urination    Leg/calf pain or swelling on one side    severe abdominal pain    nausea and vomiting more than 4-5 times a day, or if you are unable to keep anything down    fever more than 100.4 degrees F.       Meet the Midwives from [facility]  You are invited to an informational meet and greet with [facility] Certified Nurse-Midwives. Our free \"Meet the Midwives\" event is a great opportunity to learn about our midwives' philosophy and experience, the hospitals where we can assist with your birth, and answer questions you may have. Partners, friends, and family are welcome to attend. Currently, this is a virtual event.  Date  First Tuesday of every month at 7 pm.    Link to next (live) meeting  [email or web address]  To Join by Telephone (audio only) Call:   [phone] Phone Conference ID: [id number]#        Touring the Maternity Care Center  At this time we are offering a virtual tour of the " Maternity Care Centers at both [facility] and [facility]:   [facility]: [email or web address]  [facility]:   [email or web address]  [email or web address]/[email or web address]/Maternity-Care-Center/#[email or web address]_tour  When in person tours become available, registrations is required. To schedule a tour at either [facility] or [facility], please do so online using the following links:  [facility] - [email or web address]  [facility] - [email or web address]&[email or web address]&[email or web address]      Car Seat Clinics:  https://[place]ps.[place].gov/divisions/ots/child-passenger-safety/Pages/car-seat-checks.aspx  [facility]    Free Car Seat Distribution Facilities     By Appt. Address Contact Information (For appointment)      \Yes [employer]\[address]\[place],\[place])\cpsassfelisa@"[employer].com      Yes [facility]\[place],\[place])[phone]\[facility]usten[facility] Hospitaljose@"[employer].com      Yes [facility]\[address]\[place],\[place])[phone]\[email or web address]      Immunizations:  http://www.cdc.gov/vaccines/schedules/easy-to-read/child.html      Postpartum Depression  The first weeks of caring for a  baby are more than a full-time job. Although it is often a happy time, your feelings and moods may not be what you expected. Many women experience  baby blues.  Here are some tips to help you understand when feelings of sadness are normal, and when you should call your health care provider.    What are the baby blues?  As many as 3 of every 4 women will have short periods of mood swings, crying, or feeling cranky or restless during the  first weeks after birth. These feelings can be worse when you are tired or anxious. Women who have the baby blues often say they feel like crying but don t know why. Baby blues usually happen in the first or second week postpartum (after you give birth) and last less than a week. If you are not sleeping, becoming more upset, don t feel like you can take care of your baby, or your sadness lasts 2 weeks or more, call your health care provider.    What is postpartum depression?  About one in every 5 women will develop depression during the first few months postpartum that may be mild, moderate, or severe. Women who have postpartum depression may have some of these symptoms:    Feeling guilty     Not able to enjoy your baby and feeling like you are not bonding with your baby      Not able to sleep, even when the baby is sleeping    Sleeping too much and feeling too tired to get out of bed    Feeling overwhelmed and not able to do what you need to during the day    Not able to concentrate    Don t feel like eating    Feeling like you are not normal or not yourself anymore    Not able to make decisions    Feeling like a failure as a mother    Feeling lonely or all alone    Thinking your baby might be better off without you  If you have any of these symptoms, call your health care provider!    Which symptoms of postpartum depression are dangerous?  Sometimes a woman with postpartum depression will have thoughts of harming herself or her baby. If you find yourself thinking about hurting yourself or your baby, call your health care provider immediately.    MOTHERHOOD: THE EARLY DAYS  You prepare for the birth of your baby for many months during pregnancy, and then the first months at home after your baby is born can be a quiet, gentle time of getting to know this new person who has come to live in your home. But for most women it is not all quiet or sweet. And for some women it is a very hard time.  What Can I Expect in the  First Few Months After the Baby Comes?  New mothers and their families face many challenges in the first few months:    Your body and your hormones have to get back to normal.    You and the baby will be learning to breastfeed.    Babies only sleep a few hours at a time. The entire family will have a hard time getting enough sleep.    You and your family need to learn how to parent this new family member.    If you have a partner, you have to figure out how to stay together as a couple and maybe even start to have sex again.    You may have to figure out how to keep from getting pregnant again right away.    You may need to return to work and find day care.    How Long Will it Take for My Body to Get Back to Normal?  Some changes will occur quickly. Others will not occur as quickly.    Your uterus, cervix, and vagina will all shrink to their nonpregnant size in about 2 weeks. Your vagina may be tender and dry for a few months--especially if you are breastfeeding.    If you had stitches or hemorrhoids, your   bottom   will be sore for 2 weeks or more.    For some women who have problems urinating, it can take several months for you to be able to hold your urine when you cough or sneeze or suddenly  something heavy.    Your breast milk will   come in   2 to 3 days after the birth of your baby. It will take 6 to 8 weeks for you and the baby to get the hang of breastfeeding and find a pattern. During these first weeks, you can have engorged breasts at times and often leak milk.    Your stomach and intestines all have to fall back into place. You may have a lot of gas for a few weeks.    You may be constipated--especially if you are breastfeeding.    Your stretched stomach muscles can recover in a few weeks, but for some women it takes longer--6 months or a year--to recover.    If you had a  delivery, you may have pain or numbness around the incision for 6 months or more.    Losing the weight you gained  during pregnancy will probably take 6 months to a year. Have patience! It took 40 weeks to get here. Give yourself 40 weeks to get back.    What Can I Expect When My Hormones Change?  About 75% of all women will get the   blues.   This usually starts about 3 days after the birth of your baby. You may cry easily and feel very, very tired. A few women become very depressed. If you had a  delivery or your new baby was sick, you are at a higher risk for depression.  Call your health care provider right away if you cannot care for yourself or your baby, if you feel very nervous or worried, if you cannot stop crying, or if you are having thoughts of hurting yourself or your baby.    Taking Care of Yourself  While you are still pregnant:    Talk with your partner and your family about the time ahead. Arrange for someone to help you during the first weeks at home if you can.    Talk with your health care provider about birth control options and make a plan before the baby comes.    If you are worried about how to parent a , take parenting classes. You will learn a lot about how babies act and you will make some friends who are going through the same thing at the same time. Most [facility] have these classes.    Arrange for someone to help with baby care if you can.  After the baby comes:    Ask for help. Let other people do the cooking and cleaning and run the house. Focus on yourself and your baby.    Sleep whenever you can. Try not to be tempted to   get some things done   when the baby sleeps. This is your time to sleep, too.    Drink lots of water. You will need at least 6 big glasses of water everyday to avoid constipation and make enough breast milk. Every time you sit down to breastfeed, have a big glass of water with you to drink while you are nursing.    Eat lots of vegetables and fruit. You will need lots of vitamins and fiber to help your body get back to normal. This will also help you avoid  constipation.    Go outside and walk. Babies can go outside even if it is very cold. Fresh air and sunshine will do you both good.    Take sitz baths. Put about 6 inches of warm water in your bathtub and sit in there for 15 minutes 2 to 3 times a day. This will help your   bottom   heal more quickly. It will also give you 15 minutes of private time!    Talk to other mothers. Join a new parents group. Call Jeremy and go to [facility] meetings if you are breastfeeding.     With your partner:    Keep talking. Share the experience.    Spend time alone. Even a 30-minute walk can be a date.    Start a birth control method. You can get pregnant before you even have a period. It is very important to use birth control if you do not want to get pregnant again right away.    When you have sex, use a lubricant. A lot of lubricant! Take it slow.  The first few months after a baby comes can be a lot like floating in a jar of honey--very sweet and cordova, but very sticky, too. Take time to enjoy the good parts. Remind yourself that this time will pass. Bon voyage!    FOR MORE INFORMATION  For questions about depression during and after pregnancy:  http://www.womenshealth.gov/publications/our-publications/fact-sheet/depression-pregnancy.html   After birth: The first 6 weeks:  http://www.[email or web address]/Post-Birth-and-Recovery   Breastfeeding resources:  http://www.[employer].org/health-info/getting-breastfeeding-off-to-a-good-start/    HEALTHY PREGNANCY CARE: 37 to 41 WEEKS PREGNANT    Talk with your midwife or physician about when to call with signs of labor    Regular uterine contractions that are getting closer together and/or stronger    If you think your water has broken or is leaking    Bleeding from the vagina like a period (bloody vaginal discharge is normal)    If you are not feeling your baby move    Make plans for transportation and  as needed for when you are going to the hospital.    Your  midwife or physician may offer to check your cervix for changes.     Ask your health care provider about vaccinations you may need following delivery. By now, you should have received a Tdap immunization to protect against pertussis or whooping cough. Fathers and family members who will be in close contact with the baby should also receive a Tdap shot at least two weeks before the expected birth of the baby if they have not had a Td (tetanus) shot for at least two years.    If you are past your due date, discuss the next steps leading to delivery with your midwife or physician. If you don't start labor on your own by 41 or 42 weeks, your midwife or physician may recommend giving you medicines to ripen your cervix and start labor.    Preparing for your baby: Tell your midwife or physician how you plan to feed your baby (breast or bottle), who you have chosen to do pediatric care for your baby, and if you have a boy, whether you have chosen to have him circumcised. You will need a car seat correctly installed in your vehicle to bring your baby home. As you start to set up the nursery at home for your baby, make sure the crib is safe. The mattress needs to fit snugly against the edges of the crib. If you can fit a soda can between the bars, they are too far apart and can allow the baby's head to caught between them.    Learn about infant care and feeding, including information about infant CPR. We recommend that you put your baby to sleep on his or her back to reduce the chance of Sudden Infant Death Syndrome (SIDS). To maintain a healthy environment in which your child can grow, it's best to keep your home smoke-free. By preparing ahead, your transition into parenthood will go smoothly for you and your baby.    Your midwife or physician will want to see you for a checkup 2 to 6 weeks after delivery.    If you have questions about any symptoms you are experiencing or any other concerns, call your provider or their  clinic staff at [facility] at Dept: [phone]. If it's after clinic hours, physician patients should call the [facility] at [phone] ([phone]); midwife patients should call their answering service at [phone].    How can you care for yourself at home?   You can refer to the Starting Out Right book or find it online at http://www.[facility].org/images/stories/maternity/[facility]-Starting-Out-Right.pdf or http://www.[facility].org/images/stories/flipbooks/[facility]-starting-out-right/[facility]-starting-out-right.html#p=8     You can sign up for a weekly parenting e-mail that gives support, tips and advice from health care professionals that starts with pregnancy and continues through the toddler years. To register, go to [email or web address]/baby at any time during your pregnancy.        Making Plans for Feeding My Baby    By this point, you probably have read a lot about feeding your baby.  Breastfeed or formula? Each mother s decision is her own and [facility] respects you and your choices. We ve gathered information on both breastfeeding and formula feeding to help with your decision. Talking with your physician or nurse-midwife can also help in your decision.  However you plan to feed your baby, [facility]aternity [facility] Centers encourage rooming in with your baby, skin-to-skin contact and feeding your baby based on his or her cues.    Skin-to-skin contact  Being close to mom helps your baby adjust to life outside of the womb.  It helps your baby regulate their body temperature, heart rate, and breathing.  Your baby will usually be placed skin-to-skin immediately following birth or as soon as possible, if medical intervention is needed.    Rooming-In  Having your baby stay with you in your room is called  rooming-in .  Keeping your baby in your room helps you to learn how to care for your baby by getting to know your baby s cues, body rhythms and sleep cycle.        Cue-based feeding  Cues (signals) are baby s way of telling you what he or she wants.  When you learn your infant s cues, you know how to care for and feed your baby.   Feeding cues are the licking and smacking of lips, bringing their fist to their mouth, and a reflex called  rooting - where baby turns and opens his or her mouth, searching for the breast or bottle.  Crying is a late feeding cue.  Babies can feed frequently, often at least 8 times in 24 hours.  Breastfeeding facts  Breast milk is the best source of nutrition for your baby and is available at birth. In the first couple of days, your milk volume is already starting to increase, though it may not be noticeable. Breastfeed frequently to increase your milk supply. Within three to five days, you will begin to notice larger milk volumes. An increase in breast size, heaviness and firmness are often described as the milk  coming in.  Frequent breastfeeding can help breasts from getting overly firm and painful. You will know the baby is getting enough milk if your baby is having wet and dirty diapers and gaining weight.     If your goal is to exclusively breastfeed, it is important to not use any formula or artificial nipples (including bottles and pacifiers) while your baby is learning to breastfeed.  While it may seem like an  easy  option to give your baby a bottle, formula should only be given if there is a medical reason for your baby to have it.    Positioning and attachment   Get comfortable.  Use pillows as needed to support your arms and baby.  Hold baby close at the level of your breast, facing you in a tummy to tummy position.  Skin to skin helps with this.  Position the baby with his or her nose by the nipple.  There should be a straight line from baby s ear to shoulder to hips.  Tickle your baby s lips or wait for baby to open mouth wide, bring baby to breast by leading with the chin.  Aim the nipple at the roof of baby s mouth.  A rapid  sucking pattern is followed by longer, drawing pattern with occasional swallows heard.  When baby is correctly latched, your nipple and much of the areola are pulled well into baby s mouth.      Returning to work or school  Focus on a good start to breastfeeding.  Many women continue to provide breastmilk for their baby when they return to work or school.  Making plans about where to pump and store milk can make the transition go well.  Talk with other mothers who have also returned to work or school for tips and support.  Your employer s Human Resource department may be a resource as well.     Returning to work or school: (continued)     babies can mean fewer  sick  days for you.    A quality breast pump will also save time and add comfort.  Check with your insurance prior to giving birth for breast pump coverage.  Many insurance companies include a pump within your benefits.    Wait until your baby is at least three weeks old to introduce a bottle for the first time.  Have someone besides you give the bottle.    Breastfeed when you are with your baby. Reserve your bottles of breast milk for when you are away.     Your breasts will need to be  emptied  either by your baby or a pump.  Plan to pump at least twice in an eight hour day.    If you cannot pump at work, continue breastfeeding at home. Any amount of breast milk is worth giving to your baby.    Formula feeding facts  If you are planning to use formula to feed your baby, you will want to make some preparations ahead of time. Talk to your doctor or nurse-midwife about what type of formula to use. Some are iron-fortified, meaning they have extra iron in them. You will want to purchase formula and bottles before your baby is born to be sure you are ready after you return from the hospital. The [facility] do not provide formula samples to take home.    Be sure to follow formula mixing directions closely. Regular milk in the dairy case at the  grocery store should not be given to babies under 1 year old. Baby formula is sold in several forms including:    Ready-to-use. This is the most expensive, but no mixing is necessary.    Concentrated liquid. This is less expensive than ready-to-use and you mix with water.    Powder. This is the least expensive. You mix one level scoop of powdered formula with two ounces of water and stir well.    Most babies need 2.5 ounces of formula per pound of body weight each day. This means an 8-pound baby may drink about 20 ounces of formula a day; however, this is just an estimate. The most important thing is to pay attention to your baby s cues.  If your baby is always fussy, needs more iron or has certain food allergies, your physician may suggest you change your baby s formula to a different kind.     How do I warm my baby s bottles?  You may feed your baby a bottle without warming it first. It is OK for the breast milk or formula to be cool or room temperature. If your baby seems to prefer it warmed, you can put the filled bottle in a container of warm water and let it stand for a few minutes. Check the temperature of the liquid on your skin before feeding it to your baby; to be sure it isn t too hot. Do not heat bottles in the microwave. Microwaves heat food and liquids unevenly, and this can cause hot spots that can burn your baby.    How do I clean and sterilize bottles?  Sterilize bottles and nipples before you use them for the first time. You can do this by putting them in boiling water for 5 minutes. After that first time, you can wash them in hot and soapy water. Rinse them carefully to be sure there is no soap left on them. You can also wash them in the .    [facility] [phone] ([phone])    Share with Women\Bbas I in Labor?  What is labor? Labor is the work that your body does to birth your baby. Your uterus (the womb) contracts(tightens). The contractions(labor pains) push your baby down onto  your cervix(the opening ofyour uterus). Thispressure causesyour cervix to open. When your cervix iscompletely open (10 centimeters dilated), you will push your baby through your vagina and out into the world.  What do contractions feel like? When contractionsfirst start, theyusually feellike cramps duringyour period. Sometimesyoufeelpain in your back. Mostoften,contractions feel like muscles pulling painfully in your lower belly. At first, the contractions will probably be 15 to 20 minutes apart.They maybe irregular and will not feel too painful. As labor goes on, the contractionsget stronger,closer together, more consistent, and more painful.  How do I time the contractions? When the contractionsseem to be coming regularly, youshould start to time them.You time your contractions by counting the number of minutes from the start of one contraction to the start of the next contraction.  What should I do during early labor when the contractions start? If it is night andyoucan sleep, do so. If it happensduring the day, there are some things you can do to take care of yourself at home: Walk. If the painsyou are having are reallabor, walking will makethecontractionscome closer together and they will be stronger,but you will be able to cope with them better if you are standing or moving around. If the contractions are early labor ones that come andgo (sometimes called false labor), walkingcan make them go away. Take a shower or bath. This will help you relax. Eat. Labor is a big event.Your body needs a lot of energy to be effective.Eat whatever you feel like eating. Drink water. Not drinking enough water can cause contractions to not be as effectiveas theyshould be.You need to be well hydrated (drinking enoughwater) to help your body work well during labor. Take a na p. If youfeel tired, lay down on your side and get all the rest you can. It helps to be rested when you go intoactive labor. Do something you enjoy. Spend  time with family. Watch a movie. Distraction will help you relax. Get a massage. If your labor is in your back, a strong massage on your lower back may feel very good. Getting a foot massage or having a partner rub your feet can also be very relaxing. Don t panic. You can do this. Your body was made for this. You are strong!  When should I call my health care provider if I think I am in labor? Your contractions have been 5 minutes or less apart for at least an hour. Your contractions are becoming so painful youcannot walk or talk during one. You think your amniotic sac (bag of patton) breaks. You may have a big gush of amniotic fluid (water) or just fluid that runs down your legs when you walk or move or change position.  Are there other reasons to call my health care provider? If you are concerned about anything, don t hesitate to call your health care provider.You should definitely call your health care provider or go to the hospital if:  It is 3 weeks or more before your due date, and you are having contractions.  You have vaginal bleeding that is more than your period, soaks your underwear, or runs down your legs.  You have sudden severe pain that does not go away with rest.  Your baby has not movedfor several hours.  You are leaking greenish fluid.    For More Information: http://onlinelibrary.lomax.com/doi/10.1111/jmwh.27649/epdf     [place] Department of Health and Human Services: Signs oflabor,labor stages, and types of birth  http://womenshealth.gov/pregnancy/childbirth-beyond/labor-birth.html#a      Childbirth and Parenting Education:     Free Video Series from [facility]: https://www.nursing.[facility].edu/laborandbirthvideos  FATMATA parenting center: http://amMammoth HospitalareMyPublisher.Needium/   (839) 396-BABY  [place]: (education, yoga & wellness) www.NJVC.Needium  Enlightened Mama: www.enlightenedmama.com   Childbirth collective: (Parent topic nights)  www.childbirthcollective.org/  Hypnobabies:   www.CoPromotebiestWeStoreties.com/  Hypnobirthing:  Http://hypnobirthing.com/  The Birth Hour: https://thePowerlytics/online-childbirth-class/    APPS and Podcasts:   Ovia  Glow Sherrie  The Birth Hour (for birth stories)   Birthful   Expectful   The Longest Shortest Time  PregnancyPodcast Felicia Mlcarmen    Book Recommendations:   Yudith Brady's Birthing From Within--first few chapters include a new-age tone, you may prefer to skip it and keep going, because there is good stuff later.  This book recommendation covers emotional preparation, but does cover coping with pain, and use of both pharmacological and nonpharmacological methods.    Dr. Rivera' The Pregnancy Book and The Birth Book--the pregnancy book goes month-by month    Womanly Art of Breastfeeding by [employer]eche [employer]   Bestfeeding by Alma Sierra--great pictures    Mothering Your Nursing Toddler, by Sandra Peralta.   Addresses dealing with so many of the challenging behaviors of a nursing toddler.  How Weaning Happens, by La Leche League.  Discusses weaning at all ages, from medically necessary weaning of an infant, all the way up to age 5 (or older), with why/why not, and strategies.  Very empowering book both for deciding to wean and deciding not to.    American College of Nurse-Midwives (ACNM) http://www.midwife.org/; look at the informational handouts at http://www.midwife.org/Share-With-Women     www.mymidwife.org    Mother to Baby (Medication and Herbal guidance in pregnancy): http://www.mothertobaby.org  Toll-Free Hotline: 776.600.6111  LactMed (Medication use while breastfeeding): http://toxnet.nlm.nih.gov/newtoxnet/lactmed.htm    Women's Health.gov:  http://www.womenshealth.gov/a-z-topics/index.html    American pregnancy association - http://americanpregnancy.org    Centering Pregnancy (group prenatal care option): http://centeringhealthcare.org    Information about doulas:  Childbirth collective:  "http://www.childbirthcollective.org/  Doul[facility] of [place] ([place]ELISSA):  www.melissa.org  [facility]  project: http://Badu Networkscitiesdoulaproject.com/     Early Childhood and Family Education (ECFE):  ECFE offers parents hands-on learning experiences that will nourish a lifetime of teachable moments.  http://ecfe.info/ecfe-home/    March of Dimes www.4Cable TV     FDA - Nutrition  www.mypyramid.gov  Under \"For Consumers,\" click on \"pregnant and breastfeeding women.\"      Centers for Disease Control and Prevention (CDC) - Vaccines : http://www.cdc.gov/vaccines/       When researching information on the web, question the validity of websites.  The Brainlike .gov, WorldStores and.org tend to be more reliable information.  If there are a lot of advertisements, be cautious of the information provided. Stay away from blogs and chat rooms please!       Virtual Breastfeeding Support:    During this time of isolation, breastfeeding families need even more community!  Here are some area organizations offering virtual support groups for breastfeeding:      [employer] Support Group, Tuesdays at 10:30 am   Run by NICOLASA Mcginnis of [employer] [facility] [facility]   Go to [employer] Facebook page and click on \"events\" for link   https://www.interspireSubmit.com/events/[email or web address]/    [facility] Milk Hour, Thursdays at 2:30 pm    Run by NICOLASA Hope   Go to [facility] FB page and send message to get link   https://www.interspireSubmit.com/healthfoundations/    [facility] holding virtual meetings the first Tuesday of each month, 8-9 pm, and the   Third Saturday, 10 - 11 am.  Go to [facility] FB page; message to get link [email or web address]    [place] offers a Lactation Lounge every Friday 12pm - 1pm, run by Shelly Mix, " Fredo Medrano Leader   Sign up via link at [employer]/cbe-lactation   https://www.Cytox/cbe-lactation    [facility] is offering virtual support groups every Monday, 10:30 am - 12 pm, run by nurse NICOLASA   Https://www.[email or web address].[email or web address]/    Prenatal Breastfeeding Classes:        [employer] is offering virtual breastfeeding and  care classes:  https://www.Cytox/education-workshops    BirthEd childbirth and breastfeeding education offering virtual prenatal breastfeeding classes  https://www.Perfect Pricemn.com/workshops

## 2021-09-30 ENCOUNTER — PRENATAL OFFICE VISIT (OUTPATIENT)
Dept: MIDWIFE SERVICES | Facility: CLINIC | Age: 26
End: 2021-09-30
Payer: COMMERCIAL

## 2021-09-30 VITALS
BODY MASS INDEX: 29.04 KG/M2 | SYSTOLIC BLOOD PRESSURE: 96 MMHG | HEIGHT: 61 IN | WEIGHT: 153.8 LBS | HEART RATE: 64 BPM | DIASTOLIC BLOOD PRESSURE: 60 MMHG

## 2021-09-30 DIAGNOSIS — Z34.83 ENCOUNTER FOR SUPERVISION OF OTHER NORMAL PREGNANCY IN THIRD TRIMESTER: Primary | ICD-10-CM

## 2021-09-30 DIAGNOSIS — O99.013 ANEMIA AFFECTING PREGNANCY IN THIRD TRIMESTER: ICD-10-CM

## 2021-09-30 DIAGNOSIS — O99.013 ANEMIA AFFECTING PREGNANCY IN THIRD TRIMESTER: Primary | ICD-10-CM

## 2021-09-30 LAB
ERYTHROCYTE [DISTWIDTH] IN BLOOD BY AUTOMATED COUNT: 23.4 % (ref 10–15)
HCT VFR BLD AUTO: 33.4 % (ref 35–47)
HGB BLD-MCNC: 10.2 G/DL (ref 11.7–15.7)
MCH RBC QN AUTO: 23.3 PG (ref 26.5–33)
MCHC RBC AUTO-ENTMCNC: 30.5 G/DL (ref 31.5–36.5)
MCV RBC AUTO: 76 FL (ref 78–100)
PLATELET # BLD AUTO: 188 10E3/UL (ref 150–450)
RBC # BLD AUTO: 4.38 10E6/UL (ref 3.8–5.2)
WBC # BLD AUTO: 6.8 10E3/UL (ref 4–11)

## 2021-09-30 PROCEDURE — 87653 STREP B DNA AMP PROBE: CPT | Performed by: ADVANCED PRACTICE MIDWIFE

## 2021-09-30 PROCEDURE — 36415 COLL VENOUS BLD VENIPUNCTURE: CPT | Performed by: ADVANCED PRACTICE MIDWIFE

## 2021-09-30 PROCEDURE — 85027 COMPLETE CBC AUTOMATED: CPT | Performed by: ADVANCED PRACTICE MIDWIFE

## 2021-09-30 PROCEDURE — 99207 PR PRENATAL VISIT: CPT | Performed by: ADVANCED PRACTICE MIDWIFE

## 2021-09-30 ASSESSMENT — MIFFLIN-ST. JEOR: SCORE: 1373.97

## 2021-09-30 NOTE — PROGRESS NOTES
Here alone today for routine prenatal visit at 36w4d. Notes normal FM and BH contractions but irregular in the day. Noting some pregnancy discomforts back and pelvic pain. Is taking liquid fe (floradix) 10ml BID following IV iron infusion last completed 9/9/2021. GBS/hgb via CBC screen done today. VE declined and BS ultrasound confirms vertex, consistent with leopolds; see flow sheet for exam details. Advised on visit schedule through [facility]. Reviewed warning s/sx and reasons to call. RTC 1 week.

## 2021-10-02 PROBLEM — O99.820 GBS (GROUP B STREPTOCOCCUS CARRIER), +RV CULTURE, CURRENTLY PREGNANT: Status: ACTIVE | Noted: 2021-10-02

## 2021-10-02 LAB
GP B STREP DNA SPEC QL NAA+PROBE: POSITIVE
PATIENT PENICILLIN, AMOXICILLIN, CEPHALOSPORINS ALLERGY: NO

## 2021-10-07 ENCOUNTER — PRENATAL OFFICE VISIT (OUTPATIENT)
Dept: MIDWIFE SERVICES | Facility: CLINIC | Age: 26
End: 2021-10-07
Payer: COMMERCIAL

## 2021-10-07 VITALS
BODY MASS INDEX: 28.98 KG/M2 | HEIGHT: 61 IN | WEIGHT: 153.5 LBS | DIASTOLIC BLOOD PRESSURE: 60 MMHG | SYSTOLIC BLOOD PRESSURE: 110 MMHG | HEART RATE: 84 BPM

## 2021-10-07 DIAGNOSIS — Z34.83 ENCOUNTER FOR SUPERVISION OF OTHER NORMAL PREGNANCY IN THIRD TRIMESTER: ICD-10-CM

## 2021-10-07 DIAGNOSIS — O99.013 ANEMIA AFFECTING PREGNANCY IN THIRD TRIMESTER: Primary | ICD-10-CM

## 2021-10-07 PROCEDURE — 99207 PR PRENATAL VISIT: CPT | Performed by: ADVANCED PRACTICE MIDWIFE

## 2021-10-07 RX ORDER — HEPARIN SODIUM (PORCINE) LOCK FLUSH IV SOLN 100 UNIT/ML 100 UNIT/ML
5 SOLUTION INTRAVENOUS
Status: CANCELLED | OUTPATIENT
Start: 2021-10-07

## 2021-10-07 RX ORDER — HEPARIN SODIUM,PORCINE 10 UNIT/ML
5 VIAL (ML) INTRAVENOUS
Status: CANCELLED | OUTPATIENT
Start: 2021-10-07

## 2021-10-07 RX ORDER — NALOXONE HYDROCHLORIDE 0.4 MG/ML
0.2 INJECTION, SOLUTION INTRAMUSCULAR; INTRAVENOUS; SUBCUTANEOUS
Status: CANCELLED | OUTPATIENT
Start: 2021-10-07

## 2021-10-07 RX ORDER — EPINEPHRINE 1 MG/ML
0.3 INJECTION, SOLUTION, CONCENTRATE INTRAVENOUS EVERY 5 MIN PRN
Status: CANCELLED | OUTPATIENT
Start: 2021-10-07

## 2021-10-07 RX ORDER — ALBUTEROL SULFATE 0.83 MG/ML
2.5 SOLUTION RESPIRATORY (INHALATION)
Status: CANCELLED | OUTPATIENT
Start: 2021-10-07

## 2021-10-07 RX ORDER — MEPERIDINE HYDROCHLORIDE 25 MG/ML
25 INJECTION INTRAMUSCULAR; INTRAVENOUS; SUBCUTANEOUS EVERY 30 MIN PRN
Status: CANCELLED | OUTPATIENT
Start: 2021-10-07

## 2021-10-07 RX ORDER — DIPHENHYDRAMINE HYDROCHLORIDE 50 MG/ML
50 INJECTION INTRAMUSCULAR; INTRAVENOUS
Status: CANCELLED
Start: 2021-10-07

## 2021-10-07 RX ORDER — METHYLPREDNISOLONE SODIUM SUCCINATE 125 MG/2ML
125 INJECTION, POWDER, LYOPHILIZED, FOR SOLUTION INTRAMUSCULAR; INTRAVENOUS
Status: CANCELLED
Start: 2021-10-07

## 2021-10-07 RX ORDER — ALBUTEROL SULFATE 90 UG/1
1-2 AEROSOL, METERED RESPIRATORY (INHALATION)
Status: CANCELLED
Start: 2021-10-07

## 2021-10-07 ASSESSMENT — MIFFLIN-ST. JEOR: SCORE: 1372.61

## 2021-10-07 NOTE — PATIENT INSTRUCTIONS
"[facility] Nurse Midwives - Contact information:  Appointment line and to get a hold of CNM in clinic Monday-Friday 8 am - 5 pm:  [phone].  There are some clinics with early start times (1st appointment 7:40 am) and others with evening hours (last appointment 6:20 pm).  Most are typically open from 8 am to 5 pm.    CNM on call answering service: [phone].  Specify your hospital of choice and leave a brief message for CNM;  will then page CNM who is on call at your specified hospital and you should receive a call back with 15 minutes.  Be sure that your ringer is audible and that you can accept blocked calls so that we can get back in touch with you! This number should be reserved for urgent needs if during the day, before 8 am, after 5 pm, weekends, holidays.    Contact the on-call CNM with warning signs, such as:    vaginal bleeding     Vaginal discharge and itching or pain and burning during urination    Leg/calf pain or swelling on one side    severe abdominal pain    nausea and vomiting more than 4-5 times a day, or if you are unable to keep anything down    fever more than 100.4 degrees F.       Meet the Midwives from [facility]  You are invited to an informational meet and greet with [facility] Certified Nurse-Midwives. Our free \"Meet the Midwives\" event is a great opportunity to learn about our midwives' philosophy and experience, the hospitals where we can assist with your birth, and answer questions you may have. Partners, friends, and family are welcome to attend. Currently, this is a virtual event.  Date  First Tuesday of every month at 7 pm.    Link to next (live) meeting  https://www.[place].org/classes-and-events/meet-the-midwives-from-[facility]-clinics  To Join by Telephone (audio only) Call:   [phone] Phone Conference ID: [id number]#          Touring the Maternity Care Center  At this time we are offering a virtual tour of the " Maternity Care Centers at both [facility] and [facility]:   [facility]: https://www.[place].org/Locations/[facility]/Maternity-Care-Center  [facility]:   https://[facility].org/overarchLyman School for [facility]-care/the-birthplace/tours  https://www.[place].org/Locations/[facility]/Maternity-Care-Center/#virtual_tour  When in person tours become available, registrations is required. To schedule a tour at either [facility] or [facility], please do so online using the following links:  [facility] - [email or web address]://www.[employer]/registerlist.asp?s=[email or web address]&[email or web address]&vs=5&p=[email or web address]&[email or web address]&ps=1&group=37&it=1&mfb=050  [facility] - https://www.[employer]/registerlist.asp?s=[email or web address]&m=303&vs=5&p=2&xzfug=817&ps=1&group=38&it=1&lje=494    Childbirth and Parenting Education:     Free Video Series from [facility]: https://www.nursing.[facility]/laborandbirthvideos  [facility]: http://[facility] Iron[facility] HospitalCoda Automotive.Search [employer] ([place])/   [phone]) 323-BABY  [place]: (education, yoga & wellness) www.[employer]  Enlightened Mama: www.Seriouslyenedmama.[employer] ([place])   Childbirth collective: (Parent topic nights)  www.childbirthcollective.org/  Hypnobabies:  www.hypnobabiestwincities.com/  Hypnobirthing:  Http://hypnobirthing.com/  The Birth Hour: https://theRegalBoxhoudough/online-childbirth-class/    APPS and Podcasts:   Allie Balderas  The Birth Hour (for birth stories)   Birthful   Expectful   The Longest Shortest Time  PregnancyPodcast Felicia Nails    Breastfeeding Information:  An excellent 15-minute video on preparing for a good breastfeeding experience, including how to ensure you have a good milk supply and a comfortable latch, can be found here:  https://firstdroplets.com/      Book Recommendations:   Yudith Morton's Birthing From Within--first few chapters include a new-age tone, you may prefer to skip it and keep going, because there is good stuff later.  This book recommendation covers emotional preparation, but does cover  "coping with pain, and use of both pharmacological and nonpharmacological methods.    Dr. Rivera' The Pregnancy Book and The Birth Book--the pregnancy book goes month-by month    Womanly Art of Breastfeeding by La Leche League International   Breastfeeding by Alma Sierra--great pictures    Mothering Your Nursing Toddler, by Sandra Peralta.   Addresses dealing with so many of the challenging behaviors of a nursing toddler.  How Weaning Happens, by La Leche League.  Discusses weaning at all ages, from medically necessary weaning of an infant, all the way up to age 5 (or older), with why/why not, and strategies.  Very empowering book both for deciding to wean and deciding not to.    American College of Nurse-Midwives (ACNM) http://www.midwife.org/; look at the informational handouts at http://www.midwife.org/Share-With-Women     www.mymidwife.org    Mother to Baby (Medication and Herbal guidance in pregnancy): http://www.mothertobaby.org  Toll-Free Hotline: 557.110.8063  LactMed (Medication use while breastfeeding): http://toxnet.nlm.nih.gov/newtoxnet/lactmed.htm    Women's Health.gov:  http://www.womenshealth.gov/a-z-topics/index.html    American pregnancy association - http://americanpregnancy.org    Centering Pregnancy (group prenatal care option): http://centeringhealthcare.org    Information about doulas:  Childbirth collective: http://www.childbirthcollective.org/  Doulas of [place] ([place]EL[place]):  www.melissa.org  [facility]  project: http://twincitiesdoulaproject.com/     Early Childhood and Family Education (ECFE):  ECFE offers parents hands-on learning experiences that will nourish a lifetime of teachable moments.  http://ecfe.info/ecfe-home/    March of Dimes www.SimplyGiving.com - Nutrition  www.mypyramid.gov  Under \"For Consumers,\" click on \"pregnant and breastfeeding women.\"      Centers for Disease Control and Prevention (CDC) - Vaccines : http://www.cdc.gov/vaccines/       When " researching information on the web, question the validity of websites.  The Clicker .gov, .edu and.org tend to be more reliable information.  If there are a lot of advertisements, be cautious of the information provided. Stay away from blogs and chat rooms please!     [place] Screening Program  Http://www.health.[facility].[place].us/newbornscreening/  [place] newborns are tested soon after birth for more than 50 hidden, rare disorders, including hearing loss and critical congenital heart disease (CCHD). This site provides resources and information for families and providers.    HEALTHY PREGNANCY CARE: 37 to 41 WEEKS PREGNANT    Talk with your midwife or physician about when to call with signs of labor    Regular uterine contractions that are getting closer together and/or stronger    If you think your water has broken or is leaking    Bleeding from the vagina like a period (bloody vaginal discharge is normal)    If you are not feeling your baby move    Make plans for transportation and  as needed for when you are going to the hospital.    Your midwife or physician may offer to check your cervix for changes.     Ask your health care provider about vaccinations you may need following delivery. By now, you should have received a Tdap immunization to protect against pertussis or whooping cough. Fathers and family members who will be in close contact with the baby should also receive a Tdap shot at least two weeks before the expected birth of the baby if they have not had a Td (tetanus) shot for at least two years.    If you are past your due date, discuss the next steps leading to delivery with your midwife or physician. If you don't start labor on your own by 41 or 42 weeks, your midwife or physician may recommend giving you medicines to ripen your cervix and start labor.  Induction of labor:  http://onlinelibrary.lomax.com/store/10.1016/j.jmwh.2008.04.018/asset/j.jmwh.2008.04.018.pdf?v=1&t=imwx1auu&b=[email or web address]    Preparing for your baby: Tell your midwife or physician how you plan to feed your baby (breast or bottle), who you have chosen to do pediatric care for your baby, and if you have a boy, whether you have chosen to have him circumcised. You will need a car seat correctly installed in your vehicle to bring your baby home. As you start to set up the nursery at home for your baby, make sure the crib is safe. The mattress needs to fit snugly against the edges of the crib. If you can fit a soda can between the bars, they are too far apart and can allow the baby's head to caught between them.    Learn about infant care and feeding, including information about infant CPR. We recommend that you put your baby to sleep on his or her back to reduce the chance of Sudden Infant Death Syndrome (SIDS). To maintain a healthy environment in which your child can grow, it's best to keep your home smoke-free. By preparing ahead, your transition into parenthood will go smoothly for you and your baby.    Your midwife or physician will want to see you for a checkup 2 to 6 weeks after delivery.    If you have questions about any symptoms you are experiencing or any other concerns, call your provider or their clinic staff at [facility] at Dept: [phone]. If it's after clinic hours, physician patients should call the Care Connection at [phone] ([phone]); midwife patients should call their answering service at [phone].    How can you care for yourself at home?   You can refer to the Starting Out Right book or find it online at http://www.healtheast.org/images/stories/maternity/[facility]-Starting-Out-Right.pdf or http://www.healtheast.org/images/stories/flipbooks/[facility]-starting-out-right/[facility]-starting-out-right.html#p=8     You can sign up  for a weekly parenting e-mail that gives support, tips and advice from health care professionals that starts with pregnancy and continues through the toddler years. To register, go to www.[facility].org/baby at any time during your pregnancy.        Making Plans for Feeding My Baby    By this point, you probably have read a lot about feeding your baby.  Breastfeed or formula? Each mother s decision is her own and [facility] respects you and your choices. We ve gathered information on both breastfeeding and formula feeding to help with your decision. Talking with your physician or nurse-midwife can also help in your decision.  However you plan to feed your baby, [facility] Maternity Care Centers encourage rooming in with your baby, skin-to-skin contact and feeding your baby based on his or her cues.    Skin-to-skin contact  Being close to mom helps your baby adjust to life outside of the womb.  It helps your baby regulate their body temperature, heart rate, and breathing.  Your baby will usually be placed skin-to-skin immediately following birth or as soon as possible, if medical intervention is needed.    Rooming-In  Having your baby stay with you in your room is called  rooming-in .  Keeping your baby in your room helps you to learn how to care for your baby by getting to know your baby s cues, body rhythms and sleep cycle.       Cue-based feeding  Cues (signals) are baby s way of telling you what he or she wants.  When you learn your infant s cues, you know how to care for and feed your baby.   Feeding cues are the licking and smacking of lips, bringing their fist to their mouth, and a reflex called  rooting - where baby turns and opens his or her mouth, searching for the breast or bottle.  Crying is a late feeding cue.  Babies can feed frequently, often at least 8 times in 24 hours.    Breastfeeding facts  Breast milk is the best source of nutrition for your baby and is available at birth. In the first couple of  days, your milk volume is already starting to increase, though it may not be noticeable. Breastfeed frequently to increase your milk supply. Within three to five days, you will begin to notice larger milk volumes. An increase in breast size, heaviness and firmness are often described as the milk  coming in.  Frequent breastfeeding can help breasts from getting overly firm and painful. You will know the baby is getting enough milk if your baby is having wet and dirty diapers and gaining weight.     If your goal is to exclusively breastfeed, it is important to not use any formula or artificial nipples (including bottles and pacifiers) while your baby is learning to breastfeed.  While it may seem like an  easy  option to give your baby a bottle, formula should only be given if there is a medical reason for your baby to have it.      Positioning and attachment   Get comfortable.  Use pillows as needed to support your arms and baby.  Hold baby close at the level of your breast, facing you in a tummy to tummy position.  Skin to skin helps with this.  Position the baby with his or her nose by the nipple.  There should be a straight line from baby s ear to shoulder to hips.  Tickle your baby s lips or wait for baby to open mouth wide, bring baby to breast by leading with the chin.  Aim the nipple at the roof of baby s mouth.  A rapid sucking pattern is followed by longer, drawing pattern with occasional swallows heard.  When baby is correctly latched, your nipple and much of the areola are pulled well into baby s mouth.      Returning to work or school  Focus on a good start to breastfeeding.  Many women continue to provide breastmilk for their baby when they return to work or school.  Making plans about where to pump and store milk can make the transition go well.  Talk with other mothers who have also returned to work or school for tips and support.  Your employer s Human Resource department may be a resource as well.      Returning to work or school: (continued)     babies can mean fewer  sick  days for you.    A quality breast pump will also save time and add comfort.  Check with your insurance prior to giving birth for breast pump coverage.  Many insurance companies include a pump within your benefits.    Wait until your baby is at least three weeks old to introduce a bottle for the first time.  Have someone besides you give the bottle.    Breastfeed when you are with your baby. Reserve your bottles of breast milk for when you are away.     Your breasts will need to be  emptied  either by your baby or a pump.  Plan to pump at least twice in an eight hour day.    If you cannot pump at work, continue breastfeeding at home. Any amount of breast milk is worth giving to your baby.    Formula feeding facts  If you are planning to use formula to feed your baby, you will want to make some preparations ahead of time. Talk to your doctor or nurse-midwife about what type of formula to use. Some are iron-fortified, meaning they have extra iron in them. You will want to purchase formula and bottles before your baby is born to be sure you are ready after you return from the hospital. [facility] do not provide formula samples to take home.    Be sure to follow formula mixing directions closely. Regular milk in the dairy case at the grocery store should not be given to babies under 1 year old. Baby formula is sold in several forms including:    Ready-to-use. This is the most expensive, but no mixing is necessary.    Concentrated liquid. This is less expensive than ready-to-use and you mix with water.    Powder. This is the least expensive. You mix one level scoop of powdered formula with two ounces of water and stir well.    Most babies need 2.5 ounces of formula per pound of body weight each day. This means an 8-pound baby may drink about 20 ounces of formula a day; however, this is just an estimate. The most important  thing is to pay attention to your baby s cues.  If your baby is always fussy, needs more iron or has certain food allergies, your physician may suggest you change your baby s formula to a different kind.     How do I warm my baby s bottles?  You may feed your baby a bottle without warming it first. It is OK for the breast milk or formula to be cool or room temperature. If your baby seems to prefer it warmed, you can put the filled bottle in a container of warm water and let it stand for a few minutes. Check the temperature of the liquid on your skin before feeding it to your baby; to be sure it isn t too hot. Do not heat bottles in the microwave. Microwaves heat food and liquids unevenly, and this can cause hot spots that can burn your baby.    How do I clean and sterilize bottles?  Sterilize bottles and nipples before you use them for the first time. You can do this by putting them in boiling water for 5 minutes. After that first time, you can wash them in hot and soapy water. Rinse them carefully to be sure there is no soap left on them. You can also wash them in the .    [facility] Connection [phone] ([phone])    Baby Café    Pregnant and interested in breastfeeding?  Need answers to breastfeeding questions?  Want to help breastfeeding moms?  Already breastfeeding and want to meet other moms?    Join us at the Baby Café!    Baby Cafe is a free, drop-in service offering breast-feeding support for pregnant women, breast-feeding mothers and their families.  Come share tips and socialize with other mothers.  Babies and siblings are welcome (no childcare available).    Starting April 2018, Baby Café will be at 4 locations.  Please see below for the Baby Café closest to you!      [facility]  [address] [place] [address]  1st Wednesday: 10am-12pm    [facility]  [address] [place] [address]  3rd Wednesday 4-6pm    [facility]  [address] [place]  "[id number]   10am-12:30pm    [facility]  [address] [place] [address]  : 4-6pm      Hmong, Uzbek, and Japanese which is may be available at some sites.    For more information, please contact: [email or web address] [email or web address]. or [phone]      Virtual Breastfeeding Support:    During this time of isolation, breastfeeding families need even more community!  Here are some area organizations offering virtual support groups for breastfeeding:      [facility] Support Group,  at 10:30 am   Run by NICOLASA Mcginnis of [employer]   Go to [employer] Facebook page and click on \"events\" for link   https://www.[employer].com/events/204178669822186/    [facility] Milk Hour,  at 2:30 pm    Run by NICOLASA Hope   Go to [facility] FB page and send message to get link   https://www.[employer].Motionloft/healthfoundations/    [facility] holding virtual meetings the first Tuesday of each month, 8-9 pm, and the   Third Saturday, 10 - 11 am.  Go to [facility] [place] FB page; message to get link https://www.[employer]/LLLofGoldenValley/?hc_location=[facility]    [facility] offers a Lactation Lounge every Friday 12pm - 1pm, run by Barbara [facility] Leader   Sign up via link at Parse/cbe-lactation   https://www.Parse/cbe-lactation    [facility] is offering virtual support groups every Monday, 10:30 am - 12 pm, run by nurse IBCLC   Https://www.facebook.com/events/708775850015686/    Prenatal Breastfeeding Classes:        [facility] is offering virtual breastfeeding and  care classes:  https://www.Parse/education-workshops    Birth[employer] childbirth and breastfeeding education offering virtual prenatal breastfeeding " classes  https://www.Akermin.com/workshops

## 2021-10-07 NOTE — PROGRESS NOTES
Here alone today for routine prenatal visit at 37w4d. Notes active FM, having BH contractions occasionally throughout they day but no regular UCs. Today we discussed the progression of her iron supplementation and infusion and her lab results for hemoglobin. Discussed how quickly IV iron helps to raise her levels and the slow increase with oral supplementation. Advised that we recommend IV iron at the end of pregnancy to achieve a normal hemoglobin before birth as we anticipate a normal blood loss with birth but also to protect her from the result of any excessive blood loss. She continues with floradix 10 ml twice daily and is tolerating this well; encouraged to continue. States she is open to more IV iron infusions but will discuss this with her spouse before scheduling; ordered new infusion therapy orders for venofer x 5 with at least 2 days between infusions. Plan for labs on admission or 2 weeks after iron infusions are completed. Discussed GBS positive results, reviewed normalcy but recommendation for prophylaxis in labor, recommendations for timing to present in labor or with SROM at home; all questions answered and accepting of antibiotics in labor. Requested SVE today; completed without difficulty, see flow sheet for exam details. Reviewed warning s/sx and reasons to call. RTC 1 week.

## 2021-10-11 ENCOUNTER — INFUSION THERAPY VISIT (OUTPATIENT)
Dept: INFUSION THERAPY | Facility: CLINIC | Age: 26
End: 2021-10-11
Attending: ADVANCED PRACTICE MIDWIFE
Payer: COMMERCIAL

## 2021-10-11 VITALS
HEART RATE: 84 BPM | SYSTOLIC BLOOD PRESSURE: 101 MMHG | TEMPERATURE: 98.4 F | RESPIRATION RATE: 16 BRPM | DIASTOLIC BLOOD PRESSURE: 56 MMHG

## 2021-10-11 DIAGNOSIS — Z34.83 ENCOUNTER FOR SUPERVISION OF OTHER NORMAL PREGNANCY IN THIRD TRIMESTER: ICD-10-CM

## 2021-10-11 DIAGNOSIS — O99.013 ANEMIA AFFECTING PREGNANCY IN THIRD TRIMESTER: Primary | ICD-10-CM

## 2021-10-11 PROCEDURE — 250N000011 HC RX IP 250 OP 636: Performed by: ADVANCED PRACTICE MIDWIFE

## 2021-10-11 PROCEDURE — 258N000003 HC RX IP 258 OP 636: Performed by: ADVANCED PRACTICE MIDWIFE

## 2021-10-11 PROCEDURE — 96365 THER/PROPH/DIAG IV INF INIT: CPT

## 2021-10-11 RX ORDER — EPINEPHRINE 1 MG/ML
0.3 INJECTION, SOLUTION INTRAMUSCULAR; SUBCUTANEOUS EVERY 5 MIN PRN
Status: CANCELLED | OUTPATIENT
Start: 2021-10-13

## 2021-10-11 RX ORDER — HEPARIN SODIUM (PORCINE) LOCK FLUSH IV SOLN 100 UNIT/ML 100 UNIT/ML
5 SOLUTION INTRAVENOUS
Status: CANCELLED | OUTPATIENT
Start: 2021-10-13

## 2021-10-11 RX ORDER — ALBUTEROL SULFATE 0.83 MG/ML
2.5 SOLUTION RESPIRATORY (INHALATION)
Status: CANCELLED | OUTPATIENT
Start: 2021-10-13

## 2021-10-11 RX ORDER — NALOXONE HYDROCHLORIDE 0.4 MG/ML
0.2 INJECTION, SOLUTION INTRAMUSCULAR; INTRAVENOUS; SUBCUTANEOUS
Status: CANCELLED | OUTPATIENT
Start: 2021-10-13

## 2021-10-11 RX ORDER — HEPARIN SODIUM,PORCINE 10 UNIT/ML
5 VIAL (ML) INTRAVENOUS
Status: CANCELLED | OUTPATIENT
Start: 2021-10-13

## 2021-10-11 RX ORDER — DIPHENHYDRAMINE HYDROCHLORIDE 50 MG/ML
50 INJECTION INTRAMUSCULAR; INTRAVENOUS
Status: CANCELLED
Start: 2021-10-13

## 2021-10-11 RX ORDER — MEPERIDINE HYDROCHLORIDE 50 MG/ML
25 INJECTION INTRAMUSCULAR; INTRAVENOUS; SUBCUTANEOUS EVERY 30 MIN PRN
Status: CANCELLED | OUTPATIENT
Start: 2021-10-13

## 2021-10-11 RX ORDER — METHYLPREDNISOLONE SODIUM SUCCINATE 125 MG/2ML
125 INJECTION, POWDER, LYOPHILIZED, FOR SOLUTION INTRAMUSCULAR; INTRAVENOUS
Status: CANCELLED
Start: 2021-10-13

## 2021-10-11 RX ORDER — ALBUTEROL SULFATE 90 UG/1
1-2 AEROSOL, METERED RESPIRATORY (INHALATION)
Status: CANCELLED
Start: 2021-10-13

## 2021-10-11 RX ADMIN — IRON SUCROSE 200 MG: 20 INJECTION, SOLUTION INTRAVENOUS at 09:47

## 2021-10-11 RX ADMIN — SODIUM CHLORIDE 250 ML: 9 INJECTION, SOLUTION INTRAVENOUS at 09:45

## 2021-10-11 NOTE — PROGRESS NOTES
Infusion Nursing Note:  Bob LIOR Wade presents today for Iron infusion.    Patient seen by provider today: No   present during visit today: Not Applicable.    Note: Tolerated Iron infusion well, .      Intravenous Access:  Peripheral IV placed.    Treatment Conditions:  Results reviewed, labs MET treatment parameters, ok to proceed with treatment.      Post Infusion Assessment:  Patient tolerated infusion well  Blood return noted pre and post infusion.  Site patent and intact, free from redness, edema or discomfort.  No evidence of extravasations.  Access discontinued per protocol.       Discharge Plan:   Patient and/or family verbalized understanding of discharge instructions and all questions answered.      Delmis Sheth RN

## 2021-10-13 ENCOUNTER — INFUSION THERAPY VISIT (OUTPATIENT)
Dept: INFUSION THERAPY | Facility: CLINIC | Age: 26
End: 2021-10-13
Attending: ADVANCED PRACTICE MIDWIFE
Payer: COMMERCIAL

## 2021-10-13 VITALS
SYSTOLIC BLOOD PRESSURE: 102 MMHG | RESPIRATION RATE: 16 BRPM | HEART RATE: 70 BPM | DIASTOLIC BLOOD PRESSURE: 60 MMHG | OXYGEN SATURATION: 99 % | TEMPERATURE: 97.9 F

## 2021-10-13 DIAGNOSIS — O99.013 ANEMIA AFFECTING PREGNANCY IN THIRD TRIMESTER: Primary | ICD-10-CM

## 2021-10-13 DIAGNOSIS — Z34.83 ENCOUNTER FOR SUPERVISION OF OTHER NORMAL PREGNANCY IN THIRD TRIMESTER: ICD-10-CM

## 2021-10-13 PROCEDURE — 96365 THER/PROPH/DIAG IV INF INIT: CPT

## 2021-10-13 PROCEDURE — 250N000011 HC RX IP 250 OP 636: Performed by: ADVANCED PRACTICE MIDWIFE

## 2021-10-13 PROCEDURE — 258N000003 HC RX IP 258 OP 636: Performed by: ADVANCED PRACTICE MIDWIFE

## 2021-10-13 RX ORDER — EPINEPHRINE 1 MG/ML
0.3 INJECTION, SOLUTION INTRAMUSCULAR; SUBCUTANEOUS EVERY 5 MIN PRN
Status: CANCELLED | OUTPATIENT
Start: 2021-10-15

## 2021-10-13 RX ORDER — HEPARIN SODIUM (PORCINE) LOCK FLUSH IV SOLN 100 UNIT/ML 100 UNIT/ML
5 SOLUTION INTRAVENOUS
Status: CANCELLED | OUTPATIENT
Start: 2021-10-15

## 2021-10-13 RX ORDER — ALBUTEROL SULFATE 90 UG/1
1-2 AEROSOL, METERED RESPIRATORY (INHALATION)
Status: DISCONTINUED | OUTPATIENT
Start: 2021-10-13 | End: 2021-10-13 | Stop reason: HOSPADM

## 2021-10-13 RX ORDER — MEPERIDINE HYDROCHLORIDE 50 MG/ML
25 INJECTION INTRAMUSCULAR; INTRAVENOUS; SUBCUTANEOUS EVERY 30 MIN PRN
Status: CANCELLED | OUTPATIENT
Start: 2021-10-15

## 2021-10-13 RX ORDER — DIPHENHYDRAMINE HYDROCHLORIDE 50 MG/ML
50 INJECTION INTRAMUSCULAR; INTRAVENOUS
Status: DISCONTINUED | OUTPATIENT
Start: 2021-10-13 | End: 2021-10-13 | Stop reason: HOSPADM

## 2021-10-13 RX ORDER — NALOXONE HYDROCHLORIDE 0.4 MG/ML
0.2 INJECTION, SOLUTION INTRAMUSCULAR; INTRAVENOUS; SUBCUTANEOUS
Status: DISCONTINUED | OUTPATIENT
Start: 2021-10-13 | End: 2021-10-13 | Stop reason: HOSPADM

## 2021-10-13 RX ORDER — METHYLPREDNISOLONE SODIUM SUCCINATE 125 MG/2ML
125 INJECTION, POWDER, LYOPHILIZED, FOR SOLUTION INTRAMUSCULAR; INTRAVENOUS
Status: CANCELLED
Start: 2021-10-15

## 2021-10-13 RX ORDER — NALOXONE HYDROCHLORIDE 0.4 MG/ML
0.2 INJECTION, SOLUTION INTRAMUSCULAR; INTRAVENOUS; SUBCUTANEOUS
Status: CANCELLED | OUTPATIENT
Start: 2021-10-15

## 2021-10-13 RX ORDER — ALBUTEROL SULFATE 90 UG/1
1-2 AEROSOL, METERED RESPIRATORY (INHALATION)
Status: CANCELLED
Start: 2021-10-15

## 2021-10-13 RX ORDER — MEPERIDINE HYDROCHLORIDE 50 MG/ML
25 INJECTION INTRAMUSCULAR; INTRAVENOUS; SUBCUTANEOUS EVERY 30 MIN PRN
Status: DISCONTINUED | OUTPATIENT
Start: 2021-10-13 | End: 2021-10-13 | Stop reason: HOSPADM

## 2021-10-13 RX ORDER — HEPARIN SODIUM,PORCINE 10 UNIT/ML
5 VIAL (ML) INTRAVENOUS
Status: CANCELLED | OUTPATIENT
Start: 2021-10-15

## 2021-10-13 RX ORDER — ALBUTEROL SULFATE 0.83 MG/ML
2.5 SOLUTION RESPIRATORY (INHALATION)
Status: CANCELLED | OUTPATIENT
Start: 2021-10-15

## 2021-10-13 RX ORDER — EPINEPHRINE 1 MG/ML
0.3 INJECTION, SOLUTION INTRAMUSCULAR; SUBCUTANEOUS EVERY 5 MIN PRN
Status: DISCONTINUED | OUTPATIENT
Start: 2021-10-13 | End: 2021-10-13 | Stop reason: HOSPADM

## 2021-10-13 RX ORDER — DIPHENHYDRAMINE HYDROCHLORIDE 50 MG/ML
50 INJECTION INTRAMUSCULAR; INTRAVENOUS
Status: CANCELLED
Start: 2021-10-15

## 2021-10-13 RX ORDER — METHYLPREDNISOLONE SODIUM SUCCINATE 125 MG/2ML
125 INJECTION, POWDER, LYOPHILIZED, FOR SOLUTION INTRAMUSCULAR; INTRAVENOUS
Status: DISCONTINUED | OUTPATIENT
Start: 2021-10-13 | End: 2021-10-13 | Stop reason: HOSPADM

## 2021-10-13 RX ORDER — ALBUTEROL SULFATE 0.83 MG/ML
2.5 SOLUTION RESPIRATORY (INHALATION)
Status: DISCONTINUED | OUTPATIENT
Start: 2021-10-13 | End: 2021-10-13 | Stop reason: HOSPADM

## 2021-10-13 RX ADMIN — IRON SUCROSE 200 MG: 20 INJECTION, SOLUTION INTRAVENOUS at 12:55

## 2021-10-13 RX ADMIN — SODIUM CHLORIDE 250 ML: 9 INJECTION, SOLUTION INTRAVENOUS at 12:56

## 2021-10-13 NOTE — PROGRESS NOTES
Infusion Nursing Note:  Juliawild Dalenatalia presents today for Iron infusion.    Patient seen by provider today: No   present during visit today: Not Applicable.    Note: Tolerates infusion well offers no complaints       Intravenous Access:  Peripheral IV placed.    Treatment Conditions:  Not Applicable.      Post Infusion Assessment:  Patient tolerated infusion without incident.       Discharge Plan:   Patient and/or family verbalized understanding of discharge instructions and all questions answered.      Delmis Sheth RN

## 2021-10-14 ENCOUNTER — PRENATAL OFFICE VISIT (OUTPATIENT)
Dept: MIDWIFE SERVICES | Facility: CLINIC | Age: 26
End: 2021-10-14
Payer: COMMERCIAL

## 2021-10-14 VITALS
WEIGHT: 156.2 LBS | HEART RATE: 72 BPM | SYSTOLIC BLOOD PRESSURE: 100 MMHG | HEIGHT: 61 IN | DIASTOLIC BLOOD PRESSURE: 64 MMHG | BODY MASS INDEX: 29.49 KG/M2

## 2021-10-14 DIAGNOSIS — Z34.83 ENCOUNTER FOR SUPERVISION OF OTHER NORMAL PREGNANCY IN THIRD TRIMESTER: Primary | ICD-10-CM

## 2021-10-14 PROCEDURE — 99207 PR PRENATAL VISIT: CPT | Performed by: MIDWIFE

## 2021-10-14 ASSESSMENT — MIFFLIN-ST. JEOR: SCORE: 1384.86

## 2021-10-14 NOTE — PROGRESS NOTES
"For problem list: Considering NFP or Paragard    S: Bob is a  at 38w4d who presents today for an ORTIZ. Reports good fetal movement. Has been receiving IV iron, next appointment is tomorrow. Continues to take liquid iron supplement. Denies VB, LOF. Reports a little cramping in pelvis, low back pain, and some [place] contractions but no regular, painful contractions. Denies headaches, vision changes, and edema. Discussed options for postpartum contraception, Bob is not interested in hormonal options and is considering NFP or the Paragard. No other concerns today.      O:  /64 (BP Location: Left arm, Patient Position: Sitting, Cuff Size: Adult Regular)   Pulse 72   Ht 1.556 m (5' 1.25\")   Wt 70.9 kg (156 lb 3.2 oz)   LMP 2021   BMI 29.27 kg/m      A/P:    IUP @ 38w4d: Third trimester precautions discussed.    RTC in one week.    Patient was seen with student, ELIZABETH Meyers who was present for learning. I personally assessed, examined and made clinical decisions reflected in the documentation.       "

## 2021-10-15 ENCOUNTER — INFUSION THERAPY VISIT (OUTPATIENT)
Dept: INFUSION THERAPY | Facility: CLINIC | Age: 26
End: 2021-10-15
Attending: ADVANCED PRACTICE MIDWIFE
Payer: COMMERCIAL

## 2021-10-15 VITALS
TEMPERATURE: 98.6 F | HEART RATE: 68 BPM | OXYGEN SATURATION: 98 % | RESPIRATION RATE: 16 BRPM | DIASTOLIC BLOOD PRESSURE: 68 MMHG | SYSTOLIC BLOOD PRESSURE: 111 MMHG

## 2021-10-15 DIAGNOSIS — Z34.83 ENCOUNTER FOR SUPERVISION OF OTHER NORMAL PREGNANCY IN THIRD TRIMESTER: ICD-10-CM

## 2021-10-15 DIAGNOSIS — O99.013 ANEMIA AFFECTING PREGNANCY IN THIRD TRIMESTER: Primary | ICD-10-CM

## 2021-10-15 PROCEDURE — 250N000011 HC RX IP 250 OP 636: Performed by: ADVANCED PRACTICE MIDWIFE

## 2021-10-15 PROCEDURE — 96365 THER/PROPH/DIAG IV INF INIT: CPT

## 2021-10-15 PROCEDURE — 258N000003 HC RX IP 258 OP 636: Performed by: ADVANCED PRACTICE MIDWIFE

## 2021-10-15 RX ORDER — HEPARIN SODIUM,PORCINE 10 UNIT/ML
5 VIAL (ML) INTRAVENOUS
Status: CANCELLED | OUTPATIENT
Start: 2021-10-17

## 2021-10-15 RX ORDER — MEPERIDINE HYDROCHLORIDE 50 MG/ML
25 INJECTION INTRAMUSCULAR; INTRAVENOUS; SUBCUTANEOUS EVERY 30 MIN PRN
Status: CANCELLED | OUTPATIENT
Start: 2021-10-17

## 2021-10-15 RX ORDER — NALOXONE HYDROCHLORIDE 0.4 MG/ML
0.2 INJECTION, SOLUTION INTRAMUSCULAR; INTRAVENOUS; SUBCUTANEOUS
Status: DISCONTINUED | OUTPATIENT
Start: 2021-10-15 | End: 2021-10-15 | Stop reason: HOSPADM

## 2021-10-15 RX ORDER — METHYLPREDNISOLONE SODIUM SUCCINATE 125 MG/2ML
125 INJECTION, POWDER, LYOPHILIZED, FOR SOLUTION INTRAMUSCULAR; INTRAVENOUS
Status: DISCONTINUED | OUTPATIENT
Start: 2021-10-15 | End: 2021-10-15 | Stop reason: HOSPADM

## 2021-10-15 RX ORDER — EPINEPHRINE 1 MG/ML
0.3 INJECTION, SOLUTION INTRAMUSCULAR; SUBCUTANEOUS EVERY 5 MIN PRN
Status: CANCELLED | OUTPATIENT
Start: 2021-10-17

## 2021-10-15 RX ORDER — DIPHENHYDRAMINE HYDROCHLORIDE 50 MG/ML
50 INJECTION INTRAMUSCULAR; INTRAVENOUS
Status: CANCELLED
Start: 2021-10-17

## 2021-10-15 RX ORDER — ALBUTEROL SULFATE 90 UG/1
1-2 AEROSOL, METERED RESPIRATORY (INHALATION)
Status: DISCONTINUED | OUTPATIENT
Start: 2021-10-15 | End: 2021-10-15 | Stop reason: HOSPADM

## 2021-10-15 RX ORDER — NALOXONE HYDROCHLORIDE 0.4 MG/ML
0.2 INJECTION, SOLUTION INTRAMUSCULAR; INTRAVENOUS; SUBCUTANEOUS
Status: CANCELLED | OUTPATIENT
Start: 2021-10-17

## 2021-10-15 RX ORDER — METHYLPREDNISOLONE SODIUM SUCCINATE 125 MG/2ML
125 INJECTION, POWDER, LYOPHILIZED, FOR SOLUTION INTRAMUSCULAR; INTRAVENOUS
Status: CANCELLED
Start: 2021-10-17

## 2021-10-15 RX ORDER — DIPHENHYDRAMINE HYDROCHLORIDE 50 MG/ML
50 INJECTION INTRAMUSCULAR; INTRAVENOUS
Status: DISCONTINUED | OUTPATIENT
Start: 2021-10-15 | End: 2021-10-15 | Stop reason: HOSPADM

## 2021-10-15 RX ORDER — HEPARIN SODIUM (PORCINE) LOCK FLUSH IV SOLN 100 UNIT/ML 100 UNIT/ML
5 SOLUTION INTRAVENOUS
Status: CANCELLED | OUTPATIENT
Start: 2021-10-17

## 2021-10-15 RX ORDER — EPINEPHRINE 1 MG/ML
0.3 INJECTION, SOLUTION INTRAMUSCULAR; SUBCUTANEOUS EVERY 5 MIN PRN
Status: DISCONTINUED | OUTPATIENT
Start: 2021-10-15 | End: 2021-10-15 | Stop reason: HOSPADM

## 2021-10-15 RX ORDER — MEPERIDINE HYDROCHLORIDE 50 MG/ML
25 INJECTION INTRAMUSCULAR; INTRAVENOUS; SUBCUTANEOUS EVERY 30 MIN PRN
Status: DISCONTINUED | OUTPATIENT
Start: 2021-10-15 | End: 2021-10-15 | Stop reason: HOSPADM

## 2021-10-15 RX ORDER — ALBUTEROL SULFATE 90 UG/1
1-2 AEROSOL, METERED RESPIRATORY (INHALATION)
Status: CANCELLED
Start: 2021-10-17

## 2021-10-15 RX ORDER — ALBUTEROL SULFATE 0.83 MG/ML
2.5 SOLUTION RESPIRATORY (INHALATION)
Status: CANCELLED | OUTPATIENT
Start: 2021-10-17

## 2021-10-15 RX ORDER — ALBUTEROL SULFATE 0.83 MG/ML
2.5 SOLUTION RESPIRATORY (INHALATION)
Status: DISCONTINUED | OUTPATIENT
Start: 2021-10-15 | End: 2021-10-15 | Stop reason: HOSPADM

## 2021-10-15 RX ADMIN — IRON SUCROSE 200 MG: 20 INJECTION, SOLUTION INTRAVENOUS at 08:42

## 2021-10-15 RX ADMIN — SODIUM CHLORIDE 250 ML: 9 INJECTION, SOLUTION INTRAVENOUS at 08:41

## 2021-10-15 NOTE — PROGRESS NOTES
Infusion Nursing Note:  Bob Olvera presents today for Venofer Infusion   Patient seen by provider today: No   present during visit today: Not Applicable.    Note: Patient ambulated into Infusion Care by herself. Patient is alert and oriented and VSS. A peripheral IV was placed in her left AC. Patient received Venofer Infusion without any problems. Peripheral IV was flushed with Normal saline and discontinued. Dry 2 x 2 gauze was wrapped with Coban around IV site. Patient will return next week for 2 more Iron Infusions. Her baby is due October 19,2021.      Intravenous Access:  Peripheral IV placed.    Treatment Conditions:  Patient received Venofer Infusion.      Post Infusion Assessment:  Patient tolerated infusion without incident.       Discharge Plan:   Patient and/or family verbalized understanding of discharge instructions and all questions answered.      Chelsey Waters RN,OCN

## 2021-10-19 ENCOUNTER — INFUSION THERAPY VISIT (OUTPATIENT)
Dept: INFUSION THERAPY | Facility: CLINIC | Age: 26
End: 2021-10-19
Payer: COMMERCIAL

## 2021-10-19 VITALS
HEART RATE: 72 BPM | RESPIRATION RATE: 16 BRPM | OXYGEN SATURATION: 99 % | TEMPERATURE: 97.8 F | SYSTOLIC BLOOD PRESSURE: 96 MMHG | DIASTOLIC BLOOD PRESSURE: 61 MMHG

## 2021-10-19 DIAGNOSIS — Z34.83 ENCOUNTER FOR SUPERVISION OF OTHER NORMAL PREGNANCY IN THIRD TRIMESTER: ICD-10-CM

## 2021-10-19 DIAGNOSIS — O99.013 ANEMIA AFFECTING PREGNANCY IN THIRD TRIMESTER: Primary | ICD-10-CM

## 2021-10-19 PROBLEM — Z87.59 HISTORY OF POSTPARTUM HEMORRHAGE: Status: ACTIVE | Noted: 2021-10-19

## 2021-10-19 PROCEDURE — 250N000011 HC RX IP 250 OP 636: Performed by: ADVANCED PRACTICE MIDWIFE

## 2021-10-19 PROCEDURE — 96365 THER/PROPH/DIAG IV INF INIT: CPT

## 2021-10-19 PROCEDURE — 258N000003 HC RX IP 258 OP 636: Performed by: ADVANCED PRACTICE MIDWIFE

## 2021-10-19 RX ORDER — DIPHENHYDRAMINE HYDROCHLORIDE 50 MG/ML
50 INJECTION INTRAMUSCULAR; INTRAVENOUS
Status: CANCELLED
Start: 2021-10-21

## 2021-10-19 RX ORDER — MEPERIDINE HYDROCHLORIDE 50 MG/ML
25 INJECTION INTRAMUSCULAR; INTRAVENOUS; SUBCUTANEOUS EVERY 30 MIN PRN
Status: DISCONTINUED | OUTPATIENT
Start: 2021-10-19 | End: 2021-10-19 | Stop reason: HOSPADM

## 2021-10-19 RX ORDER — METHYLPREDNISOLONE SODIUM SUCCINATE 125 MG/2ML
125 INJECTION, POWDER, LYOPHILIZED, FOR SOLUTION INTRAMUSCULAR; INTRAVENOUS
Status: CANCELLED
Start: 2021-10-21

## 2021-10-19 RX ORDER — HEPARIN SODIUM,PORCINE 10 UNIT/ML
5 VIAL (ML) INTRAVENOUS
Status: CANCELLED | OUTPATIENT
Start: 2021-10-21

## 2021-10-19 RX ORDER — METHYLPREDNISOLONE SODIUM SUCCINATE 125 MG/2ML
125 INJECTION, POWDER, LYOPHILIZED, FOR SOLUTION INTRAMUSCULAR; INTRAVENOUS
Status: DISCONTINUED | OUTPATIENT
Start: 2021-10-19 | End: 2021-10-19 | Stop reason: HOSPADM

## 2021-10-19 RX ORDER — MEPERIDINE HYDROCHLORIDE 50 MG/ML
25 INJECTION INTRAMUSCULAR; INTRAVENOUS; SUBCUTANEOUS EVERY 30 MIN PRN
Status: CANCELLED | OUTPATIENT
Start: 2021-10-21

## 2021-10-19 RX ORDER — EPINEPHRINE 1 MG/ML
0.3 INJECTION, SOLUTION INTRAMUSCULAR; SUBCUTANEOUS EVERY 5 MIN PRN
Status: DISCONTINUED | OUTPATIENT
Start: 2021-10-19 | End: 2021-10-19 | Stop reason: HOSPADM

## 2021-10-19 RX ORDER — NALOXONE HYDROCHLORIDE 0.4 MG/ML
0.2 INJECTION, SOLUTION INTRAMUSCULAR; INTRAVENOUS; SUBCUTANEOUS
Status: CANCELLED | OUTPATIENT
Start: 2021-10-21

## 2021-10-19 RX ORDER — ALBUTEROL SULFATE 90 UG/1
1-2 AEROSOL, METERED RESPIRATORY (INHALATION)
Status: CANCELLED
Start: 2021-10-21

## 2021-10-19 RX ORDER — HEPARIN SODIUM (PORCINE) LOCK FLUSH IV SOLN 100 UNIT/ML 100 UNIT/ML
5 SOLUTION INTRAVENOUS
Status: CANCELLED | OUTPATIENT
Start: 2021-10-21

## 2021-10-19 RX ORDER — EPINEPHRINE 1 MG/ML
0.3 INJECTION, SOLUTION INTRAMUSCULAR; SUBCUTANEOUS EVERY 5 MIN PRN
Status: CANCELLED | OUTPATIENT
Start: 2021-10-21

## 2021-10-19 RX ORDER — NALOXONE HYDROCHLORIDE 0.4 MG/ML
0.2 INJECTION, SOLUTION INTRAMUSCULAR; INTRAVENOUS; SUBCUTANEOUS
Status: DISCONTINUED | OUTPATIENT
Start: 2021-10-19 | End: 2021-10-19 | Stop reason: HOSPADM

## 2021-10-19 RX ORDER — ALBUTEROL SULFATE 0.83 MG/ML
2.5 SOLUTION RESPIRATORY (INHALATION)
Status: DISCONTINUED | OUTPATIENT
Start: 2021-10-19 | End: 2021-10-19 | Stop reason: HOSPADM

## 2021-10-19 RX ORDER — ALBUTEROL SULFATE 0.83 MG/ML
2.5 SOLUTION RESPIRATORY (INHALATION)
Status: CANCELLED | OUTPATIENT
Start: 2021-10-21

## 2021-10-19 RX ORDER — FERROUS SULFATE 15 MG/ML
DROPS ORAL
COMMUNITY
Start: 2021-10-16 | End: 2021-11-09

## 2021-10-19 RX ORDER — DIPHENHYDRAMINE HYDROCHLORIDE 50 MG/ML
50 INJECTION INTRAMUSCULAR; INTRAVENOUS
Status: DISCONTINUED | OUTPATIENT
Start: 2021-10-19 | End: 2021-10-19 | Stop reason: HOSPADM

## 2021-10-19 RX ORDER — ALBUTEROL SULFATE 90 UG/1
1-2 AEROSOL, METERED RESPIRATORY (INHALATION)
Status: DISCONTINUED | OUTPATIENT
Start: 2021-10-19 | End: 2021-10-19 | Stop reason: HOSPADM

## 2021-10-19 RX ADMIN — IRON SUCROSE 200 MG: 20 INJECTION, SOLUTION INTRAVENOUS at 08:55

## 2021-10-19 NOTE — PROGRESS NOTES
Infusion Nursing Note:  Bob Olvera presents today for venofer infusion    Patient seen by provider today: No   present during visit today: Not Applicable.    Note: N/A.      Intravenous Access:  Peripheral IV placed.    Treatment Conditions:  Not Applicable.      Post Infusion Assessment:  Patient tolerated infusion without incident.       Discharge Plan:   Patient and/or family verbalized understanding of discharge instructions and all questions answered.      Ale Angel RN

## 2021-10-20 NOTE — PROGRESS NOTES
Bob is a  at 39w4d who presents today for a routine prenatal visit. Notes normal active FM and some low uterine cramping that resolves with rest, no regular UCs. Reviewed her dating as she thought she was past her due date; advised based on LMP and consistent with her early US (not greater than 7 days different). Advised on post-dates management and plan for routine visit in 1 week. Discussed her IV iron infusion and follow up plan of care; advised will draw labs when she presents in labor as she has 1 more infusion to schedule. Feeling ready and trusting for baby's arrival, expresses no concerns. They have named their previous babies after family members, grandparents and sisters and special aunties, and are awaiting the baby's birth to decide. Feeling good support from her spouse, taking care of her other children. Reviewed postpartum care and visits. Reviewed warning s/sx and reasons to call. RTC 1 week.

## 2021-10-21 ENCOUNTER — PRENATAL OFFICE VISIT (OUTPATIENT)
Dept: MIDWIFE SERVICES | Facility: CLINIC | Age: 26
End: 2021-10-21
Payer: COMMERCIAL

## 2021-10-21 VITALS
BODY MASS INDEX: 29.51 KG/M2 | DIASTOLIC BLOOD PRESSURE: 62 MMHG | SYSTOLIC BLOOD PRESSURE: 92 MMHG | HEART RATE: 72 BPM | WEIGHT: 156.3 LBS | HEIGHT: 61 IN

## 2021-10-21 DIAGNOSIS — Z34.83 ENCOUNTER FOR SUPERVISION OF OTHER NORMAL PREGNANCY IN THIRD TRIMESTER: ICD-10-CM

## 2021-10-21 DIAGNOSIS — Z87.59 HISTORY OF POSTPARTUM HEMORRHAGE: ICD-10-CM

## 2021-10-21 PROCEDURE — 99207 PR PRENATAL VISIT: CPT | Performed by: ADVANCED PRACTICE MIDWIFE

## 2021-10-21 ASSESSMENT — MIFFLIN-ST. JEOR: SCORE: 1385.31

## 2021-10-21 NOTE — PATIENT INSTRUCTIONS
"[facility] Nurse Midwives - Contact information:  Appointment line and to get a hold of CNM in clinic Monday-Friday 8 am - 5 pm:  [phone].  There are some clinics with early start times (1st appointment 7:40 am) and others with evening hours (last appointment 6:20 pm).  Most are typically open from 8 am to 5 pm.    CNM on call answering service: [phone].  Specify your hospital of choice and leave a brief message for CNM;  will then page CNM who is on call at your specified hospital and you should receive a call back with 15 minutes.  Be sure that your ringer is audible and that you can accept blocked calls so that we can get back in touch with you! This number should be reserved for urgent needs if during the day, before 8 am, after 5 pm, weekends, holidays.    Contact the on-call CNM with warning signs, such as:    vaginal bleeding     Vaginal discharge and itching or pain and burning during urination    Leg/calf pain or swelling on one side    severe abdominal pain    nausea and vomiting more than 4-5 times a day, or if you are unable to keep anything down    fever more than 100.4 degrees F.       Meet the Midwives from [facility]  You are invited to an informational meet and greet with [facility] Certified Nurse-Midwives. Our free \"Meet the Midwives\" event is a great opportunity to learn about our midwives' philosophy and experience, the hospitals where we can assist with your birth, and answer questions you may have. Partners, friends, and family are welcome to attend. Currently, this is a virtual event.  Date  First Tuesday of every month at 7 pm.    Link to next (live) meeting  [email or web address]://[email or web address]/classes-and-events/meet-the-midwives-from-[facility]-clinics  To Join by Telephone (audio only) Call:   [phone] Phone Conference ID: [phone]#          Touring the Maternity Care Center  At this time we are offering a virtual tour of the " Maternity Care Centers at both [facility]:   [facility]: https://www.[email or web address]/Locations/[facility]/Maternity-Care-Center  [place]:   https://[facility].org/overarching-care/the-birthplace/tours  https://www.[facility].org/Locations/[facility]/Maternity-Care-Center/#virtual_tour  When in person tours become available, registrations is required. To schedule a tour at either [place] or [facility], please do so online using the following links:  [facility] - [email or web address]://[email or web address].Traansmission/[email or web address]?s=[email or web address]&[email or web address]&[email or web address]&p=2&jpwwt=139&ps=1&group=37&it=1&lwb=203  [facility] - [email or web address]://www.Traansmission/registerlist.asp?s=6&m=303&vs=5&p=2&xjpkf=396&ps=1&group=38&it=1&ike=543    Childbirth and Parenting Education:         Everyday Miracles:   https://www.everyday-miracles.org/    Free Video Series from [facility]: https://nursing.[facility]/academics/specialty-areas/nurse-midwifery/having-baby-prenatal-videos/having-baby-prenatal-and    FATMATA parenting center: http://[facility] Broad[facility].NovaShunt/   [phone]  [place]: (education, yoga & wellness) www."[employer]"a.NovaShunt  Enlightened Mama: www.enlightenedmama.com   Childbirth collective: (Parent topic nights)  www.childbirthcollective.org/  Hypnobabies:  www.hypnobabiestwincities.com/  Hypnobirthing:  Http://hypnobirthing.com/  The Birth Hour: https://SocialancebirthhouSTORYS.JP/online-childbirth-class/    APPS and Podcasts:   Allie Prakash Nurture    Evidence Based Birth  The Birth Hour (for birth stories)   Birthful   Expectful   The Longest Shortest Time  PregnancyPodcast Felicia Nails    Book Recommendations:   Yudith Brady's Birthing From Within--first few chapters include a new-age tone, you may prefer to skip it and keep going, because there is good stuff later.  This book recommendation covers emotional preparation, but does cover coping with pain, and use of both pharmacological and nonpharmacological  "methods.    Dr. Rivera' The Pregnancy Book and The Birth Book--the pregnancy book goes month-by month      The Birth Partner by Maite Lenz    Womanly Art of Breastfeeding by La Leche League International   Breastfeeding by Alma Sierra--great pictures    Mothering Your Nursing Toddler, by Sandra Peralta.   Addresses dealing with so many of the challenging behaviors of a nursing toddler.  How Weaning Happens, by La Leche League.  Discusses weaning at all ages, from medically necessary weaning of an infant, all the way up to age 5 (or older), with why/why not, and strategies.  Very empowering book both for deciding to wean and deciding not to.    American College of Nurse-Midwives (ACNM) http://www.midwife.org/; look at the informational handouts at http://www.midwife.org/Share-With-Women     www.mymidwife.org    Mother to Baby (Medication and Herbal guidance in pregnancy): http://www.mothertobaby.org  Toll-Free Hotline: 542.739.3207  LactMed (Medication use while breastfeeding): http://toxnet.nlm.nih.gov/newtoxnet/lactmed.htm    Women's Health.gov:  http://www.womenshealth.gov/a-z-topics/index.html    American pregnancy association - http://americanpregnancy.org    Centering Pregnancy (group prenatal care option): http://centeringhealthcare.org    Information about doulas:  Childbirth collective: http://www.childbirthcollective.org/  Doulas of [place] (MELISSA):  www.melissa.org  [facility]  project: http://twincitiesdoulaproject.com/     Early Childhood and Family Education (ECFE):  ECFE offers parents hands-on learning experiences that will nourish a lifetime of teachable moments.  http://ecfe.info/ecfe-home/    March of Dimes www.Everyday Solutions.HappyFactory     FDA - Nutrition  www.mypyramid.gov  Under \"For Consumers,\" click on \"pregnant and breastfeeding women.\"      Centers for Disease Control and Prevention (CDC) - Vaccines : http://www.cdc.gov/vaccines/       When researching information on the web, " question the validity of websites.  The Free & Clear .gov, .edu and.org tend to be more reliable information.  If there are a lot of advertisements, be cautious of the information provided. Stay away from blogs and chat rooms please!     [place] Screening Program  Http://www.health.[facility].mn.us/newbornscreening/  [place] newborns are tested soon after birth for more than 50 hidden, rare disorders, including hearing loss and critical congenital heart disease (CCHD). This site provides resources and information for families and providers.    HEALTHY PREGNANCY CARE: 37 to 41 WEEKS PREGNANT    Talk with your midwife or physician about when to call with signs of labor    Regular uterine contractions that are getting closer together and/or stronger    If you think your water has broken or is leaking    Bleeding from the vagina like a period (bloody vaginal discharge is normal)    If you are not feeling your baby move    Make plans for transportation and  as needed for when you are going to the hospital.    Your midwife or physician may offer to check your cervix for changes.     Ask your health care provider about vaccinations you may need following delivery. By now, you should have received a Tdap immunization to protect against pertussis or whooping cough. Fathers and family members who will be in close contact with the baby should also receive a Tdap shot at least two weeks before the expected birth of the baby if they have not had a Td (tetanus) shot for at least two years.    If you are past your due date, discuss the next steps leading to delivery with your midwife or physician. If you don't start labor on your own by 41 or 42 weeks, your midwife or physician may recommend giving you medicines to ripen your cervix and start labor.  Induction of labor:  http://onlinelibrary.lomax.com/store/10.1016/j.jmwh.2008.04.018/asset/j.jmwh.2008.04.018.pdf?v=1&t=feux2mdm&y=68hg385i4wx55h98o0c3az6s894663v0ci2ke469    Preparing for your baby: Tell your midwife or physician how you plan to feed your baby (breast or bottle), who you have chosen to do pediatric care for your baby, and if you have a boy, whether you have chosen to have him circumcised. You will need a car seat correctly installed in your vehicle to bring your baby home. As you start to set up the nursery at home for your baby, make sure the crib is safe. The mattress needs to fit snugly against the edges of the crib. If you can fit a soda can between the bars, they are too far apart and can allow the baby's head to caught between them.    Learn about infant care and feeding, including information about infant CPR. We recommend that you put your baby to sleep on his or her back to reduce the chance of Sudden Infant Death Syndrome (SIDS). To maintain a healthy environment in which your child can grow, it's best to keep your home smoke-free. By preparing ahead, your transition into parenthood will go smoothly for you and your baby.    Your midwife or physician will want to see you for a checkup 2 to 6 weeks after delivery.    If you have questions about any symptoms you are experiencing or any other concerns, call your provider or their clinic staff at [facility] at Dept: [phone]. If it's after clinic hours, physician patients should call the Care Connection at [phone] ([phone]); midwife patients should call their answering service at [phone].    How can you care for yourself at home?   You can refer to the Starting Out Right book or find it online at http://www.[facility]altheast.org/images/stories/maternity/[facility]-Starting-Out-Right.pdf or http://www.healtheast.org/images/stories/flipbooks/[facility]-starting-out-right/[facility]-starting-out-right.html#p=8     You can sign up  for a weekly parenting e-mail that gives support, tips and advice from health care professionals that starts with pregnancy and continues through the toddler years. To register, go to www.[facility].org/baby at any time during your pregnancy.        Making Plans for Feeding My Baby    By this point, you probably have read a lot about feeding your baby.  Breastfeed or formula? Each mother s decision is her own and [facility] respects you and your choices. We ve gathered information on both breastfeeding and formula feeding to help with your decision. Talking with your physician or nurse-midwife can also help in your decision.  However you plan to feed your baby, [facility] Maternity Care Centers encourage rooming in with your baby, skin-to-skin contact and feeding your baby based on his or her cues.    Skin-to-skin contact  Being close to mom helps your baby adjust to life outside of the womb.  It helps your baby regulate their body temperature, heart rate, and breathing.  Your baby will usually be placed skin-to-skin immediately following birth or as soon as possible, if medical intervention is needed.    Rooming-In  Having your baby stay with you in your room is called  rooming-in .  Keeping your baby in your room helps you to learn how to care for your baby by getting to know your baby s cues, body rhythms and sleep cycle.       Cue-based feeding  Cues (signals) are baby s way of telling you what he or she wants.  When you learn your infant s cues, you know how to care for and feed your baby.   Feeding cues are the licking and smacking of lips, bringing their fist to their mouth, and a reflex called  rooting - where baby turns and opens his or her mouth, searching for the breast or bottle.  Crying is a late feeding cue.  Babies can feed frequently, often at least 8 times in 24 hours.    Breastfeeding facts  Breast milk is the best source of nutrition for your baby and is available at birth. In the first couple of  days, your milk volume is already starting to increase, though it may not be noticeable. Breastfeed frequently to increase your milk supply. Within three to five days, you will begin to notice larger milk volumes. An increase in breast size, heaviness and firmness are often described as the milk  coming in.  Frequent breastfeeding can help breasts from getting overly firm and painful. You will know the baby is getting enough milk if your baby is having wet and dirty diapers and gaining weight.     If your goal is to exclusively breastfeed, it is important to not use any formula or artificial nipples (including bottles and pacifiers) while your baby is learning to breastfeed.  While it may seem like an  easy  option to give your baby a bottle, formula should only be given if there is a medical reason for your baby to have it.      Positioning and attachment   Get comfortable.  Use pillows as needed to support your arms and baby.  Hold baby close at the level of your breast, facing you in a tummy to tummy position.  Skin to skin helps with this.  Position the baby with his or her nose by the nipple.  There should be a straight line from baby s ear to shoulder to hips.  Tickle your baby s lips or wait for baby to open mouth wide, bring baby to breast by leading with the chin.  Aim the nipple at the roof of baby s mouth.  A rapid sucking pattern is followed by longer, drawing pattern with occasional swallows heard.  When baby is correctly latched, your nipple and much of the areola are pulled well into baby s mouth.      Returning to work or school  Focus on a good start to breastfeeding.  Many women continue to provide breastmilk for their baby when they return to work or school.  Making plans about where to pump and store milk can make the transition go well.  Talk with other mothers who have also returned to work or school for tips and support.  Your employer s Human Resource department may be a resource as well.      Returning to work or school: (continued)     babies can mean fewer  sick  days for you.    A quality breast pump will also save time and add comfort.  Check with your insurance prior to giving birth for breast pump coverage.  Many insurance companies include a pump within your benefits.    Wait until your baby is at least three weeks old to introduce a bottle for the first time.  Have someone besides you give the bottle.    Breastfeed when you are with your baby. Reserve your bottles of breast milk for when you are away.     Your breasts will need to be  emptied  either by your baby or a pump.  Plan to pump at least twice in an eight hour day.    If you cannot pump at work, continue breastfeeding at home. Any amount of breast milk is worth giving to your baby.    Formula feeding facts  If you are planning to use formula to feed your baby, you will want to make some preparations ahead of time. Talk to your doctor or nurse-midwife about what type of formula to use. Some are iron-fortified, meaning they have extra iron in them. You will want to purchase formula and bottles before your baby is born to be sure you are ready after you return from the hospital. The [facility] do not provide formula samples to take home.    Be sure to follow formula mixing directions closely. Regular milk in the dairy case at the grocery store should not be given to babies under 1 year old. Baby formula is sold in several forms including:    Ready-to-use. This is the most expensive, but no mixing is necessary.    Concentrated liquid. This is less expensive than ready-to-use and you mix with water.    Powder. This is the least expensive. You mix one level scoop of powdered formula with two ounces of water and stir well.    Most babies need 2.5 ounces of formula per pound of body weight each day. This means an 8-pound baby may drink about 20 ounces of formula a day; however, this is just an estimate. The most important  thing is to pay attention to your baby s cues.  If your baby is always fussy, needs more iron or has certain food allergies, your physician may suggest you change your baby s formula to a different kind.     How do I warm my baby s bottles?  You may feed your baby a bottle without warming it first. It is OK for the breast milk or formula to be cool or room temperature. If your baby seems to prefer it warmed, you can put the filled bottle in a container of warm water and let it stand for a few minutes. Check the temperature of the liquid on your skin before feeding it to your baby; to be sure it isn t too hot. Do not heat bottles in the microwave. Microwaves heat food and liquids unevenly, and this can cause hot spots that can burn your baby.    How do I clean and sterilize bottles?  Sterilize bottles and nipples before you use them for the first time. You can do this by putting them in boiling water for 5 minutes. After that first time, you can wash them in hot and soapy water. Rinse them carefully to be sure there is no soap left on them. You can also wash them in the .    [facility] Connection [phone]-[phone] ([phone])    Baby Café    Pregnant and interested in breastfeeding?  Need answers to breastfeeding questions?  Want to help breastfeeding moms?  Already breastfeeding and want to meet other moms?    Join us at the Baby Café!    Baby Cafe is a free, drop-in service offering breast-feeding support for pregnant women, breast-feeding mothers and their families.  Come share tips and socialize with other mothers.  Babies and siblings are welcome (no childcare available).    Starting April 2018, Baby Café will be at 4 locations.  Please see below for the Baby Café closest to you!      [facility]  [address] [place] [address]  1st Wednesday: 10am-12pm    [facility]  [address] [place] [address]  3rd Wednesday 4-6pm    [facility]  [address] [place]  "[id number]   10am-12:30pm    [facility]  [address] [place] [address]  : 4-6pm      Hmong, Turkmen, and Icelandic which is may be available at some sites.    For more information, please contact: [email or web address] [email or web address]. or [phone]      Virtual Breastfeeding Support:    During this time of isolation, breastfeeding families need even more community!  Here are some area organizations offering virtual support groups for breastfeeding:      [facility] Support Group,  at 10:30 am   Run by NICOLASA Mcginnis of [employer]   Go to [employer] Facebook page and click on \"events\" for link   [email or web address]://[email or web address].[email or web address]    [facility] Milk Hour,  at 2:30 pm    Run by NICOLASA Hope   Go to [facility] FB page and send message to get link   https://www.PreciouStatus.Swank/healthfoundations/    [facility] holding virtual meetings the first Tuesday of each month, 8-9 pm, and the   Third Saturday, 10 - 11 am.  Go to [facility] and [place] FB page; message to get link [email or web address]://[email or web address].PreciouStatus.Swank/LLLofGoldenValley/?hc_location=[facility]    [facility] offers a Lactation Lounge every Friday 12pm - 1pm, run by [facility] Leader   Sign up via link at [employer]/cbe-lactation   https://www.Meteor Solutions/cbe-lactation    [facility] is offering virtual support groups every Monday, 10:30 am - 12 pm, run by nurse IBCLC   Https://www.facebook.com/events/[email or web address]/    Prenatal Breastfeeding Classes:        [facility] is offering virtual breastfeeding and  care classes:  https://www.Meteor Solutions/education-workshops    [employer]irth[employer] childbirth and breastfeeding education offering virtual prenatal breastfeeding " classes  https://www.Smart Ecosystems.com/workshops

## 2021-10-26 ENCOUNTER — HOSPITAL ENCOUNTER (INPATIENT)
Facility: CLINIC | Age: 26
LOS: 1 days | Discharge: HOME | End: 2021-10-27
Attending: ADVANCED PRACTICE MIDWIFE | Admitting: ADVANCED PRACTICE MIDWIFE
Payer: COMMERCIAL

## 2021-10-26 ENCOUNTER — MEDICAL CORRESPONDENCE (OUTPATIENT)
Dept: HEALTH INFORMATION MANAGEMENT | Facility: CLINIC | Age: 26
End: 2021-10-26

## 2021-10-26 PROBLEM — Z37.9 NORMAL LABOR: Status: RESOLVED | Noted: 2021-10-26 | Resolved: 2021-10-26

## 2021-10-26 PROBLEM — Z37.9 NORMAL LABOR: Status: ACTIVE | Noted: 2021-10-26

## 2021-10-26 LAB — SARS-COV-2 RNA RESP QL NAA+PROBE: NEGATIVE

## 2021-10-26 PROCEDURE — 0KQM0ZZ REPAIR PERINEUM MUSCLE, OPEN APPROACH: ICD-10-PCS | Performed by: ADVANCED PRACTICE MIDWIFE

## 2021-10-26 PROCEDURE — 250N000013 HC RX MED GY IP 250 OP 250 PS 637

## 2021-10-26 PROCEDURE — 250N000011 HC RX IP 250 OP 636: Performed by: ADVANCED PRACTICE MIDWIFE

## 2021-10-26 PROCEDURE — 87635 SARS-COV-2 COVID-19 AMP PRB: CPT | Performed by: ADVANCED PRACTICE MIDWIFE

## 2021-10-26 PROCEDURE — 10907ZC DRAINAGE OF AMNIOTIC FLUID, THERAPEUTIC FROM PRODUCTS OF CONCEPTION, VIA NATURAL OR ARTIFICIAL OPENING: ICD-10-PCS | Performed by: ADVANCED PRACTICE MIDWIFE

## 2021-10-26 PROCEDURE — 250N000013 HC RX MED GY IP 250 OP 250 PS 637: Performed by: ADVANCED PRACTICE MIDWIFE

## 2021-10-26 PROCEDURE — 250N000011 HC RX IP 250 OP 636

## 2021-10-26 PROCEDURE — 250N000009 HC RX 250

## 2021-10-26 PROCEDURE — 722N000001 HC LABOR CARE VAGINAL DELIVERY SINGLE

## 2021-10-26 PROCEDURE — 120N000001 HC R&B MED SURG/OB

## 2021-10-26 PROCEDURE — C9803 HOPD COVID-19 SPEC COLLECT: HCPCS

## 2021-10-26 PROCEDURE — 59400 OBSTETRICAL CARE: CPT | Performed by: ADVANCED PRACTICE MIDWIFE

## 2021-10-26 RX ORDER — MISOPROSTOL 200 UG/1
TABLET ORAL
Status: COMPLETED
Start: 2021-10-26 | End: 2021-10-26

## 2021-10-26 RX ORDER — MISOPROSTOL 200 UG/1
400 TABLET ORAL
Status: DISCONTINUED | OUTPATIENT
Start: 2021-10-26 | End: 2021-10-27 | Stop reason: HOSPADM

## 2021-10-26 RX ORDER — BISACODYL 10 MG
10 SUPPOSITORY, RECTAL RECTAL DAILY PRN
Status: DISCONTINUED | OUTPATIENT
Start: 2021-10-26 | End: 2021-10-27 | Stop reason: HOSPADM

## 2021-10-26 RX ORDER — IBUPROFEN 800 MG/1
800 TABLET, FILM COATED ORAL EVERY 6 HOURS PRN
Status: DISCONTINUED | OUTPATIENT
Start: 2021-10-26 | End: 2021-10-27 | Stop reason: HOSPADM

## 2021-10-26 RX ORDER — OXYTOCIN 10 [USP'U]/ML
10 INJECTION, SOLUTION INTRAMUSCULAR; INTRAVENOUS
Status: DISCONTINUED | OUTPATIENT
Start: 2021-10-26 | End: 2021-10-27 | Stop reason: HOSPADM

## 2021-10-26 RX ORDER — METOCLOPRAMIDE HYDROCHLORIDE 5 MG/ML
10 INJECTION INTRAMUSCULAR; INTRAVENOUS EVERY 6 HOURS PRN
Status: DISCONTINUED | OUTPATIENT
Start: 2021-10-26 | End: 2021-10-26 | Stop reason: HOSPADM

## 2021-10-26 RX ORDER — MISOPROSTOL 200 UG/1
400 TABLET ORAL
Status: DISCONTINUED | OUTPATIENT
Start: 2021-10-26 | End: 2021-10-26 | Stop reason: HOSPADM

## 2021-10-26 RX ORDER — MODIFIED LANOLIN
OINTMENT (GRAM) TOPICAL
Status: DISCONTINUED | OUTPATIENT
Start: 2021-10-26 | End: 2021-10-27 | Stop reason: HOSPADM

## 2021-10-26 RX ORDER — MISOPROSTOL 200 UG/1
800 TABLET ORAL
Status: DISCONTINUED | OUTPATIENT
Start: 2021-10-26 | End: 2021-10-27 | Stop reason: HOSPADM

## 2021-10-26 RX ORDER — NALOXONE HYDROCHLORIDE 0.4 MG/ML
0.4 INJECTION, SOLUTION INTRAMUSCULAR; INTRAVENOUS; SUBCUTANEOUS
Status: DISCONTINUED | OUTPATIENT
Start: 2021-10-26 | End: 2021-10-27 | Stop reason: HOSPADM

## 2021-10-26 RX ORDER — MISOPROSTOL 200 UG/1
800 TABLET ORAL
Status: DISCONTINUED | OUTPATIENT
Start: 2021-10-26 | End: 2021-10-26 | Stop reason: HOSPADM

## 2021-10-26 RX ORDER — NALOXONE HYDROCHLORIDE 0.4 MG/ML
0.2 INJECTION, SOLUTION INTRAMUSCULAR; INTRAVENOUS; SUBCUTANEOUS
Status: DISCONTINUED | OUTPATIENT
Start: 2021-10-26 | End: 2021-10-26 | Stop reason: HOSPADM

## 2021-10-26 RX ORDER — PROCHLORPERAZINE 25 MG
25 SUPPOSITORY, RECTAL RECTAL EVERY 12 HOURS PRN
Status: DISCONTINUED | OUTPATIENT
Start: 2021-10-26 | End: 2021-10-26 | Stop reason: HOSPADM

## 2021-10-26 RX ORDER — ONDANSETRON 2 MG/ML
4 INJECTION INTRAMUSCULAR; INTRAVENOUS EVERY 6 HOURS PRN
Status: DISCONTINUED | OUTPATIENT
Start: 2021-10-26 | End: 2021-10-26 | Stop reason: HOSPADM

## 2021-10-26 RX ORDER — CARBOPROST TROMETHAMINE 250 UG/ML
250 INJECTION, SOLUTION INTRAMUSCULAR
Status: DISCONTINUED | OUTPATIENT
Start: 2021-10-26 | End: 2021-10-26 | Stop reason: HOSPADM

## 2021-10-26 RX ORDER — KETOROLAC TROMETHAMINE 30 MG/ML
30 INJECTION, SOLUTION INTRAMUSCULAR; INTRAVENOUS EVERY 6 HOURS PRN
Status: DISCONTINUED | OUTPATIENT
Start: 2021-10-26 | End: 2021-10-27 | Stop reason: HOSPADM

## 2021-10-26 RX ORDER — HYDROCORTISONE 2.5 %
CREAM (GRAM) TOPICAL 3 TIMES DAILY PRN
Status: DISCONTINUED | OUTPATIENT
Start: 2021-10-26 | End: 2021-10-27 | Stop reason: HOSPADM

## 2021-10-26 RX ORDER — NALOXONE HYDROCHLORIDE 0.4 MG/ML
0.2 INJECTION, SOLUTION INTRAMUSCULAR; INTRAVENOUS; SUBCUTANEOUS
Status: DISCONTINUED | OUTPATIENT
Start: 2021-10-26 | End: 2021-10-27 | Stop reason: HOSPADM

## 2021-10-26 RX ORDER — NALOXONE HYDROCHLORIDE 0.4 MG/ML
0.4 INJECTION, SOLUTION INTRAMUSCULAR; INTRAVENOUS; SUBCUTANEOUS
Status: DISCONTINUED | OUTPATIENT
Start: 2021-10-26 | End: 2021-10-26 | Stop reason: HOSPADM

## 2021-10-26 RX ORDER — OXYTOCIN 10 [USP'U]/ML
10 INJECTION, SOLUTION INTRAMUSCULAR; INTRAVENOUS
Status: DISCONTINUED | OUTPATIENT
Start: 2021-10-26 | End: 2021-10-26 | Stop reason: HOSPADM

## 2021-10-26 RX ORDER — ONDANSETRON 4 MG/1
4 TABLET, ORALLY DISINTEGRATING ORAL EVERY 6 HOURS PRN
Status: DISCONTINUED | OUTPATIENT
Start: 2021-10-26 | End: 2021-10-26 | Stop reason: HOSPADM

## 2021-10-26 RX ORDER — KETOROLAC TROMETHAMINE 30 MG/ML
30 INJECTION, SOLUTION INTRAMUSCULAR; INTRAVENOUS
Status: DISCONTINUED | OUTPATIENT
Start: 2021-10-26 | End: 2021-10-27 | Stop reason: HOSPADM

## 2021-10-26 RX ORDER — PROCHLORPERAZINE MALEATE 10 MG
10 TABLET ORAL EVERY 6 HOURS PRN
Status: DISCONTINUED | OUTPATIENT
Start: 2021-10-26 | End: 2021-10-26 | Stop reason: HOSPADM

## 2021-10-26 RX ORDER — METOCLOPRAMIDE 10 MG/1
10 TABLET ORAL EVERY 6 HOURS PRN
Status: DISCONTINUED | OUTPATIENT
Start: 2021-10-26 | End: 2021-10-26 | Stop reason: HOSPADM

## 2021-10-26 RX ORDER — ACETAMINOPHEN 325 MG/1
650 TABLET ORAL EVERY 4 HOURS PRN
Status: DISCONTINUED | OUTPATIENT
Start: 2021-10-26 | End: 2021-10-27 | Stop reason: HOSPADM

## 2021-10-26 RX ORDER — LIDOCAINE HYDROCHLORIDE 10 MG/ML
INJECTION, SOLUTION EPIDURAL; INFILTRATION; INTRACAUDAL; PERINEURAL
Status: COMPLETED
Start: 2021-10-26 | End: 2021-10-26

## 2021-10-26 RX ORDER — LIDOCAINE HYDROCHLORIDE 10 MG/ML
INJECTION, SOLUTION INFILTRATION; PERINEURAL
Status: COMPLETED
Start: 2021-10-26 | End: 2021-10-26

## 2021-10-26 RX ORDER — OXYTOCIN/0.9 % SODIUM CHLORIDE 30/500 ML
340 PLASTIC BAG, INJECTION (ML) INTRAVENOUS CONTINUOUS PRN
Status: DISCONTINUED | OUTPATIENT
Start: 2021-10-26 | End: 2021-10-27 | Stop reason: HOSPADM

## 2021-10-26 RX ORDER — OXYCODONE HYDROCHLORIDE 5 MG/1
5 TABLET ORAL EVERY 4 HOURS PRN
Status: DISCONTINUED | OUTPATIENT
Start: 2021-10-26 | End: 2021-10-27 | Stop reason: HOSPADM

## 2021-10-26 RX ORDER — OXYTOCIN/0.9 % SODIUM CHLORIDE 30/500 ML
100-340 PLASTIC BAG, INJECTION (ML) INTRAVENOUS CONTINUOUS PRN
Status: DISCONTINUED | OUTPATIENT
Start: 2021-10-26 | End: 2021-10-27 | Stop reason: HOSPADM

## 2021-10-26 RX ORDER — METHYLERGONOVINE MALEATE 0.2 MG/ML
200 INJECTION INTRAVENOUS
Status: DISCONTINUED | OUTPATIENT
Start: 2021-10-26 | End: 2021-10-26 | Stop reason: HOSPADM

## 2021-10-26 RX ORDER — OXYTOCIN 10 [USP'U]/ML
INJECTION, SOLUTION INTRAMUSCULAR; INTRAVENOUS
Status: COMPLETED
Start: 2021-10-26 | End: 2021-10-26

## 2021-10-26 RX ORDER — OXYTOCIN/0.9 % SODIUM CHLORIDE 30/500 ML
340 PLASTIC BAG, INJECTION (ML) INTRAVENOUS CONTINUOUS PRN
Status: DISCONTINUED | OUTPATIENT
Start: 2021-10-26 | End: 2021-10-26 | Stop reason: HOSPADM

## 2021-10-26 RX ORDER — METHYLERGONOVINE MALEATE 0.2 MG/ML
200 INJECTION INTRAVENOUS
Status: DISCONTINUED | OUTPATIENT
Start: 2021-10-26 | End: 2021-10-27 | Stop reason: HOSPADM

## 2021-10-26 RX ORDER — CARBOPROST TROMETHAMINE 250 UG/ML
250 INJECTION, SOLUTION INTRAMUSCULAR
Status: DISCONTINUED | OUTPATIENT
Start: 2021-10-26 | End: 2021-10-27 | Stop reason: HOSPADM

## 2021-10-26 RX ORDER — IBUPROFEN 600 MG/1
600 TABLET, FILM COATED ORAL
Status: DISCONTINUED | OUTPATIENT
Start: 2021-10-26 | End: 2021-10-27 | Stop reason: HOSPADM

## 2021-10-26 RX ORDER — METHYLERGONOVINE MALEATE 0.2 MG/ML
INJECTION INTRAVENOUS
Status: DISPENSED
Start: 2021-10-26 | End: 2021-10-26

## 2021-10-26 RX ORDER — DOCUSATE SODIUM 100 MG/1
100 CAPSULE, LIQUID FILLED ORAL DAILY
Status: DISCONTINUED | OUTPATIENT
Start: 2021-10-26 | End: 2021-10-27 | Stop reason: HOSPADM

## 2021-10-26 RX ADMIN — KETOROLAC TROMETHAMINE 30 MG: 30 INJECTION, SOLUTION INTRAMUSCULAR at 05:37

## 2021-10-26 RX ADMIN — LIDOCAINE HYDROCHLORIDE 300 MG: 10 INJECTION, SOLUTION EPIDURAL; INFILTRATION; INTRACAUDAL; PERINEURAL at 05:30

## 2021-10-26 RX ADMIN — DOCUSATE SODIUM 100 MG: 100 CAPSULE, LIQUID FILLED ORAL at 11:25

## 2021-10-26 RX ADMIN — ACETAMINOPHEN 650 MG: 325 TABLET ORAL at 15:53

## 2021-10-26 RX ADMIN — OXYTOCIN 10 UNITS: 10 INJECTION INTRAVENOUS at 05:03

## 2021-10-26 RX ADMIN — ACETAMINOPHEN 650 MG: 325 TABLET ORAL at 06:39

## 2021-10-26 RX ADMIN — IBUPROFEN 800 MG: 800 TABLET, FILM COATED ORAL at 15:54

## 2021-10-26 RX ADMIN — ACETAMINOPHEN 650 MG: 325 TABLET ORAL at 20:19

## 2021-10-26 RX ADMIN — IBUPROFEN 800 MG: 800 TABLET, FILM COATED ORAL at 22:17

## 2021-10-26 RX ADMIN — OXYCODONE HYDROCHLORIDE 5 MG: 5 TABLET ORAL at 22:17

## 2021-10-26 RX ADMIN — MISOPROSTOL 800 MCG: 200 TABLET ORAL at 05:12

## 2021-10-26 RX ADMIN — ACETAMINOPHEN 650 MG: 325 TABLET ORAL at 11:25

## 2021-10-26 RX ADMIN — LIDOCAINE HYDROCHLORIDE 20 ML: 10 INJECTION, SOLUTION INFILTRATION; PERINEURAL at 05:07

## 2021-10-26 NOTE — H&P
Labor & Delivery Admission Note:    Date: 10/26/2021  Time: 6:23 AM    Admission H&P  Bob Olvera,  1995, MRN [id number]    Normal labor [O80, Z37.9]    PCP: Dena Troy, [phone]   Code status:  Full Code       Extended Emergency Contact Information  Primary Emergency Contact: Duy Caruso  Address: [address]           [id number]           [place] [address] [facility]  Mobile Phone: [phone]  Relation: Spouse  Secondary Emergency Contact: Monet Lantigua  Address: [id number]           [place] [address] [facility]  Mobile Phone: [phone]  Relation: Cousin       Chief Complaint: Normal Labor       HPI:      Bob Olvera, is a 26 year old, G 4 P 3003 AT 40w2d, LMP 2021, Estimated Date of Delivery: Oct 24, 2021, confirmed by 14-week 6-day ultrasound on 2021, admitted to [facility] on 10/26/2021 at 4:30 AM secondary to: Labor.   Duy contacted this writer at 4:14 AM.  He and patient Bob were en route to [facility] at that time.  While this writer en route, phone call made to charge nurse in order to review problem list at which time plans were made for admission labs (type and screen, CBC) and IV placement both for history of hemorrhage and GBS positive status.  Upon her arrival, patient was taken to OB triage where she experienced spontaneous rupture of membranes for meconium stained fluid at approximately 4:40 AM.  At that time, staff brought her to her labor room, #[id number].  This writer arrived labor room at 4:45 AM during which time a nurse was attempting to start IV in patient's left hand simultaneously intending to draw admission labs, unsuccessful.  Completely dilated at 4:56 AM during which time she began actively pushing with contractions.    Prenatal History:  Bob Olvera began care with [facility] at the [facility] on 2021 at 13+4 weeks gestation with a lapse in care between weeks 19 and 29 for a total of  9 visits. Her care was significant for: Prepregnancy BMI 25; normal weight gain; anemia in pregnancy requiring IV iron infusions x4; history of third-degree perineal laceration; history of postpartum hemorrhage; lapse in prenatal care; GBS POSITIVE.    Pre pregnant BMI: 25  TW pounds  BP in pregnancy: Normotensive  Size:Dates relationship: Equal    Pertinent Labs:    Prenatal Office Visit on 2021   Component Date Value Ref Range Status     Group B Strep PCR 2021 Positive* Negative Final    ALERT: Streptococcus agalactiae (Group B Streptococcus) has a high rate of resistance to clindamycin. Therefore, clindamycin is not recommended for treatment unless susceptibility testing has been performed.     Penicillin, amoxicillin, or cephal* 2021 No   Final     WBC Count 2021 6.8  4.0 - 11.0 10e3/uL Final     RBC Count 2021 4.38  3.80 - 5.20 10e6/uL Final     Hemoglobin 2021 10.2* 11.7 - 15.7 g/dL Final     Hematocrit 2021 33.4* 35.0 - 47.0 % Final     MCV 2021 76* 78 - 100 fL Final     MCH 2021 23.3* 26.5 - 33.0 pg Final     MCHC 2021 30.5* 31.5 - 36.5 g/dL Final     RDW 2021 23.4* 10.0 - 15.0 % Final     Platelet Count 2021 188  150 - 450 10e3/uL Final   Prenatal Office Visit on 2021   Component Date Value Ref Range Status     WBC Count 2021 7.5  4.0 - 11.0 10e3/uL Final     RBC Count 2021 4.27  3.80 - 5.20 10e6/uL Final     Hemoglobin 2021 10.0* 11.7 - 15.7 g/dL Final     Hematocrit 2021 32.7* 35.0 - 47.0 % Final     MCV 2021 77* 78 - 100 fL Final     MCH 2021 23.4* 26.5 - 33.0 pg Final     MCHC 2021 30.6* 31.5 - 36.5 g/dL Final     RDW 2021 23.3* 10.0 - 15.0 % Final     Platelet Count 2021 185  150 - 450 10e3/uL Final   Prenatal Office Visit on 2021   Component Date Value Ref Range Status     WBC Count 2021 7.7  4.0 - 11.0 10e3/uL Final     RBC Count 2021 4.07  3.80 -  5.20 10e6/uL Final     Hemoglobin 09/08/2021 9.0* 11.7 - 15.7 g/dL Final     Hematocrit 09/08/2021 30.4* 35.0 - 47.0 % Final     MCV 09/08/2021 75* 78 - 100 fL Final     MCH 09/08/2021 22.1* 26.5 - 33.0 pg Final     MCHC 09/08/2021 29.6* 31.5 - 36.5 g/dL Final     RDW 09/08/2021 20.6* 10.0 - 15.0 % Final     Platelet Count 09/08/2021 241  150 - 450 10e3/uL Final   Prenatal Office Visit on 08/11/2021   Component Date Value Ref Range Status     Glu Gest Screen 1hr 50g 08/11/2021 132* 70 - 129 mg/dL Final     Treponema Antibody Total 08/11/2021 Negative  Negative Final     Hemoglobin 08/11/2021 8.7* 11.7 - 15.7 g/dL Final     Ferritin 08/11/2021 3* 10 - 130 ng/mL Final     Iron 08/11/2021 26* 42 - 175 ug/dL Final     Transferrin 08/11/2021 414* 212 - 360 mg/dL Final     Transferrin Saturation, Calculated 08/11/2021 5* 20 - 50 % Final     Transferrin IBC, Calculated 08/11/2021 518  313 - 563 ug/dL Final   Prenatal Office Visit - [facility] on 04/22/2021   Component Date Value Ref Range Status     ABO/RH(D) 04/22/2021 AB POS   Final     ABORH REPEAT 04/22/2021 AB POS   Final     Antibody Screen 04/22/2021 Negative   Final     Rubella Antibody IgG 04/22/2021 Positive   Final     WBC 04/22/2021 5.4  4.0 - 11.0 thou/uL Final     RBC Count 04/22/2021 4.29  3.80 - 5.40 mill/uL Final     Hemoglobin 04/22/2021 10.7* 12.0 - 16.0 g/dL Final     Hematocrit 04/22/2021 33.2* 35.0 - 47.0 % Final     MCV 04/22/2021 77* 80 - 100 fL Final     MCH 04/22/2021 24.9* 27.0 - 34.0 pg Final     MCHC 04/22/2021 32.2  32.0 - 36.0 g/dL Final     RDW 04/22/2021 14.2  11.0 - 14.5 % Final     Platelet Count 04/22/2021 265  140 - 440 thou/uL Final     Mean Platelet Volume 04/22/2021 10.8  8.5 - 12.5 fL Final     % Neutrophils 04/22/2021 63  50 - 70 % Final     % Lymphocytes 04/22/2021 30  20 - 40 % Final     % Monocytes 04/22/2021 5  2 - 10 % Final     % Eosinophils 04/22/2021 1  0 - 6 % Final     % Basophils 04/22/2021 0  0 - 2 % Final     %  Immature Granulocytes 2021 0  <=0 % Final     Absolute Neutrophils 2021 3.4  2.0 - 7.7 thou/uL Final     Absolute Lymphocytes 2021 1.6  0.8 - 4.4 thou/uL Final     Absolute Monocytes 2021 0.3  0.0 - 0.9 thou/uL Final     Eosinophils Absolute 2021 0.0  0.0 - 0.4 thou/uL Final     Absolute Basophils 2021 0.0  0.0 - 0.2 thou/uL Final     Absolute Immature Granulocytes 2021 0.0  <=0.0 thou/uL Final     Culture 2021 No Growth   Final     HIV Antigen Antibody Combo 2021 Negative  Negative Final     Hepatitis B Surface Antigen 2021 Negative  Negative Final     Treponema Antibody Total 2021 Negative  Negative Final       Pertinent Radiology:  Please see prenatal record    OB History  OB History    Para Term  AB Living   4 3 3 0 0 3   SAB TAB Ectopic Multiple Live Births   0 0 0 0 3      # Outcome Date GA Lbr Mike/2nd Weight Sex Delivery Anes PTL Lv   4 Current            3 Term 20 40w6d 13:08 / 00:09 4.03 kg (8 lb 14.2 oz) F Vag-Spont None N NIKI      Birth Comments: episiotomy, fetal intolerance      Complications: Dysfunctional Labor      Name: ELIZABETH AVILA      Apgar1: 2  Apgar5: 5   2 Term 18 39w0d 04:30 / 00:14 3.544 kg (7 lb 13 oz) M Vag-Spont None N NIKI      Birth Comments: 3rd degree lac      Complications: Fetal Intolerance      Name: Austin Avila      Apgar1: 7  Apgar5: 9   1 Term 16 42w0d 08:00 / 00:35 3.459 kg (7 lb 10 oz) F Vag-Spont None N NIKI      Birth Comments: PPH due to anterior/periclitoral and perineal lacerations. 2nd degree, BF x 8 months. No PPD.       Name: Cecile      Apgar1: 9  Apgar5: 9       Medical History  @MARIA [email or web address]@  [facility]     Surgical History  She  has a past surgical history that includes Stomach surgery (age 5) and Middle ear surgery (10/10, ).       Social History  Reviewed, and she  reports that she has never smoked. She has never used smokeless tobacco. She reports that she  does not drink alcohol and does not use drugs.        Family History  Reviewed, and family history includes No Known Problems in her brother, daughter, father, maternal aunt, maternal uncle, mother, paternal aunt, paternal grandmother, paternal uncle, sister, and son.   Psychosocial Needs  Social History     Social History Narrative     Not on file     Additional psychosocial needs reviewed per nursing assessment.       No Known Allergies   Medications Prior to Admission   Medication Sig Dispense Refill Last Dose     IRON SUPPLEMENT CHILDRENS 75 (15 Fe) MG/ML oral drops  (Patient not taking: Reported on 10/21/2021)        Prenatal Vit-Fe Fumarate-FA (PRENATAL VITAMIN PO)            Review of Systems:  Pertinent items are noted in HPI.   Physical Exam:  Temp:  [98.3  F (36.8  C)] 98.3  F (36.8  C)  BP: (109-118)/(57-65) 115/65    /65   Temp 98.3  F (36.8  C) (Oral)   LMP 2021       Membrane Status:  Spontaneous rupture of membranes at 4:40 AM for meconium stained fluid     Cervical Exam:  9 cm / 100%/+1 station at 4:45 AM.  Cephalic presentation.  Meconium stained fluid noted.  No vaginal bleeding.   Fetal Heart Rate:  125, moderate variability with decelerations into the 90s with contractions, slow return to baseline   Uterine Activity:  Every 2 to 3 minutes, palpate strong       Notable AP/IP factors to date:  1.)  Prepregnancy BMI 25  2.)  History of postpartum hemorrhage  3.)  History of third-degree perineal laceration  4.)  Lapse in care between 19 and 29 weeks  5.)  Antepartum anemia requiring IV iron infusions  6.)  GBS positive    Impression:  1.  26-year-old  4 para 3-0-0-3 with IUP at 40+2 weeks  2.  Stage I of labor, active phase, transition  3.  Fetal heart rate category 2  4.  GBS positive  5.  Antepartum anemia  6.  History of postpartum hemorrhage  7.  History of third-degree perineal laceration     Plan:  -Ad[facility] to [facility]  -Charge nurse called ahead of time and verbal order given  for admission labs (type and screen and CBC) and   -IV start for both history of postpartum hemorrhage and GBS positive status.  -Vital signs  -Continuous external fetal monitoring  -Penicillin IV antibiotic prophylaxis verbal order given  -Regular diet  -CNM management  -Encourage position changes, frequent voiding of bladder, rest as needed  -Observe for progress  -Anticipate normal spontaneous vaginal birth  -Plan active management of the third stage of labor    Total time: 15 minutes at bedside and in the [facility] obtaining and clarifying the above history, performing the physical exam, providing patient education and counseling, coordinating and developing this plan of care.  Education, counseling and coordination of care time: Greater than 50%.    Provider: NALLELY Holt CNM    Date: 10/26/2021  Time: 6:23 AM    MAKAYLA Goodwin 1995, MRN [id number]

## 2021-10-26 NOTE — L&D DELIVERY NOTE
OB Vaginal Delivery Note    Bob Olvera MRN# [id number]   Age: 26 year old YOB: 1995       GA: 40w2d  GP:   Labor Complications: None   EBL:   mL  Delivery QBL: 151 mL  Delivery Type: Vaginal, Spontaneous   ROM to Delivery Time: (Delivered) Minutes: 22  Merriman Weight: 3.78 kg (8 lb 5.3 oz)    1 Minute 5 Minute 10 Minute   Apgar Totals: 5   9             Delivery Details:  Bob Olvera, a 26 year old  female delivered a viable infant with apgars of 5  and 9 .  (Upon admission, considering history of postpartum hemorrhage and GBS positive status, IV placement was attempted x1 without success.  Admission labs were also unable to be drawn considering the speed with which Bob labored and began pushing efforts.)  Patient was fully dilated and pushing after 4 hours 56 minutes in active labor.  Fetal heart rate baseline 125 with moderate variability and fetal heart rate decelerations to the 90s with contractions, slow return to baseline.  Perineum with obvious scar tissue from previous births.  Delivery team was summoned to the birth room for meconium stained fluid.  Delivery was via vaginal, spontaneous  to a sterile field under none  anesthesia. Infant delivered in vertex  left  occiput  anterior  position. Anterior and posterior shoulders delivered without difficulty. The cord was immediately clamped twice, cut by this writer and 3 vessels  were noted. Cord blood was obtained in routine fashion with the following disposition: discard .   was taken to the warmer immediately for further evaluation; please see NNP progress note for further details.    Cord complications: none   Placenta delivered at 10/26/2021  5:04 AM . Placental disposition was Hospital disposal . Fundal massage performed and fundus found to be firm immediately.  Despite this, there was a slow trickle appreciated prompting this writer to examine lower uterine segment in which were multiple small clots easily removed  with examining fingers.  Vital signs stable throughout.  In addition to intramuscular Pitocin given after placental delivery, this writer placed misoprostol 800 mcg per rectum.    Episiotomy: none    Perineum, vagina, cervix were inspected, and the following lacerations were noted:   Perineal lacerations: 2nd  along previous scar line.         Perineal laceration was repaired in the usual fashion using both 3-0 and 4-0 Vicryl after lidocaine 1 ice pack applied and % local anesthesia. Bob tolerated well.  Perineal edema noted.  Magnesium packs ordered.    Excellent hemostasis was noted. Needle count correct. Infant and patient in delivery room in good and stable condition.        [facility]ade Female-Bob [[id number]]    Labor Event Times    Dilation complete date: 10/26/21 Complete time:  4:56 AM   Start pushing date/time: 10/26/2021 0456      Labor Length    2nd Stage (hrs): 0 (min): 6   3rd Stage (hrs): 0 (min): 1      Labor Events     labor?: No   steroids: None  Labor Type: Spontaneous  Predominate monitoring during 1st stage: intermittent electronic fetal monitoring     Antibiotics received during labor?: No     Rupture date/time: 10/26/21 0440   Rupture type: Artificial Rupture of Membranes  Fluid color: Meconium     Augmentation: None  1:1 continuous labor support provided by?: RN Labor partogram used?: no      Delivery/Placenta Date and Time    Delivery Date: 10/26/21 Delivery Time:  5:02 AM   Placenta Date/Time: 10/26/2021  5:04 AM  Oxytocin given at the time of delivery: after delivery of placenta  Delivering clinician: Vania Mojica CNM          Vaginal Counts          Needles Suture Needles Sponges (RETIRED) Instruments   Initial counts       Added to count       Relief counts       Final counts             Placed during labor Accounted for at the end of labor   FSE No    IUPC No    Cervadil No                      Apgars    Living status: Living   1 Minute 5 Minute 10 Minute 15 Minute  "20 Minute   Skin color: 0  1       Heart rate: 2  2       Reflex irritability: 1  2       Muscle tone: 1  2       Respiratory effort: 1  2       Total: 5  9       Apgars assigned by: MITA BROWNING     Cord    Vessels: 3 Vessels    Cord Complications: None               Cord Blood Disposition: Discard    Gases Sent?: No    Delayed cord clamping?: No    Stem cell collection?: No       Independence Resuscitation    Methods: Suctioning, NCPAP, Damien Puff, Oximetry   Care at Delivery: Called to attend  for MSAF, infant delivered and brought to warmer, stimulated, dried and initiated PPV x 4-5 breaths.  Infant began to cry, suctioned mouth and nose for thin MSAF. BS =, coarse but clearing, HR >200. By 5 min infant had good tone, color and resp effort. sats >90%. Routine  cares. DB     Independence Measurements    Weight: 8 lb 5.3 oz Length: 1' 7.69\"   Head circumference: 36 cm       Labor Events and Shoulder Dystocia    Fetal Tracing Prior to Delivery: Category 2  Shoulder dystocia present?: Neg     Delivery (Maternal) (Provider to Complete) ([id number])    Episiotomy: None  Perineal lacerations: 2nd    Repair suture: 4-0 Vicryl, 3-0 Vicryl  Number of repair packets: 2  Genital tract inspection done: Pos     Blood Loss  Mother: Bob Olvera #[id number]   Start of Mother's Information    Delivery Blood Loss  10/25/21 1702 - 10/26/21 0629    Delivery QBL (mL) Hospital Encounter 151 mL    Postpartum QBL (mL) Hospital Encounter 114 mL    Total  265 mL         End of Mother's Information  Mother: Bob Olvera #[id number]          Delivery - Provider to Complete ([id number])    Delivering clinician: Vania Mojica CNM  CNM Care: Exclusive CNM care in labor  Attempted Delivery Types (Choose all that apply): Spontaneous Vaginal Delivery  Delivery Type (Choose the 1 that will go to the Birth History): Vaginal, Spontaneous                                 Placenta    Date/Time: 10/26/2021  5:04 AM  Removal: " Spontaneous  Disposition: Hospital disposal           Anesthesia    Method: None                Presentation and Position    Presentation: Vertex    Position: Left Occiput Anterior                 Vania Mojica CNM

## 2021-10-26 NOTE — PROGRESS NOTES
Brief day-of delivery CNM round    Assessment:  -  -Lactating mother  -GBS, inadequate treatment  -Anemia, s/p iron infusions    Plan:  -Reviewed 48 hour stay-- Bob is aware / amenable to this   -Repeat hgb in AM-- follow up pending results. Bob reports she would rather not have an IV / final iron infusion if it is not necessary  -Encouraged frequent breastfeeding / only supplementing with formula after and in age-appropriate amounts  -RN to check on family re: spitty baby / amount of formula per feed    Subjective:  Resting comfortably in bed. FOB changing baby's diaper. They report baby has been very spitty following feedings. Unsure how much formula to give her    Objective:  General: alert, no apparent distress  Remainder of exam deferred      NALLELY Ortega, CNM      Total time = 15 minutes on the day of service, including chart review, face to face time, and entering orders.

## 2021-10-26 NOTE — PLAN OF CARE
Problem: Pain (Postpartum Vaginal Delivery)  Goal: Acceptable Pain Control  Outcome: Improving  Intervention: Prevent or Manage Pain  Recent Flowsheet Documentation  Taken 10/26/2021 1600 by Mica Goodson, RN  Complementary Therapy: music therapy provided     Problem: Adjustment to Role Transition (Postpartum Vaginal Delivery)  Goal: Successful Maternal Role Transition  Outcome: Improving      English

## 2021-10-27 VITALS
HEART RATE: 63 BPM | RESPIRATION RATE: 16 BRPM | OXYGEN SATURATION: 99 % | TEMPERATURE: 98.2 F | DIASTOLIC BLOOD PRESSURE: 58 MMHG | SYSTOLIC BLOOD PRESSURE: 105 MMHG

## 2021-10-27 LAB — HGB BLD-MCNC: 11.2 G/DL (ref 11.7–15.7)

## 2021-10-27 PROCEDURE — 250N000013 HC RX MED GY IP 250 OP 250 PS 637: Performed by: ADVANCED PRACTICE MIDWIFE

## 2021-10-27 PROCEDURE — 85018 HEMOGLOBIN: CPT | Performed by: ADVANCED PRACTICE MIDWIFE

## 2021-10-27 PROCEDURE — 99207 PR SUBSEQUENT HOSPITAL CARE FOR MOTHER, 15 MINUTES: CPT | Performed by: MIDWIFE

## 2021-10-27 PROCEDURE — 36415 COLL VENOUS BLD VENIPUNCTURE: CPT | Performed by: ADVANCED PRACTICE MIDWIFE

## 2021-10-27 RX ORDER — IBUPROFEN 200 MG
600 TABLET ORAL EVERY 6 HOURS PRN
COMMUNITY
Start: 2021-10-27 | End: 2021-11-09

## 2021-10-27 RX ORDER — ACETAMINOPHEN 325 MG/1
650 TABLET ORAL EVERY 4 HOURS PRN
COMMUNITY
Start: 2021-10-27 | End: 2022-03-30

## 2021-10-27 RX ADMIN — ACETAMINOPHEN 650 MG: 325 TABLET ORAL at 09:37

## 2021-10-27 RX ADMIN — DOCUSATE SODIUM 100 MG: 100 CAPSULE, LIQUID FILLED ORAL at 10:15

## 2021-10-27 RX ADMIN — IBUPROFEN 800 MG: 800 TABLET, FILM COATED ORAL at 09:36

## 2021-10-27 NOTE — PROGRESS NOTES
Bob Olvera      MRN#: [id number]  Age: 26 year old      YOB: 1995      PPD #1 S/P   Patient feels well and is without issue, baby is rooming in  Breast feeding status:initiated  Complications since 2 hours post delivery: Uncontrolled pain, treated with oxycodone x1.   Patient is tolerating regular diet,  tolerating acitivity well, voiding without difficulty, cramping is significant with movement, fundal massage, and breastfeeding. Lochia is decreasing, denies clots.  Perineal pain is is minimal.      SIGNIFICANT PROBLEMS:  Patient Active Problem List    Diagnosis Date Noted      (normal spontaneous vaginal delivery) 10/26/2021     Priority: Medium     Lactating mother 10/26/2021     Priority: Medium     BMI 25.0-25.9,adult 10/26/2021     Priority: Medium     TWG rec: 15-25#       History of postpartum hemorrhage 10/19/2021     Priority: Medium     2016: PPH d/t periclitoral and perineal lacerations       GBS (group B Streptococcus carrier), +RV culture, currently pregnant 10/02/2021     Priority: Medium     Anemia affecting pregnancy 2021     Priority: Medium     9/10/2021 receiving iron infusions.   Formatting of this note might be different from the original.  21: Hgb 10.7:  Encouraged once daily iron supplementation. Consider hgb electrophoresis at next visit due to low MCV.       Encounter for supervision of other normal pregnancy in third trimester 2021     Priority: Medium     Formatting of this note might be different from the original.  [facility]ospital[facility]  Partner Name: Duy  Ultrasound predicts sex: girl  Childrens names/ages: Cecile, Austin, Anjali; 2016, ,   Previous labor experiences:  x 3; PPH, 3rd degree lac, epis  Waterbirth (interest, declined, ineligible): not interested  Date waterbirth patient education reviewed: na  Level of education/occupation:  Some college/CNA  Pertinent History:   PP contraception: considering NFP or Paragard,  not interested in hormonal methods  Hx PPD/Depression/Anxiety: reports none  Peds provider: [facility]   Plans for labor pain management: no medication  Plans for post partum recovery/time off: SAHM/ On call only as ub[employer]   Feeding preference: Both  Breast pump: Has at home  Car seat: has at home  Circumcision: girl baby  Discussed 2 week visit:reviewed  Tdap: 8-11-21  Flu:9-8-21       History of third degree perineal laceration 06/13/2018     Priority: Medium     Formatting of this note might be different from the original.  2nd pregnancy  11/26/2019: Hemoglobin A1c with initial OB labs 5.3% (WNL)    Formatting of this note might be different from the original.  Repaired by Dr. Renteria, 2nd birth       Screening for malignant neoplasm of cervix 08/27/2015     Priority: Medium     Formatting of this note might be different from the original.  Overview:   2015 NILM  Plan: pap test 8/2018    ICD 10           PE:    Vitals:    10/26/21 1100 10/26/21 1600 10/27/21 0000 10/27/21 0930   BP: 118/58 108/56 103/51 105/58   Pulse: 72 60 71 63   Resp:  16 16 16   Temp:  99.1  F (37.3  C) 98.2  F (36.8  C) 98.2  F (36.8  C)   TempSrc:  Oral Oral    SpO2:  99% 99% 99%       Abdomen: Soft, Non-tender      Uterus: Fundus Firm, Tender, located at the umbilicus   Lochia: Rubra, appropriate amount    Lower Extremities: Trace Edema Bilateral      Postpartum hemoglobin    Recent Labs   Lab 10/27/21  0820   HGB 11.2*     Blood type No results found for: ABO  No results found for: RH  Rubella status - immune      Assessment  Stable Post-partum day #1  Anemia   Pain not well controlled  GBS positive with precipitous delivery  Breast feeding  Rhophylac not indicated      Plan  Bob reports feeling well overall on PPD #1    Reviewed pain management postpartum, encouraged taking ibuprofen and acetaminophen around the clock as available for improved pain control. Encouraged ice, heat, tub baths, and rest.     Reviewed improved hemoglobin  postpartum since IV iron infusions. Though improved, pt encouraged to continue oral iron daily.    Teaching done: D/C Instructions: Nutrition/Activity, RTC Clinic for PP Appointment, Iron supplementation, pain management, breast milk supply with new born.     Postpartum warning s/s reviewed, including bleeding/clots, fever, mastitis, or depression    Routine Postpartum Management  Anticipate discharge to home tomorrow  RTC in 2 weeks for mood check or earlier ELIZABETH Rai

## 2021-10-27 NOTE — PROGRESS NOTES
Note to correct previous progress note written by Jennifer Todd.     The CNM (Dena Troy), not the NNP ordered oxycodone for the patient.     Shelly Patel RN

## 2021-10-27 NOTE — DISCHARGE SUMMARY
Vaginal Delivery Postpartum 36 hours postpartum    Patient Name:  Bob Olvera     :      1995     MRN:      [id number]     Assessment:  Normal postpartum course. Anemia affecting pregnancy       Plan:  Discharge home to self care. Continue iron supplement daily. Call prn problems. Follow up with CNM at 2 and 6 weeks postpartum.      Subjective:     The patient feels well:  Voiding without difficulty, bleeding decreased, tolerating normal diet, and passing flatus.  Pain is well controlled with current medications.  The patient has no emotional concerns.  The baby is well and being fed by both breast and bottle.        Objective:     /58 (Cuff Size: Adult Regular)   Pulse 63   Temp 98.2  F (36.8  C) (Oral)   Resp 16   LMP 2021   SpO2 99%   Breastfeeding Yes    See exam from this morning   Lab:     Lab Results   Component Value Date/Time    HGB 11.2 (L) 10/27/2021 08:20 AM         Results for orders placed or performed during the hospital encounter of 10/26/21   Asymptomatic COVID-19 Virus (Coronavirus) by PCR Nasopharyngeal     Status: Normal    Specimen: Nasopharyngeal; Swab   Result Value Ref Range    SARS CoV2 PCR Negative Negative    Narrative    Testing was performed using the leslie  SARS-CoV-2 & Influenza A/B Assay on the leslie  [employer]  System.  This test should be ordered for the detection of SARS-COV-2 in individuals who meet SARS-CoV-2 clinical and/or epidemiological criteria. Test performance is unknown in asymptomatic patients.  This test is for in vitro diagnostic use under the FDA EUA for laboratories certified under CLIA to perform moderate and/or high complexity testing. This test has not been FDA cleared or approved.  A negative test does not rule out the presence of PCR inhibitors in the specimen or target RNA in concentration below the limit of detection for the assay. The possibility of a false negative should be considered if the patient's recent exposure or clinical  presentation suggests COVID-19.  [facility] are certified under the Clinical Laboratory Improvement Amendments of 1988 (CLIA-88) as qualified to perform moderate and/or high complexity laboratory testing.   Hemoglobin     Status: Abnormal   Result Value Ref Range    Hemoglobin 11.2 (L) 11.7 - 15.7 g/dL           Provider: NALLELY Forman DNP, CNM       Date:  10/27/2021     Time:  4:04 PM       Patient Name:  Bob Olvera     :  1995     MRN:  [id number]

## 2021-10-27 NOTE — PLAN OF CARE
Pt VSS, only declines use of oxy this shift. Heat and tylenol utilized for pain. Discharge teaching complete and all questions answered.

## 2021-10-27 NOTE — PROGRESS NOTES
Call to nnp regarding Bob's pain that was not being controlled by ibuprofen, tylenol, heat packs and lavender. The nnp ordered oxycodone, this was given to patient. Shortly after taking patient appearing more comfortable.

## 2021-10-27 NOTE — LACTATION NOTE
Met with Bob to see how breast feeding is going.  She feels that baby is latching well and has no questions at this time.  She has a breast pump and knows how to use it.  Will follow up as needed.

## 2021-10-27 NOTE — PROGRESS NOTES
Postpartum Progress Note:    Patient Name:  Bob Olvera  :      1995  MRN:      [id number]        A/P:   ~17 hours postpartum of   Unmanaged postpartum pain  -orders placed for oxycodone 5 mg immediate release tab #2 PRN q 4 hours. Advised to  patient on scheduled OTC pain medications for more consistent pain management control.    S: Received call from beside RN who reports Bob is not coping well with her postpartum pain. She has been up independently throughout the day, voiding normally and normal postpartum lochia; however, she was declining scheduled ibuprofen between tylenol doses today. She is currently having difficulty completing self cares of getting up to the bathroom to void due to her pain.    O:   /56   Pulse 60   Temp 99.1  F (37.3  C) (Oral)   Resp 16   LMP 2021   SpO2 99%   Breastfeeding Yes      Lochia: wnl        Total time spent with patient: 0 minutes      Provider:  Dena Troy DNP, APRN, CNM            Date:  10/26/2021  Time:  10:07 PM

## 2021-10-27 NOTE — PROGRESS NOTES
Outreach Note for BILLY Olvera  [id number]  1995    Chart reviewed, discharge plan discussed with patient, needs assessed. Patient verbalizes understanding of plan, requests postpartum home care visit, nurse visit planned for Thursday, October 28th, Home Care Intake updated. Address and phone number reported as being correct in EMR.     Patient states she has good support at home, has baby care essentials, and feels ready to discharge today. Outreach RN will continue to follow and assist if needed with discharge plan. No additional needs identified at this time.

## 2021-10-28 ENCOUNTER — MEDICAL CORRESPONDENCE (OUTPATIENT)
Dept: HEALTH INFORMATION MANAGEMENT | Facility: CLINIC | Age: 26
End: 2021-10-28

## 2021-11-09 ENCOUNTER — OFFICE VISIT (OUTPATIENT)
Dept: MIDWIFE SERVICES | Facility: CLINIC | Age: 26
End: 2021-11-09
Payer: COMMERCIAL

## 2021-11-09 VITALS
SYSTOLIC BLOOD PRESSURE: 106 MMHG | HEART RATE: 68 BPM | DIASTOLIC BLOOD PRESSURE: 68 MMHG | BODY MASS INDEX: 27.9 KG/M2 | HEIGHT: 61 IN | WEIGHT: 147.8 LBS

## 2021-11-09 DIAGNOSIS — Z22.330 GBS CARRIER: ICD-10-CM

## 2021-11-09 DIAGNOSIS — Z30.09 FAMILY PLANNING COUNSELING: ICD-10-CM

## 2021-11-09 DIAGNOSIS — D64.9 ANEMIA, UNSPECIFIED TYPE: ICD-10-CM

## 2021-11-09 PROCEDURE — 99024 POSTOP FOLLOW-UP VISIT: CPT | Performed by: MIDWIFE

## 2021-11-09 RX ORDER — LANOLIN ALCOHOL/MO/W.PET/CERES
1000 CREAM (GRAM) TOPICAL DAILY
Qty: 90 TABLET | Refills: 3 | Status: SHIPPED | OUTPATIENT
Start: 2021-11-09 | End: 2022-03-30

## 2021-11-09 RX ORDER — MULTIVIT WITH MINERALS/LUTEIN
250 TABLET ORAL DAILY
Qty: 90 TABLET | Refills: 3 | Status: SHIPPED | OUTPATIENT
Start: 2021-11-09 | End: 2022-03-30

## 2021-11-09 ASSESSMENT — MIFFLIN-ST. JEOR: SCORE: 1346.76

## 2021-11-09 NOTE — PROGRESS NOTES
"Assessment:     Normal postpartum recovery  - S/P Spontaneous vaginal delivery  - S/p 2nd degree tear  - History of anemia following birth  - Negative depression screen  - Family Planning Counseling  - Lactating mother    Plan:     - Routine postpartum care  - Importance of treating anemia postpartum reviewed. Patient understanding.   - Rx sent for slo fe, vitamin C, and vitamin B12 for iron supplementation.  - Reviewed resources for lactation if needed  - Reviewed pregnancy warning signs  - Plan for Paraguard IUD insertion at 6 week visit.  - Return to clinic in 4 weeks for a routine exam following birth      Subjective:      Bob Olvera is a 26 year old female who presents for a postpartum follow up visit. She is 2 weeks postpartum following a  with 2nd degree tear The delivery was at 40 gestational weeks. I have fully reviewed the prenatal and intrapartum course. The amount and color of the lochia is appropriate for the duration of recovery. Bob feels well and postpartum course has been stable. Baby Shirley's  course has been stable. The baby is being fed by breastfeeding. She denies none, breast discomfort, pain with feedings, concerns about baby's weight gain and concerns about milk supply. Voiding without difficulty, lochia normal, tolerating normal diet, and passing flatus and normal bowel movements.  Pain is well controlled with current medications. Bob offers concern for managing four kids at home; occasionally feels overwhelmed, but endorses good support at home.  Postpartum depression screening score: 1, negative #10. Plans IUD for birth control. Pt identifies family and friends as support system    The following portions of the patient's history were reviewed and updated as appropriate: current medications and problem list    Objective:   /68 (BP Location: Right arm, Patient Position: Sitting, Cuff Size: Adult Regular)   Pulse 68   Ht 1.556 m (5' 1.25\")   Wt 67 kg (147 lb 12.8 oz)  "  LMP 01/17/2021   Breastfeeding Yes   BMI 27.70 kg/m      Total time with patient 20 minutes with >50% on education, counseling and coordination of care.    ELIZABETH Handley

## 2021-12-03 ENCOUNTER — OFFICE VISIT (OUTPATIENT)
Dept: MIDWIFE SERVICES | Facility: CLINIC | Age: 26
End: 2021-12-03
Payer: COMMERCIAL

## 2021-12-03 ENCOUNTER — PRENATAL OFFICE VISIT (OUTPATIENT)
Dept: MIDWIFE SERVICES | Facility: CLINIC | Age: 26
End: 2021-12-03
Payer: COMMERCIAL

## 2021-12-03 ENCOUNTER — TELEPHONE (OUTPATIENT)
Dept: OBGYN | Facility: CLINIC | Age: 26
End: 2021-12-03

## 2021-12-03 VITALS
HEIGHT: 61 IN | HEART RATE: 70 BPM | DIASTOLIC BLOOD PRESSURE: 68 MMHG | WEIGHT: 148.8 LBS | SYSTOLIC BLOOD PRESSURE: 100 MMHG | BODY MASS INDEX: 28.09 KG/M2

## 2021-12-03 DIAGNOSIS — Z01.812 PRE-PROCEDURE LAB EXAM: ICD-10-CM

## 2021-12-03 DIAGNOSIS — Z53.8 UNSUCCESSFUL IUD INSERTION: Primary | ICD-10-CM

## 2021-12-03 DIAGNOSIS — D64.9 ANEMIA, UNSPECIFIED TYPE: ICD-10-CM

## 2021-12-03 PROBLEM — O99.820 GBS (GROUP B STREPTOCOCCUS CARRIER), +RV CULTURE, CURRENTLY PREGNANT: Status: RESOLVED | Noted: 2021-10-02 | Resolved: 2021-12-03

## 2021-12-03 PROBLEM — Z87.59 HISTORY OF POSTPARTUM HEMORRHAGE: Status: RESOLVED | Noted: 2021-10-19 | Resolved: 2021-12-03

## 2021-12-03 LAB
HCG UR QL: NEGATIVE
HGB BLD-MCNC: 12.7 G/DL (ref 11.7–15.7)

## 2021-12-03 PROCEDURE — 85018 HEMOGLOBIN: CPT | Performed by: ADVANCED PRACTICE MIDWIFE

## 2021-12-03 PROCEDURE — 58300 INSERT INTRAUTERINE DEVICE: CPT | Mod: 53 | Performed by: ADVANCED PRACTICE MIDWIFE

## 2021-12-03 PROCEDURE — 81025 URINE PREGNANCY TEST: CPT | Performed by: ADVANCED PRACTICE MIDWIFE

## 2021-12-03 PROCEDURE — 36415 COLL VENOUS BLD VENIPUNCTURE: CPT | Performed by: ADVANCED PRACTICE MIDWIFE

## 2021-12-03 PROCEDURE — 99207 PR POST PARTUM EXAM: CPT | Performed by: ADVANCED PRACTICE MIDWIFE

## 2021-12-03 RX ORDER — MISOPROSTOL 200 UG/1
TABLET ORAL
Qty: 2 TABLET | Refills: 0 | Status: SHIPPED | OUTPATIENT
Start: 2021-12-03 | End: 2022-03-30

## 2021-12-03 ASSESSMENT — MIFFLIN-ST. JEOR: SCORE: 1351.29

## 2021-12-06 DIAGNOSIS — Z01.812 PRE-PROCEDURE LAB EXAM: Primary | ICD-10-CM

## 2021-12-06 NOTE — PROGRESS NOTES
IUD Insertion Procedure Note    Indications: contraception    Procedure Details   Urine pregnancy test was done and negative. The risks (including infection, bleeding, pain, and uterine perforation) and benefits of the procedure were explained to the patient and Verbal informed consent was obtained.  Verbal pre-procedural time out was completed with patient.     Speculum placed-- Bob had significant discomfort with placement d/t 2nd degree laceration / stitches present at introitus. Cervix cleansed with Betadine. Unable to sound past 3 cm. Tenaculum placed for stability-- still unable to sound past 3 cm. Insertion stopped at that time d/t significant patient discomfort.    Condition:  Stable    Complications:  Failed insertion    Plan:  1. Misoprostol ordered for pre-procedure cervical softening. Reviewed instructions with Bob  2. Will schedule with OBGYN for insertion at a later date  3. Declines contraception prior to insertion d/t  will be [place] of the country until Feb      NALLELY Ortega, SILVA    (No charge visit d/t failed insertion)

## 2021-12-06 NOTE — PROGRESS NOTES
Assessment:     Normal postpartum recovery  S/P   S/p 2nd degree tear  Negative depression screen  Pap UTD  Desires IUD    Plan:     -Clipped 2-3 cm of suture from apex of laceration with Bob's permission today-- encouraged gentle massage to the area to promote blood flow, healing & dissolving remaining internal sutures  -Routine postpartum care  -Reviewed PP depression, anxiety, and psychosis s/sx and when to call for further resources. Instructed to call on-call CNM with concerns or schedule clinic visit as needed.  -RTC in 1 year for annual exam or sooner as needed    Subjective:      Bob Olvera is a 26 year old female who presents for a postpartum follow up visit. She is 6 weeks postpartum following a  with 2nd degree tear The delivery was at 40 gestational weeks. I have fully reviewed the prenatal and intrapartum course. The amount and color of the lochia is appropriate for the duration of recovery. Bob feels well and postpartum course has been stable. Baby Shirley's  course has been stable. The baby, Shirley, is being fed by breast. She denies breast discomfort, pain with feedings, concerns about baby's weight gain and concerns about milk supply. Voiding without difficulty, lochia normal, tolerating normal diet, and passing flatus and normal bowel movements.  Some pain at introitus / wondering if stitches have healed. Scant bleeding from this site. Bob  offers no emotional concerns.  Postpartum depression screening score: 0, negative #10. Plans IUD for birth control. Pt identifies family   and friends   as support system     has been traveling & will not return until Feb. Has had sex but not for the last 2 weeks. Neg pregnancy test today.    The following portions of the patient's history were reviewed and updated as appropriate: allergies, current medications and problem list    Review of Systems  General:  Denies problem  Eyes: Denies problem  Ears/Nose/Throat: Denies  problem  Cardiovascular: Denies problem  Respiratory:  Denies problem  Gastrointestinal:  Denies problem,   Genitourinary: Pain at laceration site-- possible stitches present  Musculoskeletal:  Denies problem  Skin: Denies problem  Neurologic: Denies problem  Psychiatric: Denies problem  Endocrine: Denies problem  Heme/Lymphatic: Denies problem   Allergic/Immunologic: Denies problem       Objective:        Physical Exam:  General Appearance: Alert, cooperative, no distress, appears stated age  Skin: Skin color, texture, turgor normal, no rashes or lesions.  Throat: Lips, mucosa, and tongue normal; teeth and gums normal  HEENT: grossly normal; otoscopic and opthalmic exam not performed.   Neck: Supple, symmetrical, trachea midline, no adenopathy  Respiratory: Normal respiratory effort  Cardiovascular: No peripheral edema.  Breasts: No concerns, deferred  Pelvic: external genitalia WNL. Sutures present at vaginal introitus. Knot clipped & 2-3 cm of internal suture gently removed with Dhaki's consent. See additional note for attempted IUD insertion.  Musculoskeletal: No digital cyanosis. Normal gait and station. Moves all limbs freely.  Extremities: Extremities normal, atraumatic, no cyanosis or edema  Neuro: Cranial nerves II-XII grossly intact.  Psych: Intact judgment and insight. OX3 and conversational.    Kerrie Camarillo, APRN, CNM

## 2022-01-05 RX ORDER — ALBUTEROL SULFATE 90 UG/1
AEROSOL, METERED RESPIRATORY (INHALATION)
COMMUNITY
Start: 2021-05-07 | End: 2022-03-30

## 2022-01-05 RX ORDER — MISOPROSTOL 100 UG/1
TABLET ORAL
COMMUNITY
Start: 2021-12-03 | End: 2022-03-30

## 2022-01-06 ENCOUNTER — OFFICE VISIT (OUTPATIENT)
Dept: OBGYN | Facility: CLINIC | Age: 27
End: 2022-01-06
Payer: COMMERCIAL

## 2022-01-06 VITALS
BODY MASS INDEX: 27.75 KG/M2 | WEIGHT: 147 LBS | OXYGEN SATURATION: 99 % | HEIGHT: 61 IN | SYSTOLIC BLOOD PRESSURE: 104 MMHG | HEART RATE: 63 BPM | DIASTOLIC BLOOD PRESSURE: 68 MMHG

## 2022-01-06 DIAGNOSIS — Z30.430 ENCOUNTER FOR INSERTION OF INTRAUTERINE CONTRACEPTIVE DEVICE: Primary | ICD-10-CM

## 2022-01-06 PROCEDURE — 58300 INSERT INTRAUTERINE DEVICE: CPT | Performed by: OBSTETRICS & GYNECOLOGY

## 2022-01-06 RX ORDER — COPPER 313.4 MG/1
1 INTRAUTERINE DEVICE INTRAUTERINE ONCE
COMMUNITY
Start: 2022-01-06 | End: 2024-01-26

## 2022-01-06 RX ORDER — COPPER 313.4 MG/1
1 INTRAUTERINE DEVICE INTRAUTERINE ONCE
Status: COMPLETED
Start: 2022-01-06 | End: 2022-01-06

## 2022-01-06 RX ADMIN — COPPER 1 EACH: 313.4 INTRAUTERINE DEVICE INTRAUTERINE at 09:34

## 2022-01-06 ASSESSMENT — MIFFLIN-ST. JEOR: SCORE: 1338.13

## 2022-03-10 ENCOUNTER — MEDICAL CORRESPONDENCE (OUTPATIENT)
Dept: HEALTH INFORMATION MANAGEMENT | Facility: CLINIC | Age: 27
End: 2022-03-10
Payer: COMMERCIAL

## 2022-03-29 ENCOUNTER — TELEPHONE (OUTPATIENT)
Facility: CLINIC | Age: 27
End: 2022-03-29
Payer: COMMERCIAL

## 2022-03-29 NOTE — TELEPHONE ENCOUNTER
Pt got an IUD inserted in January and has been having nausea and low back pain for 5 days. She feels its related to the IUD and would like to talk to a CNM regarding this.

## 2022-03-29 NOTE — TELEPHONE ENCOUNTER
Patient called back. She has not heard from anyone regarding this message. I will forward this on to another Midwife.

## 2022-03-29 NOTE — TELEPHONE ENCOUNTER
Returned phone call to discuss patient's concerns. LMP 3/9/22. Back pain and nausea persisting for 5 days. Unsure if she can feel her IUD strings.  She took a pregnancy test and it was negative. Has not been seen in clinic for IUD check since insertion in January. Recommend office visit to check IUD placement and follow-up on her symptoms.     NALLELY Edwards, CNM, IBCLC  [facility]  Midwifery

## 2022-03-30 ENCOUNTER — OFFICE VISIT (OUTPATIENT)
Dept: MIDWIFE SERVICES | Facility: CLINIC | Age: 27
End: 2022-03-30
Payer: COMMERCIAL

## 2022-03-30 VITALS
HEIGHT: 61 IN | SYSTOLIC BLOOD PRESSURE: 100 MMHG | WEIGHT: 142.9 LBS | HEART RATE: 64 BPM | DIASTOLIC BLOOD PRESSURE: 60 MMHG | BODY MASS INDEX: 26.98 KG/M2

## 2022-03-30 DIAGNOSIS — Z97.5 IUD (INTRAUTERINE DEVICE) IN PLACE: ICD-10-CM

## 2022-03-30 DIAGNOSIS — R11.0 NAUSEA: ICD-10-CM

## 2022-03-30 DIAGNOSIS — Z30.431 IUD CHECK UP: Primary | ICD-10-CM

## 2022-03-30 DIAGNOSIS — M54.50 ACUTE BILATERAL LOW BACK PAIN WITHOUT SCIATICA: ICD-10-CM

## 2022-03-30 DIAGNOSIS — R30.0 DYSURIA: ICD-10-CM

## 2022-03-30 LAB
ALBUMIN UR-MCNC: NEGATIVE MG/DL
APPEARANCE UR: CLEAR
BILIRUB UR QL STRIP: NEGATIVE
COLOR UR AUTO: YELLOW
GLUCOSE UR STRIP-MCNC: NEGATIVE MG/DL
HGB UR QL STRIP: NEGATIVE
KETONES UR STRIP-MCNC: NEGATIVE MG/DL
LEUKOCYTE ESTERASE UR QL STRIP: NEGATIVE
NITRATE UR QL: NEGATIVE
PH UR STRIP: 6 [PH] (ref 5–8)
SP GR UR STRIP: >=1.03 (ref 1–1.03)
UROBILINOGEN UR STRIP-ACNC: 0.2 E.U./DL

## 2022-03-30 PROCEDURE — 99213 OFFICE O/P EST LOW 20 MIN: CPT | Performed by: ADVANCED PRACTICE MIDWIFE

## 2022-03-30 PROCEDURE — 81003 URINALYSIS AUTO W/O SCOPE: CPT | Performed by: ADVANCED PRACTICE MIDWIFE

## 2022-03-30 NOTE — PROGRESS NOTES
"  Assessment:      27 year old, IUD check   IUD in place  Dysuria  Back pain  Nausea     Plan:      -Discussed that IUD appears to be in proper place based on SSE and digital pelvic exam. Reviewed expected changes to cycles with Paragard.  -Encouraged OTC Ibuprofen prn at this time.  -Encouraged small frequent meals and hydration. Advised observing symptoms over the next few weeks to months to see if there is a pattern in relation to her cycle.   -Advised repeat home UPT if next period is late.  -Labs today: UA/UC. Offered pelvic US to verify placement of IUD/and that it is not imbedded in the uterine wall. She will continue to observe symptoms and may call back to have this ordered if desired.     Subjective:      Bob Olvera is a 27 year old female who presents for IUD check. Paragard IUD was placed on 1/6/22 by Dr. Brunner. She reports placement wasn't too painful but she had some back ache for 2 days after. Last period came as expected on 3/9/22 and lasted 7 days. It was heavier than usual which is also anticipated with the Paragard IUD. She has had intercourse since placement without issue.    This past Saturday she began to have nausea (she is fasting some days) but has been able to eat otherwise.  She also began to have off and on low abdominal pain (suprapubic) \"like a wound that someone keeps poking\". Feels it is different than cramping pain. Denies unusual vaginal discharge or odor. Denies fever. Has had some instances of dysuria, or pain with peeing. Denies difficulty with BM. She is also noting back ache, lower and bilateral.  She took a home UPT on Sunday and it was negative. Reports periods come every 30 days. Concerned she may be pregnant.      Shares that she isn't exercising for strenghtening as much as she should and may be reason for back ache.    Objective:      /60   Pulse 64   Ht 1.556 m (5' 1.25\")   Wt 64.8 kg (142 lb 14.4 oz)   LMP 03/09/2022   Breastfeeding No   BMI 26.78 kg/m  "      Pelvic: SE only - normal vaginal and mucosal tissue, female genital cutting present, apparent type II. No abnormal discharge or odor. Cervix normal appearance with IUD strings visible x 2, approximately 3 cm in length. Negative CMT. IUD itself not palpable with digital exam.    29 minutes on the date of the encounter doing chart review, review of test results, interpretation of tests, patient visit and documentation    NALLELY Puga,CNM

## 2022-07-17 ENCOUNTER — HEALTH MAINTENANCE LETTER (OUTPATIENT)
Age: 27
End: 2022-07-17

## 2022-09-24 ENCOUNTER — HEALTH MAINTENANCE LETTER (OUTPATIENT)
Age: 27
End: 2022-09-24

## 2022-09-28 ENCOUNTER — DOCUMENTATION ONLY (OUTPATIENT)
Dept: MIDWIFE SERVICES | Facility: CLINIC | Age: 27
End: 2022-09-28

## 2022-09-28 ENCOUNTER — OFFICE VISIT (OUTPATIENT)
Dept: MIDWIFE SERVICES | Facility: CLINIC | Age: 27
End: 2022-09-28

## 2022-09-28 ENCOUNTER — OFFICE VISIT (OUTPATIENT)
Dept: INTERNAL MEDICINE | Facility: CLINIC | Age: 27
End: 2022-09-28
Payer: COMMERCIAL

## 2022-09-28 VITALS
HEIGHT: 61 IN | DIASTOLIC BLOOD PRESSURE: 66 MMHG | WEIGHT: 138.3 LBS | BODY MASS INDEX: 26.11 KG/M2 | HEART RATE: 67 BPM | SYSTOLIC BLOOD PRESSURE: 96 MMHG

## 2022-09-28 VITALS
HEART RATE: 67 BPM | TEMPERATURE: 98.4 F | WEIGHT: 137.4 LBS | DIASTOLIC BLOOD PRESSURE: 66 MMHG | SYSTOLIC BLOOD PRESSURE: 96 MMHG | BODY MASS INDEX: 25.75 KG/M2 | OXYGEN SATURATION: 98 %

## 2022-09-28 DIAGNOSIS — K59.00 CONSTIPATION, UNSPECIFIED CONSTIPATION TYPE: ICD-10-CM

## 2022-09-28 DIAGNOSIS — R10.2 PELVIC PAIN IN FEMALE: ICD-10-CM

## 2022-09-28 DIAGNOSIS — Z97.5 IUD (INTRAUTERINE DEVICE) IN PLACE: ICD-10-CM

## 2022-09-28 DIAGNOSIS — N89.8 VAGINAL ODOR: ICD-10-CM

## 2022-09-28 DIAGNOSIS — R30.0 DYSURIA: Primary | ICD-10-CM

## 2022-09-28 DIAGNOSIS — R21 RASH: Primary | ICD-10-CM

## 2022-09-28 PROBLEM — Z34.83 ENCOUNTER FOR SUPERVISION OF OTHER NORMAL PREGNANCY IN THIRD TRIMESTER: Status: RESOLVED | Noted: 2021-04-22 | Resolved: 2022-09-28

## 2022-09-28 PROBLEM — O99.019 ANEMIA AFFECTING PREGNANCY: Status: RESOLVED | Noted: 2021-04-22 | Resolved: 2022-09-28

## 2022-09-28 LAB
ALBUMIN UR-MCNC: NEGATIVE MG/DL
APPEARANCE UR: CLEAR
BILIRUB UR QL STRIP: NEGATIVE
CLUE CELLS: ABNORMAL
COLOR UR AUTO: YELLOW
GLUCOSE UR STRIP-MCNC: NEGATIVE MG/DL
HGB UR QL STRIP: NEGATIVE
KETONES UR STRIP-MCNC: NEGATIVE MG/DL
LEUKOCYTE ESTERASE UR QL STRIP: NEGATIVE
NITRATE UR QL: NEGATIVE
PH UR STRIP: 6 [PH] (ref 5–8)
SP GR UR STRIP: >=1.03 (ref 1–1.03)
TRICHOMONAS, WET PREP: ABNORMAL
UROBILINOGEN UR STRIP-ACNC: 0.2 E.U./DL
WBC'S/HIGH POWER FIELD, WET PREP: ABNORMAL
YEAST, WET PREP: ABNORMAL

## 2022-09-28 PROCEDURE — 87210 SMEAR WET MOUNT SALINE/INK: CPT | Performed by: ADVANCED PRACTICE MIDWIFE

## 2022-09-28 PROCEDURE — 87491 CHLMYD TRACH DNA AMP PROBE: CPT | Performed by: ADVANCED PRACTICE MIDWIFE

## 2022-09-28 PROCEDURE — 87086 URINE CULTURE/COLONY COUNT: CPT | Performed by: ADVANCED PRACTICE MIDWIFE

## 2022-09-28 PROCEDURE — 87591 N.GONORRHOEAE DNA AMP PROB: CPT | Performed by: ADVANCED PRACTICE MIDWIFE

## 2022-09-28 PROCEDURE — 99214 OFFICE O/P EST MOD 30 MIN: CPT | Performed by: ADVANCED PRACTICE MIDWIFE

## 2022-09-28 PROCEDURE — 99213 OFFICE O/P EST LOW 20 MIN: CPT | Performed by: NURSE PRACTITIONER

## 2022-09-28 PROCEDURE — 81003 URINALYSIS AUTO W/O SCOPE: CPT | Performed by: ADVANCED PRACTICE MIDWIFE

## 2022-09-28 PROCEDURE — 87088 URINE BACTERIA CULTURE: CPT | Performed by: ADVANCED PRACTICE MIDWIFE

## 2022-09-28 RX ORDER — TRIAMCINOLONE ACETONIDE 5 MG/G
1 OINTMENT TOPICAL 2 TIMES DAILY
Qty: 30 G | Refills: 0 | Status: SHIPPED | OUTPATIENT
Start: 2022-09-28 | End: 2022-10-10

## 2022-09-28 RX ORDER — KETOCONAZOLE 20 MG/G
CREAM TOPICAL 2 TIMES DAILY
Qty: 30 G | Refills: 0 | Status: SHIPPED | OUTPATIENT
Start: 2022-09-28 | End: 2024-01-26

## 2022-09-28 NOTE — PATIENT INSTRUCTIONS
Use a combination of the triamcinolone and ketoconazole on your feet twice daily for the next 10 days or so.    Use a thick moisturizer on your feet twice daily after each application of the triamcinolone and ketoconazole.    Schedule an appointment with your midwife to discuss your abdominal pain and have your IUD examined

## 2022-09-28 NOTE — PROGRESS NOTES
"SUBJECTIVE:  Bob Olvera is a 27 year old female who presents with concern for suprapubic/pelvic pain, dysuria, and back pain. The pain began 3-4 days ago and is constant and sharp or sore in nature. She has not taken anything to relieve the pain or changed her daily activities. The suprapubic pain is very centrally located and she feels radiates to her middle low back. She has some discomfort with urination. Has also been a little constipated this past week. Last BM 2 days ago. She has also noticed some very light pinkish discharge and vaginal odor. No vaginal itching or pain.     Chief Complaint   Patient presents with     Urinary Problem     Possible UTI     LMP: 9/14-9/18/22 and normal flow for patient.  She has a Paragard IUD in place and reports she is able to check her strings.    Active diagnoses this visit:  Dysuria  Constipation, unspecified constipation type  IUD (intrauterine device) in place  Vaginal odor  Pelvic pain in female    Review Of Systems: Negative except that which is obtained in the HPI.    OBJECTIVE:  BP 96/66   Pulse 67   Ht 1.556 m (5' 1.25\")   Wt 62.7 kg (138 lb 4.8 oz)   LMP 09/18/2022   Breastfeeding No   BMI 25.92 kg/m      Exam:  Constitutional: alert and in no acute distress  Head: Normocephalic. No masses, lesions, tenderness or abnormalities  Respiratory: Breathing unlabored.  Gastrointestinal: Abdomen soft, non-tender.  : Normal external genitalia without lesions  Pelvic exam: normal vagina and vulva, some discomfort with speculum placement. Vaginal discharge is thin, clear, and white, normal parous cervix without lesions or tenderness. IUD strings visualized x 2 and appropriate length at 3 cm. Uterus normal size anteverted, adenxa normal in size with mild tenderness during bimanual exam using top and bottom hands, Negative CMT.  Musculoskeletal: no gross deformities noted, gait normal and normal muscle tone  Skin: no suspicious lesions or rashes  Neurologic: Sensation " grossly WNL.  Psychiatric: mentation appears normal and affect normal/bright      ASSESSMENT / PLAN:  (R30.0) Dysuria (primary encounter diagnosis)  (N89.8) Vaginal odor  (R10.2) Pelvic pain in female  Comment: discussed possible etiologies for her pain including constipation, UTI, vaginitis/STI, concern with IUD placement, ovarian cyst, or combination of these.  Plan: Urine Culture, Urinalysis Macroscopic, Wet preparation, Chlamydia trachomatis/Neisseria gonorrhoeae by PCR.  A pelvic US was discussed and ordered. Pt aware she can schedule this pending lab work.    (K59.00) Constipation, unspecified constipation type  Plan: Advised on fluids, fiber, exercise at this time.     (Z97.5) IUD (intrauterine device) in place  Plan: IUD appears to be in appropriate place. IUD strings appropriate in length and IUD itself not palpated during exam.      30 minutes on the date of the encounter doing chart review, patient visit and documentation    NALLELY Puga, SILVA

## 2022-09-28 NOTE — PROGRESS NOTES
"  Assessment & Plan     Rash  Eczema versus fungal infection.  We will treat with triamcinolone and ketoconazole combination along with aggressive moisturization.  Follow-up if symptoms do not start to improve within the next week or so    She is having some mild abdominal pain and wants to know if her IUD is WNL, I suggested that she follow-up with her midwife for an exam and possible pelvic ultrasound, if warranted    - triamcinolone (KENALOG) 0.5 % external ointment; Apply 1 g topically 2 times daily  - ketoconazole (NIZORAL) 2 % external cream; Apply topically 2 times daily    Patient Instructions   Use a combination of the triamcinolone and ketoconazole on your feet twice daily for the next 10 days or so.    Use a thick moisturizer on your feet twice daily after each application of the triamcinolone and ketoconazole.    Schedule an appointment with your midwife to discuss your abdominal pain and have your IUD examined       BMI:   Estimated body mass index is 25.75 kg/m  as calculated from the following:    Height as of 3/30/22: 1.556 m (5' 1.25\").    Weight as of this encounter: 62.3 kg (137 lb 6.4 oz).     Nathaniel Crenshaw, [facility] is a 27 year [facility], presenting for the following health issues:  Derm Problem (Right foot x 1 month - Itchy)      HPI     Rash  Onset/Duration: 1 mo.   Description  Location: Right foot  Character: flakey, draining, burning  Itching: severe  Intensity:  moderate  Progression of Symptoms:  intermittent  Accompanying signs and symptoms:   Fever: No  Body aches or joint pain: YES, abdominal/back pain  Sore throat symptoms: No  Recent cold symptoms: No  History:           Previous episodes of similar rash: None  New exposures:  None  Recent travel: No  Exposure to similar rash: No  Precipitating or alleviating factors: Prescribed med but was unable to .  Therapies tried and outcome: Oil and garlic home " remedy.      Review of Systems   Constitutional, HEENT, cardiovascular, pulmonary, gi and gu systems are negative, except as otherwise noted.      Objective    BP 96/66 (BP Location: Right arm, Patient Position: Sitting, Cuff Size: Adult Regular)   Pulse 67   Temp 98.4  F (36.9  C) (Oral)   Wt 62.3 kg (137 lb 6.4 oz)   SpO2 98%   BMI 25.75 kg/m    Body mass index is 25.75 kg/m .  Physical Exam     Flaky appearing rash on right foot skin surfaces with some pitting evident.  No open sores or lesions however

## 2022-09-29 LAB
C TRACH DNA SPEC QL PROBE+SIG AMP: NEGATIVE
N GONORRHOEA DNA SPEC QL NAA+PROBE: NEGATIVE

## 2022-09-30 DIAGNOSIS — R82.71 GBS BACTERIURIA: ICD-10-CM

## 2022-09-30 DIAGNOSIS — N30.00 ACUTE CYSTITIS WITHOUT HEMATURIA: Primary | ICD-10-CM

## 2022-09-30 LAB — BACTERIA UR CULT: ABNORMAL

## 2022-09-30 RX ORDER — AMPICILLIN TRIHYDRATE 500 MG
500 CAPSULE ORAL 3 TIMES DAILY
Qty: 15 CAPSULE | Refills: 0 | Status: SHIPPED | OUTPATIENT
Start: 2022-09-30 | End: 2022-10-05

## 2022-10-10 ENCOUNTER — TELEPHONE (OUTPATIENT)
Dept: INTERNAL MEDICINE | Facility: CLINIC | Age: 27
End: 2022-10-10

## 2022-10-10 DIAGNOSIS — R21 RASH: ICD-10-CM

## 2022-10-10 RX ORDER — TRIAMCINOLONE ACETONIDE 5 MG/G
1 OINTMENT TOPICAL 2 TIMES DAILY
Qty: 30 G | Refills: 0 | Status: SHIPPED | OUTPATIENT
Start: 2022-10-10 | End: 2024-01-26

## 2022-10-10 NOTE — TELEPHONE ENCOUNTER
PLEASE CALL PHARMACY -[phone] OR SEND NEW RX THAT INDICATES   AREA OF APPLICATION AND  DAYS SUPPLY FOR PRESCRIBED QTY  FOR TRIAMCINOLONE EXTERNAL OINTMENT

## 2022-10-11 ENCOUNTER — TELEPHONE (OUTPATIENT)
Dept: INTERNAL MEDICINE | Facility: CLINIC | Age: 27
End: 2022-10-11

## 2022-10-11 NOTE — TELEPHONE ENCOUNTER
Please call pharmacy at [phone]- or send new RX that indicates: area of application and days supply for Ketoconazole external cream.

## 2022-12-02 ENCOUNTER — HOSPITAL ENCOUNTER (OUTPATIENT)
Dept: ULTRASOUND IMAGING | Facility: CLINIC | Age: 27
Discharge: HOME | End: 2022-12-02
Attending: ADVANCED PRACTICE MIDWIFE | Admitting: ADVANCED PRACTICE MIDWIFE
Payer: COMMERCIAL

## 2022-12-02 DIAGNOSIS — R10.2 PELVIC PAIN IN FEMALE: ICD-10-CM

## 2022-12-02 PROCEDURE — 76856 US EXAM PELVIC COMPLETE: CPT

## 2022-12-06 ENCOUNTER — TELEPHONE (OUTPATIENT)
Dept: MIDWIFE SERVICES | Facility: CLINIC | Age: 27
End: 2022-12-06

## 2023-01-29 ENCOUNTER — HEALTH MAINTENANCE LETTER (OUTPATIENT)
Age: 28
End: 2023-01-29

## 2023-06-06 ENCOUNTER — TELEPHONE (OUTPATIENT)
Dept: NURSING | Facility: CLINIC | Age: 28
End: 2023-06-06
Payer: COMMERCIAL

## 2023-06-06 NOTE — TELEPHONE ENCOUNTER
Patient returning call from [facility]. Says she had call yesterday and today. Does not know what it is about other than she scheduled appointments recently. No note is present. Keeps getting transferred to [facility]. This writer did warm transfer to [facility] to have them investigate who is calling.     MINDY GARCIA RN  [facility] nurse advisors  6/6/2023  2:09 PM

## 2023-06-16 ENCOUNTER — HOSPITAL ENCOUNTER (OUTPATIENT)
Dept: CT IMAGING | Facility: CLINIC | Age: 28
Discharge: HOME | End: 2023-06-16
Attending: OTOLARYNGOLOGY | Admitting: OTOLARYNGOLOGY
Payer: COMMERCIAL

## 2023-06-16 DIAGNOSIS — H92.09 OTALGIA: ICD-10-CM

## 2023-06-16 PROCEDURE — 70480 CT ORBIT/EAR/FOSSA W/O DYE: CPT

## 2023-06-22 NOTE — PROGRESS NOTES
"Problem Visit    June 23, 2023    Subjective:    Bob Olvera is a 28 year old female who presents with her children for evaluation of internal and external pelvic pain and dizziness ongoing x two weeks. Has not found particularly relieving measures, activity and heat worsen. Verbalizes local irritation, expressed discomfort with speculum exertion. Last LMP 7/1/23, typical per Bob, has not had recent intercourse since last LMP. Verbalizes has had similar pain and sought midwifery care for previously- labs unremarkable    ROS:   General: Denies fevers, chills, night sweats.  Skin: Denies new rashes or lesions.  HEENT: Denies headache, visual disturbance, throat swelling or difficulty swallowing.  CV: Denies chest pain, palpitations or shortness of breath.  GI: Denies N/V/D, blood in stool or constipation.  : Denies dysuria, polyuria. Feels intermittent increased need to void without successfully voiding  Genital: Denies odorous discharge.  MSK: Denies joint pain, muscles aches or difficulty ambulating. Experiencing lower back pain.  Neurologic: Denies difficulty ambulating, concentrating.  Endocrine: Denies hot/cold intolerance, sweating.  Psychiatric: Denies depression or anxiety.      Objective:    BP 90/56   Pulse 72   Temp 98.4  F (36.9  C) (Oral)   Ht 1.556 m (5' 1.25\")   Wt 54.7 kg (120 lb 11.2 oz)   LMP 06/01/2023   Breastfeeding No   BMI 22.62 kg/m    PHQ-2 score: 3  PHQ-9 score: 8, 0 to #10    Physical Exam:  General Appearance: Alert, cooperative, no distress, appears stated age  Skin: Skin color, texture, turgor normal, no rashes or lesions.  HEENT: grossly normal; otoscopic and opthalmic exam not performed.   Musculoskeletal: No digital cyanosis. Normal gait and station. Moves all limbs freely. No CVAT.  Neuro: Cranial nerves II-XII grossly intact.  Psych: Intact judgment and insight. OX3 and conversational.  Pelvic exam: External genitalia normal without lesions or irritation. Vaginal tissue " erythematous, tender. Cervix with thin, homogenous, white, watery discharge without lesions, inflammation, or tenderness. No cystocele, No rectocele. No palpable uterine enlargement.    Assessment/Plan:    Bob Olvera is a 28 year old  presenting for pelvic pain with exertion and at rest and increased urinary urgency  Differentials include bacterial vaginosis versus yeast versus urinary tract infection.   Possible anemia  Paraguard IUD    - For urine analysis and culture  - For GC/CT testing  - For wet prep  - Serum hemoglobin, pt requesting lipids, not fasting  - Encouraged to increase hydration and dietary protein  - Encouraged schedule visit with primary care provider for lower back pain if remains unresolved    Time spent:  Chart review/Pre-charting on 23: 5 minutes  Face-to-face visit: 15 minutes   Documentation:  5 minutes  Total time spent on day of service:  25  minutes          ELIZABETH Finney  I was present with the student, Zuleyma Valentine, who participated in the service and the documentation of the note. I have verified the history and personally performed the physical exam and medical decision making. I agree with the assessment and plan as documented in the note  Dena Troy, DNP, APRN, CNM  [facility]  Midwifery

## 2023-06-23 ENCOUNTER — OFFICE VISIT (OUTPATIENT)
Dept: MIDWIFE SERVICES | Facility: CLINIC | Age: 28
End: 2023-06-23
Payer: COMMERCIAL

## 2023-06-23 VITALS
HEIGHT: 61 IN | DIASTOLIC BLOOD PRESSURE: 56 MMHG | TEMPERATURE: 98.4 F | SYSTOLIC BLOOD PRESSURE: 90 MMHG | HEART RATE: 72 BPM | WEIGHT: 120.7 LBS | BODY MASS INDEX: 22.79 KG/M2

## 2023-06-23 DIAGNOSIS — B96.89 BV (BACTERIAL VAGINOSIS): Primary | ICD-10-CM

## 2023-06-23 DIAGNOSIS — N76.0 VAGINITIS AND VULVOVAGINITIS: ICD-10-CM

## 2023-06-23 DIAGNOSIS — Z00.00 ROUTINE ADULT HEALTH MAINTENANCE: ICD-10-CM

## 2023-06-23 DIAGNOSIS — R53.83 OTHER FATIGUE: ICD-10-CM

## 2023-06-23 DIAGNOSIS — D50.8 IRON DEFICIENCY ANEMIA SECONDARY TO INADEQUATE DIETARY IRON INTAKE: ICD-10-CM

## 2023-06-23 DIAGNOSIS — N76.0 BV (BACTERIAL VAGINOSIS): Primary | ICD-10-CM

## 2023-06-23 DIAGNOSIS — R10.2 PELVIC PAIN IN FEMALE: Primary | ICD-10-CM

## 2023-06-23 LAB
ALBUMIN UR-MCNC: NEGATIVE MG/DL
APPEARANCE UR: CLEAR
BILIRUB UR QL STRIP: NEGATIVE
CHOLEST SERPL-MCNC: 129 MG/DL
CLUE CELLS: PRESENT
COLOR UR AUTO: YELLOW
GLUCOSE UR STRIP-MCNC: NEGATIVE MG/DL
HDLC SERPL-MCNC: 50 MG/DL
HGB BLD-MCNC: 10.7 G/DL (ref 11.7–15.7)
HGB UR QL STRIP: NEGATIVE
KETONES UR STRIP-MCNC: NEGATIVE MG/DL
LDLC SERPL CALC-MCNC: 70 MG/DL
LEUKOCYTE ESTERASE UR QL STRIP: NEGATIVE
NITRATE UR QL: NEGATIVE
NONHDLC SERPL-MCNC: 79 MG/DL
PH UR STRIP: 6.5 [PH] (ref 5–8)
SP GR UR STRIP: >=1.03 (ref 1–1.03)
TRICHOMONAS, WET PREP: ABNORMAL
TRIGL SERPL-MCNC: 43 MG/DL
UROBILINOGEN UR STRIP-ACNC: 0.2 E.U./DL
WBC'S/HIGH POWER FIELD, WET PREP: ABNORMAL
YEAST, WET PREP: ABNORMAL

## 2023-06-23 PROCEDURE — 87591 N.GONORRHOEAE DNA AMP PROB: CPT | Performed by: ADVANCED PRACTICE MIDWIFE

## 2023-06-23 PROCEDURE — 80061 LIPID PANEL: CPT | Performed by: ADVANCED PRACTICE MIDWIFE

## 2023-06-23 PROCEDURE — 87491 CHLMYD TRACH DNA AMP PROBE: CPT | Performed by: ADVANCED PRACTICE MIDWIFE

## 2023-06-23 PROCEDURE — 81003 URINALYSIS AUTO W/O SCOPE: CPT | Performed by: ADVANCED PRACTICE MIDWIFE

## 2023-06-23 PROCEDURE — 36415 COLL VENOUS BLD VENIPUNCTURE: CPT | Performed by: ADVANCED PRACTICE MIDWIFE

## 2023-06-23 PROCEDURE — 87210 SMEAR WET MOUNT SALINE/INK: CPT | Performed by: ADVANCED PRACTICE MIDWIFE

## 2023-06-23 PROCEDURE — 85018 HEMOGLOBIN: CPT | Performed by: ADVANCED PRACTICE MIDWIFE

## 2023-06-23 PROCEDURE — 99213 OFFICE O/P EST LOW 20 MIN: CPT | Mod: GC | Performed by: ADVANCED PRACTICE MIDWIFE

## 2023-06-23 RX ORDER — FERROUS SULFATE 325(65) MG
325 TABLET ORAL EVERY OTHER DAY
Qty: 45 TABLET | Refills: 3 | Status: SHIPPED | OUTPATIENT
Start: 2023-06-23 | End: 2023-12-15

## 2023-06-23 RX ORDER — METRONIDAZOLE 500 MG/1
500 TABLET ORAL 2 TIMES DAILY
Qty: 14 TABLET | Refills: 0 | Status: SHIPPED | OUTPATIENT
Start: 2023-06-23 | End: 2023-06-30

## 2023-06-23 ASSESSMENT — PATIENT HEALTH QUESTIONNAIRE - PHQ9: SUM OF ALL RESPONSES TO PHQ QUESTIONS 1-9: 8

## 2023-06-24 LAB
C TRACH DNA SPEC QL PROBE+SIG AMP: NEGATIVE
N GONORRHOEA DNA SPEC QL NAA+PROBE: NEGATIVE

## 2023-07-03 ENCOUNTER — TELEPHONE (OUTPATIENT)
Dept: MIDWIFE SERVICES | Facility: CLINIC | Age: 28
End: 2023-07-03
Payer: COMMERCIAL

## 2023-07-03 DIAGNOSIS — B96.89 BV (BACTERIAL VAGINOSIS): Primary | ICD-10-CM

## 2023-07-03 DIAGNOSIS — N76.0 BV (BACTERIAL VAGINOSIS): Primary | ICD-10-CM

## 2023-07-03 RX ORDER — METRONIDAZOLE 500 MG/1
500 TABLET ORAL 2 TIMES DAILY
Qty: 14 TABLET | Refills: 0 | Status: SHIPPED | OUTPATIENT
Start: 2023-07-03 | End: 2023-07-10

## 2023-07-17 ENCOUNTER — TELEPHONE (OUTPATIENT)
Dept: MIDWIFE SERVICES | Facility: CLINIC | Age: 28
End: 2023-07-17
Payer: COMMERCIAL

## 2023-07-25 ENCOUNTER — LAB (OUTPATIENT)
Dept: LAB | Facility: CLINIC | Age: 28
End: 2023-07-25
Payer: COMMERCIAL

## 2023-07-25 ENCOUNTER — OFFICE VISIT (OUTPATIENT)
Dept: MIDWIFE SERVICES | Facility: CLINIC | Age: 28
End: 2023-07-25
Payer: COMMERCIAL

## 2023-07-25 VITALS
HEIGHT: 61 IN | SYSTOLIC BLOOD PRESSURE: 86 MMHG | DIASTOLIC BLOOD PRESSURE: 54 MMHG | HEART RATE: 80 BPM | BODY MASS INDEX: 23.03 KG/M2 | WEIGHT: 122 LBS

## 2023-07-25 DIAGNOSIS — Z13.21 ENCOUNTER FOR VITAMIN DEFICIENCY SCREENING: ICD-10-CM

## 2023-07-25 DIAGNOSIS — Z12.4 CERVICAL CANCER SCREENING: Primary | ICD-10-CM

## 2023-07-25 DIAGNOSIS — Z11.3 SCREEN FOR STD (SEXUALLY TRANSMITTED DISEASE): ICD-10-CM

## 2023-07-25 DIAGNOSIS — Z00.00 ROUTINE HEALTH MAINTENANCE: ICD-10-CM

## 2023-07-25 PROBLEM — Z87.59 HISTORY OF THIRD DEGREE PERINEAL LACERATION: Status: RESOLVED | Noted: 2018-06-13 | Resolved: 2023-07-25

## 2023-07-25 LAB
CLUE CELLS: ABNORMAL
ERYTHROCYTE [DISTWIDTH] IN BLOOD BY AUTOMATED COUNT: 13.2 % (ref 10–15)
HCT VFR BLD AUTO: 32.6 % (ref 35–47)
HGB BLD-MCNC: 9.9 G/DL (ref 11.7–15.7)
MCH RBC QN AUTO: 23.3 PG (ref 26.5–33)
MCHC RBC AUTO-ENTMCNC: 30.4 G/DL (ref 31.5–36.5)
MCV RBC AUTO: 77 FL (ref 78–100)
PLATELET # BLD AUTO: 258 10E3/UL (ref 150–450)
RBC # BLD AUTO: 4.24 10E6/UL (ref 3.8–5.2)
TRICHOMONAS, WET PREP: ABNORMAL
WBC # BLD AUTO: 4.3 10E3/UL (ref 4–11)
WBC'S/HIGH POWER FIELD, WET PREP: ABNORMAL
YEAST, WET PREP: ABNORMAL

## 2023-07-25 PROCEDURE — 87210 SMEAR WET MOUNT SALINE/INK: CPT | Performed by: ADVANCED PRACTICE MIDWIFE

## 2023-07-25 PROCEDURE — 85027 COMPLETE CBC AUTOMATED: CPT

## 2023-07-25 PROCEDURE — 36415 COLL VENOUS BLD VENIPUNCTURE: CPT

## 2023-07-25 PROCEDURE — 99395 PREV VISIT EST AGE 18-39: CPT | Performed by: ADVANCED PRACTICE MIDWIFE

## 2023-07-25 PROCEDURE — 82306 VITAMIN D 25 HYDROXY: CPT

## 2023-07-25 PROCEDURE — G0145 SCR C/V CYTO,THINLAYER,RESCR: HCPCS | Performed by: ADVANCED PRACTICE MIDWIFE

## 2023-07-25 NOTE — PROGRESS NOTES
Bob is a 28 year old  female who presents for annual exam.       HPI:  Pt presents for physical and pap.  Reports some intermittent back pain since her last birth, towards her middle/upper back.  Exacerbated at times during menses.  Also has some pain at her abdominal surgical incision (surgery of unknown type done in [place] as a child) Notes pain only when cold.  Would like vitamin D screening today as well as repeat wet prep (BV treated approx 1 month ago)      GYNECOLOGIC HISTORY:    Patient's last menstrual period was 2023.    Regular menses? Yes.  Heavier first day, then normal progression of period.    Her current contraception method is: IUD. She is pleased with this method and plans to continue.  She  reports that she has never smoked. She has never been exposed to tobacco smoke. She has never used smokeless tobacco.    Patient is sexually active.  STD testing offered?   Was completed 1 month ago.  Denies need for repeat GC/Chlamydia today  Last PHQ-9 score on record =       2023     2:07 PM   PHQ-9 SCORE   PHQ-9 Total Score 8     Last GAD7 score on record =        No data to display                HEALTH MAINTENANCE:  Cholesterol: (  Cholesterol   Date Value Ref Range Status   2023 129 <200 mg/dL Final   2016 143 <=199 mg/dL Final     Last Mammo:  Not indicated , Denies breast concerns/changes    Lab Results   Component Value Date    GYNINTERP  2019     Negative for squamous intraepithelial lesion or malignancy  Electronically signed by Hortencia Cox CT (ASCP) on 2019 at 12:58 PM       Not yet due for colonoscopy or DEXA screening    Health maintenance updated:  yes    HISTORY:  OB History    Para Term  AB Living   4 4 4 0 0 4   SAB IAB Ectopic Multiple Live Births   0 0 0 0 4      # Outcome Date GA Lbr Mike/2nd Weight Sex Delivery Anes PTL Lv   4 Term 10/26/21 40w2d / 00:06 3.78 kg (8 lb 5.3 oz) F Vag-Spont None N NIKI      Name:  ANAMIKA,FEMALE-[facility]      Apgar1: 5  Apgar5: 9   3 Term 05/30/20 40w6d 13:08 / 00:09 4.03 kg (8 lb 14.2 oz) F Vag-Spont None N NIKI      Birth Comments: episiotomy, fetal intolerance      Complications: Dysfunctional Labor      Name: ELIZABETH AVILA      Apgar1: 2  Apgar5: 5   2 Term 06/13/18 39w0d 04:30 / 00:14 3.544 kg (7 lb 13 oz) M Vag-Spont None N NIKI      Birth Comments: 3rd degree lac      Complications: Fetal Intolerance      Name: Austin Avila      Apgar1: 7  Apgar5: 9   1 Term 04/11/16 42w0d 08:00 / 00:35 3.459 kg (7 lb 10 oz) F Vag-Spont None N NIKI      Birth Comments: PPH due to anterior/periclitoral and perineal lacerations. 2nd degree, BF x 8 months. No PPD.       Name: Cecile      Apgar1: 9  Apgar5: 9       Patient Active Problem List   Diagnosis    Screening for malignant neoplasm of cervix    IUD (intrauterine device) in place     Past Surgical History:   Procedure Laterality Date    MIDDLE EAR SURGERY  10/10, 2/11    STOMACH SURGERY  age 5      Social History     Tobacco Use    Smoking status: Never     Passive exposure: Never    Smokeless tobacco: Never   Substance Use Topics    Alcohol use: Never      Problem (# of Occurrences) Relation (Name,Age of Onset)    No Known Problems (20) Mother, Father, Sister, Brother, Daughter, Son, Maternal Aunt, Maternal Uncle, Paternal Aunt, Paternal Uncle, Paternal Grandmother, Daughter, Daughter, Brother, Brother, Brother, Sister, Sister, Sister, Sister              Current Outpatient Medications   Medication Sig    Ferrous Sulfate (IRON PO) Liquid drops.  Takes 1 drop daily    paragard intrauterine copper device 1 each by Intrauterine route once IUD inserted 1-6-22    ferrous sulfate (FEROSUL) 325 (65 Fe) MG tablet Take 1 tablet (325 mg) by mouth every other day (Patient not taking: Reported on 7/25/2023)    ketoconazole (NIZORAL) 2 % external cream Apply topically 2 times daily (Patient not taking: Reported on 6/23/2023)    triamcinolone (KENALOG) 0.5 % external  "ointment Apply 1 g topically 2 times daily To the tops of your feet for the next 10 days (Patient not taking: Reported on 7/25/2023)     No current facility-administered medications for this visit.     No Known Allergies    Past medical, surgical, social and family histories were reviewed and updated in EPIC.    ROS:   12 point review of systems negative other than symptoms noted below or in the HPI.  No urinary frequency or dysuria, bladder or kidney problems    EXAM:  BP (!) 86/54   Pulse 80   Ht 1.549 m (5' 1\")   Wt 55.3 kg (122 lb)   LMP 07/23/2023   Breastfeeding No   BMI 23.05 kg/m     BMI: Body mass index is 23.05 kg/m .    PHYSICAL EXAM:  Constitutional:   Appearance: Well nourished, well developed, alert, in no acute distress  Neck:  Lymph Nodes:  No lymphadenopathy present    Thyroid:  Gland size normal, nontender, no nodules or masses present  on palpation  Chest:  Respiratory Effort:  Breathing unlabored  Cardiovascular:    Heart: Auscultation:  Regular rate, normal rhythm, no murmurs present  Breasts: Palpation of Breasts and Axillae:  No masses present on palpation, no breast tenderness.  Gastrointestinal:   Abdominal Examination:  Abdomen nontender to palpation, tone normal without rigidity or guarding, no masses present, umbilicus without lesions.  1-2fb diastasis recti.  Abdominal scar present from approx xyphoid process to top of abdominal muscles.  Scar is soft, mobile.  Does extend slightly to area of diastasis recti.   BACK: Grossly normal.  Slight tenderness to palpation over area of spine just under bra line.  No lesions or lumps.   Liver and Spleen:  No hepatomegaly present, liver nontender to palpation    Hernias:  No hernias present  Lymphatic: Lymph Nodes:  No other lymphadenopathy present  Skin:  General Inspection:  No rashes present, no lesions present, no areas of  discoloration  Neurologic:    Mental Status:  Oriented X3.  Normal strength and tone, sensory exam                " grossly normal, mentation intact and speech normal.    Psychiatric:   Mentation appears normal and affect normal/bright.         Pelvic Exam:  External Genitalia:     Normal appearance for age, no discharge present, no tenderness present, no inflammatory lesions present, color normal  Vagina:    Normal vaginal vault without central or paravaginal defects, no discharge present, no inflammatory lesions present, no masses present  Bladder:     Nontender to palpation  Urethra:   Urethral Body:  Urethra palpation normal, urethra structural support normal   Urethral Meatus:  No erythema or lesions present  Cervix:     Appearance healthy, no lesions present, nontender to palpation, no bleeding present, string present  Uterus:     Nontender to palpation, no masses present, position anteflexed, mobility: normal  Adnexa:     No adnexal tenderness present, no adnexal masses present  Perineum:     Perineum within normal limits, no evidence of trauma, no rashes or skin lesions present  Anus:     Anus within normal limits, no hemorrhoids present  Inguinal Lymph Nodes:     No lymphadenopathy present  Pubic Hair:     Normal pubic hair distribution for age  Genitalia and Groin:     No rashes present, no lesions present, no areas of discoloration, no masses present    COUNSELING:   Reviewed preventive health counseling, as reflected in patient instructions    BMI: Body mass index is 23.05 kg/m .  Has noted a recent weight loss which she attributes to decrease in appetite.  Reviewed dietary recommendations including increasing high iron foods due to low hgb at last check.  Is taking iron supplement every few days.  Reports it is difficult to remember and she doesn't like the taste.  Using liquid iron.  Was unable to tolerate iron tabs.      ASSESSMENT:  28 year old female with satisfactory annual exam.    ICD-10-CM    1. Cervical cancer screening  Z12.4 Pap screen reflex to HPV if ASCUS - recommend age 25 - 29      2. Screen for STD  (sexually transmitted disease)  Z11.3 Wet prep - Clinic Collect      3. Routine health maintenance  Z00.00 Vitamin D deficiency screening     CBC with platelets      4. Encounter for vitamin deficiency screening  Z13.21 Vitamin D deficiency screening          PLAN:  Routine annual physical.  Pap smear.  CBC.  Vitamin D screening.  Reviewed body mechanics for back pain as well as comfort measures.  Discussed option of PT if desired.  Abdominal scar discomfort with cold likely r/t placement on muscle.  Reviewed comfort measures.  RTC in 1 year or prn.    Ekta Nash CNM

## 2023-07-25 NOTE — PATIENT INSTRUCTIONS
Patient Education     Pap  Does this test have other names?  Pap smear, cervical cytology, Papanicolaou test, Pap smear test, vaginal smear technique  What is this test?  This test checks the cells from inside a woman's cervix for any changes that could lead to cancer. The cervix is the lower part of a woman's uterus that opens into the vagina.   This test is named after Favian Dodd M.D., one of the healthcare providers who developed this method of testing for cervical cancer.   Why do I need this test?  You may need this test as a screening test to look for cervical cancer or changes in cervical cells that might eventually lead to cancer. Major medical groups generally advise that women get regular Pap tests every 3 years starting at age 21. Getting a regular Pap test can be lifesaving. Cervical cancer is a serious type of cancer in women. It is also one of the most treatable types when found early.   If your test shows abnormal cells, your healthcare provider may be able to find and treat cervical problems right away or stop cervical cancer before it becomes life-threatening. Pap tests can also diagnose serious infections and pelvic inflammation.    What other tests might I have along with this test?  You will likely have a pelvic exam along with this test. Depending on your age and other factors, your tissue samples may also be tested for human papillomavirus (HPV) infection at the same time your Pap test is done. Infection with some types of HPV puts you at risk for cervical cancer.   If you have an abnormal Pap test result, you may need other tests. These may include:   Colposcopy. Your cervix and vagina are looked at with a microscope called a colposcope, which magnifies any abnormal areas.  Endocervical curettage. Cells are taken from the opening of your cervix with a spoon-shaped tool and looked at under a microscope. This may be done during the colposcopy.  Biopsy. A small tissue sample is taken  from your cervix and looked at under a microscope. This may be done during the colposcopy.   What do my test results mean?  Test results may vary depending on your age, gender, health history, and other things. Your test results may be different depending on the lab used. They may not mean you have a problem. Ask your healthcare provider what your test results mean for you.   Your results will either be normal, unclear, or abnormal. If you get an unclear or abnormal result, this does not mean that you have cancer. It can often mean a minor cervical problem. Your healthcare provider may do another Pap test to confirm the initial results. Or they may advise other tests, such as colposcopy.   Occasionally a lab test has a false-positive result. This means you do not have a cervical problem even though the test result shows you do.    How is this test done?  This test is often done with a pelvic exam. You will lie on your back with your knees bent and your feet in stirrups, then relax and spread your legs. Your healthcare provider first checks your vagina and reproductive organs for infections and health problems.   Then your provider uses a device called a speculum to open the vagina. The provider examines your cervix and scrapes off a few cells from inside your cervix.   Some women may have slight discomfort or pressure when the speculum is inserted or when the sample of cells is taken.   Does this test pose any risks?  This test poses no known risks.  What might affect my test results?  Using vaginal lubricants, cleansers, contraceptives, or creams may mask your symptoms. Don't use vaginal douches and don't have sex for 2 days before an exam. Using these products or having sex may wash away or disguise abnormal cervical cells.   How do I get ready for this test?  It may seem like a good idea to wash up before having a Pap test, but this can actually erase the signs of a health problem. For accurate test results,  don't have sex or use tampons, douches, vaginal creams, deodorant sprays and powders, and contraceptive foams and jellies for 2 days before your exam.   Although you can have the test while you're menstruating, it is better to have the test when you're not. The ideal time to have a Pap test is at least 5 days after the end of your period.     Be sure your healthcare provider knows about all medicines, herbs, vitamins, and supplements you are taking. This includes medicines that don't need a prescription and any illegal drugs you may use.    [employer] last reviewed this educational content on 9/1/2022 2000-2022 [employer]. All rights reserved. This information is not intended as a substitute for professional medical care. Always follow your healthcare professional's instructions.           Patient Education   Ibaarida Naasaha: Haris Arce.  Breast Self-Exam: Care Instructions  Shanae Cobian.  Isbaariga naasuhu waa markaad naasaha ka baartid buro iyo sibedel kale. Baaritaankan caadiga ahi wuxuu ku barayaa sida naasahaagu caadi ahaan u egyihiin aadna u dareentid. Naasaha isbedelada ku dhaca badankoodu kansar maaha.  Isbaarida naasaha booskii mamaogrm-ka ma galayso. Baaritaanka naasaha uu dhakhtarkaagu sameeyo iyp mamogram joogto waxay badinayaan jaaniska helitaanka wixii dhib ah ee naasahaagu qabaan.  Dumarka qaarkood waxay waqti meel u dhigaan bishiiba inay si talaabaysan ay isaga baaraan kansar-ka. Dumar kale waxay isu baaraan si ka duwan oo aan caadi ahayn. Naasahooday fiiriyaan markay cadayanayaan, ama markay dareemaan naasahooda oo ay qubeysanayaan.  Haddaa naasahaaga ka dareentid isbedel, dhakhtarkaga u sheeg.  Lasocoshada-Daryeelka waa qayb muhiim ah oo ka ceferino ah daaweyntaada iyo badbaadadaada. Hubso in aad sameysato oo aad tagto [facility] oo bill, waxaadna wacdaa [facility] haddaad dhib qabto. Sidseun lomase waa fikrad fiican inaad ogaato natiijayada  "baaritaannada lagugu sameeyay, iyo inaad kaydsato liiska daawooyinka aad qaadato.  Sidee baad naasaha isaga baarikartaa?  Marka ugu fiican ee naasaha la iska baaro waa yuko usbuuc kadib marka caadadu kugu dhacdo. Naasahaagu waa inaanay curdan. Hadaanay caado kugu dhicin waxaad baaritaanka qaadan kartaa maalin aad xasuusn kartid.  Si aad u baarto naasahaaga:.  Iska bixi dharka inta ka sareysa dhexda, jiifsana. Markaad jiifsatid, maqaarka naasahaadu wuxuu si siman u saarmayaa xabadkaada, taasoo keenaysa inaad si fudud u dareentid jirka naaska.  Isticmaal gacantaada bidix sadexdeeda farood ee dhexe qaybtooda banaanka ah si aad u baartid naaskaaga midig. Dhaqaaji farahaaga si goobo yaryar ah oo is dhexgalaya.  Isticmaal sadex cadaadis oo cata duwan si aad u dareentid jirka naaska. Tartiib u riix si aad u dareento jirka u aparna sareeya. Si renee xoogan si aad u dareento jirka xoogaa gudaha jira. Si xoogan u riix si aad u dareentid jirka u dhow lafta naaska iyo feeraha. Dhamaystir riixmooyinkan cata duwan intaadan u gudbin qayb kale.  Naaskaaga bill ku samee, si layn ah kasoo bilowmaya kalxanta ilaa iyo laynka rajasiinka, iyo kilkisha ilaa feeraha. Ku celceli ilaa aad dhamaystirtid naaskoo bill.  Ku sarika baaritaanka naaskaaga bidix adigoo isticmaalaya gacantaada midig saddexda farood ee dhexe qaybtooda calaacasha.  Si aad u baarto naasahaaga markaad qubeysanayso:  Yuko gacan ka kormari madaxaaga naaskaagana saabuun jacob dhinaca ka lydia.  Adoo isticmaalaya farahaaga dhinaca calaacasha, si tartiib ah gacantaada naaska ku kor lydia (sida laynka ah oo kor lagu sharxay) adigoo si taxadar leh u raadinaya burooyin ama isbedel.  Sidoo kale ku samee naaska kalana.  Dhakhtateresakaaga hasii baaro waxaad dareentid oo ubaahan in lasii baaro.  Yukokeed ku sherman kartaa wax badan?  Tag   [email or web address]  Jaye [address] Gudaha sanduuqa raadinta si loo latasha wax badan oo wendy gold. \"Ibaarida Naasaha: Deannmamiryam Arce..\"  Hadda " brain bennett: Bisha Sideedaad 2, 2022               Nocorwin Bobbysmadda: 13.7    [id number] [facility] Centerwise, [employer].   Haris stephens la qaatay hoosta ruqsada xirfad yaqaankike fernandezyerolando caafimaseble Haddii aad qabto masterson aalo lefty jones had iyo james xirfad yaqaajaved kevinInfirmary LTAC Hospitalseble. [employer] jameson [place].

## 2023-07-26 LAB — DEPRECATED CALCIDIOL+CALCIFEROL SERPL-MC: 23 UG/L (ref 20–75)

## 2023-07-28 LAB
BKR LAB AP GYN ADEQUACY: NORMAL
BKR LAB AP GYN INTERPRETATION: NORMAL
BKR LAB AP HPV REFLEX: NORMAL
BKR LAB AP LMP: NORMAL
BKR LAB AP PREVIOUS ABNORMAL: NORMAL
PATH REPORT.COMMENTS IMP SPEC: NORMAL
PATH REPORT.COMMENTS IMP SPEC: NORMAL
PATH REPORT.RELEVANT HX SPEC: NORMAL

## 2023-09-25 ENCOUNTER — OFFICE VISIT (OUTPATIENT)
Dept: FAMILY MEDICINE | Facility: CLINIC | Age: 28
End: 2023-09-25
Payer: COMMERCIAL

## 2023-09-25 VITALS
TEMPERATURE: 98 F | RESPIRATION RATE: 16 BRPM | HEART RATE: 60 BPM | SYSTOLIC BLOOD PRESSURE: 100 MMHG | DIASTOLIC BLOOD PRESSURE: 66 MMHG | OXYGEN SATURATION: 100 %

## 2023-09-25 DIAGNOSIS — N76.0 BV (BACTERIAL VAGINOSIS): ICD-10-CM

## 2023-09-25 DIAGNOSIS — B37.31 CANDIDIASIS OF VAGINA: ICD-10-CM

## 2023-09-25 DIAGNOSIS — B96.89 BV (BACTERIAL VAGINOSIS): ICD-10-CM

## 2023-09-25 DIAGNOSIS — R10.2 PELVIC PAIN IN FEMALE: Primary | ICD-10-CM

## 2023-09-25 LAB
ALBUMIN UR-MCNC: NEGATIVE MG/DL
AMORPH CRY #/AREA URNS HPF: ABNORMAL /HPF
APPEARANCE UR: CLEAR
BACTERIA #/AREA URNS HPF: ABNORMAL /HPF
BILIRUB UR QL STRIP: NEGATIVE
CLUE CELLS: PRESENT
COLOR UR AUTO: YELLOW
GLUCOSE UR STRIP-MCNC: NEGATIVE MG/DL
HGB UR QL STRIP: NEGATIVE
KETONES UR STRIP-MCNC: NEGATIVE MG/DL
LEUKOCYTE ESTERASE UR QL STRIP: ABNORMAL
NITRATE UR QL: NEGATIVE
PH UR STRIP: 7 [PH] (ref 5–8)
RBC #/AREA URNS AUTO: ABNORMAL /HPF
SP GR UR STRIP: 1.02 (ref 1–1.03)
SQUAMOUS #/AREA URNS AUTO: ABNORMAL /LPF
TRICHOMONAS, WET PREP: ABNORMAL
UROBILINOGEN UR STRIP-ACNC: 0.2 E.U./DL
WBC #/AREA URNS AUTO: ABNORMAL /HPF
WBC'S/HIGH POWER FIELD, WET PREP: ABNORMAL
YEAST, WET PREP: PRESENT

## 2023-09-25 PROCEDURE — 81001 URINALYSIS AUTO W/SCOPE: CPT | Performed by: PHYSICIAN ASSISTANT

## 2023-09-25 PROCEDURE — 99213 OFFICE O/P EST LOW 20 MIN: CPT | Performed by: PHYSICIAN ASSISTANT

## 2023-09-25 PROCEDURE — 87210 SMEAR WET MOUNT SALINE/INK: CPT | Performed by: PHYSICIAN ASSISTANT

## 2023-09-25 RX ORDER — METRONIDAZOLE 500 MG/1
500 TABLET ORAL 2 TIMES DAILY
Qty: 14 TABLET | Refills: 0 | Status: SHIPPED | OUTPATIENT
Start: 2023-09-25 | End: 2023-10-02

## 2023-09-25 RX ORDER — FLUCONAZOLE 150 MG/1
150 TABLET ORAL ONCE
Qty: 1 TABLET | Refills: 0 | Status: SHIPPED | OUTPATIENT
Start: 2023-09-25 | End: 2023-09-25

## 2023-09-25 NOTE — PROGRESS NOTES
Chief Complaint   Patient presents with    Pelvic Pain     Has had pelvic pain and back pain for 4 days  more pain with intercourse       ASSESSMENT/PLAN:  Bob was seen today for pelvic pain.    Diagnoses and all orders for this visit:    Pelvic pain in female  -     Cancel: UA with Microscopic reflex to Culture - lab collect; Future  -     Cancel: UA with Microscopic reflex to Culture - lab collect  -     UA with Microscopic reflex to Culture - lab collect; Future  -     Wet prep - Clinic Collect  -     UA with Microscopic reflex to Culture - lab collect  -     UA Microscopic with Reflex to Culture    Candidiasis of vagina  -     fluconazole (DIFLUCAN) 150 MG tablet; Take 1 tablet (150 mg) by mouth once for 1 dose    BV (bacterial vaginosis)  -     metroNIDAZOLE (FLAGYL) 500 MG tablet; Take 1 tablet (500 mg) by mouth 2 times daily for 7 days    Doubt PID since she has had pain with intercourse before the symptoms occurred.  The symptoms also present typically with her BV or UTIs.  Was recently tested for chlamydia and gonorrhea no new sexual partner since.  Wet prep showed clue cells and yeast.  Will treat for BV and candidal vaginitis.  Follow-up if symptoms persist after treatment with PCP      Ralf Lagos PA-C      SUBJECTIVE:  Bob is a 28 year old female who presents to urgent care with about 4 days of lower abdominal pain, back pain, vaginal discharge and pain with urination that comes and goes.  Has had BV and UTIs in the past.  She is also stating that she has been having some pain with intercourse.  Yesterday it happened and also happened a week before.  No changes in nausea, no vomiting or diarrhea    ROS: Pertinent ROS neg other than the symptoms noted above in the HPI.     OBJECTIVE:  /66   Pulse 60   Temp 98  F (36.7  C) (Oral)   Resp 16   SpO2 100%    GENERAL: healthy, alert and no distress    DIAGNOSTICS       Results for orders placed or performed in visit on 09/25/23   UA with  Microscopic reflex to Culture - lab collect     Status: Abnormal    Specimen: Urine, Clean Catch   Result Value Ref Range    Color Urine Yellow Colorless, Straw, Light Yellow, Yellow    Appearance Urine Clear Clear    Glucose Urine Negative Negative mg/dL    Bilirubin Urine Negative Negative    Ketones Urine Negative Negative mg/dL    Specific Gravity Urine 1.025 1.005 - 1.030    Blood Urine Negative Negative    pH Urine 7.0 5.0 - 8.0    Protein Albumin Urine Negative Negative mg/dL    Urobilinogen Urine 0.2 0.2, 1.0 E.U./dL    Nitrite Urine Negative Negative    Leukocyte Esterase Urine Trace (A) Negative   UA Microscopic with Reflex to Culture     Status: Abnormal   Result Value Ref Range    Bacteria Urine Few (A) None Seen /HPF    RBC Urine None Seen 0-2 /HPF /HPF    WBC Urine 0-5 0-5 /HPF /HPF    Squamous Epithelials Urine Few (A) None Seen /LPF    Amorphous Crystals Urine Moderate (A) None Seen /HPF    Narrative    Urine Culture not indicated   Wet prep - Clinic Collect     Status: Abnormal    Specimen: Vagina; Swab   Result Value Ref Range    Trichomonas Absent Absent    Yeast Present (A) Absent    Clue Cells Present (A) Absent    WBCs/high power field 1+ (A) None        Current Outpatient Medications   Medication    Ferrous Sulfate (IRON PO)    ferrous sulfate (FEROSUL) 325 (65 Fe) MG tablet    ketoconazole (NIZORAL) 2 % external cream    paragard intrauterine copper device    triamcinolone (KENALOG) 0.5 % external ointment     No current facility-administered medications for this visit.      Patient Active Problem List   Diagnosis    Screening for malignant neoplasm of cervix    IUD (intrauterine device) in place      Past Medical History:   Diagnosis Date    Anemia affecting pregnancy 4/22/2021    9/10/2021 receiving iron infusions.  Formatting of this note might be different from the original. 4/22/21: Hgb 10.7:  Encouraged once daily iron supplementation. Consider hgb electrophoresis at next visit due to  low MCV.    Encounter for screening for cervical cancer     2015 NIL with [facility]    GERD (gastroesophageal reflux disease)     Gestational diabetes     2nd baby only     Past Surgical History:   Procedure Laterality Date    MIDDLE EAR SURGERY  10/10, 2/11    STOMACH SURGERY  age 5     Family History   Problem Relation Age of Onset    No Known Problems Mother     No Known Problems Father     No Known Problems Sister     No Known Problems Brother     No Known Problems Daughter     No Known Problems Son     No Known Problems Maternal Aunt     No Known Problems Maternal Uncle     No Known Problems Paternal Aunt     No Known Problems Paternal Uncle     No Known Problems Paternal Grandmother     No Known Problems Daughter     No Known Problems Daughter     No Known Problems Brother     No Known Problems Brother     No Known Problems Brother     No Known Problems Sister     No Known Problems Sister     No Known Problems Sister     No Known Problems Sister      Social History     Tobacco Use    Smoking status: Never     Passive exposure: Never    Smokeless tobacco: Never   Substance Use Topics    Alcohol use: Never              The plan of care was discussed with the patient. They understand and agree with the course of treatment prescribed. A printed summary was given including instructions and medications.  The use of Dragon/[employer]ra[employer] dictation services may have been used to construct the content in this note; any grammatical or spelling errors are non-intentional. Please contact the author of this note directly if you are in need of any clarification.

## 2023-12-15 ENCOUNTER — OFFICE VISIT (OUTPATIENT)
Dept: MIDWIFE SERVICES | Facility: CLINIC | Age: 28
End: 2023-12-15
Payer: COMMERCIAL

## 2023-12-15 VITALS
HEIGHT: 61 IN | DIASTOLIC BLOOD PRESSURE: 56 MMHG | BODY MASS INDEX: 23.41 KG/M2 | WEIGHT: 124 LBS | SYSTOLIC BLOOD PRESSURE: 94 MMHG | HEART RATE: 60 BPM | OXYGEN SATURATION: 99 %

## 2023-12-15 DIAGNOSIS — N76.0 BV (BACTERIAL VAGINOSIS): Primary | ICD-10-CM

## 2023-12-15 DIAGNOSIS — R10.2 PELVIC PAIN IN FEMALE: ICD-10-CM

## 2023-12-15 DIAGNOSIS — R30.0 DYSURIA: Primary | ICD-10-CM

## 2023-12-15 DIAGNOSIS — B96.89 BV (BACTERIAL VAGINOSIS): Primary | ICD-10-CM

## 2023-12-15 LAB
ALBUMIN UR-MCNC: NEGATIVE MG/DL
APPEARANCE UR: CLEAR
BILIRUB UR QL STRIP: NEGATIVE
CLUE CELLS: PRESENT
COLOR UR AUTO: YELLOW
GLUCOSE UR STRIP-MCNC: NEGATIVE MG/DL
HGB UR QL STRIP: ABNORMAL
KETONES UR STRIP-MCNC: NEGATIVE MG/DL
LEUKOCYTE ESTERASE UR QL STRIP: NEGATIVE
NITRATE UR QL: NEGATIVE
PH UR STRIP: 6 [PH] (ref 5–8)
SP GR UR STRIP: >=1.03 (ref 1–1.03)
TRICHOMONAS, WET PREP: ABNORMAL
UROBILINOGEN UR STRIP-ACNC: 0.2 E.U./DL
WBC'S/HIGH POWER FIELD, WET PREP: ABNORMAL
YEAST, WET PREP: ABNORMAL

## 2023-12-15 PROCEDURE — 87491 CHLMYD TRACH DNA AMP PROBE: CPT | Performed by: ADVANCED PRACTICE MIDWIFE

## 2023-12-15 PROCEDURE — 87591 N.GONORRHOEAE DNA AMP PROB: CPT | Performed by: ADVANCED PRACTICE MIDWIFE

## 2023-12-15 PROCEDURE — 81003 URINALYSIS AUTO W/O SCOPE: CPT | Performed by: ADVANCED PRACTICE MIDWIFE

## 2023-12-15 PROCEDURE — 87210 SMEAR WET MOUNT SALINE/INK: CPT | Performed by: ADVANCED PRACTICE MIDWIFE

## 2023-12-15 PROCEDURE — 99213 OFFICE O/P EST LOW 20 MIN: CPT | Performed by: ADVANCED PRACTICE MIDWIFE

## 2023-12-15 RX ORDER — METRONIDAZOLE 500 MG/1
500 TABLET ORAL 2 TIMES DAILY
Qty: 14 TABLET | Refills: 0 | Status: SHIPPED | OUTPATIENT
Start: 2023-12-15 | End: 2023-12-22

## 2023-12-15 NOTE — PATIENT INSTRUCTIONS
Check out the following link to an article on vaginal lubricants and check out this brand, [email or web address]/organic-lubricant?[email or web address].   [email or web address]    [email or web address]    [email or web address]    Your main treatment is going to be the use of vagina-friendly probiotics vaginally and orally.    Orally, take Lactobacillus reuteri and Lactobacillus rhamnosus, 1-2 capsules daily depending on the severity of your current symptoms or how often you get recurrences, for 6-24 weeks, again, depending on your yeast infection severity and frequency. Basically, you want to take these daily until you're free of infection for 6 months.  Vaginally, you're going to alternate products, one day using the above product blend, and the next day, a product containing Lactobacillus crispatus. A number of studies now show that these three probiotic strains can dramatically improve the vaginal ecosystem and heal and prevent vaginal yeast infections, both by restoring the local britton, but also by resetting the gut microbiome as well. To insert the capsule, use your fingers the way you'd insert a tampon, and gently push the capsule back the length your finger will go or until you feel resistance - whichever comes first. You may want to wear a light pad if you do this treatment during the day, or do this before bed and let it dissolve and do its work while you're sleeping.  Also, take 20 mg of zinc citrate orally, daily, especially for recurrent infections and take a multivitamin if you haven't been eating your best to help boost your immune system, and consider adaptogens if you think it's stress that's making you susceptible or throwing your system off.  While I don't generally recommend specific products, these specific strains aren't super easy to find. For the Lactobacillus reuteri and Lactobacillus rhamnosus in my  practice I use [employer] Women's Pro-Floravailable through the [employer], or you can use a product like [employer] Femdophilus for the  Lactobacillus crispatus, Physioflor.    [email or web address]/prevent-treat-bacterial-[email or web address]    You ll want to follow this for one week, and avoid sex (all forms) during this time:    Make sure you re getting zinc (30 mg/day), vitamin E (400 IU/day), and vitamin A (up to 10,000 units/day, except in pregnancy when only up to what s in your prenatal vitamin) in your diet and multivitamin - these are essential for healing the vaginal tissue that gets irritated and inflamed in BV infections.  Each morning insert one capsule of Women s Pro-Rozina or a probiotic containing L. rhamnosus and L. reuteri. Treating BV using topical yogurt applications is also an option. While it s widely disputed, one study found that 88 percent of women who applied (unsweetened, live, active culture) yogurt internally to the vagina twice a day for a minimum of seven days remained BV-free two months after the study. In my practice, I find that combined vaginal and oral use of probiotic capsules is as effective and easier. In a pinch, however, for example if you re traveling, the yogurt is a reasonable go-to.  Take 1 capsule of the above probiotic orally each day as well.  Each evening use an herbal suppository. Suppositories that contain goldenseal (or berberine, an active ingredient from goldenseal) and essential oils are antimicrobial. A note of caution: berberine-containing supplements have a strong yellow color that can stain underwear or sheets.  You can purchase a good quality pre-made vaginal suppository from [employer], or you can make your own using this recipe that s been tried and true in my practice for over 30 years. I recommend purchasing your supplies online from [employer] for convenience. Once you learn to make this, it s easy - and if you make a full batch,  you ll have enough for a week of treatment and then some.    Here s my go-to suppository blend for treating both vaginal yeast and BV infections. I use this suppository in my practice for pregnant patients, but check with your midwife or doctor first.    1 cup cocoa butter  1/2 cup coconut oil  3 Tbs calendula oil  1/4 tsp thyme essential oil  1/4 tsp lavender essential oil  1/4 tsp tea tree essential oil  4 Tbs comfrey root powder finely ground (safe for short term topical use)  2 Tbs goldenseal root powder    See article for how to make.

## 2023-12-15 NOTE — PROGRESS NOTES
"Assessment:     Vaginitis  dysuria     Plan:       -Labs: GC/CT swab, macro, Wet prep collected.  -Treatment: based on labs  -Pt to be notified of treatment/plan.  -Education done regarding vaginal hygeine, probiotics, warm tub baths, avoiding douches, only cotton underwear, and avoiding pantiliners. Given written materials.  -RTC if symptoms persist or worsen.         Subjective:      Bob Olvera is a 28 year old female who presents for evaluation of an dysuria and vaginitis. She is a return [facility] pt. Symptoms have been present for 1 week. Vaginal symptoms: pain with voiding, pelvic pain now at pubic symphysis. She does have a chronic history of pelvic pain and has been seen for IUD surviellance with IUD in good placement 1 year ago. Strings are present and she notes no changes to menstrual cycle this past year. Contraception: IUD Paragard. She denies itching or abnormal discharge. Sexually transmitted infection risk: accepts screening today. Menstrual flow: regular q 28-30 days.LMP Patient's last menstrual period was 12/02/2023. .    The following portions of the patient's history were reviewed and updated as appropriate: allergies, current medications, past family history, past medical history, past social history, past surgical history, and problem list.      Review of Systems  Pertinent items are noted in HPI.     Objective:   BP 94/56 (BP Location: Left arm, Patient Position: Sitting, Cuff Size: Adult Regular)   Pulse 60   Ht 1.549 m (5' 1\")   Wt 56.2 kg (124 lb)   LMP 12/02/2023   SpO2 99%   BMI 23.43 kg/m      Physical Exam:  General: Pleasant, articulate, well-groomed, well-nourished female.  Not in any apparent distress.  Skin: no lesions or rashes.  Abdomen: Soft, nontender, no masses, negative CVAT.  Pelvic:  External genitalia: Normal hair distribution, no lesions.  Urethral opening: Without lesions, or tenderness.   Bladder: Without masses, or " tenderness.  Vagina: Pink, rugated, normal-appearing discharge. GC/CT and wet prep obtained.  Cervix: parous, pink, smooth, no lesions.  IUD strings are present at external os  Bimanual: Uterus small, mobile, nontender, no masses.  No CMT.  Adnexa, without masses or tenderness.    Lower extremities: +1 reflexes, no significant edema.    20 minutes on the date of the encounter doing chart review, review of test results, patient visit, and documentation     Dena Troy, ANIKET, APRN, CNM  [facility]  Midwifery

## 2023-12-16 LAB
C TRACH DNA SPEC QL PROBE+SIG AMP: NEGATIVE
N GONORRHOEA DNA SPEC QL NAA+PROBE: NEGATIVE

## 2024-01-24 NOTE — PROGRESS NOTES
"Assessment:     Vaginitis     Plan:       -Wet prep collected ***.  -Treatment: ***  -Pt to be notified of treatment/plan.  -Education done regarding vaginal hygeine, probiotics, warm tub baths, avoiding douches, only cotton underwear, and avoiding pantiliners.   -RTC if symptoms persist or worsen.     Total time with patient {Blank single:26010::\"10-19\",\"20-29\",\"30-39\",\"40-54\"} ***new {Blank single:32462::\"15-29\",\"30-44\",\"45-59\",\"60-74\"} minutes with >50% on education, counseling and coordination of care.      Subjective:      Bob Olvera is a 29 year old female who presents for evaluation of an abnormal vaginal discharge. Last seen 12/15/23 with positive wet prep for clue cells, recurrent BV infection in the past year. Since treatment notes ***. Symptoms have been present for {1-10:18387} {time; units:74623::\"days\"}. Vaginal symptoms: {symptoms:09201}. Contraception: {contraceptive method:5051}. She denies {symptoms:73739} Sexually transmitted infection risk: {std risk:85692}. Menstrual flow: {menses:715}.LMP ***.    {Common ambulatory SmartLinks:01275}      Review of Systems  {ros; complete:48763}     Objective:   LMP 12/02/2023     Physical Exam:  General: Pleasant, articulate, well-groomed, well-nourished female.  Not in any apparent distress.  Skin: no lesions or rashes.  Abdomen: Soft, nontender, no masses, negative CVAT.  Pelvic:  External genitalia: Normal hair distribution, no lesions.  Urethral opening: Without lesions, or tenderness.   Bladder: Without masses, or tenderness.  Vagina: Pink, rugated, normal-appearing discharge. ***wet prep obtained.  Cervix: ***parous, pink, smooth, no lesions.    Bimanual: Uterus small, mobile, nontender, no masses.  No CMT.  Adnexa, without masses or tenderness.    Lower extremities: +1 reflexes, no significant edema.  ***    Time spent:  Chart review/Pre-charting on {Date:25244}: *** minutes  Face-to-face visit: *** minutes with >50% on education, counseling and " "coordination of care.   Documentation:  *** minutes  Total time spent on day of service:  {Blank single:98233::\"10-19\",\"20-29\",\"30-39\",\"40-54\"} ***new {Blank single:68932::\"15-29\",\"30-44\",\"45-59\",\"60-74\"} minutes     Dena Troy, DNP, APRN, CNM  [facility]  Midwifery        "

## 2024-01-26 ENCOUNTER — OFFICE VISIT (OUTPATIENT)
Dept: MIDWIFE SERVICES | Facility: CLINIC | Age: 29
End: 2024-01-26
Payer: COMMERCIAL

## 2024-01-26 VITALS
HEART RATE: 68 BPM | BODY MASS INDEX: 23.54 KG/M2 | SYSTOLIC BLOOD PRESSURE: 86 MMHG | DIASTOLIC BLOOD PRESSURE: 60 MMHG | WEIGHT: 124.7 LBS | HEIGHT: 61 IN

## 2024-01-26 DIAGNOSIS — Z97.5 IUD (INTRAUTERINE DEVICE) IN PLACE: ICD-10-CM

## 2024-01-26 DIAGNOSIS — Z30.432 ENCOUNTER FOR REMOVAL OF INTRAUTERINE CONTRACEPTIVE DEVICE: Primary | ICD-10-CM

## 2024-01-26 PROCEDURE — 58301 REMOVE INTRAUTERINE DEVICE: CPT | Performed by: ADVANCED PRACTICE MIDWIFE

## 2024-01-26 NOTE — PATIENT INSTRUCTIONS
"[email or web address]    Please find the below link and information mary and learn about emergency contraception using your own oral birth control pills:  [email or web address]    Combined emergency contraceptive pills (\"morning after pills\") contain two types of hormones, estrogen and progestin, which are commonly foundin daily oral contraceptives. You can use many brands of daily birth control pills to prevent pregnancy after sex.   Doctors recommend that you take your first dose of combined emergency contraceptive pills within 120 hours, followed by a second dose 12 hours later. Each dose contains at least 100 mcg of estrogen and .50 mg of progestin, so the number of pills will depend on which daily oral contraceptive you are using for emergencycontraception. You can use combined pills for emergency contraception even if your doctor recommends against using oral contraceptives foryour regular birth control (For more details about using these pills, click here. While you have a lot of choices of combined pills you can takefor emergency contraception, remember that not every oral contraceptive can be used this way (find out why here).  Combined emergency contraceptivepills reduce your risk of pregnancy by about 75%. Don t worry, this doesn t mean that 25 percent of women get pregnant using these morning after pills. It just means that this type of emergency contraception prevents 75% of the pregnancies researchers would expect to happen when a womandoesn t use birth control, her contraception fails (like the condom breaks or falls off), or she is made to have sex against her will. Usually, if 100 women have sex without using birth control one time during thesecond or third week of their monthly menstrual cycle, about 8 of them will get pregnant. But if those same women also use combined emergency contraceptive pills, only two will get " pregnant. And the sooner you start taking emergency contraception after sex, the better it works. Even so, you should know that progestin-only emergency contraceptive pills are more effective.  About half of women who use combined emergency contraceptive pills experience nausea and about one in 5 vomits. If you throw up within an hour after taking a dose, you may need to take that same dose again. One way to reduce the chances you ll get sick to your stomach is touse an over-the-counter anti-nausea drug an hour before you take emergency contraception. In the [place], you should look for the long-acting medicine meclizine, which is sold both as a generic and under the brandnames Dramamine II and Bonine. Keep in mind, fewer women experience nausea and vomiting after taking progestin-only emergency contraceptive pills.   Tolearn which brands of combined emergency contraception and daily birth control pills can be found in the [place] or any other country,try using our up-to-date database. Or take a look at this table for quickinformation about the brands of oral contraceptives in the [place] that can be used as morning after pills. You can also have a health careprovider insert a Copper-T IUD up to five days after sex to prevent pregnancy.  Types of Emergency Contraception  Can I use any birth control pills for emergency contraception?    To have the best chance of preventing pregnancy in the few days after sex, you should use one of the oral contraceptives listed on this website as emergency contraception. Here you will find all the birth control pill brands available in the [place] and worldwide that contain the hormones that have been most widely studied and found safe and effective as emergency contraceptive pills ( morning after pills  or  day after pills ).    Two types of birth control pills have been well researched in clinical trials for their use as emergency contraception. The first  "contains levonorgestrel, a form of the female hormone progesterone; they are often called  progestin-only  or  mini  pills. The second contains levonorgestrel and ethinyl estradiol, a form of the female hormone estrogen; they are oftenreferred to as  combined  pills.     Can I use Ortho Tri-Cyclen for EC?  There are a number of other oralcontraceptives on the market which contain formulations of female hormones that have not been studied for use in preventing pregnancy after sex. Theseinclude Ortho Tri-Cyclen, one of the most commonly prescribed combined birth control pills in the [place]. If you have a prescription for Ortho Tri-Cyclen or any other oral contraceptive not listed in our database of pills, you should contact a health care provider if you think you might need emergency contraception.    One clinical trial has found that a combined pill containing the same type of estrogen as Ortho Tri-Cyclen, ethinyl estradiol, but a different progestin, norethindrone (ornorethisterone), could be used as emergency contraception. However, because this combined pill does not appear to be as effective as combined emergency contraceptive pills with the estrogen ethinyl estradiol and the progestin levonorgestrel, we do not recommend that you use these pills for emergency contraception unless they are the only pills available. (Because of the limited research and lower effectiveness, you will not find any pillscontaining this formulation listed in our database of emergency contraceptivepills (\"morning after pills\" or \"day after pills\").  For a thorough and up-to-date academic review of the research about pills used foremergency contraception, click here.   Back to QUESTIONS  "

## 2024-01-26 NOTE — PROGRESS NOTES
"  Subjective:   Bob Olvera is a 29 year old female who presents for birth control follow-up. Current contraception method: IUD Paragard which she desires removal today due to unexplain pelvic pain that she attributes to her IUD. Sexually transmitted infection risk: low. LMP Patient's last menstrual period was 01/21/2024. . Menstrual flow: regular q 28-30 days. Inquires today about depo shot, OCPs, diaphragm and tubal ligation. Pertinent past medical history: no contraindications to hormonal birth control.    Patient's last menstrual period was 01/21/2024.      The following portions of the patient's history were reviewed and updated as appropriate: allergies, current medications, past family history, past medical history, past social history, past surgical history, and problem list.    Review of Systems  Pertinent items are noted in HPI.     Objective:   BP (!) 86/60   Pulse 68   Ht 1.549 m (5' 1\")   Wt 56.6 kg (124 lb 11.2 oz)   LMP 01/21/2024   Breastfeeding No   BMI 23.56 kg/m      Physical Exam:  General Appearance: Alert, cooperative, no distress, appears stated age  Skin: Skin color, texture, turgor normal, no rashes or lesions.  Throat: Lips, mucosa, and tongue normal; teeth and gums normal  HEENT: grossly normal; otoscopic and opthalmic exam not performed.   Neck: Supple, symmetrical, trachea midline, no adenopathy  Respiratory: Normal respiratory effort  Cardiovascular: No peripheral edema.  Musculoskeletal: No digital cyanosis. Normal gait and station. Moves all limbs freely.  Neuro: Cranial nerves II-XII grossly intact.  Psych: Intact judgment and insight. OX3 and conversational.        Assessment:   29 year old, discontinuing  IUD Paragard, .    Birth control consult    Plan:     -Reviewed MOA for progesterone only and combined hormonal birth control methods, reviewed side effects of depo vs CASSI. We also reviewed non hormonal diaphragm use with spermacide. Given written materials on Caya. Also " reviewed permanent tubal ligation, procedure done in short stay surgery under anesthesia; discussed vasectomy as lower risk procedure for permanent birth control. Given written materials on procedure and recovery.  -Offered OBGYN referral for tubal consultation and declined today.  -declined Rx for new contraceptive method at this time. Will plan to use condoms.  -RTC PRN if desires to start a new method     10 minutes on the date of the encounter doing chart review, patient visit, and documentation     Dena Troy, DNP, APRN, CNM

## 2024-01-26 NOTE — PROGRESS NOTES
IUD Removal:  SUBJECTIVE:    Is a pregnancy test required: No.  Was a consent obtained?  Yes, verbal    Bob Olvera is a 29 year old female,, Patient's last menstrual period was 2024. who presents today for IUD removal. Her current IUD was placed 2 years ago. She has had problems including pelvic pain with menses with the IUD. She requests removal of the IUD because she wants to change her method of contraception and of problems stated above    Today's PHQ-2 Score:       2024     9:11 AM   PHQ-2 (  [employer])   Q1: Little interest or pleasure in doing things 0   Q2: Feeling down, depressed or hopeless 0   PHQ-2 Score 0       PROCEDURE:    A speculum exam was performed and the cervix was visualized. The IUD string was visualized. Using ring forceps, the string  was grasped and the IUD removed intact.    POST PROCEDURE:    The patient tolerated the procedure well. Patient was discharged in stable condition.    Call if bleeding, pain or fever occur., Birth control counseling given., Use of condoms/foam discussed., and Handouts were given to the patient.    Dena Troy CNM

## 2024-03-07 ENCOUNTER — OFFICE VISIT (OUTPATIENT)
Dept: MIDWIFE SERVICES | Facility: CLINIC | Age: 29
End: 2024-03-07
Payer: COMMERCIAL

## 2024-03-07 VITALS
TEMPERATURE: 98.6 F | SYSTOLIC BLOOD PRESSURE: 100 MMHG | WEIGHT: 118.5 LBS | BODY MASS INDEX: 22.37 KG/M2 | DIASTOLIC BLOOD PRESSURE: 60 MMHG | HEIGHT: 61 IN | HEART RATE: 72 BPM

## 2024-03-07 DIAGNOSIS — R30.0 DYSURIA: ICD-10-CM

## 2024-03-07 DIAGNOSIS — B37.31 YEAST VAGINITIS: ICD-10-CM

## 2024-03-07 DIAGNOSIS — R39.89 ABNORMAL UROGENITAL DISCHARGE: Primary | ICD-10-CM

## 2024-03-07 LAB
ALBUMIN UR-MCNC: 30 MG/DL
APPEARANCE UR: CLEAR
BILIRUB UR QL STRIP: NEGATIVE
CLUE CELLS: ABNORMAL
COLOR UR AUTO: YELLOW
GLUCOSE UR STRIP-MCNC: NEGATIVE MG/DL
HGB UR QL STRIP: NEGATIVE
KETONES UR STRIP-MCNC: NEGATIVE MG/DL
LEUKOCYTE ESTERASE UR QL STRIP: ABNORMAL
MUCOUS THREADS #/AREA URNS LPF: PRESENT /LPF
NITRATE UR QL: NEGATIVE
PH UR STRIP: 7 [PH] (ref 5–7)
RBC URINE: 2 /HPF
SP GR UR STRIP: 1.03 (ref 1–1.03)
SQUAMOUS EPITHELIAL: 5 /HPF
TRICHOMONAS, WET PREP: ABNORMAL
UROBILINOGEN UR STRIP-MCNC: <2 MG/DL
WBC URINE: 10 /HPF
WBC'S/HIGH POWER FIELD, WET PREP: ABNORMAL
YEAST, WET PREP: PRESENT

## 2024-03-07 PROCEDURE — 87591 N.GONORRHOEAE DNA AMP PROB: CPT | Performed by: ADVANCED PRACTICE MIDWIFE

## 2024-03-07 PROCEDURE — 99213 OFFICE O/P EST LOW 20 MIN: CPT | Performed by: ADVANCED PRACTICE MIDWIFE

## 2024-03-07 PROCEDURE — 87491 CHLMYD TRACH DNA AMP PROBE: CPT | Performed by: ADVANCED PRACTICE MIDWIFE

## 2024-03-07 PROCEDURE — 87086 URINE CULTURE/COLONY COUNT: CPT | Performed by: ADVANCED PRACTICE MIDWIFE

## 2024-03-07 PROCEDURE — 81001 URINALYSIS AUTO W/SCOPE: CPT | Performed by: ADVANCED PRACTICE MIDWIFE

## 2024-03-07 PROCEDURE — 87210 SMEAR WET MOUNT SALINE/INK: CPT | Performed by: ADVANCED PRACTICE MIDWIFE

## 2024-03-07 RX ORDER — FLUCONAZOLE 150 MG/1
150 TABLET ORAL
Qty: 2 TABLET | Refills: 0 | Status: SHIPPED | OUTPATIENT
Start: 2024-03-07 | End: 2024-04-03

## 2024-03-07 NOTE — PROGRESS NOTES
"Assessment:     Vaginitis  Dysuria  Abnormal discharge  Vaginal itching     Plan:       -Wet prep collected . Pt requested GC/CT. UA/UC done for dysuria and flank pain while voiding.  -Treatment: diflucan ordered and advised on medication  -notes 3 infections in the past year. Encouraged probiotic x 1 year.  -Pt to be notified of treatment/plan.  -Education done regarding vaginal hygeine, probiotics, warm tub baths, avoiding douches, only cotton underwear, and avoiding pantiliners.   -RTC if symptoms persist or worsen.           Subjective:      Bob Olvera is a 29 year old female who presents for evaluation of an abnormal vaginal discharge. She is return pt to [facility] . Symptoms have been present for 4 days. Vaginal symptoms: vaginal itching, dysuria, flank pain. She is currently fasting and notes her urine to be a dark yellow. Sexually transmitted infection risk: low risk but requesting screening.   She is fasting for Ramadan.    The following portions of the patient's history were reviewed and updated as appropriate: allergies, current medications, past family history, past medical history, past social history, past surgical history, and problem list.      Review of Systems  Pertinent items are noted in HPI.     Objective:   /60 (BP Location: Right arm, Patient Position: Sitting, Cuff Size: Adult Regular)   Pulse 72   Temp 98.6  F (37  C) (Oral)   Ht 1.549 m (5' 1\")   Wt 53.8 kg (118 lb 8 oz)   LMP 02/20/2024   Breastfeeding No   BMI 22.39 kg/m      Physical Exam:  General: Pleasant, articulate, well-groomed, well-nourished female.  Not in any apparent distress.  Skin: no lesions or rashes.  Abdomen: Soft, nontender, no masses, negative CVAT.  Pelvic:  External genitalia: Normal hair distribution, no lesions.  Urethral opening: Without lesions, or tenderness.   Bladder: Without masses, or tenderness.  Vagina: erythema throughout, rugated, thick clumping white to " yellow-appearing discharge. GC/CT and wet prep obtained.  Cervix: parous, pink, smooth, no lesions.    Bimanual: deferred  Lower extremities: +1 reflexes, no significant edema.      20 minutes on the date of the encounter doing chart review, patient visit, and documentation     Dena Troy, DNP, APRN, CNM  [facility]  Midwifery

## 2024-03-08 LAB
C TRACH DNA SPEC QL PROBE+SIG AMP: NEGATIVE
N GONORRHOEA DNA SPEC QL NAA+PROBE: NEGATIVE

## 2024-03-09 LAB — BACTERIA UR CULT: NORMAL

## 2024-04-03 DIAGNOSIS — B37.31 YEAST VAGINITIS: ICD-10-CM

## 2024-04-03 RX ORDER — FLUCONAZOLE 150 MG/1
150 TABLET ORAL
Qty: 2 TABLET | Refills: 0 | Status: SHIPPED | OUTPATIENT
Start: 2024-04-03 | End: 2024-06-14

## 2024-04-03 NOTE — TELEPHONE ENCOUNTER
Incoming faxed refill request received from [facility] in [place], [place].  Medication requested:    Pending Prescriptions:                       Disp   Refills    fluconazole (DIFLUCAN) 150 MG tablet      2 tabl*0            Sig: Take 1 tablet (150 mg) by mouth every 72 hours    Medication last prescribed: 3/07/24  Last visit with [facility] CNM group: 3/07/24  Upcoming appointment(s): None    Refill request routed to on-call CNM for review.

## 2024-06-13 LAB
ABO/RH(D): NORMAL
ANTIBODY SCREEN: NEGATIVE
SPECIMEN EXPIRATION DATE: NORMAL

## 2024-06-14 ENCOUNTER — PRENATAL OFFICE VISIT (OUTPATIENT)
Dept: MIDWIFE SERVICES | Facility: CLINIC | Age: 29
End: 2024-06-14
Payer: COMMERCIAL

## 2024-06-14 VITALS
HEIGHT: 61 IN | DIASTOLIC BLOOD PRESSURE: 62 MMHG | SYSTOLIC BLOOD PRESSURE: 102 MMHG | HEART RATE: 75 BPM | WEIGHT: 125.3 LBS | BODY MASS INDEX: 23.66 KG/M2

## 2024-06-14 DIAGNOSIS — O99.011 ANEMIA DURING PREGNANCY IN FIRST TRIMESTER: ICD-10-CM

## 2024-06-14 DIAGNOSIS — Z34.81 ENCOUNTER FOR SUPERVISION OF OTHER NORMAL PREGNANCY, FIRST TRIMESTER: Primary | ICD-10-CM

## 2024-06-14 DIAGNOSIS — Z86.32 HISTORY OF GESTATIONAL DIABETES: ICD-10-CM

## 2024-06-14 LAB
ERYTHROCYTE [DISTWIDTH] IN BLOOD BY AUTOMATED COUNT: 16.6 % (ref 10–15)
HBA1C MFR BLD: 6.2 %
HCT VFR BLD AUTO: 29.2 % (ref 35–47)
HGB BLD-MCNC: 9 G/DL (ref 11.7–15.7)
MCH RBC QN AUTO: 21.5 PG (ref 26.5–33)
MCHC RBC AUTO-ENTMCNC: 30.8 G/DL (ref 31.5–36.5)
MCV RBC AUTO: 70 FL (ref 78–100)
PLATELET # BLD AUTO: 270 10E3/UL (ref 150–450)
RBC # BLD AUTO: 4.18 10E6/UL (ref 3.8–5.2)
WBC # BLD AUTO: 6.4 10E3/UL (ref 4–11)

## 2024-06-14 PROCEDURE — 36415 COLL VENOUS BLD VENIPUNCTURE: CPT | Performed by: ADVANCED PRACTICE MIDWIFE

## 2024-06-14 PROCEDURE — 86780 TREPONEMA PALLIDUM: CPT | Performed by: ADVANCED PRACTICE MIDWIFE

## 2024-06-14 PROCEDURE — 86900 BLOOD TYPING SEROLOGIC ABO: CPT | Performed by: ADVANCED PRACTICE MIDWIFE

## 2024-06-14 PROCEDURE — 86901 BLOOD TYPING SEROLOGIC RH(D): CPT | Performed by: ADVANCED PRACTICE MIDWIFE

## 2024-06-14 PROCEDURE — 86803 HEPATITIS C AB TEST: CPT | Performed by: ADVANCED PRACTICE MIDWIFE

## 2024-06-14 PROCEDURE — 82728 ASSAY OF FERRITIN: CPT | Performed by: ADVANCED PRACTICE MIDWIFE

## 2024-06-14 PROCEDURE — 83540 ASSAY OF IRON: CPT | Performed by: ADVANCED PRACTICE MIDWIFE

## 2024-06-14 PROCEDURE — 85027 COMPLETE CBC AUTOMATED: CPT | Performed by: ADVANCED PRACTICE MIDWIFE

## 2024-06-14 PROCEDURE — 87340 HEPATITIS B SURFACE AG IA: CPT | Performed by: ADVANCED PRACTICE MIDWIFE

## 2024-06-14 PROCEDURE — 87389 HIV-1 AG W/HIV-1&-2 AB AG IA: CPT | Performed by: ADVANCED PRACTICE MIDWIFE

## 2024-06-14 PROCEDURE — 86762 RUBELLA ANTIBODY: CPT | Performed by: ADVANCED PRACTICE MIDWIFE

## 2024-06-14 PROCEDURE — 83036 HEMOGLOBIN GLYCOSYLATED A1C: CPT | Performed by: ADVANCED PRACTICE MIDWIFE

## 2024-06-14 PROCEDURE — 99215 OFFICE O/P EST HI 40 MIN: CPT | Performed by: ADVANCED PRACTICE MIDWIFE

## 2024-06-14 PROCEDURE — 86850 RBC ANTIBODY SCREEN: CPT | Performed by: ADVANCED PRACTICE MIDWIFE

## 2024-06-14 PROCEDURE — 83550 IRON BINDING TEST: CPT | Performed by: ADVANCED PRACTICE MIDWIFE

## 2024-06-14 PROCEDURE — 87086 URINE CULTURE/COLONY COUNT: CPT | Performed by: ADVANCED PRACTICE MIDWIFE

## 2024-06-14 NOTE — PATIENT INSTRUCTIONS
"\"We hope you had a positive experience and that you can definitely recommend [facility] to your family and friends. You ll be receiving a survey soon and we look forward to hearing your feedback\".    Welcome to [facility]   and thank you for choosing us for your maternity care provider!  Congratulations!      GoWarhart  After each of your visits you are welcome to check -R- Ranch and Mine for your visit summary including education and links to information relevant to your pregnancy and/or well woman care.   Find the \"Visits\" tab at the top of the page, you will see a list of recent visits and for each visit a for link for \"View After Visit Summary.\" View of your After Visit Summary will allow you to read our recommendations from your visit, review any education provided, and link to websites with useful information.   If you have any questions or difficulty navigating Language Learning Class, please feel free to contact us and we will do our best to direct you.  Meet the Midwives from [facility]  You are invited to an informational meet and greet with [facility] Certified Nurse-Midwives. Our free \"Meet the Midwives\" event is a great opportunity to learn about our midwives' philosophy and experience, the hospitals where we can assist with your birth, and answer questions you may have. Partners, friends, and family are welcome to attend. Currently, this is a virtual event.  Date  Last Thursday of every month at 7 pm.    Link to next (live) meeting   [email or web address]  To Join by Telephone (audio only) Call:   [phone] Phone Conference ID: [phone] #    Contact information:  Appointment line and to get a hold of CNM in clinic Monday-Friday 8 am - 5 pm:  [phone].  There are some clinics with early start times (1st appointment 7:40 am) and others with evening hours (last appointment 6:20 pm).  Most are typically open from 8 " am to 5 pm.    CNM on call answering service: [phone].  Specify your hospital of choice and leave a brief message for CNM;  will then page CNM who is on call at your specified hospital and you should receive a call back with 15 minutes.  Be sure that your ringer is audible and that you can accept blocked calls so that we can get back in touch with you! This number should be reserved for urgent needs if during the day, before 8 am, after 5 pm, weekends, holidays.      We support all families in their infant feeding journey. We'll provide education on Breastfeeding/Chestfeeding early and often to help you achieve your goals. Please let us know if you have questions along the way!      Breastfeeding: a Healthy Option for You and Your Baby  Consider breastfeeding for the healthiest way to feed your baby. Ask your midwife or physician for more information.     The choice of how you will feed your baby is important.  Before your baby s birth, you ll want to learn about the benefits of breastfeeding.  [facility] continue to support Baby Friendly standards; an initiative that was created by the World Health Organization and UNICEF.  This helps give you and your baby the best start in feeding their baby.    Why should I breastfeed my baby?   Babies are less likely to develop childhood obesity or diabetes   Babies are less likely to suffer from recurrent ear infections   Babies are less likely to be hospitalized for respiratory conditions   Breast milk is rich in nutrients and antibodies-it is easy to digest    How does it benefit me?   Lowers the risk for diabetes, breast and ovarian cancer and postpartum depression   Moms can lose  baby weight  more quickly   Cost savings - formula can cost well over $1,500 per year   Convenient - no bottles and nipples to sterilize, no measuring and mixing formula   The physical contact with  breastfeeding can make babies feel secure, warm and comforted     What about formula?  While you and your baby are staying with us at [facility], we will support whatever feeding choice you make for your baby.    Some important considerations:    The American Academy of Pediatrics, the World Health Organization, and many more organizations recommend exclusive breastfeeding for 6 months and continued breastfeeding while adding other foods for the first 1-2 years.    Any amount of breastmilk has benefits to both baby and mother.  Giving formula in replacement of breastfeeding can affect mother s milk supply.  If formula is needed, hospital staff will work with you on a plan to help develop your milk supply.  Formula alters the natural growth of good bacteria in the  stomach.   Research has found that first time mothers who offer formula in the hospital have a shorter duration of breastfeeding.    How can I start to prepare?   Start by having a conversation with your medical provider.   Talk with your partner, family and friends.   Attend a prenatal class that includes breastfeeding preparation. Birth and breastfeeding classes are offered by [employer]. Visit [employer] for class information.   After your baby s birth, hospital staff and lactation consultants will help you and your baby get off to a great start with breastfeeding.      RESOURCES  [facility] Maternity Care:   https://[facility].org/locations/the-birthplace-at[facility] M[facility] Care:   https://[facility].org/locations/the-birthplace-at[facility]    Scroll to the bottom of this page if the above link does not work      Breastfeeding:    OUTPATIENT LACTATION RESOURCES     -Schedule an appointment with a [facility] SILVA who is also a Lactation Consultant by calling [phone]. We see women for  breastfeeding visits at [facility] and [facility].     Chocolate Milk Club:  http://www.AlverixvesselsmidwiferAntidot.com/chocolate-milk-club/  Join the Facebook group or join us for support on the first Monday of each month from 7 to 9 p.m.  , Dr. Anita Duvall, DNP, CNM, CNP, IBCLC, ICEA  Phone: [phone]  Fax: [phone]  Email: [email or web address]    R.O.S.E. = Reaching our Sisters Everywhere  Http://www.breastfeedingrose.org/    Black Women Do Breastfeed Blog  www.blackwomendobreastfeed.org    Club Mom breastfeeding support for Black mothers:  Contact Rob Orr  Phone: [phone]   Email:  [email or web address].     Deb Castro  Phone: [phone]   Email:  [email or web address]    Club Dad parent support for Black fathers:   Contact Johnny Whalen   Phone: [phone]   Email:  [email or web address]    The ong Breastfeeding Coalition is a wonderful support for [facility] women who are breastfeeding.  They are best found on Facebook.      The Hmong Breastfeeding Coalition has produced a collection of video stories about breastfeeding in the [facility] community, produced by the Hmong Breastfeeding Coalition.  Most are in English, but one on handling breastmilk is in ong.  The video collection is in the middle of the page.    This page also has several other resources on ong breastfeeding.     https://mnbreastfeedingcoalition.org/communities/    WIC program application: Fred Nick   Https://www.youtube.com/watch?v=lQoWwPoyGYc    For information on Local WIC services call:  [phone]    You may qualify...  If you are pregnant, nursing, or have a child under age 5, we encourage you to Apply for WIC.    WIC Provides...  Nutrition tips and advice  Support for breastfeeding  Healthy foods like fresh fruits and vegetables, whole grain cereals, bread and tortillas, low fat milk, and baby foods  Caring and  supportive staff  Don't delay! We're here to help!  CALL TODAY FOR A WIC CLINIC NEAR YOU  5-629-FPG-0024 or 1-793.199.2031     New Parent Connection:   Elvie [place], [address] [facility]  In-person meetings on  from 6 pm - 7:15 pm for parents of  to 9 months, at the same site.   All are free, drop-in, no registration required.    There are also free virtual meetings ongoing on :  11:30 am - 12:30 pm for parents of newborns to 3 months  4:15 pm to 5:15 pm for parents of 3 to 9-month olds  For joining info parents should call Kavitha Merchant at [phone]    -Baby Café  Find a Baby Café - Baby Café [employer] (babycafeusa.org)   Search by State ([place]) to find the nearest cafe to you      [facility] Baby Café  Due to COVID-19, all Baby Café sessions are canceled until further notice. For lactation support, please contact one of our bilingual staff:  Marga (IBCLC) [phone]  Elvia (IBCLC/ Qatari) [phone]  Zoua (Hmong) [phone]  Niles (Citizen of [place]) [phone]  Baby Café is a free, drop-in service offering breastfeeding/chestfeeding support. Come share tips and socialize with other pregnant, breastfeeding/chestfeeding families. Babies and siblings are welcome (no  available).  We offer:  Professionally trained lactation staff.  Resource books for lending.  Relaxed and fun atmosphere.  Refreshments.   More information  Elvia Moyer  [phone]  [email or web address]     -Attend a baby weigh in at [facility].  Lactation consultants are available to answer questions  [place]:  1:00 - 2:00  [facility]:  1:00 - 2:00   www.[facility] HospitalPetCoacher.[place]po[place]    -Attend one of the New Mama groups at [facility] in [facility].  [facility] also offers one-on-one in home and in office lactation consults.   www.[facility].com    -Attend a LeLeche League meeting.  Multiple groups in several locations throughout the [facility]. The  meetings are no-cost and always informative breastfeeding education session through [employer] La Leche League  Www.cherrieruben.org/     Held at [facility] the second Thursday of each month at 7pm    Childbirth and Parenting Education:     Everyday Miracles:   https://www.everyday-miracles.org/    Free Video Series from [facility]: https://nursing.[facility]/academics/specialty-areas/nurse-midwifery/having-baby-prenatal-videos/having-baby-prenatal-and    Childbirth Education virtual (live) classes: www.BCB Medical/classes  The Birth Hour: https://Looxii/online-childbirth-class/  BirthED: https://www.birthedmn.com/  FATMATA parenting center: http://amTrinity Health Grand Rapids HospitalAd Venture/   (322) 826-FXML  Blooma: (education, yoga & wellness) www.[employer]  Enlightened Mama: www.Mobile Travel Technologiesenedmama.Fibrenetix   Childbirth collective: (Parent topic nights)  www.childbirthcollective.org/  Hypnobabies:  www.hypnobabiestCloudy Days.Fibrenetix/  Hypnobirthing:  Http://[employer]ge/  Hypnobirthing virtual class: www.Kijubi/hypnobirthing    Information about doulas:  Childbirth collective: http://www.childbirthcollective.org/  Doulas of [place] (MELISSA):  www.melissa.org  [facility]  project: http://twincitiesdoulaproject.com/     Early Childhood and Family Education (ECFE):  ECFE offers parents hands-on learning experiences that will nourish a lifetime of teachable moments.  http://ecfe.info/ecfe-home/    APPS and Podcasts:   Allie Prakash Nurture    Evidence Based Birth  The Birth Hour (for birth stories)   Birthful   Expectful   The Longest Shortest Time  PregnancyPodcanatoliy Nails  https://www.downtobirthshow.com/    Book Recommendations:   Yudith Oskaloosa's Birthing From Within--first few chapters include a new-age tone, you may prefer to skip it and keep going, because there is good stuff later.  This book recommendation covers emotional preparation, but does cover coping with pain, and use of  "both pharmacological and nonpharmacological methods.    Guide to Childbirth by Karely Macario  Childbirth Without Fear by Norman Johnson Read    Dr. Rivera' The Pregnancy Book and The Birth Book--the pregnancy book goes month-by month    The Birth Partner by Maite Lenz    Womanly Art of Breastfeeding by La Leche League International   Bestfeeding by Alma Sierra--great pictures    Mothering Your Nursing Toddler, by Sandra Peralta.   Addresses dealing with so many of the challenging behaviors of a nursing toddler.  How Weaning Happens, by La Leche League.  Discusses weaning at all ages, from medically necessary weaning of an infant, all the way up to age 5 (or older), with why/why not, and strategies.  Very empowering book both for deciding to wean and deciding not to.    American College of Nurse-Midwives (ACNM) http://www.midwife.org/; look at the informational handouts at http://www.midwife.org/Share-With-Women     www.mymidwife.org    Mother to Baby (Medication and Herbal guidance in pregnancy): http://www.mothertobaby.org  Toll-Free Hotline: 746.234.9281  LactMed (Medication use while breastfeeding): http://toxnet.nlm.nih.gov/newtoxnet/lactmed.htm    Women's Health.gov:  http://www.womenshealth.gov/a-z-topics/index.html    American pregnancy association - http://americanpregnancy.org    Centering Pregnancy (group prenatal care option): http://centeringhealthcare.org    March of Dimes www.marchofdiCityvox.com     FDA - Nutrition  www.mypyramid.gov  Under \"For Consumers,\" click on \"pregnant and breastfeeding women.\"      Centers for Disease Control and Prevention (CDC) - Vaccines : http://www.cdc.gov/vaccines/       When researching information on the web, question the validity of websites.  The domains .gov, .edu and.org tend to be more reliable information.  If there are a lot of advertisements, be cautious of the information provided. Stay away from blogs and chat rooms please!   "

## 2024-06-14 NOTE — PROGRESS NOTES
"PRENATAL VISIT   FIRST OBSTETRICAL EXAM - OB    Assessment / Impression     First prenatal visit at 11w6d  AGE: 29 year old    History of postpartum hemorrhage first birth  History of third-degree laceration first birth  History of gestational diabetes second pregnancy A1GDM  History macrosomic , last baby 4000g   Last pap = 2023  Body mass index is 23.97 kg/m .   EDPS = 1, \"0\" to #10    Plan:       - IOB labs drawn., Declines GC/CT screening,  Pap smear screening not indicated and due 2026., and HgbA1C  -Pt is low risk and not interested in drawing lead level.  -Reviewed prenatal care schedule and discussed routine OB visits versus problem visits and referrals. Also discussed use of ultrasound in pregnancy.  - declined  early US; reviewed dating. Dating by LMP.   -Optimal nutrition and weight gain discussed. Pregnancy weight gain of 25-35 lbs (BMI 18.5-24.9) encouraged.   -Because the patient does not have 1 high risk nor 2 or more moderate risk factors, low-dose aspirin not be initiated.   -Antepartum VTE risk factors absent.  -Reviewed recommendation for universal early pregnancy screening for diabetes risk; pt accepts Hemoglobin A1C screening today. Pt has 1 or more high risk factors for overt diabetes and is less than 12 weeks gestation.   Early 1 hour GCT will not be added to labs today. Provided written education in AVS and Will plan for routine GDM screening at 24-28 weeks GA   -Pt is not a candidate for an antepartum OB consult.    -Reviewed genetic aneuploidy screening options. Patient  declines:  all . Encouraged to call insurance to verify coverage if elects screening during pregnancy.  -The following referrals were given to patient for follow up: None at this time.   -Reviewed MyChart, lab results, and how lab results are disseminated.   -IOB packet given and reviewed with patient, discussed standards of care, scope of practice, clinic and hospital settings, also reviewed clinic versus " emergency phone number.   -Reviewed importance of regular exercise in pregnancy and help with low back pain and other pregnancy symptoms.   -Discussed importance of taking a prenatal vitamin with the goal of having 400 mcg of folic acid. Additionally taking a Vitamin D supplement (1,000-2,000 IU/day) and an Omega 3/fish oil/DHA is beneficial. Research also supports taking 150 mcg of Iodine when pregnant.  -Discussed supports in place for breastfeeding and provided educational handouts to review.  -Anticipatory guidance for common pregnancy questions and concerns reviewed.   -Danger s/sx for this trimester reviewed with patient.  -CNM services and hospital options reviewed; emergency and scheduling phone numbers given to patient.  -Return to clinic 4-6 weeks    40 minutes on the date of the encounter doing chart review, patient visit, and documentation       Subjective:      Bob Olvera is a 29 year old  at 11w6d here today for her First Obstetrical Exam.  She is a return [facility] patient..   Unplanned but welcomed . History:  5 Para 4004  Complications of pregnancy, labor and postpartum: PPH, 3rd degree laceration, GDM and macrosomia  Current symptoms also include: nausea.  Patient's last menstrual period was 2024 (exact date)., predicting an expected date of delivery of Estimated Date of Delivery: Dec 28, 2024. Last period was normal. Her previous cycle lengths were 28 days following IUD removal in 2024. Her pregnancy is dated by LMP.     The patient states that she is in a monogamous relationship and states that she is safe. Offered GC/CT screening today and patient declines.    Risk factors: see problem list    The patient has the following high risk factors for preeclampsia:none. The patient has the following moderate risk factors for preeclampsia:none.     The patient has the following antepartum risk factors for VTE (two or more risk factors, or 1 *  risk factor, places patient at higher risk): none.   Clinical history/risk factors requiring antepartum OB consult: none.     The patient has the following high risk factors for overt diabetes: Previous pregnancy with a diagnosis of GDM.    Social History:   Education level: HS  Occupation: [employer]  Partners name: Duy    OB History    Para Term  AB Living   5 4 4 0 0 4   SAB IAB Ectopic Multiple Live Births   0 0 0 0 4      # Outcome Date GA Lbr Mike/2nd Weight Sex Type Anes PTL Lv   5 Current            4 Term 10/26/21 40w2d / 00:06 3.78 kg (8 lb 5.3 oz) F Vag-Spont None N NIKI      Name: ELEONORA WILLSON-INGRID      Apgar1: 5  Apgar5: 9   3 Term 20 40w6d 13:08 / 00:09 4.03 kg (8 lb 14.2 oz) F Vag-Spont None N NIKI      Birth Comments: episiotomy, fetal intolerance      Complications: Dysfunctional Labor      Name: ELIZABETH AVILA      Apgar1: 2  Apgar5: 5   2 Term 18 39w0d 04:30 / 00:14 3.544 kg (7 lb 13 oz) M Vag-Spont None N NIKI      Birth Comments: 3rd degree lac      Complications: Fetal Intolerance      Name: Austin Avila      Apgar1: 7  Apgar5: 9   1 Term 16 42w0d 08:00 / 00:35 3.459 kg (7 lb 10 oz) F Vag-Spont None N NIKI      Birth Comments: PPH due to anterior/periclitoral and perineal lacerations. 2nd degree, BF x 8 months. No PPD.       Name: Cecile      Apgar1: 9  Apgar5: 9       Expected Date of Delivery: 2024, by Last Menstrual Period    Past Medical History:   Diagnosis Date    Anemia affecting pregnancy 2021    9/10/2021 receiving iron infusions.  Formatting of this note might be different from the original. 21: Hgb 10.7:  Encouraged once daily iron supplementation. Consider hgb electrophoresis at next visit due to low MCV.    Encounter for screening for cervical cancer      NIL with [facility]    GERD (gastroesophageal reflux disease)     Gestational diabetes     2nd baby only     Past Surgical History:   Procedure Laterality Date    MIDDLE EAR SURGERY   "10/10, 2/11    STOMACH SURGERY  age 5     Social History     Tobacco Use    Smoking status: Never     Passive exposure: Never    Smokeless tobacco: Never   Substance Use Topics    Alcohol use: Never    Drug use: Never     Current Outpatient Medications   Medication Sig Dispense Refill    cholecalciferol (VITAMIN D3) 25 mcg (1000 units) capsule Take 1 capsule by mouth daily      fluconazole (DIFLUCAN) 150 MG tablet Take 1 tablet (150 mg) by mouth every 72 hours (Patient not taking: Reported on 6/14/2024) 2 tablet 0     No Known Allergies    GAD7 score today:not screened   EPDS score today: 1.\"0\" to #10   History of anxiety or depression: no      Review of Systems  General:  Denies problem  Eyes: Denies problem  Ears/Nose/Throat: Denies problem  Cardiovascular: Denies problem  Respiratory:  Denies problem  Gastrointestinal:  Denies problem  Genitourinary: Denies problem  Musculoskeletal:  Denies problem  Skin: Denies problem  Neurologic: Denies problem  Psychiatric: Denies problem  Endocrine: Denies problem  Heme/Lymphatic: Denies problem   Allergic/Immunologic: Denies problem      Objective:     Vitals:    06/14/24 1307   BP: 102/62   Pulse: 75   Weight: 56.8 kg (125 lb 4.8 oz)   Height: 1.54 m (5' 0.63\")     Physical Exam:  General Appearance: Alert, cooperative, no distress, appears stated age  Head: Normocephalic, without obvious abnormality, atraumatic  Eyes: Conjunctiva/corneas clear, does not wear corrective lenses  Neck: Supple, symmetrical, trachea midline, no adenopathy  Thyroid: not enlarged, symmetric, no tenderness/mass/nodules  Back: Symmetric, no curvature, ROM normal, no CVA tenderness  Lungs: Clear to auscultation bilaterally, respirations unlabored  Heart: Regular rate and rhythm, S1 and S2 normal, no murmur, rub, or gallop  Breasts: not indicated based on USPSTF recommendations for asymptomatic women  .   Abdomen: Soft, non-tender, no masses. Gravid to 12  FHT: 163 bpm  Pelvic exam: Not indicated "   Extremities: Extremities normal, atraumatic, no cyanosis or edema  Skin: Skin color, texture, turgor normal, no rashes or lesions  Lymph nodes: Cervical, supraclavicular, and axillary nodes normal  Neurologic: Alert and oriented x 3.    Lab: No results found for any visits on 06/14/24.

## 2024-06-15 DIAGNOSIS — O99.011 ANEMIA DURING PREGNANCY IN FIRST TRIMESTER: Primary | ICD-10-CM

## 2024-06-15 DIAGNOSIS — R73.09 ELEVATED HEMOGLOBIN A1C: ICD-10-CM

## 2024-06-15 LAB
FERRITIN SERPL-MCNC: 11 NG/ML (ref 6–175)
HBV SURFACE AG SERPL QL IA: NONREACTIVE
HCV AB SERPL QL IA: NONREACTIVE
HIV 1+2 AB+HIV1 P24 AG SERPL QL IA: NONREACTIVE
IRON BINDING CAPACITY (ROCHE): 391 UG/DL (ref 240–430)
IRON SATN MFR SERPL: 9 % (ref 15–46)
IRON SERPL-MCNC: 34 UG/DL (ref 37–145)
RUBV IGG SERPL QL IA: 5.99 INDEX
RUBV IGG SERPL QL IA: POSITIVE
T PALLIDUM AB SER QL: NONREACTIVE

## 2024-06-16 LAB — BACTERIA UR CULT: NO GROWTH

## 2024-07-03 PROBLEM — Z34.82 ENCOUNTER FOR SUPERVISION OF OTHER NORMAL PREGNANCY IN SECOND TRIMESTER: Status: ACTIVE | Noted: 2021-04-22

## 2024-07-03 NOTE — PROGRESS NOTES
Here alone today for routine prenatal visit at 15w6d. Reports feeling well but some fatigue and constipation which she is managing with tamarind seeds soaked in water with good effect. She is taking iron daily. Notes active FM and no regular UCs. Reviewed recommendation for 1 hour GCT based on her elevated hgb A1C level at her IOB and accepts today. Rescreen hgb today. Advised on timing of mid-pregnancy ultrasound; [facility] pt [facility]. . Advised on upcoming labs and visit schedule. Reviewed warning s/sx and reasons to call. RTC 4 weeks.

## 2024-07-12 ENCOUNTER — PRENATAL OFFICE VISIT (OUTPATIENT)
Dept: MIDWIFE SERVICES | Facility: CLINIC | Age: 29
End: 2024-07-12
Payer: COMMERCIAL

## 2024-07-12 VITALS — BODY MASS INDEX: 25.25 KG/M2 | DIASTOLIC BLOOD PRESSURE: 64 MMHG | WEIGHT: 132 LBS | SYSTOLIC BLOOD PRESSURE: 108 MMHG

## 2024-07-12 DIAGNOSIS — Z34.82 ENCOUNTER FOR SUPERVISION OF OTHER NORMAL PREGNANCY IN SECOND TRIMESTER: Primary | ICD-10-CM

## 2024-07-12 LAB
ACANTHOCYTES BLD QL SMEAR: ABNORMAL
AUER BODIES BLD QL SMEAR: ABNORMAL
BASO STIPL BLD QL SMEAR: ABNORMAL
BITE CELLS BLD QL SMEAR: ABNORMAL
BLISTER CELLS BLD QL SMEAR: ABNORMAL
BURR CELLS BLD QL SMEAR: ABNORMAL
DACRYOCYTES BLD QL SMEAR: ABNORMAL
ELLIPTOCYTES BLD QL SMEAR: SLIGHT
FRAGMENTS BLD QL SMEAR: ABNORMAL
GLUCOSE 1H P 50 G GLC PO SERPL-MCNC: 101 MG/DL (ref 70–129)
HGB C CRYSTALS: ABNORMAL
HOWELL-JOLLY BOD BLD QL SMEAR: ABNORMAL
NEUTS HYPERSEG BLD QL SMEAR: ABNORMAL
PLAT MORPH BLD: ABNORMAL
POLYCHROMASIA BLD QL SMEAR: ABNORMAL
RBC AGGLUT BLD QL: ABNORMAL
RBC MORPH BLD: ABNORMAL
ROULEAUX BLD QL SMEAR: ABNORMAL
SICKLE CELLS BLD QL SMEAR: ABNORMAL
SMUDGE CELLS BLD QL SMEAR: ABNORMAL
SPHEROCYTES BLD QL SMEAR: ABNORMAL
STOMATOCYTES BLD QL SMEAR: ABNORMAL
TARGETS BLD QL SMEAR: ABNORMAL
TOXIC GRANULES BLD QL SMEAR: ABNORMAL
VARIANT LYMPHS BLD QL SMEAR: ABNORMAL

## 2024-07-12 PROCEDURE — 36415 COLL VENOUS BLD VENIPUNCTURE: CPT | Performed by: ADVANCED PRACTICE MIDWIFE

## 2024-07-12 PROCEDURE — 85018 HEMOGLOBIN: CPT | Performed by: ADVANCED PRACTICE MIDWIFE

## 2024-07-12 PROCEDURE — 99207 PR PRENATAL VISIT: CPT | Performed by: ADVANCED PRACTICE MIDWIFE

## 2024-07-12 PROCEDURE — 82950 GLUCOSE TEST: CPT | Performed by: ADVANCED PRACTICE MIDWIFE

## 2024-07-12 NOTE — PATIENT INSTRUCTIONS
"\"We hope you had a positive experience and that you can definitely recommend [facility] to your family and friends. You ll be receiving a survey soon and we look forward to hearing your feedback\".    [facility]- Contact information:  Appointment line and to get a hold of CNM in clinic Monday-Friday 8 am - 5 pm:  [phone].  There are some clinics with early start times (1st appointment 7:40 am) and others with evening hours (last appointment 6:20 pm).  Most are typically open from 8 am to 5 pm.    CNM on call answering service: [phone].  Specify your hospital of choice and leave a brief message for CNM;  will then page CNM who is on call at your specified hospital and you should receive a call back with 15 minutes.  Be sure that your ringer is audible and that you can accept blocked calls so that we can get back in touch with you! This number should be reserved for urgent needs if during the day, before 8 am, after 5 pm, weekends, holidays.    Contact the on-call CNM with warning signs, such as:  vaginal bleeding   Vaginal discharge and itching or pain and burning during urination  Leg/calf pain or swelling on one side  severe abdominal pain  nausea and vomiting more than 4-5 times a day, or if you are unable to keep anything down  fever more than 100.4 degrees F.     Yellow Chiphart  After each of your visits you are welcome to check [facility] for your visit summary including education and links to information relevant to your pregnancy and/or well woman care.   Find the \"Visits\" tab at the top of the page, you will see a list of recent visits and for each visit a for link for \"View After Visit Summary.\" View of your After Visit Summary will allow you to read our recommendations from your visit, review any education provided, and link to websites with useful information.   If you have any questions or difficulty navigating [employer], please feel free to " "contact us and we will do our best to direct you.        Touring the Maternity Care Center  At this time we are offering a virtual tour of the Maternity Care Centers at both [facility] and [facility]:   [facility] Maternity Care:   https://Starfish Retention Solutions[facility] Center[facility].org/locations/the-birthplace-at--[facility] Maternity Care:   [email or web address]://[employer]/locations/the-birthplace-at--[facility]    Scroll to the bottom of this page if the above link does not work       Meet the Midwives from [facility]  You are invited to an informational meet and greet with [facility]'s [facility] Certified Nurse-Midwives. Our free \"Meet the Midwives\" event is a great opportunity to learn about our midwives' philosophy and experience, the hospitals where we can assist with your birth, and answer questions you may have. Partners, friends, and family are welcome to attend. Currently, this is a virtual event.  Date  Last Thursday of every month at 7 pm.    Link to next (live) meeting  [email or web address]  To Join by Telephone (audio only) Call:   [phone] Phone Conference ID: [phone] #    Ultrasound Appointment:   Don't forget to schedule your ultrasound appointment around 20 weeks into your pregnancy. Your midwife will order the exam for you to schedule at [phone] with [facility] radiology locations or at the independent radiology clinic of your preference.      Why is dental care in pregnancy important?  During pregnancy, you are more likely to have problems with your teeth or gums. If you have an infection in your teeth or gums, the chance of your baby being premature (born early) or having low birth weight may be slightly higher than if your teeth and gums are healthy  Dental care is safe during pregnancy and important for the health of you and your baby.   What should you know before you " see the dentist?  Make sure your dentist knows that you are pregnant.  If medications for infection or for pain are needed, your dentist can prescribe ones that are safe for you and your baby.  Tell your dentist about any changes you have noticed since you became pregnant and about any medications r or supplements you are taking.  Routine x-rays should be avoided in pregnancy, but it may be necessary if there is a problem or an emergency.   Your body should be covered with a lead apron to protect you and your baby.  Dental work can be done safely at any point in pregnancy. If possible, it is best to delay treatments and pro- cedures until after the first trimester.    For more information on dental health in pregnancy: http://onlinelibrary.lomax.com/store/10.1111/jmwh.35832/asset/bqsu99465.pdf?v=1&t=kaxl6f57&a=[email or web address]     Quickening:   Your Baby in the Second Trimester of Pregnancy  At the start of the second trimester, you will feel your baby's movements, which get stronger as the baby grows bigger. At the end of the fourth month, your baby weighs about five ounces. Her kidneys begin to produce urine. During visits to your health care provider, you will be able to hear your baby's heartbeat more clearly. Your baby can move and hear your voice.   By the end of the fifth month, you'll be able to feel light movements (called quickening) of your fetus. Your baby is sleeping and waking at regular intervals, and is more active than before. At this point, she is about nine inches long and weighs about one-half to one pound. During the sixth month, your baby's features become clearer. Eyebrows, eyelashes, and hair are developing. She also has finger and toe prints, and may be kicking strongly.  By the end of the second trimester, your baby weighs as much as two pounds and is about 11 inches long.         Gestational diabetes  Gestational diabetes develops during pregnancy (gestation). Like  other types of diabetes, gestational diabetes affects how your cells use sugar (glucose). Gestational diabetes causes high blood sugar that can affect your pregnancy and your baby's health.  Any pregnancy complication is concerning, but there's good news. Expectant moms can help control gestational diabetes by eating healthy foods, exercising and, if necessary, taking medication. Controlling blood sugar can prevent a difficult birth and keep you and your baby healthy.  In gestational diabetes, blood sugar usually returns to normal soon after delivery. But if you've had gestational diabetes, you're at risk for type 2 diabetes. You'll continue working with your health care team to monitor and manage your blood sugar.    Who is at risk?  This is a list of factors that increase the risk of developing gestational diabetes for women during pregnancy:      Overweight prior to pregnancy (20% or more over ideal body weight)      High risk ethnic group: , , ,       Impaired glucose tolerance or traces of glucose in the urine      Family history of diabetes      Previously giving birth to a baby over 9 lbs. or stillborn      Previous pregnancy with gestational diabetes    Prevention:  There are no guarantees when it comes to preventing gestational diabetes -- but the more healthy habits you can adopt before pregnancy, the better. If you've had gestational diabetes, these healthy choices may also reduce your risk of having it in future pregnancies or developing type 2 diabetes down the road.  Eat healthy foods. Choose foods high in fiber and low in fat and calories. Focus on fruits, vegetables and whole grains. Strive for variety to help you achieve your goals without compromising taste or nutrition. Watch portion sizes.   Keep active. Exercising before and during pregnancy can help protect you from developing gestational diabetes. Aim for 30 minutes of moderate activity on most days  of the week. Take a brisk daily walk. Ride your bike. Swim laps.  If you can't fit a single 30-minute workout into your day, several shorter sessions can do just as much good. Park in the distant lot when you run errands. Get off the bus one stop before you reach your destination. Every step you take increases your chances of staying healthy.  Lose excess pounds before pregnancy. Doctors don't recommend weight loss during pregnancy. But if you're planning to get pregnant, losing extra weight beforehand may help you have a healthier pregnancy.  Focus on permanent changes to your eating habits. Motivate yourself by remembering the long-term benefits of losing weight, such as a healthier heart, more energy and improved self-esteem.    Preventing Diabetes after Pregnancy:  It is estimated that 35-60 percent of women that have had gestational diabetes will develop type 2 diabetes in the future. It is also thought that children from these pregnancies have a greater chance of developing obesity and type 2 diabetes.    If you do have prediabetes or have risk factors for having diabetes, research shows that doing just two things can help you prevent or delay type 2 diabetes: Lose 5% to 7% of your body weight, which would be 10 to 14 pounds for a 200-pound person; and get at least 150 minutes each week of physical activity, such as brisk walking.        RESOURCES    Breastfeeding Information:  OUTPATIENT LACTATION RESOURCES    -Schedule a clinic appointment with a [facility] [place] [facility] with dedicated clinic hours for breastfeeding assistance by calling [phone]. Breastfeeding clinic visits are at [facility] on , [facility] on  and [facility] on .     New Parent Connection:   Elvie Oviedo, [id number] [place] [address]  In-person meetings on  from 6 pm - 7:15 pm for parents of  to 9 months, at the same site.   All are free, drop-in, no  registration required.    There are also free virtual meetings ongoing on Tuesdays:  11:30 am - 12:30 pm for parents of newborns to 3 months  4:15 pm to 5:15 pm for parents of 3 to 9-month olds  For joining info parents should call Kavitha Merchant at [phone]    -Attend a baby weigh in at [facility].  Lactation consultants are available to answer questions  [place]: Tuesdays 1:00 - 2:00  [facility]: Mondays 1:00 - 2:00   www.[email or web address]    -Attend one of the New Mama groups at [facility] in [facility].  [facility] also offers one-on-one in home and in office lactation consults.   www.Expert    -Attend a LeLeche League meeting.  Multiple groups in several locations throughout the [facility]. The meetings are no-cost and always informative breastfeeding education session through [employer]atal La Leche League  Www.londas.org/    -Medication use while breastfeeding: http://toxnet.nlm.nih.gov/newtoxnet/lactmed.htm     Childbirth and Parenting Education:     Everyday Miracles:   https://www.everyday-miracles.org/    Free Video Series from [facility]: https://nursing.[facility]/academics/specialty-areas/nurse-midwifery/having-baby-prenatal-videos/having-baby-prenatal-and    Childbirth Education virtual (live) classes: www.Music Dealers/classes  The Birth Hour: https://HiLo Tickets/online-childbirth-class/  BirthED: https://www.birthedmn.com/  FATMATA parenting center: http://Tunii/   (097) 981-BABY  [place]: (education, yoga & wellness) www.tab ticketbroker.Silith.IO  [facility]: www.Expert   Childbirth collective: (Parent topic nights)  www.childbirthcollective.org/  Hypnobabies:  www.hypnobabiestwincities.com/  Hypnobirthing:  Http://hypnobirthiChoisr.Silith.IO/  Hypnobirthing virtual class: www.flutterJustinmind.com/hypnobirthing    Information about doulas:  Childbirth collective: http://www.childbirthcollective.org/  Doulas of [place]  [place] (MELISSA):  www.melissa.org  [facility]  project: http://twincitiesdoulaproject.com/     Early Childhood and Family Education (ECFE):  ECFE offers parents hands-on learning experiences that will nourish a lifetime of teachable moments.  http://ecfe.info/ecfe-home/    APPS and Podcasts:   Allie Balderas    Evidence Based Birth  The Birth Hour (for birth stories)   Birthful   Expectful   The Longest Shortest Time  PregnancyPodcast Felicia Nails  https://www.downtobirthshow.com/    Book Recommendations:   Yudith Wallingford's Birthing From Within--first few chapters include a new-age tone, you may prefer to skip it and keep going, because there is good stuff later.  This book recommendation covers emotional preparation, but does cover coping with pain, and use of both pharmacological and nonpharmacological methods.    Guide to Childbirth by Karely Macario  Childbirth Without Fear by Norman Johnson Read    Dr. Rivera' The Pregnancy Book and The Birth Book--the pregnancy book goes month-by month    The Birth Partner by Maite Lenz    Womanly Art of Breastfeeding by La Leche League International   Bestfeeding by Alma Sierra--great pictures    Mothering Your Nursing Toddler, by Sandra Peralta.   Addresses dealing with so many of the challenging behaviors of a nursing toddler.  How Weaning Happens, by La Leche League.  Discusses weaning at all ages, from medically necessary weaning of an infant, all the way up to age 5 (or older), with why/why not, and strategies.  Very empowering book both for deciding to wean and deciding not to.    American College of Nurse-Midwives (ACNM) http://www.midwife.org/; look at the informational handouts at http://www.midwife.org/Share-With-Women     www.mymidwife.org    Mother to Baby (Medication and Herbal guidance in pregnancy): http://www.mothertobaby.org  Toll-Free Hotline: 447.578.2661  LactMed (Medication use while breastfeeding):  "http://toxnet.nlm.nih.gov/newtoxnet/lactmed.htm    Women's Health.gov:  http://www.womenshealth.gov/a-z-topics/index.html    American pregnancy association - http://americanpregnancy.org    Centering Pregnancy (group prenatal care option): http://centeringhealthcare.org    March of Dimes www.Fast Society.Hadron Systems     FDA - Nutrition  www.mypyramid.gov  Under \"For Consumers,\" click on \"pregnant and breastfeeding women.\"      Centers for Disease Control and Prevention (CDC) - Vaccines : http://www.cdc.gov/vaccines/       When researching information on the web, question the validity of websites.  The domains .gov, .edu and.org tend to be more reliable information.  If there are a lot of advertisements, be cautious of the information provided. Stay away from blogs and chat rooms please!   "

## 2024-07-15 DIAGNOSIS — O99.011 ANEMIA DURING PREGNANCY IN FIRST TRIMESTER: ICD-10-CM

## 2024-07-15 DIAGNOSIS — D50.8 IRON DEFICIENCY ANEMIA SECONDARY TO INADEQUATE DIETARY IRON INTAKE: Primary | ICD-10-CM

## 2024-07-15 LAB — HGB BLD-MCNC: 7.9 G/DL (ref 11.7–15.7)

## 2024-07-15 RX ORDER — ALBUTEROL SULFATE 0.83 MG/ML
2.5 SOLUTION RESPIRATORY (INHALATION)
Status: CANCELLED | OUTPATIENT
Start: 2024-07-16

## 2024-07-15 RX ORDER — MULTIVIT WITH MINERALS/LUTEIN
250 TABLET ORAL EVERY OTHER DAY
Qty: 30 TABLET | Refills: 3 | Status: SHIPPED | OUTPATIENT
Start: 2024-07-15 | End: 2024-09-13

## 2024-07-15 RX ORDER — HEPARIN SODIUM (PORCINE) LOCK FLUSH IV SOLN 100 UNIT/ML 100 UNIT/ML
5 SOLUTION INTRAVENOUS
Status: CANCELLED | OUTPATIENT
Start: 2024-07-16

## 2024-07-15 RX ORDER — LANOLIN ALCOHOL/MO/W.PET/CERES
1000 CREAM (GRAM) TOPICAL EVERY OTHER DAY
Qty: 30 TABLET | Refills: 3 | Status: SHIPPED | OUTPATIENT
Start: 2024-07-15 | End: 2024-09-13

## 2024-07-15 RX ORDER — EPINEPHRINE 1 MG/ML
0.3 INJECTION, SOLUTION, CONCENTRATE INTRAVENOUS EVERY 5 MIN PRN
Status: CANCELLED | OUTPATIENT
Start: 2024-07-16

## 2024-07-15 RX ORDER — ALBUTEROL SULFATE 90 UG/1
1-2 AEROSOL, METERED RESPIRATORY (INHALATION)
Status: CANCELLED
Start: 2024-07-16

## 2024-07-15 RX ORDER — DIPHENHYDRAMINE HYDROCHLORIDE 50 MG/ML
50 INJECTION INTRAMUSCULAR; INTRAVENOUS
Status: CANCELLED
Start: 2024-07-16

## 2024-07-15 RX ORDER — UREA 10 %
45 LOTION (ML) TOPICAL EVERY OTHER DAY
Qty: 30 TABLET | Refills: 3 | Status: SHIPPED | OUTPATIENT
Start: 2024-07-15 | End: 2024-09-13

## 2024-07-15 RX ORDER — HEPARIN SODIUM,PORCINE 10 UNIT/ML
5-20 VIAL (ML) INTRAVENOUS DAILY PRN
Status: CANCELLED | OUTPATIENT
Start: 2024-07-16

## 2024-07-15 RX ORDER — METHYLPREDNISOLONE SODIUM SUCCINATE 125 MG/2ML
125 INJECTION, POWDER, LYOPHILIZED, FOR SOLUTION INTRAMUSCULAR; INTRAVENOUS
Status: CANCELLED
Start: 2024-07-16

## 2024-07-15 RX ORDER — MEPERIDINE HYDROCHLORIDE 25 MG/ML
25 INJECTION INTRAMUSCULAR; INTRAVENOUS; SUBCUTANEOUS EVERY 30 MIN PRN
Status: CANCELLED | OUTPATIENT
Start: 2024-07-16

## 2024-07-24 ENCOUNTER — INFUSION THERAPY VISIT (OUTPATIENT)
Dept: INFUSION THERAPY | Facility: HOSPITAL | Age: 29
End: 2024-07-24
Attending: ADVANCED PRACTICE MIDWIFE
Payer: COMMERCIAL

## 2024-07-24 VITALS
OXYGEN SATURATION: 99 % | RESPIRATION RATE: 16 BRPM | SYSTOLIC BLOOD PRESSURE: 105 MMHG | TEMPERATURE: 98.2 F | HEIGHT: 61 IN | DIASTOLIC BLOOD PRESSURE: 59 MMHG | HEART RATE: 73 BPM | BODY MASS INDEX: 25.25 KG/M2

## 2024-07-24 DIAGNOSIS — O99.011 ANEMIA DURING PREGNANCY IN FIRST TRIMESTER: Primary | ICD-10-CM

## 2024-07-24 DIAGNOSIS — D50.8 IRON DEFICIENCY ANEMIA SECONDARY TO INADEQUATE DIETARY IRON INTAKE: ICD-10-CM

## 2024-07-24 PROCEDURE — 258N000003 HC RX IP 258 OP 636: Performed by: ADVANCED PRACTICE MIDWIFE

## 2024-07-24 PROCEDURE — 96376 TX/PRO/DX INJ SAME DRUG ADON: CPT

## 2024-07-24 PROCEDURE — 250N000011 HC RX IP 250 OP 636: Performed by: ADVANCED PRACTICE MIDWIFE

## 2024-07-24 PROCEDURE — 96365 THER/PROPH/DIAG IV INF INIT: CPT

## 2024-07-24 PROCEDURE — 96366 THER/PROPH/DIAG IV INF ADDON: CPT

## 2024-07-24 RX ORDER — EPINEPHRINE 1 MG/ML
0.3 INJECTION, SOLUTION INTRAMUSCULAR; SUBCUTANEOUS EVERY 5 MIN PRN
Status: DISCONTINUED | OUTPATIENT
Start: 2024-07-24 | End: 2024-07-24 | Stop reason: HOSPADM

## 2024-07-24 RX ORDER — HEPARIN SODIUM,PORCINE 10 UNIT/ML
5-20 VIAL (ML) INTRAVENOUS DAILY PRN
OUTPATIENT
Start: 2024-07-24

## 2024-07-24 RX ORDER — HEPARIN SODIUM (PORCINE) LOCK FLUSH IV SOLN 100 UNIT/ML 100 UNIT/ML
5 SOLUTION INTRAVENOUS
OUTPATIENT
Start: 2024-07-24

## 2024-07-24 RX ORDER — ALBUTEROL SULFATE 0.83 MG/ML
2.5 SOLUTION RESPIRATORY (INHALATION)
Status: DISCONTINUED | OUTPATIENT
Start: 2024-07-24 | End: 2024-07-24 | Stop reason: HOSPADM

## 2024-07-24 RX ORDER — ALBUTEROL SULFATE 0.83 MG/ML
2.5 SOLUTION RESPIRATORY (INHALATION)
Status: CANCELLED | OUTPATIENT
Start: 2024-07-24

## 2024-07-24 RX ORDER — DIPHENHYDRAMINE HYDROCHLORIDE 50 MG/ML
50 INJECTION INTRAMUSCULAR; INTRAVENOUS
Status: CANCELLED
Start: 2024-07-24

## 2024-07-24 RX ORDER — ALBUTEROL SULFATE 90 UG/1
1-2 AEROSOL, METERED RESPIRATORY (INHALATION)
Status: CANCELLED
Start: 2024-07-24

## 2024-07-24 RX ORDER — MEPERIDINE HYDROCHLORIDE 50 MG/ML
25 INJECTION INTRAMUSCULAR; INTRAVENOUS; SUBCUTANEOUS EVERY 30 MIN PRN
Status: CANCELLED | OUTPATIENT
Start: 2024-07-24

## 2024-07-24 RX ORDER — METHYLPREDNISOLONE SODIUM SUCCINATE 125 MG/2ML
125 INJECTION, POWDER, LYOPHILIZED, FOR SOLUTION INTRAMUSCULAR; INTRAVENOUS
Status: DISCONTINUED | OUTPATIENT
Start: 2024-07-24 | End: 2024-07-24 | Stop reason: HOSPADM

## 2024-07-24 RX ORDER — DIPHENHYDRAMINE HYDROCHLORIDE 50 MG/ML
50 INJECTION INTRAMUSCULAR; INTRAVENOUS
Status: DISCONTINUED | OUTPATIENT
Start: 2024-07-24 | End: 2024-07-24 | Stop reason: HOSPADM

## 2024-07-24 RX ORDER — ALBUTEROL SULFATE 90 UG/1
1-2 AEROSOL, METERED RESPIRATORY (INHALATION)
Status: DISCONTINUED | OUTPATIENT
Start: 2024-07-24 | End: 2024-07-24 | Stop reason: HOSPADM

## 2024-07-24 RX ORDER — EPINEPHRINE 1 MG/ML
0.3 INJECTION, SOLUTION INTRAMUSCULAR; SUBCUTANEOUS EVERY 5 MIN PRN
Status: CANCELLED | OUTPATIENT
Start: 2024-07-24

## 2024-07-24 RX ORDER — MEPERIDINE HYDROCHLORIDE 50 MG/ML
25 INJECTION INTRAMUSCULAR; INTRAVENOUS; SUBCUTANEOUS EVERY 30 MIN PRN
Status: DISCONTINUED | OUTPATIENT
Start: 2024-07-24 | End: 2024-07-24 | Stop reason: HOSPADM

## 2024-07-24 RX ORDER — METHYLPREDNISOLONE SODIUM SUCCINATE 125 MG/2ML
125 INJECTION, POWDER, LYOPHILIZED, FOR SOLUTION INTRAMUSCULAR; INTRAVENOUS
Status: CANCELLED
Start: 2024-07-24

## 2024-07-24 RX ADMIN — SODIUM CHLORIDE 25 MG: 9 INJECTION, SOLUTION INTRAVENOUS at 10:05

## 2024-07-24 RX ADMIN — SODIUM CHLORIDE 975 MG: 9 INJECTION, SOLUTION INTRAVENOUS at 11:21

## 2024-07-24 RX ADMIN — SODIUM CHLORIDE 250 ML: 9 INJECTION, SOLUTION INTRAVENOUS at 10:05

## 2024-07-24 ASSESSMENT — PAIN SCALES - GENERAL: PAINLEVEL: NO PAIN (0)

## 2024-07-24 NOTE — PROGRESS NOTES
Infusion Nursing Note:  Bob Olvera presents today for IV infed.    Patient seen by provider today: No   present during visit today: Not Applicable.    Note: Tolerated test dose and remaining infusion well, offers no side effects complaints..      Intravenous Access:  Peripheral IV placed.    Treatment Conditions:  Not Applicable.      Post Infusion Assessment:  Patient tolerated infusion without incident.       Discharge Plan:   Patient and/or family verbalized understanding of discharge instructions and all questions answered.      Delmis Sheth RN

## 2024-08-06 NOTE — PROGRESS NOTES
Here *** today for routine prenatal visit at ***w***d. Reports feeling ***. Notes ***active FM and ***no regular UCs. IV iron infusion completed on 7/24/24; accepting of repeat hgb today. Advised continued oral supplementation and reviewed dietary sources. Wondering about ***. Advised on upcoming labs and visit schedule. Reviewed warning s/sx and reasons to call. RTC *** weeks.

## 2024-08-06 NOTE — PATIENT INSTRUCTIONS
"\"We hope you had a positive experience and that you can definitely recommend [facility] to your family and friends. You ll be receiving a survey soon and we look forward to hearing your feedback\".    [facility] Contact information:  Appointment line and to get a hold of CNM in clinic Monday-Friday 8 am - 5 pm:  [phone].  There are some clinics with early start times (1st appointment 7:40 am) and others with evening hours (last appointment 6:20 pm).  Most are typically open from 8 am to 5 pm.    CNM on call answering service: [phone].  Specify your hospital of choice and leave a brief message for CNM;  will then page CNM who is on call at your specified hospital and you should receive a call back with 15 minutes.  Be sure that your ringer is audible and that you can accept blocked calls so that we can get back in touch with you! This number should be reserved for urgent needs if during the day, before 8 am, after 5 pm, weekends, holidays.    Contact the on-call CNM with warning signs, such as:  vaginal bleeding   Vaginal discharge and itching or pain and burning during urination  Leg/calf pain or swelling on one side  severe abdominal pain  nausea and vomiting more than 4-5 times a day, or if you are unable to keep anything down  fever more than 100.4 degrees F.   Avtozaperhart  After each of your visits you are welcome to check Open Silicon for your visit summary including education and links to information relevant to your pregnancy and/or well woman care.   Find the \"Visits\" tab at the top of the page, you will see a list of recent visits and for each visit a for link for \"View After Visit Summary.\" View of your After Visit Summary will allow you to read our recommendations from your visit, review any education provided, and link to websites with useful information.   If you have any questions or difficulty navigating Kickfire, please feel free to contact " "us and we will do our best to direct you.  Meet the Midwives from [facility]  You are invited to an informational meet and greet with [facility] Certified Nurse-Midwives. Our free \"Meet the Midwives\" event is a great opportunity to learn about our midwives' philosophy and experience, the hospitals where we can assist with your birth, and answer questions you may have. Partners, friends, and family are welcome to attend. Currently, this is a virtual event.  Date  Last Thursday of every month at 7 pm.    Link to next (live) meeting  https://[facility].org/meet-the-midwives  To Join by Telephone (audio only) Call:   [phone] Phone Conference ID: [phone] #    Childbirth and Parenting Education:     Everyday Miracles:   https://www.everyday-miracles.org/    Free Video Series from [facility]: https://nursing.[facility]/academics/specialty-areas/nurse-midwifery/having-baby-prenatal-videos/having-baby-prenatal-and    Childbirth Education virtual (live) classes: www.Motion Computing/classes  The Birth Hour: https://Veggie Grill/online-childbirth-class/  BirthED: https://www.birthedmn.com/  FATMATA parenting center: http://amPine Rest Christian Mental Health ServicesingBMdr.Imaginova/   (521) 129-YVDD  [place]: (education, yoga & wellness) www.Guokang Health Management  Enlightened Mama: www.enlightenedmama.com   Childbirth collective: (Parent topic nights)  www.childbirthcollective.org/  Hypnobabies:  www.hypnobabiestXormisties.com/  Hypnobirthing:  Http://hypnEvento Social PromotionrtTab Solutions.Imaginova/  Hypnobirthing virtual class: www.Codewise/hypnobirthing    Information about doulas:  Childbirth collective: http://www.childbirthcollective.org/  Doulas of [place] (MELISSA):  www.melissa.org  [facility]  project: http://QobliQ GrouptiesdoulaAirtaskerject.com/     Early Childhood and Family Education (ECFE):  ECFE offers parents hands-on learning experiences that will nourish a lifetime of teachable " moments.  http://ecfe.info/ecfe-home/    APPS and Podcasts:   Allie Balderas    Evidence Based Birth  The Birth Hour (for birth stories)   Birthful   Expectful   The Longest Shortest Time  PregnancyPodcast Felicia Nails  https://www.downtobirthshow.com/    Book Recommendations:   Yudith [place]'s Birthing From Within--first few chapters include a new-age tone, you may prefer to skip it and keep going, because there is good stuff later.  This book recommendation covers emotional preparation, but does cover coping with pain, and use of both pharmacological and nonpharmacological methods.    Guide to Childbirth by Karely Macario  Childbirth Without Fear by Norman Johnson Read    Dr. Rivera' The Pregnancy Book and The Birth Book--the pregnancy book goes month-by month    The Birth Partner by Maite Lenz    Womanly Art of Breastfeeding by La Leche League Delivery Hero   Bestfeeding by Alma Sierra--great pictures    Mothering Your Nursing Toddler, by Sandra Peralta.   Addresses dealing with so many of the challenging behaviors of a nursing toddler.  How Weaning Happens, by La Leche League.  Discusses weaning at all ages, from medically necessary weaning of an infant, all the way up to age 5 (or older), with why/why not, and strategies.  Very empowering book both for deciding to wean and deciding not to.    American College of Nurse-Midwives (ACNM) http://www.midwife.org/; look at the informational handouts at http://www.midwife.org/Share-With-Women     www.mymidwife.org    Mother to Baby (Medication and Herbal guidance in pregnancy): http://www.mothertobaby.org  Toll-Free Hotline: 295.830.1875  LactMed (Medication use while breastfeeding): http://toxnet.nlm.nih.gov/newtoxnet/lactmed.htm    Women's Health.gov:  http://www.womenshealth.gov/a-z-topics/index.html    American pregnancy association - http://americanpregnancy.org    Centering Pregnancy (group prenatal care option):  "http://centeringhealthcare.org    March of Dimes www.Jazz Pharmaceuticals.Prizeo     FDA - Nutrition  www.mypyramid.gov  Under \"For Consumers,\" click on \"pregnant and breastfeeding women.\"      Centers for Disease Control and Prevention (CDC) - Vaccines : http://www.cdc.gov/vaccines/       When researching information on the web, question the validity of websites.  The Loop Survey .gov, UP[employer] andWorldDeskorg tend to be more reliable information.  If there are a lot of advertisements, be cautious of the information provided. Stay away from blogs and chat rooms please!     Baby Feeding in the Hospital: Information, Support and Resources    As you prepare for the birth of your child, you will want to consider options for feeding your baby including breast-feeding and/or baby formula. The American Academy of Pediatrics recommends exclusive breast-feeding for the first six months (although any amount of breast-feeding is beneficial).  However, we also understand that breast-feeding is a personal choice and not for everyone. Whether or not you choose to breast-feed, your decision will be respected by our staff.    There are numerous benefits of breast-feeding; here are a few to consider:  Provides antibodies to protect your baby from infections and diseases  The cost: formula can cost over $1,500 per year  Convenience, no warming up or sterilizing bottles and supplies  The physical contact with breastfeeding can make babies feel secure, warm and comforted    What ever my feeding choice, what can I expect after I deliver my baby?  Your baby will usually be placed skin-to-skin immediately following birth. The skin to skin contact between you and your baby will be a special and memorable time. The bonding and attachment comforts your baby and has a positive effect on baby s brain development.   Having your baby  room in  with you also helps you start to learn your baby s body rhythms and sleep cycle.    You will also begin to learn your baby s cues " (signals) that he or she is ready to feed.    When do I start to feed my baby?  As soon as possible after your baby s birth, you will be encouraged to begin feeding.  In the first couple of weeks, your baby will eat often.  Breastfeeding babies usually eat at least 8 times in 24 hours.  Babies fed formula usually eat at least 7 times in 24 hours.      Breast-feeding tips:  Get comfortable and use pillows for support.  Have your baby at the level of your breast, facing you,  tummy to tummy .    Touch your nipple to your baby s lips so you baby s mouth opens wide (rooting reflex).  Aim the nipple toward the roof of your baby s mouth. When your baby opens his or her mouth, pull your baby toward your breast to help your baby  latch on  to your nipple and much of the areola area.  Hand expressing your breast milk can assist with latching your baby to your breast, if needed.  Ask for help, breastfeeding may seem awkward or uncomfortable at first, this is normal. There are numerous resources available at [facility] Nurse [facility] ([facility]), Clinics and beyond.   If your goal is to exclusively breastfeed, avoid using any formula or artificial nipples (including bottles and pacifiers) while you are your baby are learning to breastfeed unless there is a medical reason.     Mixing breastfeeding and formula can interfere with how you begin building your milk supply.  It can impact how you and your baby  learn  to breastfeeding together and alter the natural growth of  good  bacteria in your baby s stomach.  Delay a pacifier or a bottle in the first few weeks until breastfeeding is well established. This is often around 3 weeks of age.  Ask your nurse to show you how to hand express.   Breast milk can be kept in the refrigerator or freezer for later use.        Touring the Maternity Care Center  [facility] Maternity Care:    https://Manas Informatic.Precision Biologics/locations/the-birthplace-at--[facility] Maternity Care:   https://Manas Informatic.Precision Biologics/locations/the-birthplace-atRedwood LLC  Scroll to the bottom of this page if the above link does not work      Hospital and Clinic Breastfeeding Resources:  -Schedule an appointment with a [facility] Nurse Midwives [facility]   CNM who is also a Lactation Consultant by calling [phone]     -Schedule a clinic appointment with a [facility] Nurse Midwives [facility] CNM with dedicated clinic hours for breastfeeding assistance by calling [phone]. Breastfeeding clinic visits are at [facility] on , [facility] on  and [facility] on .     New Parent Connection:   [facility], [address] [place] [address]  In-person meetings on  from 6 pm - 7:15 pm for parents of  to 9 months, at the same site.   All are free, drop-in, no registration required.    There are also free virtual meetings ongoing on :  11:30 am - 12:30 pm for parents of newborns to 3 months  4:15 pm to 5:15 pm for parents of 3 to 9-month olds  For joining info parents should call Kavitha Merchant at [phone]      [facility] Baby Café  More information  Elvia Moyer  [phone]  [email or web address].     -Attend a baby weigh in at [facility].  Lactation consultants are available to answer questions  Ness:  1:00 - 2:00  [facility]:  1:00 - 2:00  www.[facility] Monitoringer.com    -Attend one of the New Mama groups at [facility] in [facility]  [facility] also offers one-on-one in home and in office lactation consults.   www.[facility].Samba TV    -Attend a LeLeche League meeting.  Multiple groups in several locations throughout the [facility]. The meetings are no-cost and always informative breastfeeding education session through  "Internatal La Leche League  Www.Seven Energy.org/    -Medication use while breastfeeding: http://toxnet.nlm.nih.gov/newtoxnet/lactmed.htm     Online Resources:  Breastfeedingmadesimple.com  Llli.org (La Leche League)  Normalfed.com  WomenshSt. Vincent Hospitalth.gov/breastfeeding  Workandpump.com    Breast-feeding Supplies & Pumps:  Talk to your insurance provider or WIC (Women, Infants and Children) to learn more about options available to you. Recent health insurance changes may include additional coverage for supplies and pumps.    Public Health:  Women, Infants and Children Nutrition program (WIC): provides breast-feeding support and education in addition to formal feeding moms. 503-XIX-7005 or http://www.health.[facility].us/divs/fh/wic    Family Health Home Visiting: [facility] Nurse home visits are available. Talk to your provider to see if you qualify. Most [facility] have a program available.    Additional Resources:  La Leche League is an international, nonprofit, nonsectarian organization offering information, education, and support to mothers who want to breast-feed their babies. Local groups offer phone help and monthly meetings. Visit Osprey Data or [place]opo and us the  Find local support  drop down menu or click on the  Resources  tab.    [place] Breastfeeding Resources: 3-274-420-BABY (2229) toll free    National Breastfeeding Help Line trained breastfeeding peer counselors can help answer common breast-feeding questions by phone. Monday-Friday: English/Uzbek  3-053- 737-9188 toll free, 1-877.327.6631 (TTY)    Virtual Breastfeeding Support:    During this time of isolation, breastfeeding families need even more community!  Here are some area organizations offering virtual support groups for breastfeeding:    Lat Cafe Support Group, Tuesdays at 10:30 am   Run by NICOLASA Mcginnis of The Baby Whisperer Lactation Consultants   Go to The Baby Whisperer Lactation Consultants Facebook page and click on \"events\" " for link   https://www.Max-Viz.com/events/195542877207011/  [facility] Milk Hour,  at 2:30 pm    Run by NICOLASA Hope   Go to [facility] FB page and send message to get link   https://www.Max-Viz.com/healthfoundations/  [facility]/[place] holding virtual meetings the first Tuesday of each month, 8-9 pm, and the   Third Saturday, 10 - 11 am.  Go to [facility] and [place] FB page; message to get link https://www.Max-Viz.HealthStream/LLLofGoldenValcynthia/?hc_location=[facility]  [facility] offers a Lactation Lounge every Friday 12pm - 1pm, run by [facility] Marcela Leader   Sign up via link at https://www.Peekaboo Mobile/cbe-lactation  [facility] is offering virtual support groups every Monday, 10:30 am - 12 pm, run by nurse NICOLASA   Https://www.Max-Viz.com/events/[email or web address]/    Prenatal Breastfeeding Classes:      [facility] is offering virtual breastfeeding and  care classes:  https://www.Peekaboo Mobile/education-workshops  BirthEd childbirth and breastfeeding education offering virtual prenatal breastfeeding classes  https://www.HandUp PBCmn.com/workshops

## 2024-08-09 ENCOUNTER — PRENATAL OFFICE VISIT (OUTPATIENT)
Dept: MIDWIFE SERVICES | Facility: CLINIC | Age: 29
End: 2024-08-09
Payer: COMMERCIAL

## 2024-08-09 VITALS
HEIGHT: 61 IN | DIASTOLIC BLOOD PRESSURE: 66 MMHG | WEIGHT: 138 LBS | BODY MASS INDEX: 26.06 KG/M2 | SYSTOLIC BLOOD PRESSURE: 102 MMHG | HEART RATE: 64 BPM

## 2024-08-09 DIAGNOSIS — Z34.82 ENCOUNTER FOR SUPERVISION OF OTHER NORMAL PREGNANCY IN SECOND TRIMESTER: Primary | ICD-10-CM

## 2024-08-09 DIAGNOSIS — O99.011 ANEMIA DURING PREGNANCY IN FIRST TRIMESTER: ICD-10-CM

## 2024-08-09 LAB — HGB BLD-MCNC: 8.5 G/DL (ref 11.7–15.7)

## 2024-08-09 PROCEDURE — 99207 PR PRENATAL VISIT: CPT | Performed by: ADVANCED PRACTICE MIDWIFE

## 2024-08-09 PROCEDURE — 85018 HEMOGLOBIN: CPT | Performed by: ADVANCED PRACTICE MIDWIFE

## 2024-08-09 PROCEDURE — 36415 COLL VENOUS BLD VENIPUNCTURE: CPT | Performed by: ADVANCED PRACTICE MIDWIFE

## 2024-08-09 NOTE — PROGRESS NOTES
Here with her family today for routine prenatal visit at 19w6d. Reports feeling good. Notes feeling a little bit of fetal movement and no regular UCs. IV iron infusion completed on 7/24/24, Hgb 7.9; accepting of repeat hgb today. Advised continued oral supplementation and reviewed dietary sources. Discussed likely need 2nd IV iron infusion to support her iron requirements. Advised on upcoming labs and visit schedule. Reviewed warning s/sx and reasons to call. RTC 4 weeks. Provided Bob with the number for radiology to schedule her 20 week ultrasound which was ordered at her last visit.         ELIZABETH Silva    I was present with the student who participated in the service and in the documentation of the note. I have verified the history and personally performed the physical exam and medical decision-making. I agree with the assessment and plan of care as documented in the note.  Dena Troy, ANIKET, APRN, CNM  [facility]  Midwifery

## 2024-08-19 ENCOUNTER — HOSPITAL ENCOUNTER (OUTPATIENT)
Dept: ULTRASOUND IMAGING | Facility: CLINIC | Age: 29
Discharge: HOME | End: 2024-08-19
Attending: ADVANCED PRACTICE MIDWIFE | Admitting: ADVANCED PRACTICE MIDWIFE
Payer: COMMERCIAL

## 2024-08-19 ENCOUNTER — DOCUMENTATION ONLY (OUTPATIENT)
Dept: MIDWIFE SERVICES | Facility: CLINIC | Age: 29
End: 2024-08-19
Payer: COMMERCIAL

## 2024-08-19 DIAGNOSIS — Z34.82 ENCOUNTER FOR SUPERVISION OF OTHER NORMAL PREGNANCY IN SECOND TRIMESTER: ICD-10-CM

## 2024-08-19 PROCEDURE — 76805 OB US >/= 14 WKS SNGL FETUS: CPT

## 2024-08-27 ENCOUNTER — TELEPHONE (OUTPATIENT)
Dept: OBGYN | Facility: CLINIC | Age: 29
End: 2024-08-27
Payer: COMMERCIAL

## 2024-08-27 NOTE — LETTER
August 27, 2024      Bob Olvera  [address]     [place] [address]        To Whom It May Concern,     To Whom It May Concern:    Bob Olvera receives prenatal care from the [facility]. Estimated Date of Delivery: 5/4/2018. We recommend dental care during a pregnancy. For treatment purposes, we recommend routine x-ray's after the 14th week of pregnancy. If it is determined by the provider that x-ray evaluation is an important assessment for treatment, we recommend double lead apron shield be utilized during the exam. The dental provider may determine the medications best suited for pregnancy and breastfeeding women including anesthesia, analgesia, and antibiotic therapies based on the indication for that medication. It is safe to use Lidocaine in pregnancy as a common medication for routine care. Please refer to pregnancy and lactation safety recommendation within the drug information materials or through LactMed Database run by the US National Library of Medicine at https://toxnet.nlm.nih.gov/newtoxnet/lactmed.htm.     If you have any questions or concerns, please don't hesitate to call [phone] or send a SourceMedical message    Sincerely,        Electronically signed by NALLELY Moya CNM        Sincerely,        NALLELY Moya CNM         Postpartum day ~0    Patient reports:  Doing well.  Tolerating magnesium.   Denies SOB and CP.    Visit Vitals  /82 (BP Location: RUE - Right upper extremity, Patient Position: Semi-Vann's)   Pulse 80   Temp 98.4 °F (36.9 °C) (Oral)   Resp 15   Ht 5' 5\" (1.651 m)   Wt 73.7 kg   LMP 2023 Comment: Periods are irregular   SpO2 97%   Breastfeeding Unknown   BMI 27.02 kg/m²       Uterine fundus firm, non-tender.    Extremities:  Non-tender, minimal edema.    HGB (g/dL)   Date Value   2024 13.4      HCT (%)   Date Value   2024 38.2        Impression:  Jocelyne Paniagua is a 22 year old  female at 34w2d s/p  complicated by preeclampsia with severe features     Plan:  Hernanedz replaced due to straight cath X2  Magnesium until maribel am

## 2024-09-11 NOTE — PATIENT INSTRUCTIONS
"\"We hope you had a positive experience and that you can definitely recommend [facility] to your family and friends. You ll be receiving a survey soon and we look forward to hearing your feedback\".    [facility]  Contact information:  Appointment line and to get a hold of CNM in clinic Monday-Friday 8 am - 5 pm:  [phone].  There are some clinics with early start times (1st appointment 7:40 am) and others with evening hours (last appointment 6:20 pm).  Most are typically open from 8 am to 5 pm.    CNM on call answering service: [phone].  Specify your hospital of choice and leave a brief message for CNM;  will then page CNM who is on call at your specified hospital and you should receive a call back with 15 minutes.  Be sure that your ringer is audible and that you can accept blocked calls so that we can get back in touch with you! This number should be reserved for urgent needs if during the day, before 8 am, after 5 pm, weekends, holidays.    Contact the on-call CNM with warning signs, such as:  vaginal bleeding   Vaginal discharge and itching or pain and burning during urination  Leg/calf pain or swelling on one side  severe abdominal pain  nausea and vomiting more than 4-5 times a day, or if you are unable to keep anything down  fever more than 100.4 degrees F.     EBOOK[facility]LACEhart  After each of your visits you are welcome to check [facility] for your visit summary including education and links to information relevant to your pregnancy and/or well woman care.   Find the \"Visits\" tab at the top of the page, you will see a list of recent visits and for each visit a for link for \"View After Visit Summary.\" View of your After Visit Summary will allow you to read our recommendations from your visit, review any education provided, and link to websites with useful information.   If you have any questions or difficulty navigating [place], please feel free to " "contact us and we will do our best to direct you.     Meet the Midwives from [facility]  You are invited to an informational meet and greet with [facility]'s [facility] Certified Nurse-Midwives. Our free \"Meet the Midwives\" event is a great opportunity to learn about our midwives' philosophy and experience, the hospitals where we can assist with your birth, and answer questions you may have. Partners, friends, and family are welcome to attend. Currently, this is a virtual event.  Date  Last Thursday of every month at 7 pm.    Link to next (live) meeting  [email or web address]  To Join by Telephone (audio only) Call:   [phone] Phone Conference ID: [phone] #        Touring the Maternity Care [facility] Maternity Care:   [email or web address]://[facility].[email or web address]/locations/the-[facility]place-[facility] Care:   https://[facility].org/locations/[facility]    Scroll to the bottom of this page if the above link does not work.      Pre-register after 30 weeks online at the hospital where your baby will be born [email or web address]      Baby Feeding in the Hospital: Information, Support and Resources    As you prepare for the birth of your child, you will want to consider options for feeding your baby including breast-feeding and/or baby formula. The American Academy of Pediatrics recommends exclusive breast-feeding for the first six months (although any amount of breast-feeding is beneficial).  However, we also understand that breast-feeding is a personal choice and not for everyone. Whether or not you choose to breast-feed, your decision will be respected by our staff.    There are numerous benefits of breast-feeding; here are a few to consider:  Provides antibodies to protect your baby from infections and diseases  The cost: " formula can cost over $1,500 per year  Convenience, no warming up or sterilizing bottles and supplies  The physical contact with breastfeeding can make babies feel secure, warm and comforted    What ever my feeding choice, what can I expect after I deliver my baby?  Your baby will usually be placed skin-to-skin immediately following birth. The skin to skin contact between you and your baby will be a special and memorable time. The bonding and attachment comforts your baby and has a positive effect on baby s brain development.   Having your baby  room in  with you also helps you start to learn your baby s body rhythms and sleep cycle.    You will also begin to learn your baby s cues (signals) that he or she is ready to feed.    When do I start to feed my baby?  As soon as possible after your baby s birth, you will be encouraged to begin feeding.  In the first couple of weeks, your baby will eat often.  Breastfeeding babies usually eat at least 8 times in 24 hours.  Babies fed formula usually eat at least 7 times in 24 hours.      Breast-feeding tips:  Get comfortable and use pillows for support.  Have your baby at the level of your breast, facing you,  tummy to tummy .    Touch your nipple to your baby s lips so you baby s mouth opens wide (rooting reflex).  Aim the nipple toward the roof of your baby s mouth. When your baby opens his or her mouth, pull your baby toward your breast to help your baby  latch on  to your nipple and much of the areola area.  Hand expressing your breast milk can assist with latching your baby to your breast, if needed.  Ask for help, breastfeeding may seem awkward or uncomfortable at first, this is normal. There are numerous resources available at [facility]), Clinics and beyond.   If your goal is to exclusively breastfeed, avoid using any formula or artificial nipples (including bottles and pacifiers) while you are your  baby are learning to breastfeed unless there is a medical reason.     Mixing breastfeeding and formula can interfere with how you begin building your milk supply.  It can impact how you and your baby  learn  to breastfeeding together and alter the natural growth of  good  bacteria in your baby s stomach.  Delay a pacifier or a bottle in the first few weeks until breastfeeding is well established. This is often around 3 weeks of age.  Ask your nurse to show you how to hand express.   Breast milk can be kept in the refrigerator or freezer for later use.    Hospital and Clinic Breastfeeding Resources:  -Schedule an appointment with a [facility] CNM who is also a Lactation Consultant by calling [phone]     -Schedule a clinic appointment with a [facility] CNM with dedicated clinic hours for breastfeeding assistance by calling [phone]. Breastfeeding clinic visits are at [facility] on , [facility] on  and [facility] on .     New Parent Connection:   [facility], [address] [place] [address]  In-person meetings on  from 6 pm - 7:15 pm for parents of  to 9 months, at the same site.   All are free, drop-in, no registration required.    There are also free virtual meetings ongoing on :  11:30 am - 12:30 pm for parents of newborns to 3 months  4:15 pm to 5:15 pm for parents of 3 to 9-month olds  For joining info parents should call Kavitha Merchant at [phone]      [facility] Baby Café  More information  Elvia Moyer  [phone]  [email or web address].     -Attend a baby weigh in at [facility].  Lactation consultants are available to answer questions  [facility]:  1:00 - 2:00  [facility]:  1:00 - 2:00  www.[employer].Chevia    -Attend one of the New Mama groups at [facility] in [facility].  [facility] also offers one-on-one  in home and in office lactation consults.   www.enlightenedmama.com    -Attend a Dash Medrano meeting.  Multiple groups in several locations throughout the [facility]. The meetings are no-cost and always informative breastfeeding education session through Internatal La Leche League  Www.home.org/  Medication use while breastfeeding: http://toxnet.nlm.nih.gov/newtoxnet/lactmed.htm     Online Resources:  Breastfeedingmadesimple.com  Llli.org (La Leche League)  Normalfed.com  Womenshealth.gov/breastfeeding  Workandpump.com    Breast-feeding Supplies & Pumps:  Talk to your insurance provider or WIC (Women, Infants and Children) to learn more about options available to you. Recent health insurance changes may include additional coverage for supplies and pumps.    Public Health:  Women, Infants and Children Nutrition program (WIC): provides breast-feeding support and education in addition to formal feeding moms. 075-VVC-7033 or http://www.health.[facility].us/divs/fh/wic    Family Health Home Visiting: Public Health Nurse home visits are available. Talk to your provider to see if you qualify. Most [facility] have a program available.    Additional Resources:  La Leche League is an international, nonprofit, nonsectarian organization offering information, education, and support to mothers who want to breast-feed their babies. Local groups offer phone help and monthly meetings. Visit [employer]."[employer] or [employer]."[employer]." and us the  Find local support  drop down menu or click on the  Resources  tab.    [place] Breastfeeding Resources: 2-301-081-BABY (2229) toll free    National Breastfeeding Help Line trained breastfeeding peer counselors can help answer common breast-feeding questions by phone. Monday-Friday: English/Sinhala  4-638- 714-2022 toll free, 1-424.568.6331 (TTY)      Childbirth and Parenting Education:       Everyday Miracles:   https://www.everyday-miracles.org/    Free Video Series from [facility]:  https://nursing.[facility]/academics/specialty-areas/nurse-midwifery/having-baby-prenatal-videos/having-baby-prenatal-and    Childbirth Education virtual (live) classes: www.[employer]/classes  The Birth Hour: https://[employer]/online-childbirth-class/  BirthED: https://www.birthedmn.com/  [facility] center: http://ScionHealthWorld First/   (767) 330-GUHE  [place]: (education, yoga & wellness) www.Heartbeat  Enlightened Mama: www.enlightenedmama.i-Optics   Childbirth collective: (Parent topic nights)  www.childbirthcollective.org/  Hypnobabies:  www.Apptive.i-Optics/  Hypnobirthing:  Http://FrameBuzz.i-Optics/  Hypnobirthing virtual class: www.Masabi/hypnobirthing    Information about doulas:  Childbirth collective: http://www.childbirthcollective.org/  Doulas of [place] (MELISSA):  www.melissa.org  [facility]  project: http://twincitiesdoulaproject.i-Optics/     Early Childhood and Family Education (ECFE):  ECFE offers parents hands-on learning experiences that will nourish a lifetime of teachable moments.  http://ecfe.info/ecfe-home/    APPS and Podcasts:   Allie Balderas    Evidence Based Birth  The Birth Hour (for birth stories)   Birthful   Expectful   The Longest Shortest Time  PregnancyPodcanatoliy Nails  https://www.downtobirthshow.com/    Book Recommendations:   Yudith Brady's Birthing From Within--first few chapters include a new-age tone, you may prefer to skip it and keep going, because there is good stuff later.  This book recommendation covers emotional preparation, but does cover coping with pain, and use of both pharmacological and nonpharmacological methods.    Guide to Childbirth by Karely Macario  Childbirth Without Fear by Norman Johnson Read    Dr. Rivera' The Pregnancy Book and The Birth Book--the pregnancy book goes month-by month    The Birth Partner by Maite Lenz    Womanly Art of Breastfeeding by La Leche League International   Bestfeeding by  "Alma Sierra--great pictures    Mothering Your Nursing Toddler, by Sandra Peralta.   Addresses dealing with so many of the challenging behaviors of a nursing toddler.  How Weaning Happens, by La Leche League.  Discusses weaning at all ages, from medically necessary weaning of an infant, all the way up to age 5 (or older), with why/why not, and strategies.  Very empowering book both for deciding to wean and deciding not to.    American College of Nurse-Midwives (ACNM) http://www.midwife.org/; look at the informational handouts at http://www.midwife.org/Share-With-Women     www.mymidwife.org    Mother to Baby (Medication and Herbal guidance in pregnancy): http://www.mothertobaby.org  Toll-Free Hotline: 788.259.5290  LactMed (Medication use while breastfeeding): http://toxnet.nlm.nih.gov/newtoxnet/lactmed.htm    Women's Health.gov:  http://www.womenshealth.gov/a-z-topics/index.html    American pregnancy association - http://americanpregnancy.org    Centering Pregnancy (group prenatal care option): http://centeringhealthcare.org    March of Dimes www.[employer].com     FDA - Nutrition  www.mypyramid.gov  Under \"For Consumers,\" click on \"pregnant and breastfeeding women.\"      Centers for Disease Control and Prevention (CDC) - Vaccines : http://www.cdc.gov/vaccines/       When researching information on the web, question the validity of websites.  The domains .gov, .edu and.org tend to be more reliable information.  If there are a lot of advertisements, be cautious of the information provided. Stay away from blogs and chat rooms please!     Virtual Breastfeeding Support:    During this time of isolation, breastfeeding families need even more community!  Here are some area organizations offering virtual support groups for breastfeeding:    Latch Cafe Support Group, Tuesdays at 10:30 am   Run by NICOLASA Mcginnis of The Baby Whisperer Lactation Consultants   Go to The Baby Whisperer Lactation Consultants Facebook " "page and click on \"events\" for link   https://www.Moz.com/events/314251963131982/  [facility] Milk Hour,  at 2:30 pm    Run by NICOLASA Hope   Go to [facility] FB page and send message to get link   https://www.Moz.com/healthfoundations/  [facility]/[place] holding virtual meetings the first Tuesday of each month, 8-9 pm, and the   Third Saturday, 10 - 11 am.  Go to [facility] and [place] FB page; message to get link https://www.Moz.[employer]/LLLofGWilfrido/?hc_location=[facility]  [facility] offers a Lactation Lounge every Friday 12pm - 1pm, run by Shelly Mix [facility] Leader   Sign up via link at https://www.InfoLogix/cbe-lactation  [facility] is offering virtual support groups every Monday, 10:30 am - 12 pm, run by nurse IBCLC   Https://www.Moz.com/events/689894877264422/    Prenatal Breastfeeding Classes:      [facility] is offering virtual breastfeeding and  care classes:  https://www.InfoLogix/education-workshops  BirthEd childbirth and breastfeeding education offering virtual prenatal breastfeeding classes  https://www.Free & Clearedmn.com/workshops  "

## 2024-09-11 NOTE — PROGRESS NOTES
Here alone today for routine prenatal visit at 24w6d. Reports feeling well. Notes active FM and some BH contractions/pelvic pressure but no regular UCs. GCT/CBC done today.  and orders placed for GTT with instructions on scheduling provided. Discussed in hgb remains low will recommend IV iron infusion and is accepting. Has been taking oral iron daily and tolerating well; refilled today. Advised on upcoming labs and visit schedule. Reviewed warning s/sx and reasons to call. RTC 4 weeks.

## 2024-09-13 ENCOUNTER — PRENATAL OFFICE VISIT (OUTPATIENT)
Dept: MIDWIFE SERVICES | Facility: CLINIC | Age: 29
End: 2024-09-13
Payer: COMMERCIAL

## 2024-09-13 VITALS
BODY MASS INDEX: 27.47 KG/M2 | HEART RATE: 80 BPM | WEIGHT: 145.5 LBS | SYSTOLIC BLOOD PRESSURE: 100 MMHG | DIASTOLIC BLOOD PRESSURE: 60 MMHG | HEIGHT: 61 IN

## 2024-09-13 DIAGNOSIS — Z86.32 HISTORY OF GESTATIONAL DIABETES: ICD-10-CM

## 2024-09-13 DIAGNOSIS — R73.09 ELEVATED HEMOGLOBIN A1C: ICD-10-CM

## 2024-09-13 DIAGNOSIS — D50.8 IRON DEFICIENCY ANEMIA SECONDARY TO INADEQUATE DIETARY IRON INTAKE: ICD-10-CM

## 2024-09-13 DIAGNOSIS — Z34.82 ENCOUNTER FOR SUPERVISION OF OTHER NORMAL PREGNANCY IN SECOND TRIMESTER: Primary | ICD-10-CM

## 2024-09-13 DIAGNOSIS — O99.810 IMPAIRED GLUCOSE IN PREGNANCY, ANTEPARTUM: ICD-10-CM

## 2024-09-13 DIAGNOSIS — O99.011 ANEMIA DURING PREGNANCY IN FIRST TRIMESTER: ICD-10-CM

## 2024-09-13 LAB
ERYTHROCYTE [DISTWIDTH] IN BLOOD BY AUTOMATED COUNT: 19.6 % (ref 10–15)
GLUCOSE 1H P 50 G GLC PO SERPL-MCNC: 131 MG/DL (ref 70–129)
HCT VFR BLD AUTO: 32.4 % (ref 35–47)
HGB BLD-MCNC: 10.1 G/DL (ref 11.7–15.7)
MCH RBC QN AUTO: 25.3 PG (ref 26.5–33)
MCHC RBC AUTO-ENTMCNC: 31.2 G/DL (ref 31.5–36.5)
MCV RBC AUTO: 81 FL (ref 78–100)
PLATELET # BLD AUTO: 212 10E3/UL (ref 150–450)
RBC # BLD AUTO: 4 10E6/UL (ref 3.8–5.2)
WBC # BLD AUTO: 7.5 10E3/UL (ref 4–11)

## 2024-09-13 PROCEDURE — 85027 COMPLETE CBC AUTOMATED: CPT | Performed by: ADVANCED PRACTICE MIDWIFE

## 2024-09-13 PROCEDURE — 82950 GLUCOSE TEST: CPT | Performed by: ADVANCED PRACTICE MIDWIFE

## 2024-09-13 PROCEDURE — 36415 COLL VENOUS BLD VENIPUNCTURE: CPT | Performed by: ADVANCED PRACTICE MIDWIFE

## 2024-09-13 PROCEDURE — 99207 PR PRENATAL VISIT: CPT | Performed by: ADVANCED PRACTICE MIDWIFE

## 2024-09-13 RX ORDER — LANOLIN ALCOHOL/MO/W.PET/CERES
1000 CREAM (GRAM) TOPICAL EVERY OTHER DAY
Qty: 30 TABLET | Refills: 3 | Status: SHIPPED | OUTPATIENT
Start: 2024-09-13

## 2024-09-13 RX ORDER — MULTIVIT WITH MINERALS/LUTEIN
250 TABLET ORAL EVERY OTHER DAY
Qty: 30 TABLET | Refills: 3 | Status: SHIPPED | OUTPATIENT
Start: 2024-09-13

## 2024-09-13 RX ORDER — UREA 10 %
45 LOTION (ML) TOPICAL EVERY OTHER DAY
Qty: 30 TABLET | Refills: 3 | Status: SHIPPED | OUTPATIENT
Start: 2024-09-13

## 2024-09-20 ENCOUNTER — LAB (OUTPATIENT)
Dept: LAB | Facility: CLINIC | Age: 29
End: 2024-09-20
Payer: COMMERCIAL

## 2024-09-20 DIAGNOSIS — O99.810 IMPAIRED GLUCOSE IN PREGNANCY, ANTEPARTUM: ICD-10-CM

## 2024-09-20 DIAGNOSIS — Z34.82 ENCOUNTER FOR SUPERVISION OF OTHER NORMAL PREGNANCY IN SECOND TRIMESTER: ICD-10-CM

## 2024-09-20 LAB
GESTATIONAL GTT 1 HR POST DOSE: 181 MG/DL (ref 60–179)
GESTATIONAL GTT 2 HR POST DOSE: 118 MG/DL (ref 60–154)
GESTATIONAL GTT 3 HR POST DOSE: 95 MG/DL (ref 60–139)
GLUCOSE P FAST SERPL-MCNC: 87 MG/DL (ref 60–94)

## 2024-09-20 PROCEDURE — 82951 GLUCOSE TOLERANCE TEST (GTT): CPT

## 2024-09-20 PROCEDURE — 36415 COLL VENOUS BLD VENIPUNCTURE: CPT

## 2024-09-20 PROCEDURE — 82952 GTT-ADDED SAMPLES: CPT

## 2024-09-22 ENCOUNTER — HEALTH MAINTENANCE LETTER (OUTPATIENT)
Age: 29
End: 2024-09-22

## 2024-10-10 NOTE — PROGRESS NOTES
Bob Olvera is here for ORTIZ at 28w6d. Here with spouse and daughter. Doing well overall. Occasionally feeling pain in her upper left thigh extending down from her back on the same side. Discussed reasons for back ache and leg pain in pregnancy as well as possible sciatic nerve pain versus varicosities.   Upon exam, there is a varicose vein on upper left thigh. No heat or redness. Discussed warning signs and when to call. Advised on comfort measures as well. She declines to try compression stockings but is interested in support belt due to the back pain as well. Ordered through DME.  Bob's last hemoglobin was 10.1 on 24 following one IV Infed dose on 24 and oral iron supplementation (she is continuing to take oral iron, Vitamim C and B12 every other day). To ensure her level is remaining stable, we discussed collecting CBC today.  She did pass the 3-hour GTT with one elevated value.  Fetal movement is normal.   RPR discussed and collected today.   Also discussed recommendation for Tdap vaccine and patient accepts today.   Patient's blood type is AB POS.   TW.2 kg (24 lb 9.6 oz), and appropriate.  Uterine size appropriate for dates.  3rd trimester handouts given and reviewed today including PTL precautions and fetal movement monitoring.   RTC 4 weeks.

## 2024-10-11 ENCOUNTER — PRENATAL OFFICE VISIT (OUTPATIENT)
Dept: MIDWIFE SERVICES | Facility: CLINIC | Age: 29
End: 2024-10-11
Payer: COMMERCIAL

## 2024-10-11 VITALS
DIASTOLIC BLOOD PRESSURE: 64 MMHG | BODY MASS INDEX: 28.27 KG/M2 | HEART RATE: 80 BPM | SYSTOLIC BLOOD PRESSURE: 100 MMHG | WEIGHT: 149.6 LBS

## 2024-10-11 DIAGNOSIS — O99.810 IMPAIRED GLUCOSE IN PREGNANCY, ANTEPARTUM: ICD-10-CM

## 2024-10-11 DIAGNOSIS — I83.812 VARICOSE VEINS OF LEFT LOWER EXTREMITY WITH PAIN: ICD-10-CM

## 2024-10-11 DIAGNOSIS — D50.8 IRON DEFICIENCY ANEMIA SECONDARY TO INADEQUATE DIETARY IRON INTAKE: ICD-10-CM

## 2024-10-11 DIAGNOSIS — O99.891 BACK PAIN IN PREGNANCY: ICD-10-CM

## 2024-10-11 DIAGNOSIS — Z34.83 ENCOUNTER FOR SUPERVISION OF OTHER NORMAL PREGNANCY, THIRD TRIMESTER: Primary | ICD-10-CM

## 2024-10-11 DIAGNOSIS — Z86.32 HISTORY OF GESTATIONAL DIABETES: ICD-10-CM

## 2024-10-11 DIAGNOSIS — M54.9 BACK PAIN IN PREGNANCY: ICD-10-CM

## 2024-10-11 LAB
ERYTHROCYTE [DISTWIDTH] IN BLOOD BY AUTOMATED COUNT: 16.9 % (ref 10–15)
HCT VFR BLD AUTO: 34.1 % (ref 35–47)
HGB BLD-MCNC: 10.7 G/DL (ref 11.7–15.7)
MCH RBC QN AUTO: 26 PG (ref 26.5–33)
MCHC RBC AUTO-ENTMCNC: 31.4 G/DL (ref 31.5–36.5)
MCV RBC AUTO: 83 FL (ref 78–100)
PLATELET # BLD AUTO: 193 10E3/UL (ref 150–450)
RBC # BLD AUTO: 4.11 10E6/UL (ref 3.8–5.2)
T PALLIDUM AB SER QL: NONREACTIVE
WBC # BLD AUTO: 7.3 10E3/UL (ref 4–11)

## 2024-10-11 PROCEDURE — 86780 TREPONEMA PALLIDUM: CPT | Performed by: ADVANCED PRACTICE MIDWIFE

## 2024-10-11 PROCEDURE — 99207 PR PRENATAL VISIT: CPT | Performed by: ADVANCED PRACTICE MIDWIFE

## 2024-10-11 PROCEDURE — 36415 COLL VENOUS BLD VENIPUNCTURE: CPT | Performed by: ADVANCED PRACTICE MIDWIFE

## 2024-10-11 PROCEDURE — 85027 COMPLETE CBC AUTOMATED: CPT | Performed by: ADVANCED PRACTICE MIDWIFE

## 2024-10-11 PROCEDURE — 90715 TDAP VACCINE 7 YRS/> IM: CPT | Performed by: ADVANCED PRACTICE MIDWIFE

## 2024-10-11 PROCEDURE — 99213 OFFICE O/P EST LOW 20 MIN: CPT | Mod: 25 | Performed by: ADVANCED PRACTICE MIDWIFE

## 2024-10-11 PROCEDURE — 90471 IMMUNIZATION ADMIN: CPT | Performed by: ADVANCED PRACTICE MIDWIFE

## 2024-10-11 NOTE — NURSING NOTE
Prior to immunization administration, verified patients identity using patient s name and date of birth. Please see Immunization Activity for additional information.     Screening Questionnaire for Adult Immunization    Are you sick today?   No   Do you have allergies to medications, food, a vaccine component or latex?   No   Have you ever had a serious reaction after receiving a vaccination?   No   Do you have a long-term health problem with heart, lung, kidney, or metabolic disease (e.g., diabetes), asthma, a blood disorder, no spleen, complement component deficiency, a cochlear implant, or a spinal fluid leak?  Are you on long-term aspirin therapy?   No   Do you have cancer, leukemia, HIV/AIDS, or any other immune system problem?   No   Do you have a parent, brother, or sister with an immune system problem?   No   In the past 3 months, have you taken medications that affect  your immune system, such as prednisone, other steroids, or anticancer drugs; drugs for the treatment of rheumatoid arthritis, Crohn s disease, or psoriasis; or have you had radiation treatments?   No   Have you had a seizure, or a brain or other nervous system problem?   No   During the past year, have you received a transfusion of blood or blood    products, or been given immune (gamma) globulin or antiviral drug?   No   For women: Are you pregnant or is there a chance you could become       pregnant during the next month?   Yes  Pregnant   Have you received any vaccinations in the past 4 weeks?   No     Immunization questionnaire was positive for at least one answer.  Notified Heydi Bah CNM.    Tdap given  Patient instructed to remain in clinic for 15 minutes afterwards, and to report any adverse reactions.     Screening performed by Ashley Campos LPN on 10/11/2024 at 10:15 AM.

## 2024-10-11 NOTE — PATIENT INSTRUCTIONS
Weeks 26 to 30 of Your Pregnancy: Care Instructions  You're starting your last trimester. You'll probably feel your baby moving around more. Your back may ache as your body gets used to your baby's size and length. Take care of yourself, and pay attention to what your body needs.    Talk to your doctor about getting the Tdap shot. It will help protect your  against whooping cough (pertussis). Also ask your doctor about flu and COVID-19 shots if you haven't had them yet. If your blood type is Rh negative, you may be given a shot of Rh immune globulin (such as RhoGAM). It can help prevent problems for your baby.   You may have [place] contractions. They are single or several strong contractions without a pattern. These are practice contractions but not the start of labor.   Be kind to yourself.       Take breaks when you're tired.  Change positions often. Don't sit for too long or stand for too long.  At work, rest during breaks if you can. If you don't get breaks, talk to your doctor about writing a letter to your employer to request them.  Avoid fumes, chemicals, and tobacco smoke.  Be sexual if you want to.       You may be interested in sex, or you may not. Everyone is different.  Sex is okay unless your doctor tells you not to.  Your belly can make it hard to find good positions for sex. [place] and explore.  Watch for signs of  labor.        These signs include:  Menstrual-like cramps. Or you may have pain or pressure in your pelvis that happens in a pattern.  About 6 or more contractions in an hour (even after rest and a glass of water).  A low, dull backache that doesn't go away when you change positions.  An increase or change in vaginal discharge.  Light vaginal bleeding or spotting.  Your water breaking.  Know what to do if you think you are having contractions.       Drink 1 or 2 glasses of water.  Lie down on your left side for at least an hour.  While on your side, feel the top of  "your belly to see if it's tight.  Write down your contractions for an hour. Time how long it is from the start of one contraction to the start of the next.  Call your doctor if you have regular contractions.  Follow-up care is a key part of your treatment and safety. Be sure to make and go to all appointments, and call your doctor if you are having problems. It's also a good idea to know your test results and keep a list of the medicines you take.  Where can you learn more?  Go to https://www.[employer].net/patiented  Enter S999 in the search box to learn more about \"Weeks 26 to 30 of Your Pregnancy: Care Instructions.\"  Current as of: July 10, 2023  Content Version: 14.2 2024 [employer].   Care instructions adapted under license by your healthcare professional. If you have questions about a medical condition or this instruction, always ask your healthcare professional. [employer] disclaims any warranty or liability for your use of this information.    Counting Your Baby's Kicks: Care Instructions  Overview     Counting your baby's kicks is one way your doctor can tell that your baby is healthy. You will probably feel your baby move for the first time between 16 and 22 weeks. The movement may feel like flutters rather than kicks. Your baby may move more at certain times of the day. When you are active, you may notice less kicking than when you are resting. At your prenatal visits, your doctor will ask whether the baby is active.  In your last trimester, your doctor may ask you to count the number of times you feel your baby move.  Follow-up care is a key part of your treatment and safety. Be sure to make and go to all appointments, and call your doctor if you are having problems. It's also a good idea to know your test results and keep a list of the medicines you take.  How do you count fetal kicks?  A common method of checking your baby's movement is to note the length of time it takes " "to count 10 movements (such as kicks, flutters, or rolls).  Pick your baby's most active time of day to count. This may be any time from morning to evening.  If you don't feel 10 movements in an hour, have something to eat or drink and count for another hour. If you don't feel at least 10 movements in the 2-hour period, call your doctor.  Do not use an at-home Doppler heart monitor in place of counting fetal movements.  When should you call for help?   Call your doctor now or seek immediate medical care if:    You feel fewer than 10 movements in a 2-hour period.     You noticed that your baby has stopped moving or is moving less than normal.   Watch closely for changes in your health, and be sure to contact your doctor if you have any problems.  Where can you learn more?  Go to https://www.[employer].net/patiented  Enter U048 in the search box to learn more about \"Counting Your Baby's Kicks: Care Instructions.\"  Current as of: July 10, 2023  Content Version: 14.2 2024 [employer].   Care instructions adapted under license by your healthcare professional. If you have questions about a medical condition or this instruction, always ask your healthcare professional. [employer] disclaims any warranty or liability for your use of this information.      Weeks 30 to 32 of Your Pregnancy: Care Instructions  Your baby is growing more every day. Its eyes can open and close, and it may have hair on its head. Your baby may sleep 20 to 45 minutes at a time and is more active at certain times.    You should feel your baby move several times every day. Your baby now turns less and kicks more.   This is a good time to tour your hospital or birthing center. You may also want to find childcare if needed.         To ease heartburn   Avoid foods that make your symptoms worse, such as chocolate, spicy foods, and caffeine.  Avoid bending over or lying down after meals.  Do not eat for 2 hours before " "bedtime.  Take antacids like Tums, but don't take ones that have sodium bicarbonate, magnesium trisilicate, or aspirin.        To care for large, swollen veins (varicose veins)   Try to avoid standing for long periods of time.  Sit with your feet propped up.  Wear support hose.  Get some exercise every day, like walking or swimming.  Counting your baby's kicks  Your doctor may ask you to count your baby's movements, such as kicks, flutters, or rolls.    Find a quiet place, and get comfortable. Write down your start time. Count your baby's movements (except hiccups). When your baby has moved 10 times, you can stop counting. Write down how many minutes it took.   If an hour goes by and you don't feel 10 movements, have something to eat or drink. Count for another hour. If you don't feel at least 10 movements in the 2-hour period, call your doctor.   Follow-up care is a key part of your treatment and safety. Be sure to make and go to all appointments, and call your doctor if you are having problems. It's also a good idea to know your test results and keep a list of the medicines you take.  Where can you learn more?  Go to https://www.[employer]net/patiented  Enter X471 in the search box to learn more about \"Weeks 30 to 32 of Your Pregnancy: Care Instructions.\"  Current as of: July 10, 2023  Content Version: 14.2 2024 [facility] [employer].   Care instructions adapted under license by your healthcare professional. If you have questions about a medical condition or this instruction, always ask your healthcare professional. [employer] disclaims any warranty or liability for your use of this information.    "

## 2024-11-04 NOTE — PROGRESS NOTES
Bob LIOR Olvera is here for ORTIZ at 32w6d.  Feeling fatigued and complaint of burning and itching with urination. No vaginal itching. Discussed collecting UA and UC today to rule of UTI and she agrees.  Hgb improved to 10.7 with last check on 10/11. It was recommended that she continue oral iron supplementation (she is taking iron /Vit C/B12 every other day together). Open to rechecking today and if lower will recommend IV iron, one more dose.  Discussed RSV vaccine: declines.  Fetal movement is active.  32 week checklist done.  Feeding plans/breast pump: both. Will call insurance about new pump as it has been 3 years since she received one.  [place] Provider chosen: [facility]  PP BCM/family planning: Not interested in IUDs due to recurrent infections with last one. Possibly Nexplanon.  Car seat: yes.  Circumcision if boy: yes if boy.  TW.6 kg (27 lb 11.2 oz), and appropriate.  Uterine size appropriate for dates.  Reviewed 3rd trimester warning signs and when to call.  RTC 2 weeks.

## 2024-11-08 ENCOUNTER — PRENATAL OFFICE VISIT (OUTPATIENT)
Dept: MIDWIFE SERVICES | Facility: CLINIC | Age: 29
End: 2024-11-08
Payer: COMMERCIAL

## 2024-11-08 VITALS
HEART RATE: 68 BPM | WEIGHT: 152.7 LBS | DIASTOLIC BLOOD PRESSURE: 60 MMHG | SYSTOLIC BLOOD PRESSURE: 110 MMHG | BODY MASS INDEX: 28.83 KG/M2 | HEIGHT: 61 IN

## 2024-11-08 DIAGNOSIS — R30.0 DYSURIA: ICD-10-CM

## 2024-11-08 DIAGNOSIS — Z34.83 ENCOUNTER FOR SUPERVISION OF OTHER NORMAL PREGNANCY, THIRD TRIMESTER: Primary | ICD-10-CM

## 2024-11-08 LAB
ALBUMIN UR-MCNC: NEGATIVE MG/DL
APPEARANCE UR: ABNORMAL
BILIRUB UR QL STRIP: NEGATIVE
COLOR UR AUTO: YELLOW
GLUCOSE UR STRIP-MCNC: NEGATIVE MG/DL
HGB BLD-MCNC: 11 G/DL (ref 11.7–15.7)
HGB UR QL STRIP: NEGATIVE
KETONES UR STRIP-MCNC: NEGATIVE MG/DL
LEUKOCYTE ESTERASE UR QL STRIP: ABNORMAL
NITRATE UR QL: NEGATIVE
PH UR STRIP: 7 [PH] (ref 5–8)
SP GR UR STRIP: 1.02 (ref 1–1.03)
UROBILINOGEN UR STRIP-ACNC: 0.2 E.U./DL

## 2024-11-08 PROCEDURE — 87086 URINE CULTURE/COLONY COUNT: CPT | Performed by: ADVANCED PRACTICE MIDWIFE

## 2024-11-08 PROCEDURE — 85018 HEMOGLOBIN: CPT | Performed by: ADVANCED PRACTICE MIDWIFE

## 2024-11-08 PROCEDURE — 81003 URINALYSIS AUTO W/O SCOPE: CPT | Performed by: ADVANCED PRACTICE MIDWIFE

## 2024-11-08 PROCEDURE — 36415 COLL VENOUS BLD VENIPUNCTURE: CPT | Performed by: ADVANCED PRACTICE MIDWIFE

## 2024-11-08 PROCEDURE — 99207 PR PRENATAL VISIT: CPT | Performed by: ADVANCED PRACTICE MIDWIFE

## 2024-11-08 NOTE — PATIENT INSTRUCTIONS
"Weeks 32 to 34 of Your Pregnancy: Care Instructions    Decide whether you want to bank or donate your baby's umbilical cord blood. If you want to save this blood, you have to arrange for it ahead of time.   Decide about circumcision. Personal, Tenriism, or cultural beliefs may play a role in your decision. You get to decide what you want for your baby.         Learn how to ease hemorrhoids.   Get more liquids, fruits, vegetables, and fiber in your diet.  Avoid sitting for too long.  Clean yourself with moist toilet paper. Or try witch hazel pads.  Try ice packs or warm sitz baths for discomfort.  Use hydrocortisone cream for pain or itching.  Ask your doctor about stool softeners.        Consider the benefits of breastfeeding.   It reduces your baby's risk of sudden infant death syndrome (SIDS).   babies are less likely to get certain infections. And they're less likely to be obese or get diabetes later in life.  It can lower your risk of breast and ovarian cancers and osteoporosis.  It saves you money.  Follow-up care is a key part of your treatment and safety. Be sure to make and go to all appointments, and call your doctor if you are having problems. It's also a good idea to know your test results and keep a list of the medicines you take.  Where can you learn more?  Go to https://www.[employer].net/patiented  Enter X711 in the search box to learn more about \"Weeks 32 to 34 of Your Pregnancy: Care Instructions.\"  Current as of: July 10, 2023  Content Version: 14.2 2024 [facility] [employer].   Care instructions adapted under license by your healthcare professional. If you have questions about a medical condition or this instruction, always ask your healthcare professional. [employer] disclaims any warranty or liability for your use of this information.    "

## 2024-11-09 LAB — BACTERIA UR CULT: NORMAL

## 2024-11-27 ENCOUNTER — PRENATAL OFFICE VISIT (OUTPATIENT)
Dept: MIDWIFE SERVICES | Facility: CLINIC | Age: 29
End: 2024-11-27
Payer: COMMERCIAL

## 2024-11-27 VITALS
SYSTOLIC BLOOD PRESSURE: 120 MMHG | BODY MASS INDEX: 29.24 KG/M2 | HEIGHT: 61 IN | OXYGEN SATURATION: 99 % | WEIGHT: 154.9 LBS | DIASTOLIC BLOOD PRESSURE: 60 MMHG | HEART RATE: 75 BPM

## 2024-11-27 DIAGNOSIS — Z34.83 ENCOUNTER FOR SUPERVISION OF OTHER NORMAL PREGNANCY, THIRD TRIMESTER: Primary | ICD-10-CM

## 2024-11-27 PROCEDURE — 99207 PR PRENATAL VISIT: CPT | Performed by: ADVANCED PRACTICE MIDWIFE

## 2024-11-27 NOTE — PATIENT INSTRUCTIONS
"\"We hope you had a positive experience and that you can definitely recommend [facility] to your family and friends. You ll be receiving a survey soon and we look forward to hearing your feedback\".    [facility]  - Contact information:  Appointment line and to get a hold of CNM in clinic Monday-Friday 8 am - 5 pm:  [phone].  There are some clinics with early start times (1st appointment 7:40 am) and others with evening hours (last appointment 6:20 pm).  Most are typically open from 8 am to 5 pm.    CNM on call answering service: [phone].  Specify your hospital of choice and leave a brief message for CNM;  will then page CNM who is on call at your specified hospital and you should receive a call back with 15 minutes.  Be sure that your ringer is audible and that you can accept blocked calls so that we can get back in touch with you! This number should be reserved for urgent needs if during the day, before 8 am, after 5 pm, weekends, holidays.    Contact the on-call CNM with warning signs, such as:  vaginal bleeding   Vaginal discharge and itching or pain and burning during urination  Leg/calf pain or swelling on one side  severe abdominal pain  nausea and vomiting more than 4-5 times a day, or if you are unable to keep anything down  fever more than 100.4 degrees F.     [employer]  After each of your visits you are welcome to check "[employer]" for your visit summary including education and links to information relevant to your pregnancy and/or well woman care.   Find the \"Visits\" tab at the top of the page, you will see a list of recent visits and for each visit a for link for \"View After Visit Summary.\" View of your After Visit Summary will allow you to read our recommendations from your visit, review any education provided, and link to websites with useful information.   If you have any questions or difficulty navigating Premise, please feel free to " "contact us and we will do our best to direct you.  Meet the Midwives from [facility]  You are invited to an informational meet and greet with [facility]'s [facility] Certified Nurse-Midwives. Our free \"Meet the Midwives\" event is a great opportunity to learn about our midwives' philosophy and experience, the hospitals where we can assist with your birth, and answer questions you may have. Partners, friends, and family are welcome to attend. Currently, this is a virtual event.  Date  Last Thursday of every month at 7 pm.    Link to next (live) meeting  [email or web address]  To Join by Telephone (audio only) Call:   [phone] Phone Conference ID: [phone] #    Touring the Maternity Care Center  At this time we are offering a virtual tour of the Maternity Care Centers at both [facility] and [facility]:   [facility] Maternity Care:   [email or web address]/locations/the-[email or web address]-at--[facility] Maternity Care:   [email or web address]://In*[employer]/locations/[facility]    Scroll to the bottom of this hyperlink if the above link does not work      Postpartum Depression  The first weeks of caring for a  baby are more than a full-time job. Although it is often a happy time, your feelings and moods may not be what you expected. Many women experience  baby blues.  Here are some tips to help you understand when feelings of sadness are normal, and when you should call your health care provider.    What are the baby blues?  As many as 3 of every 4 women will have short periods of mood swings, crying, or feeling cranky or restless during the first weeks after birth. These feelings can be worse when you are tired or anxious. Women who have the baby blues often say they feel like crying but don t know why. Baby blues usually happen in the first or second week " postpartum (after you give birth) and last less than a week. If you are not sleeping, becoming more upset, don t feel like you can take care of your baby, or your sadness lasts 2 weeks or more, call your health care provider.    What is postpartum depression?  About one in every 5 women will develop depression during the first few months postpartum that may be mild, moderate, or severe. Women who have postpartum depression may have some of these symptoms:  Feeling guilty   Not able to enjoy your baby and feeling like you are not bonding with your baby    Not able to sleep, even when the baby is sleeping  Sleeping too much and feeling too tired to get out of bed  Feeling overwhelmed and not able to do what you need to during the day  Not able to concentrate  Don t feel like eating  Feeling like you are not normal or not yourself anymore  Not able to make decisions  Feeling like a failure as a mother  Feeling lonely or all alone  Thinking your baby might be better off without you  If you have any of these symptoms, call your health care provider!    Which symptoms of postpartum depression are dangerous?  Sometimes a woman with postpartum depression will have thoughts of harming herself or her baby. If you find yourself thinking about hurting yourself or your baby, call your health care provider immediately.    MOTHERHOOD: THE EARLY DAYS  You prepare for the birth of your baby for many months during pregnancy, and then the first months at home after your baby is born can be a quiet, gentle time of getting to know this new person who has come to live in your home. But for most women it is not all quiet or sweet. And for some women it is a very hard time.  What Can I Expect in the First Few Months After the Baby Comes?  New mothers and their families face many challenges in the first few months:  Your body and your hormones have to get back to normal.  You and the baby will be learning to breastfeed.  Babies only sleep a  few hours at a time. The entire family will have a hard time getting enough sleep.  You and your family need to learn how to parent this new family member.  If you have a partner, you have to figure out how to stay together as a couple and maybe even start to have sex again.  You may have to figure out how to keep from getting pregnant again right away.  You may need to return to work and find day care.    How Long Will it Take for My Body to Get Back to Normal?  Some changes will occur quickly. Others will not occur as quickly.  Your uterus, cervix, and vagina will all shrink to their nonpregnant size in about 2 weeks. Your vagina may be tender and dry for a few months--especially if you are breastfeeding.  If you had stitches or hemorrhoids, your   bottom   will be sore for 2 weeks or more.  For some women who have problems urinating, it can take several months for you to be able to hold your urine when you cough or sneeze or suddenly  something heavy.  Your breast milk will   come in   2 to 3 days after the birth of your baby. It will take 6 to 8 weeks for you and the baby to get the hang of breastfeeding and find a pattern. During these first weeks, you can have engorged breasts at times and often leak milk.  Your stomach and intestines all have to fall back into place. You may have a lot of gas for a few weeks.  You may be constipated--especially if you are breastfeeding.  Your stretched stomach muscles can recover in a few weeks, but for some women it takes longer--6 months or a year--to recover.  If you had a  delivery, you may have pain or numbness around the incision for 6 months or more.  Losing the weight you gained during pregnancy will probably take 6 months to a year. Have patience! It took 40 weeks to get here. Give yourself 40 weeks to get back.    What Can I Expect When My Hormones Change?  About 75% of all women will get the   blues.   This usually starts about 3 days after the birth  of your baby. You may cry easily and feel very, very tired. A few women become very depressed. If you had a  delivery or your new baby was sick, you are at a higher risk for depression.  Call your health care provider right away if you cannot care for yourself or your baby, if you feel very nervous or worried, if you cannot stop crying, or if you are having thoughts of hurting yourself or your baby.    Taking Care of Yourself  While you are still pregnant:  Talk with your partner and your family about the time ahead. Arrange for someone to help you during the first weeks at home if you can.  Talk with your health care provider about birth control options and make a plan before the baby comes.  If you are worried about how to parent a , take parenting classes. You will learn a lot about how babies act and you will make some friends who are going through the same thing at the same time. Most [facility] have these classes.  Arrange for someone to help with baby care if you can.  After the baby comes:  Ask for help. Let other people do the cooking and cleaning and run the house. Focus on yourself and your baby.  Sleep whenever you can. Try not to be tempted to   get some things done   when the baby sleeps. This is your time to sleep, too.  Drink lots of water. You will need at least 6 big glasses of water everyday to avoid constipation and make enough breast milk. Every time you sit down to breastfeed, have a big glass of water with you to drink while you are nursing.  Eat lots of vegetables and fruit. You will need lots of vitamins and fiber to help your body get back to normal. This will also help you avoid constipation.  Go outside and walk. Babies can go outside even if it is very cold. Fresh air and sunshine will do you both good.  Take sitz baths. Put about 6 inches of warm water in your bathtub and sit in there for 15 minutes 2 to 3 times a day. This will help your   bottom   heal more quickly. It  will also give you 15 minutes of private time!  Talk to other mothers. Join a new parents group. Call Jeremy and go to [facility] meetings if you are breastfeeding.     With your partner:  Keep talking. Share the experience.  Spend time alone. Even a 30-minute walk can be a date.  Start a birth control method. You can get pregnant before you even have a period. It is very important to use birth control if you do not want to get pregnant again right away.  When you have sex, use a lubricant. A lot of lubricant! Take it slow.  The first few months after a baby comes can be a lot like floating in a jar of honey--very sweet and cordova, but very sticky, too. Take time to enjoy the good parts. Remind yourself that this time will pass. Bon voyage!    FOR MORE INFORMATION  For questions about depression during and after pregnancy:  http://www.womenshealth.gov/publications/our-publications/fact-sheet/depression-pregnancy.html   After birth: The first 6 weeks:  http://www.ItrybeforeIbuy/Post-Birth-and-Recovery   Breastfeeding resources:  http://www.Knip.org/health-info/getting-breastfeeding-off-to-a-good-start/    Preparing for your baby:       Car Seat Clinics:  https://dps.[place].gov/divisions/ots/child-passenger-safety/Pages/car-seat-checks.aspx    [place] Safety [place]: http://www.[place]afetycouncil.org/family/carseatindex.cfm    Child Passenger Safety: Buckle Up Right    When child safety seats and safety belts are used correctly, they can reduce the risk of death by up to 70%. But finding the right combination can be confusing.  How do you know if you should be using an infant-only seat or a convertible seat?  What are booster seats and why do kids need them until they're eight years old or are four feet nine inches tall?  How do you know when a child is ready for your car's safety belt/shoulder strap?  The American Academy of Pediatrics (AAP) recommends that all infants and toddlers should ride  in a Rear-Facing Car Safety Seat until they are two (2) years of age or until they reach the highest weight or height allowed by their car safety seat's .    A child who is both under age 8 and shorter than 4 feet 9 inches is required to be fastened in a child safety seat that meets federal safety standards. Under this law, a child cannot use a seat belt alone until they are age 8, or 4 feet 9 inches tall. It is recommended to keep a child in a booster based on their height rather than their age. Check the instruction book or label of the child safety seat to be sure it is the right seat for your child's weight and height.    www.CarSeatsMadeSimple.org    Car Seat Recycling, -    Get free expert help in a [place] community near you:  [place] Child Passenger Safety Checkup Clinic Calendar    How to Find the Right Car Seat (NHTSA)  Safe Kids [place]    Print Materials  Basic Car Seat Safety checklist  Don't Skip a Step brochure (English); (Persian); (Argentine)  Good Going! Adventures in Safety magazine  Fact Sheets  Booster Seat Safety  Outdated and Used Child Safety Seats  A Parents' Checklist: Traffic Safety  Driving Your Child to School  Occupant Protection (Safety Belts and Child Safety Seats): Frequently Asked Questions; Misconceptions and Myths; [place] Laws  Air Bags: How Do Air Bags Work; Frequently Asked Questions      Immunizations:  http://www.cdc.gov/vaccines/schedules/easy-to-read/child.html    [place] Screening Program  Http://www.health.[facility].mn.us/newbornscreening/  [place] newborns are tested soon after birth for more than 50 hidden, rare disorders, including hearing loss and critical congenital heart disease (CCHD). This site provides resources and information for families and providers.    Ask your health care provider about vaccinations you may need following delivery. By now, you should have received a Tdap immunization to protect against pertussis or whooping cough.  Fathers and family members who will be in close contact with the baby should also receive a Tdap shot at least two weeks before the expected birth of the baby if they have not had a Td (tetanus) shot for at least two years.    Your midwife may offer to check your cervix for changes. If you are past your due date, discuss the next steps leading to delivery with your midwife. If you don't start labor on your own by 41 or 42 weeks, your midwife may recommend giving you medicines to ripen your cervix and start labor.  Induction of labor: http://onlinelibrary.lomax.com/store/10.1016/j.jmwh.2008.04.018/asset/j.jmwh.2008.04.018.pdf?v=1&t=fpui9fbo&w=[email or web address]    Tell your midwife or physician how you plan to feed your baby (breast or bottle), who you have chosen to do pediatric care for your baby, and if you have a boy, whether you have chosen to have him circumcised. You will need a car seat correctly installed in your vehicle to bring your baby home. As you start to set up the nursery at home for your baby, make sure the crib is safe. The mattress needs to fit snugly against the edges of the crib. If you can fit a soda can between the bars, they are too far apart and can allow the baby's head to caught between them.    Learn about infant care and feeding, including information about infant CPR. We recommend that you put your baby to sleep on his or her back to reduce the chance of Sudden Infant Death Syndrome (SIDS). To maintain a healthy environment in which your child can grow, it's best to keep your home smoke-free. By preparing ahead, your transition into parenthood will go smoothly for you and your baby.    Your midwife will want to see you for a checkup 2 to 6 weeks after delivery.      Making Plans for Feeding My Baby    By this point, you probably have read a lot about feeding your baby.  Breastfeed or formula? Each mother s decision is her own and [facility]  [facility] respects you and your choices. We ve gathered information on both breastfeeding and formula feeding to help with your decision. Talking with your physician or nurse-midwife can also help in your decision.  However you plan to feed your baby, [facility] encourage rooming in with your baby, skin-to-skin contact and feeding your baby based on his or her cues.    Skin-to-skin contact  Being close to mom helps your baby adjust to life outside of the womb.  It helps your baby regulate their body temperature, heart rate, and breathing.  Your baby will usually be placed skin-to-skin immediately following birth or as soon as possible, if medical intervention is needed.    Rooming-In  Having your baby stay with you in your room is called  rooming-in .  Keeping your baby in your room helps you to learn how to care for your baby by getting to know your baby s cues, body rhythms and sleep cycle.       Cue-based feeding  Cues (signals) are baby s way of telling you what he or she wants.  When you learn your infant s cues, you know how to care for and feed your baby.   Feeding cues are the licking and smacking of lips, bringing their fist to their mouth, and a reflex called  rooting - where baby turns and opens his or her mouth, searching for the breast or bottle.  Crying is a late feeding cue.  Babies can feed frequently, often at least 8 times in 24 hours.  Breastfeeding facts  Breast milk is the best source of nutrition for your baby and is available at birth. In the first couple of days, your milk volume is already starting to increase, though it may not be noticeable. Breastfeed frequently to increase your milk supply. Within three to five days, you will begin to notice larger milk volumes. An increase in breast size, heaviness and firmness are often described as the milk  coming in.  Frequent breastfeeding can help breasts from getting overly firm and painful. You will know the baby  is getting enough milk if your baby is having wet and dirty diapers and gaining weight.     If your goal is to exclusively breastfeed, it is important to not use any formula or artificial nipples (including bottles and pacifiers) while your baby is learning to breastfeed.  While it may seem like an  easy  option to give your baby a bottle, formula should only be given if there is a medical reason for your baby to have it.    Positioning and attachment   Get comfortable.  Use pillows as needed to support your arms and baby.  Hold baby close at the level of your breast, facing you in a tummy to tummy position.  Skin to skin helps with this.  Position the baby with his or her nose by the nipple.  There should be a straight line from baby s ear to shoulder to hips.  Tickle your baby s lips or wait for baby to open mouth wide, bring baby to breast by leading with the chin.  Aim the nipple at the roof of baby s mouth.  A rapid sucking pattern is followed by longer, drawing pattern with occasional swallows heard.  When baby is correctly latched, your nipple and much of the areola are pulled well into baby s mouth.      Returning to work or school  Focus on a good start to breastfeeding.  Many women continue to provide breastmilk for their baby when they return to work or school.  Making plans about where to pump and store milk can make the transition go well.  Talk with other mothers who have also returned to work or school for tips and support.  Your employer s Human Resource department may be a resource as well.     Returning to work or school: (continued)   babies can mean fewer  sick  days for you.  A quality breast pump will also save time and add comfort.  Check with your insurance prior to giving birth for breast pump coverage.  Many insurance companies include a pump within your benefits.  Wait until your baby is at least three weeks old to introduce a bottle for the first time.  Have someone besides you  give the bottle.  Breastfeed when you are with your baby. Reserve your bottles of breast milk for when you are away.   Your breasts will need to be  emptied  either by your baby or a pump.  Plan to pump at least twice in an eight hour day.  If you cannot pump at work, continue breastfeeding at home. Any amount of breast milk is worth giving to your baby.    Formula feeding facts  If you are planning to use formula to feed your baby, you will want to make some preparations ahead of time. Talk to your doctor or nurse-midwife about what type of formula to use. Some are iron-fortified, meaning they have extra iron in them. You will want to purchase formula and bottles before your baby is born to be sure you are ready after you return from the hospital. The [facility] do not provide formula samples to take home.    Be sure to follow formula mixing directions closely. Regular milk in the dairy case at the grocery store should not be given to babies under 1 year old. Baby formula is sold in several forms including:  Ready-to-use. This is the most expensive, but no mixing is necessary.  Concentrated liquid. This is less expensive than ready-to-use and you mix with water.  Powder. This is the least expensive. You mix one level scoop of powdered formula with two ounces of water and stir well.    Most babies need 2.5 ounces of formula per pound of body weight each day. This means an 8-pound baby may drink about 20 ounces of formula a day; however, this is just an estimate. The most important thing is to pay attention to your baby s cues.  If your baby is always fussy, needs more iron or has certain food allergies, your physician may suggest you change your baby s formula to a different kind.     How do I warm my baby s bottles?  You may feed your baby a bottle without warming it first. It is OK for the breast milk or formula to be cool or room temperature. If your baby seems to prefer it warmed, you can put the  filled bottle in a container of warm water and let it stand for a few minutes. Check the temperature of the liquid on your skin before feeding it to your baby; to be sure it isn t too hot. Do not heat bottles in the microwave. Microwaves heat food and liquids unevenly, and this can cause hot spots that can burn your baby.    How do I clean and sterilize bottles?  Sterilize bottles and nipples before you use them for the first time. You can do this by putting them in boiling water for 5 minutes. After that first time, you can wash them in hot and soapy water. Rinse them carefully to be sure there is no soap left on them. You can also wash them in the .    Childbirth and Parenting Education:       Everyday Miracles:   https://www.everyday-miracles.org/    Free Video Series from [facility]: https://nursing.[facility]/academics/specialty-areas/nurse-midwifery/having-baby-prenatal-videos/having-baby-prenatal-and    Childbirth Education virtual (live) classes: www.Zakazaka/classes  The Birth Hour: https://The Campaign Solution/online-childbirth-class/  BirthED: https://www.birthedmn.com/  FATMATA parenting center: http://[facility]Intelligent Mobile Support/   (793) 463-BABY  [place]: (education, yoga & wellness) www.BeDo  Enlightened Mama: www.enlightenedmama.Gatheredtable   Childbirth collective: (Parent topic nights)  www.childbirthcollective.org/  Hypnobabies:  www.hypnobabiestwincities.com/  Hypnobirthing:  Http://hypnobirthing.Gatheredtable/  Hypnobirthing virtual class: www.Contatta/hypnobirthing    Information about doulas:  Childbirth collective: http://www.childbirthcollective.org/  Doulas of [place] ([place]):  www.emlissa.org  [facility]  project: http://Bizzabotiesdoulaproject.com/     Early Childhood and Family Education (ECFE):  ECFE offers parents hands-on learning experiences that will nourish a lifetime of teachable moments.  http://ecfe.info/ecfe-home/    APPS and Podcasts:   Ovia  Glow  Nurture    Evidence Based Birth  The Birth Hour (for birth stories)   Birthful   Expectful   The Longest Shortest Time  PregnancyPodcast Felicia Nails  https://www.downtobirthshow.com/    Book Recommendations:   Yudith Brady's Birthing From Within--first few chapters include a new-age tone, you may prefer to skip it and keep going, because there is good stuff later.  This book recommendation covers emotional preparation, but does cover coping with pain, and use of both pharmacological and nonpharmacological methods.      Guide to Childbirth by Karely Macario  Childbirth Without Fear by Norman Johnson Read    Dr. Rivera' The Pregnancy Book and The Birth Book--the pregnancy book goes month-by month    The Birth Partner by Maite Lenz    Womanly Art of Breastfeeding by La Leche League International   Bestfeeding by Alma Sierra--great pictures    Mothering Your Nursing Toddler, by Sandra Peralta.   Addresses dealing with so many of the challenging behaviors of a nursing toddler.  How Weaning Happens, by La Leche League.  Discusses weaning at all ages, from medically necessary weaning of an infant, all the way up to age 5 (or older), with why/why not, and strategies.  Very empowering book both for deciding to wean and deciding not to.    American College of Nurse-Midwives (ACNM) http://www.midwife.org/; look at the informational handouts at http://www.midwife.org/Share-With-Women     www.mymidwife.org    Mother to Baby (Medication and Herbal guidance in pregnancy): http://www.mothertobaby.org  Toll-Free Hotline: 578.251.2215  LactMed (Medication use while breastfeeding): http://toxnet.nlm.nih.gov/newtoxnet/lactmed.htm    Women's Health.gov:  http://www.womenshealth.gov/a-z-topics/index.html    American pregnancy association - http://americanpregnancy.org    Centering Pregnancy (group prenatal care option): http://centeringhealthcare.org    March of Dimes www.[employer].Ezetap     FDA - Nutrition  www.mypyramid.gov   "Under \"For Consumers,\" click on \"pregnant and breastfeeding women.\"      Centers for Disease Control and Prevention (CDC) - Vaccines : http://www.cdc.gov/vaccines/       When researching information on the web, question the validity of websites.  The domains .gov, .edu and.org tend to be more reliable information.  If there are a lot of advertisements, be cautious of the information provided. Stay away from blogs and chat rooms please!     [facility] Breastfeeding Support    Early Childhood Family [place] [address] provides a free, drop-in class/breastfeeding support group, facilitated by a lactation consultant and .  During the group you can connect with other parents, weigh your baby, ask questions about feeding and baby development, and more.  You do not need to register.  Fall in-person meetings will begin on , are for parents of babies from birth to 9 months, and will meet on Monday evenings from 6 - 7:15 pm in [facility], which is at [address] [place] [address].  [place] [address] also offers virtual group meetings with a lactation consultant/.  These take place on , from 11:30 am - 12:30 pm for parents of newborns to 3-month-olds, and from 4:15 - 5:15 for parents of 3 - 9 - month olds.  To get the meeting link contact Kavitha Merchant at [phone].    [facility] offers a free, drop-in breastfeeding support group facilitated by NICOLASA Sanchez.   at [facility] [address], [address], [place].  A scale is available to check baby weights, if desired.  [facility] also has a variety of new parent classes:  (cost for registration)  https://[facility] CenterPOSLavu.CRESCEL/classes/    [facility] Lactation unge facilitated by NICOLASA Dumont:  Free, drop-in support group for breastfeeding, with baby scale available if needed.  Meets at [facility], second Tuesday of each month, 10 am - 12 pm.  Text [phone] " "for info.    Lat Cafe Support Group, Tuesdays at 10:30 am   Run by Yasmin Batista, NICOLASA of [facility]   Go to [facility] Facebook page, click on \"events\" for link:   Https://www.[email or web address]/    [facility] breastfeeding support community:  free, drop-in breastfeeding support facilitated by Certified Lactation Counselors, open Mondays and Wednesdays from 1 pm - 5 pm.  In [place]:  [facility] [place], [facility] [facility]:   [facility].org    [facility] Milk Hour, Thursdays at 2:30 pm    Run by Kashif Mckeon, OLIVECLC  Go to [facility] FB page and send message to get link   Https://[email or web address]/healthfoundations/    Fredo Medrano:  http://www.londas.org/    [facility] offers a Lactation Lounge every Friday 12pm - 1pm, run by Shelly Mix, Fredo Medrano Leader.  Sign up via link at 51intern.com Ã¨â€¹Â±Ã¨â€¦Â¾Ã§Â½â€˜/cbe-lactation   https://www.51intern.com Ã¨â€¹Â±Ã¨â€¦Â¾Ã§Â½â€˜/cbe-lactation    [facility] is offering virtual support groups every Monday, 10:30 am - 12 pm, run by RN IBCLC: Https://www.[email or web address]/events/[email or web address]/    Culturally-Specific Breastfeeding Support:     For Hmong Families:   The Hmong Breastfeeding Coalition is a wonderful support for [place] Hmong women who are breastfeeding.  They are best found on Facebook.    for Black families:    Chocolate Milk Club:  http://www.American Learning CorporationlsmidDiffbot.com/chocolate-milk-club/  Email: Mishel@TurnTide    R.O.S.E. = Reaching our Sisters Everywhere  Http://www.breastfeedingrose.org/    Club Mom breastfeeding support for Black mothers:  Contact Rob Orr  Phone: [phone]   Email:  [email or web address].     Deb Castro  Phone: [phone]   Email:  [email or web address].    Club Dad parent support for Black fathers:   Contact Johnny Wahlen   Phone: [phone]   Email:  " "[email or web address].    For Native/Indigenous Families:    https://www.Health Plotter.com/groups/nitmorris.gimashkikinaan     Additional Resources:  La Leche Cogniticsmatthew is an international, nonprofit, nonsectarian organization offering information, education, and support to mothers who want to breast-feed their babies. Local groups offer phone help and monthly meetings. Visit [employer].org or Soompi.org and us the  Find local support  drop down menu or click on the  Resources  tab.    [place] Breastfeeding Resources: 1-619-877-BABY (2229) toll free    National Breastfeeding Help Line trained breastfeeding peer counselors can help answer common breast-feeding questions by phone. Monday-Friday: English/Pashto  [phone]- [phone] toll free, 1-850.786.9799 (TTY)    Virtual Breastfeeding Support:    During this time of isolation, breastfeeding families need even more community!  Here are some area organizations offering virtual support groups for breastfeeding:    [facility] Cafe Support Group, Tuesdays at 10:30 am   Run by NICOLASA Mcginnis of [facility]   Go to [facility] Facebook page and click on \"events\" for link   https://www.Health Plotter.com/events/[email or web address]/  [facility] Milk Hour, Thursdays at 2:30 pm    Run by NICOLASA Hope   Go to [facility] FB page and send message to get link   https://www.Health Plotter.com/healthfoundations/  [facility] holding virtual meetings the first Tuesday of each month, 8-9 pm, and the   Third Saturday, 10 - 11 am.  Go to [facility] FB page; message to get link [email or web address]://[email or web address] [email or web address]/?hc_[email or web address]=[email or web address]  [facility] offers a Lactation Lounge every Friday 12pm - 1pm, run by Bernardino CesarEl Campo Memorial Hospitalmatthew Leader   Sign up via link at https://www.Scientific Intake/cbe-lactation  [place] " [facility] is offering virtual support groups every Monday, 10:30 am - 12 pm, run by nurse NICOLASA   Https://www.facebook.com/events/324010127170386/    Prenatal Breastfeeding Classes:      [facility] is offering virtual breastfeeding and  care classes:  https://www.Brandark/education-workshops  [employer] childbirth and breastfeeding education offering virtual prenatal breastfeeding classes  https://www.birthedmn.com/workshops

## 2024-11-27 NOTE — PROGRESS NOTES
Here with her youngest daughter today for routine prenatal visit at 35w4d. Reports feeling well. Notes active FM and increase in BH menstrual cramping, no regular UCs. Wondering about GBS, worried if is GBS Positive will again not have time for abx due to speed of labor. Advised on doing the best we can to anticipate her early labor phase. Completed IV iron completed back in July. Continues taking every other day since that time. States she expects future pregnancies but interested in nexplanon for PP BCM. Will contact insurance about breast pump. Advised on upcoming labs and visit schedule. Reviewed warning s/sx and reasons to call. RTC 1 weeks.

## 2024-12-02 ENCOUNTER — PRENATAL OFFICE VISIT (OUTPATIENT)
Dept: MIDWIFE SERVICES | Facility: CLINIC | Age: 29
End: 2024-12-02
Payer: COMMERCIAL

## 2024-12-02 VITALS
OXYGEN SATURATION: 99 % | SYSTOLIC BLOOD PRESSURE: 115 MMHG | HEART RATE: 70 BPM | HEIGHT: 61 IN | DIASTOLIC BLOOD PRESSURE: 60 MMHG | WEIGHT: 153.6 LBS | BODY MASS INDEX: 29 KG/M2

## 2024-12-02 DIAGNOSIS — O99.011 ANEMIA DURING PREGNANCY IN FIRST TRIMESTER: ICD-10-CM

## 2024-12-02 DIAGNOSIS — Z34.83 ENCOUNTER FOR SUPERVISION OF OTHER NORMAL PREGNANCY, THIRD TRIMESTER: Primary | ICD-10-CM

## 2024-12-02 DIAGNOSIS — D50.8 IRON DEFICIENCY ANEMIA SECONDARY TO INADEQUATE DIETARY IRON INTAKE: ICD-10-CM

## 2024-12-02 LAB
ERYTHROCYTE [DISTWIDTH] IN BLOOD BY AUTOMATED COUNT: 13.7 % (ref 10–15)
FERRITIN SERPL-MCNC: 21 NG/ML (ref 6–175)
HCT VFR BLD AUTO: 35.7 % (ref 35–47)
HGB BLD-MCNC: 10.8 G/DL (ref 11.7–15.7)
HGB BLD-MCNC: 11.5 G/DL (ref 11.7–15.7)
IRON BINDING CAPACITY (ROCHE): 396 UG/DL (ref 240–430)
IRON SATN MFR SERPL: 15 % (ref 15–46)
IRON SERPL-MCNC: 59 UG/DL (ref 37–145)
MCH RBC QN AUTO: 27.3 PG (ref 26.5–33)
MCHC RBC AUTO-ENTMCNC: 32.2 G/DL (ref 31.5–36.5)
MCV RBC AUTO: 85 FL (ref 78–100)
PLATELET # BLD AUTO: 200 10E3/UL (ref 150–450)
RBC # BLD AUTO: 4.22 10E6/UL (ref 3.8–5.2)
WBC # BLD AUTO: 5.7 10E3/UL (ref 4–11)

## 2024-12-02 PROCEDURE — 87653 STREP B DNA AMP PROBE: CPT | Performed by: MIDWIFE

## 2024-12-02 PROCEDURE — 99207 PR PRENATAL VISIT: CPT | Performed by: MIDWIFE

## 2024-12-02 PROCEDURE — 82728 ASSAY OF FERRITIN: CPT | Performed by: MIDWIFE

## 2024-12-02 PROCEDURE — 83540 ASSAY OF IRON: CPT | Performed by: MIDWIFE

## 2024-12-02 PROCEDURE — 83550 IRON BINDING TEST: CPT | Performed by: MIDWIFE

## 2024-12-02 PROCEDURE — 36415 COLL VENOUS BLD VENIPUNCTURE: CPT | Performed by: MIDWIFE

## 2024-12-02 PROCEDURE — 85027 COMPLETE CBC AUTOMATED: CPT | Performed by: MIDWIFE

## 2024-12-02 RX ORDER — UREA 10 %
45 LOTION (ML) TOPICAL EVERY OTHER DAY
Qty: 30 TABLET | Refills: 3 | Status: SHIPPED | OUTPATIENT
Start: 2024-12-02

## 2024-12-02 RX ORDER — LANOLIN ALCOHOL/MO/W.PET/CERES
1000 CREAM (GRAM) TOPICAL DAILY
Qty: 90 TABLET | Refills: 3 | Status: SHIPPED | OUTPATIENT
Start: 2024-12-02

## 2024-12-02 RX ORDER — MULTIVIT WITH MINERALS/LUTEIN
500 TABLET ORAL DAILY
Qty: 90 TABLET | Refills: 3 | Status: SHIPPED | OUTPATIENT
Start: 2024-12-02

## 2024-12-02 NOTE — PROGRESS NOTES
Juliawild is here for ROBV at 36 2/7 weeks. Feeling well. Reports regular FM and denies Uc's. Does note some cramping. Denies change in vaginal discharge. Denies HA, visual changes, epigastric pain.  Feeling ready for baby to arrive. Having some trouble sleeping.  Accepting of GBS testing today, and hgb.  Confirmed cephalic via BSUS. Continues to take oral iron, though inconsistent the past few days.  Reviewed danger s/sx, what to watch for.  RTC 1 week or PRN.

## 2024-12-03 PROBLEM — B95.1 POSITIVE GBS TEST: Status: ACTIVE | Noted: 2024-12-03

## 2024-12-03 LAB — GP B STREP DNA SPEC QL NAA+PROBE: POSITIVE

## 2024-12-12 ENCOUNTER — PRENATAL OFFICE VISIT (OUTPATIENT)
Dept: MIDWIFE SERVICES | Facility: CLINIC | Age: 29
End: 2024-12-12
Payer: COMMERCIAL

## 2024-12-12 VITALS
WEIGHT: 154.8 LBS | HEIGHT: 61 IN | DIASTOLIC BLOOD PRESSURE: 50 MMHG | BODY MASS INDEX: 29.23 KG/M2 | HEART RATE: 72 BPM | SYSTOLIC BLOOD PRESSURE: 90 MMHG

## 2024-12-12 DIAGNOSIS — Z34.83 ENCOUNTER FOR SUPERVISION OF OTHER NORMAL PREGNANCY, THIRD TRIMESTER: Primary | ICD-10-CM

## 2024-12-12 NOTE — PROGRESS NOTES
"S: Feels tired and ready for baby to come. Plans to get last minute items for baby today.  Baby active.  Denies uterine cramping, vaginal bleeding or leaking of fluid. No headache, increase in edema, no epigastric pain.   O: Vitals: BP 90/50 (BP Location: Left arm, Patient Position: Sitting, Cuff Size: Adult Regular)   Pulse 72   Ht 1.549 m (5' 1\")   Wt 70.2 kg (154 lb 12.8 oz)   LMP 03/23/2024 (Exact Date)   BMI 29.25 kg/m    BMI= Body mass index is 29.25 kg/m .  Exam:  Constitutional: healthy, alert and no distress  Respiratory: respirations even and unlabored  Gastrointestinal: Abdomen soft, non-tender. Fundus measures appropriate for gestational age. Fetal heart tones hear without difficulty and within normal limits  : Deferred  Psychiatric: mentation appears normal and affect normal/bright  A:     ICD-10-CM    1. Encounter for supervision of other normal pregnancy, third trimester  Z34.83         P: Labor signs and symptoms discussed, aware of numbers to call  Reviewed GBS positive status  Discussed warning signs of PIH/preeclampsia and patient will monitor.  GBS screen completed. Discussed plans for labor.   Encouraged patient to call with any questions or concerns.  Return to clinic 1 weeks    NALLELY Moya CNM        "

## 2024-12-19 ENCOUNTER — PRENATAL OFFICE VISIT (OUTPATIENT)
Dept: MIDWIFE SERVICES | Facility: CLINIC | Age: 29
End: 2024-12-19
Payer: COMMERCIAL

## 2024-12-19 VITALS
HEIGHT: 61 IN | DIASTOLIC BLOOD PRESSURE: 62 MMHG | WEIGHT: 154.7 LBS | HEART RATE: 80 BPM | BODY MASS INDEX: 29.21 KG/M2 | SYSTOLIC BLOOD PRESSURE: 120 MMHG

## 2024-12-19 DIAGNOSIS — Z34.83 ENCOUNTER FOR SUPERVISION OF OTHER NORMAL PREGNANCY, THIRD TRIMESTER: Primary | ICD-10-CM

## 2024-12-19 NOTE — PROGRESS NOTES
Concerns: ***  {OB36:268204}  Total weight gain ***#; interval gain ***# with ***normal fundal height.   SVE offered and ***  Labor precautions discussed.  RTC 1 week.    NALLELY Hagen CNM

## 2024-12-19 NOTE — PATIENT INSTRUCTIONS
"\"We hope you had a positive experience and that you can definitely recommend [facility] to your family and friends. You ll be receiving a survey soon and we look forward to hearing your feedback\".    [facility] - Contact information:  Appointment line and to get a hold of CNM in clinic Monday-Friday 8 am - 5 pm:  [phone].  There are some clinics with early start times (1st appointment 7:40 am) and others with evening hours (last appointment 6:20 pm).  Most are typically open from 8 am to 5 pm.    CNM on call answering service: [phone].  Specify your hospital of choice and leave a brief message for CNM;  will then page CNM who is on call at your specified hospital and you should receive a call back with 15 minutes.  Be sure that your ringer is audible and that you can accept blocked calls so that we can get back in touch with you! This number should be reserved for urgent needs if during the day, before 8 am, after 5 pm, weekends, holidays.    Contact the on-call CNM with warning signs, such as:  vaginal bleeding   Vaginal discharge and itching or pain and burning during urination  Leg/calf pain or swelling on one side  severe abdominal pain  nausea and vomiting more than 4-5 times a day, or if you are unable to keep anything down  fever more than 100.4 degrees F.     Chef Dovunquehart  After each of your visits you are welcome to check Joint Loyalty for your visit summary including education and links to information relevant to your pregnancy and/or well woman care.   Find the \"Visits\" tab at the top of the page, you will see a list of recent visits and for each visit a for link for \"View After Visit Summary.\" View of your After Visit Summary will allow you to read our recommendations from your visit, review any education provided, and link to websites with useful information.   If you have any questions or difficulty navigating ClariFI, please feel free to " "contact us and we will do our best to direct you.    Meet the Midwives from [facility]  You are invited to an informational meet and greet with [facility] Certified Nurse-Midwives. Our free \"Meet the Midwives\" event is a great opportunity to learn about our midwives' philosophy and experience, the hospitals where we can assist with your birth, and answer questions you may have. Partners, friends, and family are welcome to attend. Currently, this is a virtual event.  Dates: Last Thursday of every month at 7 pm.    Link to next (live) meeting  [email or web address]  To Join by Telephone (audio only) Call:   [phone] Phone Conference ID: [phone] #      Touring the Maternity Care Center  At this time we are offering a virtual tour of the Maternity Care Centers at both [facility] and [facility]:   [facility] Maternity Care:   https://[facility].org/locations/the-birthplace-[facility] Care:   https://[facility].org/locations/the-birthplace-at[facility]    Scroll to the bottom of this hyperlink if the above link does not work      Childbirth and Parenting Education:     Everyday Miracles:   https://www.everyday-miracles.org/    Free Video Series from [facility]: https://nursing.[facility]/academics/specialty-areas/nurse-midwifery/having-baby-prenatal-videos/having-baby-prenatal-and    Childbirth Education virtual (live) classes: www.Contrail Systems/classes  The Birth Hour: https://ALLGOOB/online-childbirth-class/  BirthED: https://www.birthedmn.com/  FATMATA parenting center: http://ammapareMentorWave Technologies.OffSite VISION/   ([phone] 988-BABY  [place]: (education, yoga & wellness) www.Cyzone.OffSite VISION  Enlightened Mama: www.enlightenedmama.com   Childbirth collective: (Parent topic nights)  www.childbirthcollective.org/  Hypnobabies:  " www.Ilesfay Technology GroupbiestBioVigilant Systemscities.Withings/  Hypnobirthing:  Http://hypnCurbed Network.Withings/  Hypnobirthing virtual class: www.CareView Communications.Withings/hypnobirthing    Information about doulas:  Childbirth collective: http://www.childbirthcollective.org/  Doulas of [place] (MELISSA):  www.melissa.org  "[employer]  project: http://Citrus LanedoNHK Worldject.Withings/     Early Childhood and Family Education (ECFE):  ECFE offers parents hands-on learning experiences that will nourish a lifetime of teachable moments.  http://ecfe.info/ecfe-home/    APPS and Podcasts:   Allie Prakash Nurture    Evidence Based Birth  The Birth Hour (for birth stories)   Birthful   Expectful   The Longest Shortest Time  PregnancyPodcast Feliciaisauro Nails  https://www.downtobirthshow.com/    Book Recommendations:   Yudith Chicago's Birthing From Within--first few chapters include a new-age tone, you may prefer to skip it and keep going, because there is good stuff later.  This book recommendation covers emotional preparation, but does cover coping with pain, and use of both pharmacological and nonpharmacological methods.    Guide to Childbirth by Karely Macario  Childbirth Without Fear by Norman Johnson Read    Dr. Rivera' The Pregnancy Book and The Birth Book--the pregnancy book goes month-by month    The Birth Partner by Maite Lenz    Womanly Art of Breastfeeding by La Leche League International   Bestfeeding by Alma Sierra--great pictures    Mothering Your Nursing Toddler, by Sandra Peralta.   Addresses dealing with so many of the challenging behaviors of a nursing toddler.  How Weaning Happens, by La Leche League.  Discusses weaning at all ages, from medically necessary weaning of an infant, all the way up to age 5 (or older), with why/why not, and strategies.  Very empowering book both for deciding to wean and deciding not to.    American College of Nurse-Midwives (ACNM) http://www.midwife.org/; look at the informational handouts at  "http://www.midwife.org/Share-With-Women     www.mymidwife.org    Mother to Baby (Medication and Herbal guidance in pregnancy): http://www.mothertobaby.org  Toll-Free Hotline: 743.360.7335  LactMed (Medication use while breastfeeding): http://toxnet.nlm.nih.gov/newtoxnet/lactmed.htm    Women's Health.gov:  http://www.womenshealth.gov/a-z-topics/index.html    American pregnancy association - http://americanpregnancy.org    Centering Pregnancy (group prenatal care option): http://centeringhealthcare.org    March of Dimes www.LendLayer.M2G     FDA - Nutrition  www.mypyramid.gov  Under \"For Consumers,\" click on \"pregnant and breastfeeding women.\"      Centers for Disease Control and Prevention (CDC) - Vaccines : http://www.cdc.gov/vaccines/       When researching information on the web, question the validity of websites.  The Ste[employer]Med [employer] .gov, .edu and.org tend to be more reliable information.  If there are a lot of advertisements, be cautious of the information provided. Stay away from blogs and chat rooms please!     Making Plans for Feeding My Baby    By this point, you probably have read a lot about feeding your baby.  Breastfeed or formula? Each parent's decision is their own and [facility] respects you and your choices. We ve gathered information on both breastfeeding and formula feeding to help with your decision. Talking with your nurse-midwife can also help in your decision.  However you plan to feed your baby, [facility] encourage rooming in with your baby, skin-to-skin contact and feeding your baby based on his or her cues.    Skin-to-skin contact  Being close to mom helps your baby adjust to life outside of the womb.  It helps your baby regulate their body temperature, heart rate, and breathing.  Your baby will usually be placed skin-to-skin immediately following birth or as soon as possible, if medical intervention is needed.    Rooming-In  Having " your baby stay with you in your room is called  rooming-in .  Keeping your baby in your room helps you to learn how to care for your baby by getting to know your baby s cues, body rhythms and sleep cycle.       Cue-based feeding  Cues (signals) are baby s way of telling you what he or she wants.  When you learn your infant s cues, you know how to care for and feed your baby.   Feeding cues are the licking and smacking of lips, bringing their fist to their mouth, and a reflex called  rooting - where baby turns and opens his or her mouth, searching for the breast or bottle.  Crying is a late feeding cue.  Babies can feed frequently, often at least 8 times in 24 hours.    Breastfeeding facts  Breast milk is the best source of nutrition for your baby and is available at birth. In the first couple of days, your milk volume is already starting to increase, though it may not be noticeable. Breastfeed frequently to increase your milk supply. Within three to five days, you will begin to notice larger milk volumes. An increase in breast size, heaviness and firmness are often described as the milk  coming in.  Frequent breastfeeding can help breasts from getting overly firm and painful. You will know the baby is getting enough milk if your baby is having wet and dirty diapers and gaining weight.     If your goal is to exclusively breastfeed, it is important to not use any formula or artificial nipples (including bottles and pacifiers) while your baby is learning to breastfeed.  While it may seem like an  easy  option to give your baby a bottle, formula should only be given if there is a medical reason for your baby to have it.      Positioning and attachment   Get comfortable.  Use pillows as needed to support your arms and baby.  Hold baby close at the level of your breast, facing you in a tummy to tummy position.  Skin to skin helps with this.  Position the baby with his or her nose by the nipple.  There should be a straight  line from baby s ear to shoulder to hips.  Tickle your baby s lips or wait for baby to open mouth wide, bring baby to breast by leading with the chin.  Aim the nipple at the roof of baby s mouth.  A rapid sucking pattern is followed by longer, drawing pattern with occasional swallows heard.  When baby is correctly latched, your nipple and much of the areola are pulled well into baby s mouth.      Returning to work or school  Focus on a good start to breastfeeding.  Many women continue to provide breastmilk for their baby when they return to work or school.  Making plans about where to pump and store milk can make the transition go well.  Talk with other mothers who have also returned to work or school for tips and support.  Your employer s Human Resource department may be a resource as well.     Returning to work or school: (continued)   babies can mean fewer  sick  days for you.  A quality breast pump will also save time and add comfort.  Check with your insurance prior to giving birth for breast pump coverage.  Many insurance companies include a pump within your benefits.  Wait until your baby is at least three weeks old to introduce a bottle for the first time.  Have someone besides you give the bottle.  Breastfeed when you are with your baby. Reserve your bottles of breast milk for when you are away.   Your breasts will need to be  emptied  either by your baby or a pump.  Plan to pump at least twice in an eight hour day.  If you cannot pump at work, continue breastfeeding at home. Any amount of breast milk is worth giving to your baby.    Formula feeding facts  If you are planning to use formula to feed your baby, you will want to make some preparations ahead of time. Talk to your doctor or nurse-midwife about what type of formula to use. Some are iron-fortified, meaning they have extra iron in them. You will want to purchase formula and bottles before your baby is born to be sure you are ready after  you return from the hospital. The [facility] do not provide formula samples to take home.    Be sure to follow formula mixing directions closely. Regular milk in the dairy case at the grocery store should not be given to babies under 1 year old. Baby formula is sold in several forms including:  Ready-to-use. This is the most expensive, but no mixing is necessary.  Concentrated liquid. This is less expensive than ready-to-use and you mix with water.  Powder. This is the least expensive. You mix one level scoop of powdered formula with two ounces of water and stir well.    Most babies need 2.5 ounces of formula per pound of body weight each day. This means an 8-pound baby may drink about 20 ounces of formula a day; however, this is just an estimate. The most important thing is to pay attention to your baby s cues.  If your baby is always fussy, needs more iron or has certain food allergies, your physician may suggest you change your baby s formula to a different kind.     How do I warm my baby s bottles?  You may feed your baby a bottle without warming it first. It is OK for the breast milk or formula to be cool or room temperature. If your baby seems to prefer it warmed, you can put the filled bottle in a container of warm water and let it stand for a few minutes. Check the temperature of the liquid on your skin before feeding it to your baby; to be sure it isn t too hot. Do not heat bottles in the microwave. Microwaves heat food and liquids unevenly, and this can cause hot spots that can burn your baby.    How do I clean and sterilize bottles?  Sterilize bottles and nipples before you use them for the first time. You can do this by putting them in boiling water for 5 minutes. After that first time, you can wash them in hot and soapy water. Rinse them carefully to be sure there is no soap left on them. You can also wash them in the .    Breastfeeding/Chestfeeding Support  Contact  us  Breastfeeding/chestfeeding is the natural and healthy way for parents to feed their babies and provides the best source of nutrition in most cases to infants. Breast milk has health benefits for mom, too. The American Academy of Pediatrics recommends exclusively breastfeeding for 6 months for optimal growth and development and breastfeeding up to at least a year.  Benefits to baby:  Easy digestion, less diarrhea and constipation, breast milk is easy to digest.  Lots of bonding with parent.  Less likely to have asthma.  Less ear infections and respiratory infections.  Less likely to have Type 2 Diabetes.  Less likely to become obese.  Lower risk of Sudden Infant Death Syndrome (SIDS).  Benefits to breastfeeding/chestfeeding parent:  Helps with weight loss.  Lower risk of ovarian and breast cancer, Type 2 diabetes and heart disease.  /chestfed babies are easy to take on trips. Just grab the diapers and go!  Breastfeeding/chestfeeding saves money (no formula or bottle costs, fewer doctor bills and medication costs).  Ready to breastfeed/chestfeed anywhere, don t need to make and wash bottles.  Breastfeeding/chestfeeding is wonderful, but not always easy. Learning what to expect ahead of time and get support from a lactation professional. Check out the [facility] Breastfeeding Resource Guide for classes, drop in support groups, childbirth education, Doulas and clinic and hospital lactation services.     Early Childhood Family Education [place] [address] provides a free, drop-in class/breastfeeding support group, facilitated by a lactation consultant and .  During the group you can connect with other parents, weigh your baby, ask questions about feeding and baby development, and more.  You do not need to register.  Fall in-person meetings will begin on September 12, are for parents of babies from birth to 9 months, and will meet on Monday evenings from 6 - 7:15 pm in [facility]  "[address], which is at [id number] [address] also offers virtual group meetings with a lactation consultant/.  These take place on , from 11:30 am - 12:30 pm for parents of newborns to 3-month-olds, and from 4:15 - 5:15 for parents of 3 - 9 - month olds.  To get the meeting link contact Kavitha Merchant at [phone].    [facility] offers a free, drop-in breastfeeding support group facilitated by NICOLASA Sanchez.   at [place] [facility] [address], [place].  A scale is available to check baby weights, if desired.  [place] also has a variety of new parent classes:  (cost for registration)  https://TRANSCORP/classes/    [facility] Lactation Lounge facilitated by NICOLASA Dumont:  Free, drop-in support group for breastfeeding, with baby scale available if needed.  Meets at [facility], second Tuesday of each month, 10 am - 12 pm.  Text [phone] for info.    [place] Support Group,  at 10:30 am   Run by NICOLASA Mcginnis of The Baby Whisperer Lactation Consultants   Go to The Baby Whisperer Lactation Consultants Facebook page, click on \"events\" for link:   Https://www.Social Games Herald.com/events/621909902487038/    [facility] [place] breastfeeding support community:  free, drop-in breastfeeding support facilitated by Certified Lactation Counselors, open  and  from 1 pm - 5 pm.  In [place]:  [facility] [place], [address] [facility]:   guidingstarw[place].org    [facility] Milk Hour,  at 2:30 pm    Run by NICOLASA Hope  Go to [facility] FB page and send message to get link   Https://www.Social Games Herald.com/healthfoundations/    Fredo Medrano:  http://www.[facility].org/    [place] offers a Lactation Lounge every Friday 12pm - 1pm, run by Fredo Cesar Leader.  Sign up via link at " Good Travel Software/cbe-lactation   https://www.Good Travel Software/cbe-lactation    [facility] is offering virtual support groups every Monday, 10:30 am - 12 pm, run by RN IBCLC: Https://www.facebook.com/events/612241192549313/    Culturally-Specific Breastfeeding Support:     For Hmong Families:   The Hmong Breastfeeding Coalition is a wonderful support for [place] Hmong women who are breastfeeding.  They are best found on Facebook.    for Black families:    MakeMyTrip.comcolate Milk Club:  http://www.ClickFacts.iWantoo/chocolate-milk-club/  Email: Mishel@[employer]    R.O.S.E. = Reaching our Sisters Everywhere  Http://www.breastfeedingrose.org/    Club Mom breastfeeding support for Black mothers:  Contact Rob Orr  Phone: [phone]   Email:  [email or web address].     Deb Castro  Phone: [phone]   Email:  [email or web address]    Club Dad parent support for Black fathers:   Contact Johnny Whalen   Phone: [phone]   Email:  Mark@[email or web address]    For Native/Indigenous Families:    https://www.Theater Venture Group.com/groups/nitgabriellasing.gimashkikinaan     Additional Resources:  La Leche League is an international, nonprofit, nonsectarian organization offering information, education, and support to mothers who want to breast-feed their babies. Local groups offer phone help and monthly meetings. Visit Local.com.org or Mitoo Sports.org and us the  Find local support  drop down menu or click on the  Resources  tab.    [place] Breastfeeding Resources: 6-099-825-BABY (2229) toll free    National Breastfeeding Help Line trained breastfeeding peer counselors can help answer common breast-feeding questions by phone. Monday-Friday: English/[place]  [phone] toll free, [phone] (TTY)    Virtual Breastfeeding Support:    During this time of isolation, breastfeeding families need even more community!  Here are some area organizations offering virtual support groups  "for breastfeeding:    [place] Support Group,  at 10:30 am   Run by NICOLASA Mcginnis of The Baby Whisperer Lactation Consultants   Go to The Baby Whisperer Lactation Consultants Facebook page and click on \"events\" for link   https://www.iCarsClub.com/events/634198557487378/  [facility] Milk Hour,  at 2:30 pm    Run by NICOLASA Hope   Go to [facility] FB page and send message to get link   https://www.iCarsClub.Neul/DadShedfoundations/  [facility]/[place] holding virtual meetings the first Tuesday of each month, 8-9 pm, and the   Third Saturday, 10 - 11 am.  Go to [facility] and [place] FB page; message to get link https://www.Swatchcloud/LLLofGoldenLeonardo/?hc_location=[facility]  [place] offers a Lactation Lounge every Friday 12pm - 1pm, run by Shelly Mix [facility] Leader   Sign up via link at https://www.Click Contact/cbe-lactation  [facility] is offering virtual support groups every Monday, 10:30 am - 12 pm, run by nurse IBCLC   Https://www.iCarsClub.com/events/631990624460765/    Prenatal Breastfeeding Classes:      BloomTeach Me To Be is offering virtual breastfeeding and  care classes:  https://www.Click Contact/education-workshops  BirthEd childbirth and breastfeeding education offering virtual prenatal breastfeeding classes  https://www.Mind Technologiesedmn.com/workshops    Preparing for your baby:     [place] Screening Program  Http://www.health.[facility] hospital.mn.us/newbornscreening/  [place] newborns are tested soon after birth for more than 50 hidden, rare disorders, including hearing loss and critical congenital heart disease (CCHD). This site provides resources and information for families and providers.    When to call:   Appointment line and to get a hold of CNM in clinic Monday-Friday 8 am - 5 pm:  [phone].  There are some clinics with early start times (1st appointment 7:40 " am) and others with evening hours (last appointment 6:20 pm).  Most are typically open from 8 am to 5 pm.    CNM on call answering service: [phone].  Specify your hospital of choice and leave a brief message for CNM;  will then page CNM who is on call at your specified hospital and you should receive a call back with 15 minutes.  Be sure that your ringer is audible and that you can accept blocked calls so that we can get back in touch with you! This number should be reserved for urgent needs if during the day, before 8 am, after 5 pm, weekends, holidays.    Contact the on-call CNM with warning signs, such as:  vaginal bleeding   Vaginal discharge and itching or pain and burning during urination  Leg/calf pain or swelling on one side  severe abdominal pain  nausea and vomiting more than 4-5 times a day, or if you are unable to keep anything down  fever more than 100.4 degrees F.     Make plans for transportation and  as needed for when you are going to the hospital.    Ask your health care provider about vaccinations you may need following delivery. By now, you should have received a Tdap immunization to protect against pertussis or whooping cough. Fathers and family members who will be in close contact with the baby should also receive a Tdap shot at least two weeks before the expected birth of the baby if they have not had a Td (tetanus) shot for at least two years.    Tell your midwife or physician how you plan to feed your baby (breast or bottle), who you have chosen to do pediatric care for your baby, and if you have a boy, whether you have chosen to have him circumcised. You will need a car seat correctly installed in your vehicle to bring your baby home. As you start to set up the nursery at home for your baby, make sure the crib is safe. The mattress needs to fit snugly against the edges of the crib. If you can fit a soda can between the bars, they are too far apart and can allow the  baby's head to caught between them.    Learn about infant care and feeding, including information about infant CPR. We recommend that you put your baby to sleep on his or her back to reduce the chance of Sudden Infant Death Syndrome (SIDS). To maintain a healthy environment in which your child can grow, it's best to keep your home smoke-free. By preparing ahead, your transition into parenthood will go smoothly for you and your baby.    Your midwife will want to see you for a checkup 2 to 6 weeks after delivery.

## 2024-12-19 NOTE — PATIENT INSTRUCTIONS
"\"We hope you had a positive experience and that you can definitely recommend [facility] to your family and friends. You ll be receiving a survey soon and we look forward to hearing your feedback\".    [facility] - Contact information:  Appointment line and to get a hold of CNM in clinic Monday-Friday 8 am - 5 pm:  [phone].  There are some clinics with early start times (1st appointment 7:40 am) and others with evening hours (last appointment 6:20 pm).  Most are typically open from 8 am to 5 pm.    CNM on call answering service: [phone].  Specify your hospital of choice and leave a brief message for CNM;  will then page CNM who is on call at your specified hospital and you should receive a call back with 15 minutes.  Be sure that your ringer is audible and that you can accept blocked calls so that we can get back in touch with you! This number should be reserved for urgent needs if during the day, before 8 am, after 5 pm, weekends, holidays.    Contact the on-call CNM with warning signs, such as:  vaginal bleeding   Vaginal discharge and itching or pain and burning during urination  Leg/calf pain or swelling on one side  severe abdominal pain  nausea and vomiting more than 4-5 times a day, or if you are unable to keep anything down  fever more than 100.4 degrees F.     [place]  After each of your visits you are welcome to check "[employer] for your visit summary including education and links to information relevant to your pregnancy and/or well woman care.   Find the \"Visits\" tab at the top of the page, you will see a list of recent visits and for each visit a for link for \"View After Visit Summary.\" View of your After Visit Summary will allow you to read our recommendations from your visit, review any education provided, and link to websites with useful information.   If you have any questions or difficulty navigating [place], please feel free to " "contact us and we will do our best to direct you.    Meet the Midwives from [facility]  You are invited to an informational meet and greet with [facility] Certified Nurse-Midwives. Our free \"Meet the Midwives\" event is a great opportunity to learn about our midwives' philosophy and experience, the hospitals where we can assist with your birth, and answer questions you may have. Partners, friends, and family are welcome to attend. Currently, this is a virtual event.  Dates: Last Thursday of every month at 7 pm.    Link to next (live) meeting  [email or web address]  To Join by Telephone (audio only) Call:   [phone] Phone Conference ID: [phone] #      Touring the Maternity Care Center  At this time we are offering a virtual tour of the Maternity Care Centers at both [facility] and [facility]:   [facility] Maternity Care:   [email or web address].[email or web address]/locations/the-[facility]:   [email or web address]://[email or web address].[email or web address]/locations/[facility]    Scroll to the bottom of this hyperlink if the above link does not work      Childbirth and Parenting Education:     Everyday Miracles:   https://www.everyday-miracles.org/    Free Video Series from [facility]: https://nursing.[facility]/academics/specialty-areas/nurse-midwifery/having-baby-prenatal-videos/having-baby-prenatal-and    Childbirth Education virtual (live) classes: www.Codemasters/classes  The Birth Hour: https://Clear Vascular/online-childbirth-class/  BirthED: https://www.birthedmn.com/  FATMATA parenting center: http://ammapareGroupsite.AtBizz/   (283) 152-BABY  [place]: (education, yoga & wellness) www.Brainloop.AtBizz  Enlightened Mama: www.enlightenedmama.com   Childbirth collective: (Parent topic nights)  www.childbirthcollective.org/  Hypnobabies:  " www.The SimplebiestM2 Digital Limitedcities.Pixel Press/  Hypnobirthing:  Http://hypnTiVo.Pixel Press/  Hypnobirthing virtual class: www.Vubiquity.Pixel Press/hypnobirthing    Information about doulas:  Childbirth collective: http://www.childbirthcollective.org/  Doulas of [place] (MELISSA):  www.melissa.org  [place]  project: http://globalscholar.comdoMiewject.Pixel Press/     Early Childhood and Family Education (ECFE):  ECFE offers parents hands-on learning experiences that will nourish a lifetime of teachable moments.  http://ecfe.info/ecfe-home/    APPS and Podcasts:   Allie Prakash Nurture    Evidence Based Birth  The Birth Hour (for birth stories)   Birthful   Expectful   The Longest Shortest Time  PregnancyPodcast Feliciaisauro Nails  https://www.downtobirthshow.com/    Book Recommendations:   Yudith [place]'s Birthing From Within--first few chapters include a new-age tone, you may prefer to skip it and keep going, because there is good stuff later.  This book recommendation covers emotional preparation, but does cover coping with pain, and use of both pharmacological and nonpharmacological methods.    Guide to Childbirth by Karely Macario  Childbirth Without Fear by Norman Johnson Read    Dr. Rivera' The Pregnancy Book and The Birth Book--the pregnancy book goes month-by month    The Birth Partner by Maite Lenz    Womanly Art of Breastfeeding by La Leche League International   Bestfeeding by Alma Sierra--great pictures    Mothering Your Nursing Toddler, by Sandra Peralta.   Addresses dealing with so many of the challenging behaviors of a nursing toddler.  How Weaning Happens, by La Leche League.  Discusses weaning at all ages, from medically necessary weaning of an infant, all the way up to age 5 (or older), with why/why not, and strategies.  Very empowering book both for deciding to wean and deciding not to.    American College of Nurse-Midwives (ACNM) http://www.midwife.org/; look at the informational handouts at  "http://www.midwife.org/Share-With-Women     www.mymidwife.org    Mother to Baby (Medication and Herbal guidance in pregnancy): http://www.mothertobaby.org  Toll-Free Hotline: 202.240.3813  LactMed (Medication use while breastfeeding): http://toxnet.nlm.nih.gov/newtoxnet/lactmed.htm    Women's Health.gov:  http://www.womenshealth.gov/a-z-topics/index.html    American pregnancy association - http://americanpregnancy.org    Centering Pregnancy (group prenatal care option): http://centeringhealthcare.org    March of Dimes www.AppEnsure.ClickDiagnostics     FDA - Nutrition  www.mypyramid.gov  Under \"For Consumers,\" click on \"pregnant and breastfeeding women.\"      Centers for Disease Control and Prevention (CDC) - Vaccines : http://www.cdc.gov/vaccines/       When researching information on the web, question the validity of websites.  The Mindframe .gov, .edu and.org tend to be more reliable information.  If there are a lot of advertisements, be cautious of the information provided. Stay away from blogs and chat rooms please!     Making Plans for Feeding My Baby    By this point, you probably have read a lot about feeding your baby.  Breastfeed or formula? Each parent's decision is their own and [facility] respects you and your choices. We ve gathered information on both breastfeeding and formula feeding to help with your decision. Talking with your nurse-midwife can also help in your decision.  However you plan to feed your baby, [facility] encourage rooming in with your baby, skin-to-skin contact and feeding your baby based on his or her cues.    Skin-to-skin contact  Being close to mom helps your baby adjust to life outside of the womb.  It helps your baby regulate their body temperature, heart rate, and breathing.  Your baby will usually be placed skin-to-skin immediately following birth or as soon as possible, if medical intervention is needed.    Rooming-In  Having " your baby stay with you in your room is called  rooming-in .  Keeping your baby in your room helps you to learn how to care for your baby by getting to know your baby s cues, body rhythms and sleep cycle.       Cue-based feeding  Cues (signals) are baby s way of telling you what he or she wants.  When you learn your infant s cues, you know how to care for and feed your baby.   Feeding cues are the licking and smacking of lips, bringing their fist to their mouth, and a reflex called  rooting - where baby turns and opens his or her mouth, searching for the breast or bottle.  Crying is a late feeding cue.  Babies can feed frequently, often at least 8 times in 24 hours.    Breastfeeding facts  Breast milk is the best source of nutrition for your baby and is available at birth. In the first couple of days, your milk volume is already starting to increase, though it may not be noticeable. Breastfeed frequently to increase your milk supply. Within three to five days, you will begin to notice larger milk volumes. An increase in breast size, heaviness and firmness are often described as the milk  coming in.  Frequent breastfeeding can help breasts from getting overly firm and painful. You will know the baby is getting enough milk if your baby is having wet and dirty diapers and gaining weight.     If your goal is to exclusively breastfeed, it is important to not use any formula or artificial nipples (including bottles and pacifiers) while your baby is learning to breastfeed.  While it may seem like an  easy  option to give your baby a bottle, formula should only be given if there is a medical reason for your baby to have it.      Positioning and attachment   Get comfortable.  Use pillows as needed to support your arms and baby.  Hold baby close at the level of your breast, facing you in a tummy to tummy position.  Skin to skin helps with this.  Position the baby with his or her nose by the nipple.  There should be a straight  line from baby s ear to shoulder to hips.  Tickle your baby s lips or wait for baby to open mouth wide, bring baby to breast by leading with the chin.  Aim the nipple at the roof of baby s mouth.  A rapid sucking pattern is followed by longer, drawing pattern with occasional swallows heard.  When baby is correctly latched, your nipple and much of the areola are pulled well into baby s mouth.      Returning to work or school  Focus on a good start to breastfeeding.  Many women continue to provide breastmilk for their baby when they return to work or school.  Making plans about where to pump and store milk can make the transition go well.  Talk with other mothers who have also returned to work or school for tips and support.  Your employer s Human Resource department may be a resource as well.     Returning to work or school: (continued)   babies can mean fewer  sick  days for you.  A quality breast pump will also save time and add comfort.  Check with your insurance prior to giving birth for breast pump coverage.  Many insurance companies include a pump within your benefits.  Wait until your baby is at least three weeks old to introduce a bottle for the first time.  Have someone besides you give the bottle.  Breastfeed when you are with your baby. Reserve your bottles of breast milk for when you are away.   Your breasts will need to be  emptied  either by your baby or a pump.  Plan to pump at least twice in an eight hour day.  If you cannot pump at work, continue breastfeeding at home. Any amount of breast milk is worth giving to your baby.    Formula feeding facts  If you are planning to use formula to feed your baby, you will want to make some preparations ahead of time. Talk to your doctor or nurse-midwife about what type of formula to use. Some are iron-fortified, meaning they have extra iron in them. You will want to purchase formula and bottles before your baby is born to be sure you are ready after  you return from the hospital. The [facility] do not provide formula samples to take home.    Be sure to follow formula mixing directions closely. Regular milk in the dairy case at the grocery store should not be given to babies under 1 year old. Baby formula is sold in several forms including:  Ready-to-use. This is the most expensive, but no mixing is necessary.  Concentrated liquid. This is less expensive than ready-to-use and you mix with water.  Powder. This is the least expensive. You mix one level scoop of powdered formula with two ounces of water and stir well.    Most babies need 2.5 ounces of formula per pound of body weight each day. This means an 8-pound baby may drink about 20 ounces of formula a day; however, this is just an estimate. The most important thing is to pay attention to your baby s cues.  If your baby is always fussy, needs more iron or has certain food allergies, your physician may suggest you change your baby s formula to a different kind.     How do I warm my baby s bottles?  You may feed your baby a bottle without warming it first. It is OK for the breast milk or formula to be cool or room temperature. If your baby seems to prefer it warmed, you can put the filled bottle in a container of warm water and let it stand for a few minutes. Check the temperature of the liquid on your skin before feeding it to your baby; to be sure it isn t too hot. Do not heat bottles in the microwave. Microwaves heat food and liquids unevenly, and this can cause hot spots that can burn your baby.    How do I clean and sterilize bottles?  Sterilize bottles and nipples before you use them for the first time. You can do this by putting them in boiling water for 5 minutes. After that first time, you can wash them in hot and soapy water. Rinse them carefully to be sure there is no soap left on them. You can also wash them in the .    Breastfeeding/Chestfeeding Support  Contact  us  Breastfeeding/chestfeeding is the natural and healthy way for parents to feed their babies and provides the best source of nutrition in most cases to infants. Breast milk has health benefits for mom, too. The American Academy of Pediatrics recommends exclusively breastfeeding for 6 months for optimal growth and development and breastfeeding up to at least a year.  Benefits to baby:  Easy digestion, less diarrhea and constipation, breast milk is easy to digest.  Lots of bonding with parent.  Less likely to have asthma.  Less ear infections and respiratory infections.  Less likely to have Type 2 Diabetes.  Less likely to become obese.  Lower risk of Sudden Infant Death Syndrome (SIDS).  Benefits to breastfeeding/chestfeeding parent:  Helps with weight loss.  Lower risk of ovarian and breast cancer, Type 2 diabetes and heart disease.  /chestfed babies are easy to take on trips. Just grab the diapers and go!  Breastfeeding/chestfeeding saves money (no formula or bottle costs, fewer doctor bills and medication costs).  Ready to breastfeed/chestfeed anywhere, don t need to make and wash bottles.  Breastfeeding/chestfeeding is wonderful, but not always easy. Learning what to expect ahead of time and get support from a lactation professional. Check out the [facility] Breastfeeding Resource Guide for classes, drop in support groups, childbirth education, Doulas and clinic and hospital lactation services.     Early Childhood Family Education [place] [address] provides a free, drop-in class/breastfeeding support group, facilitated by a lactation consultant and .  During the group you can connect with other parents, weigh your baby, ask questions about feeding and baby development, and more.  You do not need to register.  Fall in-person meetings will begin on September 12, are for parents of babies from birth to 9 months, and will meet on Monday evenings from 6 - 7:15 pm in [facility]  "Site [address], which is at [address] [place] [address] also offers virtual group meetings with a lactation consultant/.  These take place on , from 11:30 am - 12:30 pm for parents of newborns to 3-month-olds, and from 4:15 - 5:15 for parents of 3 - 9 - month olds.  To get the meeting link contact Kavitha Merchant at [phone].    [facility] offers a free, drop-in breastfeeding support group facilitated by NICOLASA Sanchez.   at [facility] [address], [place].  A scale is available to check baby weights, if desired.  [place] also has a variety of new parent classes:  (cost for registration)  https://Gobbler/classes/    [facility] Lactation Lounge facilitated by NICOLASA Dumont:  Free, drop-in support group for breastfeeding, with baby scale available if needed.  Meets at [facility], second Tuesday of each month, 10 am - 12 pm.  Text [phone] for info.    [place] Support Group,  at 10:30 am   Run by NICOLASA Mcginnis of The Baby Whisperer Lactation Consultants   Go to The Baby Whisperer Lactation Consultants Facebook page, click on \"events\" for link:   Https://www.Spontacts.com/events/[email or web address]/    [facility] [place] breastfeeding support community:  free, drop-in breastfeeding support facilitated by Certified Lactation Counselors, open  and  from 1 pm - 5 pm.  In [place]:  [facility] [place], [facility] [facility]:   guidingstarwakota.org    [facility] Milk Hour,  at 2:30 pm    Run by NICOLASA Hope  Go to [facility] FB page and send message to get link   Https://www.Spontacts.com/healthfoundations/    Fredo Medrano:  http://www.[facility] Hospitalndas.org/    [place] offers a Lactation Lounge every Friday 12pm - 1pm, run by Fredo Cesar Leader.  Sign up via link at " Reonomy/cbe-lactation   https://www.Reonomy/cbe-lactation    [facility] is offering virtual support groups every Monday, 10:30 am - 12 pm, run by RN IBCLC: Https://www.facebook.com/events/[email or web address]/    Culturally-Specific Breastfeeding Support:     For Hmong Families:   The Hmong Breastfeeding Coalition is a wonderful support for [place] Hmong women who are breastfeeding.  They are best found on Facebook.    for Black families:    [employer] (WCG)colate Milk Club:  http://www.EnerG2.Tenantry Network/chocolate-milk-club/  Email: Mishel@CCBR-SYNARC    R.O.S.E. = Reaching our Sisters Everywhere  Http://www.breastfeedingrose.org/    Club Mom breastfeeding support for Black mothers:  Contact Rob Orr  Phone: [phone]   Email:  [email or web address].     Deb Castro  Phone: [phone]   Email:  [email or web address]@[email or web address]    Club Dad parent support for Black fathers:   Contact Johnny Whalen   Phone: [phone]   Email:  Mark@[email or web address]    For Native/Indigenous Families:    https://www.Nix Hydra.com/groups/nitgabriellasing.gimashkikinaan     Additional Resources:  La Leche League is an international, nonprofit, nonsectarian organization offering information, education, and support to mothers who want to breast-feed their babies. Local groups offer phone help and monthly meetings. Visit Vertica Systems.org or Darby Smart.org and us the  Find local support  drop down menu or click on the  Resources  tab.    [place] Breastfeeding Resources: 4-741-787-BABY (2229) toll free    National Breastfeeding Help Line trained breastfeeding peer counselors can help answer common breast-feeding questions by phone. Monday-Friday: English/Tanzanian  4-385- [phone] toll free, [phone] (TTY)    Virtual Breastfeeding Support:    During this time of isolation, breastfeeding families need even more community!  Here are some area organizations offering virtual support groups  "for breastfeeding:    [place] Support Group,  at 10:30 am   Run by NICOLASA Mcginnis of The Baby Whisperer Lactation Consultants   Go to The Baby Whisperer Lactation Consultants Facebook page and click on \"events\" for link   https://www.Cyvenio Biosystems.com/events/[email or web address]/  [facility] Milk Hour,  at 2:30 pm    Run by NICOLASA Hope   Go to [facility] FB page and send message to get link   https://www.Cyvenio Biosystems.Zjdg.cn/TechSkillsfoundations/  [facility] holding virtual meetings the first Tuesday of each month, 8-9 pm, and the   Third Saturday, 10 - 11 am.  Go to [facility] and [place] FB page; message to get link [email or web address]://www.Emerging Technology Center/LLLofGoldenLeonardo/?hc_location=[facility]  [place] offers a Lactation Lounge every Friday 12pm - 1pm, run by Shelly Mix [facility] Leader   Sign up via link at https://www.Nuve/cbe-lactation  [facility] is offering virtual support groups every Monday, 10:30 am - 12 pm, run by nurse IBCLC   Https://www.Cyvenio Biosystems.com/events/[email or web address]/    Prenatal Breastfeeding Classes:      Bloom[employer]rm[employer] is offering virtual breastfeeding and  care classes:  https://www.Nuve/education-workshops  BirthEd childbirth and breastfeeding education offering virtual prenatal breastfeeding classes  https://www.LT Technologiesedmn.com/workshops    Preparing for your baby:     [place] Screening Program  Http://www.health.[facility].mn.us/newbornscreening/  [place] newborns are tested soon after birth for more than 50 hidden, rare disorders, including hearing loss and critical congenital heart disease (CCHD). This site provides resources and information for families and providers.    When to call:   Appointment line and to get a hold of CNM in clinic Monday-Friday 8 am - 5 pm:  [phone].  There are some clinics with early start times (1st appointment 7:40 " am) and others with evening hours (last appointment 6:20 pm).  Most are typically open from 8 am to 5 pm.    CNM on call answering service: [phone].  Specify your hospital of choice and leave a brief message for CNM;  will then page CNM who is on call at your specified hospital and you should receive a call back with 15 minutes.  Be sure that your ringer is audible and that you can accept blocked calls so that we can get back in touch with you! This number should be reserved for urgent needs if during the day, before 8 am, after 5 pm, weekends, holidays.    Contact the on-call CNM with warning signs, such as:  vaginal bleeding   Vaginal discharge and itching or pain and burning during urination  Leg/calf pain or swelling on one side  severe abdominal pain  nausea and vomiting more than 4-5 times a day, or if you are unable to keep anything down  fever more than 100.4 degrees F.     Make plans for transportation and  as needed for when you are going to the hospital.    Ask your health care provider about vaccinations you may need following delivery. By now, you should have received a Tdap immunization to protect against pertussis or whooping cough. Fathers and family members who will be in close contact with the baby should also receive a Tdap shot at least two weeks before the expected birth of the baby if they have not had a Td (tetanus) shot for at least two years.    Tell your midwife or physician how you plan to feed your baby (breast or bottle), who you have chosen to do pediatric care for your baby, and if you have a boy, whether you have chosen to have him circumcised. You will need a car seat correctly installed in your vehicle to bring your baby home. As you start to set up the nursery at home for your baby, make sure the crib is safe. The mattress needs to fit snugly against the edges of the crib. If you can fit a soda can between the bars, they are too far apart and can allow the  baby's head to caught between them.    Learn about infant care and feeding, including information about infant CPR. We recommend that you put your baby to sleep on his or her back to reduce the chance of Sudden Infant Death Syndrome (SIDS). To maintain a healthy environment in which your child can grow, it's best to keep your home smoke-free. By preparing ahead, your transition into parenthood will go smoothly for you and your baby.    Your midwife will want to see you for a checkup 2 to 6 weeks after delivery.

## 2024-12-19 NOTE — PROGRESS NOTES
Feeling ok overall, some difficulty sleeping.  Ready for baby to come.  BH ctx, but nothing regular.  Active baby.  Denies VB/LOF.  Reviewed labor precautions/WTC.  GBS positive.

## 2024-12-23 ENCOUNTER — PRENATAL OFFICE VISIT (OUTPATIENT)
Dept: MIDWIFE SERVICES | Facility: CLINIC | Age: 29
End: 2024-12-23
Payer: COMMERCIAL

## 2024-12-23 VITALS
SYSTOLIC BLOOD PRESSURE: 115 MMHG | HEART RATE: 88 BPM | WEIGHT: 156.4 LBS | HEIGHT: 61 IN | OXYGEN SATURATION: 98 % | BODY MASS INDEX: 29.53 KG/M2 | DIASTOLIC BLOOD PRESSURE: 68 MMHG

## 2024-12-23 DIAGNOSIS — Z34.83 ENCOUNTER FOR SUPERVISION OF OTHER NORMAL PREGNANCY, THIRD TRIMESTER: Primary | ICD-10-CM

## 2024-12-23 DIAGNOSIS — B95.1 POSITIVE GBS TEST: ICD-10-CM

## 2024-12-23 PROCEDURE — 99207 PR PRENATAL VISIT: CPT | Performed by: MIDWIFE

## 2024-12-23 NOTE — PROGRESS NOTES
Subjective:   Bob is here by herself for a routine prenatal visit at 39w2d.  She has no concerns and is feeling well.   Appetite is normal. Interval weight gain is appropriate.   She is feeling baby move, irreg cramping/contractions, denies change in vaginal discharge. No leaking of fluid or bleeding.  Will have help from spouse postpartum. Is hoping to travel to see family after baby is born, maybe in the summer or in a few months. Has not seen her mother in almost 20 years!  Getting daily iron supplementation. Ready for baby to come.     Objective:  See flowsheet.    Assessment:  (Z34.83) Encounter for supervision of other normal pregnancy, third trimester  (primary encounter diagnosis)    (B95.1) Positive GBS test      Plan:  1.Additional testing needed: none  2.Anticipatory guidance, warning signs, when to call and CNM contact information reviewed.   3. Return to clinic in  1 weeks.     Flora Cortes DNP,APRN,CNM

## 2024-12-24 ENCOUNTER — HOSPITAL ENCOUNTER (OUTPATIENT)
Facility: CLINIC | Age: 29
Discharge: HOME | End: 2024-12-24
Attending: ADVANCED PRACTICE MIDWIFE | Admitting: ADVANCED PRACTICE MIDWIFE
Payer: COMMERCIAL

## 2024-12-24 ENCOUNTER — HOSPITAL ENCOUNTER (INPATIENT)
Facility: CLINIC | Age: 29
LOS: 2 days | Discharge: HOME | End: 2024-12-26
Attending: ADVANCED PRACTICE MIDWIFE | Admitting: ADVANCED PRACTICE MIDWIFE
Payer: COMMERCIAL

## 2024-12-24 VITALS — SYSTOLIC BLOOD PRESSURE: 117 MMHG | DIASTOLIC BLOOD PRESSURE: 68 MMHG | RESPIRATION RATE: 16 BRPM | TEMPERATURE: 98.5 F

## 2024-12-24 DIAGNOSIS — Z34.83 ENCOUNTER FOR SUPERVISION OF OTHER NORMAL PREGNANCY, THIRD TRIMESTER: Primary | ICD-10-CM

## 2024-12-24 PROBLEM — Z37.9 NORMAL LABOR: Status: ACTIVE | Noted: 2024-12-24

## 2024-12-24 PROBLEM — Z36.89 ENCOUNTER FOR TRIAGE IN PREGNANT PATIENT: Status: ACTIVE | Noted: 2024-12-24

## 2024-12-24 LAB
ABO + RH BLD: NORMAL
BLD GP AB SCN SERPL QL: NEGATIVE
HGB BLD-MCNC: 12 G/DL (ref 11.7–15.7)
SPECIMEN EXP DATE BLD: NORMAL

## 2024-12-24 PROCEDURE — 250N000011 HC RX IP 250 OP 636: Performed by: ADVANCED PRACTICE MIDWIFE

## 2024-12-24 PROCEDURE — 86780 TREPONEMA PALLIDUM: CPT | Performed by: ADVANCED PRACTICE MIDWIFE

## 2024-12-24 PROCEDURE — 99215 OFFICE O/P EST HI 40 MIN: CPT | Performed by: ADVANCED PRACTICE MIDWIFE

## 2024-12-24 PROCEDURE — 258N000003 HC RX IP 258 OP 636: Performed by: MIDWIFE

## 2024-12-24 PROCEDURE — 86900 BLOOD TYPING SEROLOGIC ABO: CPT | Performed by: ADVANCED PRACTICE MIDWIFE

## 2024-12-24 PROCEDURE — 258N000003 HC RX IP 258 OP 636: Performed by: ADVANCED PRACTICE MIDWIFE

## 2024-12-24 PROCEDURE — 36415 COLL VENOUS BLD VENIPUNCTURE: CPT | Performed by: ADVANCED PRACTICE MIDWIFE

## 2024-12-24 PROCEDURE — 250N000009 HC RX 250: Performed by: MIDWIFE

## 2024-12-24 PROCEDURE — 120N000001 HC R&B MED SURG/OB

## 2024-12-24 PROCEDURE — 85018 HEMOGLOBIN: CPT | Performed by: ADVANCED PRACTICE MIDWIFE

## 2024-12-24 RX ORDER — PENICILLIN G POTASSIUM 5000000 [IU]/1
5 INJECTION, POWDER, FOR SOLUTION INTRAMUSCULAR; INTRAVENOUS ONCE
Status: COMPLETED | OUTPATIENT
Start: 2024-12-24 | End: 2024-12-24

## 2024-12-24 RX ORDER — OXYTOCIN/0.9 % SODIUM CHLORIDE 30/500 ML
340 PLASTIC BAG, INJECTION (ML) INTRAVENOUS CONTINUOUS PRN
Status: DISCONTINUED | OUTPATIENT
Start: 2024-12-24 | End: 2024-12-25 | Stop reason: HOSPADM

## 2024-12-24 RX ORDER — IBUPROFEN 800 MG/1
800 TABLET, FILM COATED ORAL
Status: DISCONTINUED | OUTPATIENT
Start: 2024-12-24 | End: 2024-12-26 | Stop reason: HOSPADM

## 2024-12-24 RX ORDER — NALOXONE HYDROCHLORIDE 0.4 MG/ML
0.4 INJECTION, SOLUTION INTRAMUSCULAR; INTRAVENOUS; SUBCUTANEOUS
Status: DISCONTINUED | OUTPATIENT
Start: 2024-12-24 | End: 2024-12-25 | Stop reason: HOSPADM

## 2024-12-24 RX ORDER — METOCLOPRAMIDE 10 MG/1
10 TABLET ORAL EVERY 6 HOURS PRN
Status: DISCONTINUED | OUTPATIENT
Start: 2024-12-24 | End: 2024-12-25 | Stop reason: HOSPADM

## 2024-12-24 RX ORDER — MISOPROSTOL 200 UG/1
400 TABLET ORAL
Status: DISCONTINUED | OUTPATIENT
Start: 2024-12-24 | End: 2024-12-25 | Stop reason: HOSPADM

## 2024-12-24 RX ORDER — LOPERAMIDE HYDROCHLORIDE 2 MG/1
4 CAPSULE ORAL
Status: DISCONTINUED | OUTPATIENT
Start: 2024-12-24 | End: 2024-12-25 | Stop reason: HOSPADM

## 2024-12-24 RX ORDER — LIDOCAINE 40 MG/G
CREAM TOPICAL
Status: DISCONTINUED | OUTPATIENT
Start: 2024-12-24 | End: 2024-12-24 | Stop reason: HOSPADM

## 2024-12-24 RX ORDER — CARBOPROST TROMETHAMINE 250 UG/ML
250 INJECTION, SOLUTION INTRAMUSCULAR
Status: DISCONTINUED | OUTPATIENT
Start: 2024-12-24 | End: 2024-12-25 | Stop reason: HOSPADM

## 2024-12-24 RX ORDER — OXYTOCIN 10 [USP'U]/ML
10 INJECTION, SOLUTION INTRAMUSCULAR; INTRAVENOUS
Status: DISCONTINUED | OUTPATIENT
Start: 2024-12-24 | End: 2024-12-26 | Stop reason: HOSPADM

## 2024-12-24 RX ORDER — ONDANSETRON 2 MG/ML
4 INJECTION INTRAMUSCULAR; INTRAVENOUS EVERY 6 HOURS PRN
Status: DISCONTINUED | OUTPATIENT
Start: 2024-12-24 | End: 2024-12-25 | Stop reason: HOSPADM

## 2024-12-24 RX ORDER — OXYTOCIN/0.9 % SODIUM CHLORIDE 30/500 ML
1-24 PLASTIC BAG, INJECTION (ML) INTRAVENOUS CONTINUOUS
Status: DISCONTINUED | OUTPATIENT
Start: 2024-12-24 | End: 2024-12-25 | Stop reason: HOSPADM

## 2024-12-24 RX ORDER — ONDANSETRON 4 MG/1
4 TABLET, ORALLY DISINTEGRATING ORAL EVERY 6 HOURS PRN
Status: DISCONTINUED | OUTPATIENT
Start: 2024-12-24 | End: 2024-12-25 | Stop reason: HOSPADM

## 2024-12-24 RX ORDER — OXYTOCIN 10 [USP'U]/ML
10 INJECTION, SOLUTION INTRAMUSCULAR; INTRAVENOUS
Status: DISCONTINUED | OUTPATIENT
Start: 2024-12-24 | End: 2024-12-25 | Stop reason: HOSPADM

## 2024-12-24 RX ORDER — PROCHLORPERAZINE MALEATE 10 MG
10 TABLET ORAL EVERY 6 HOURS PRN
Status: DISCONTINUED | OUTPATIENT
Start: 2024-12-24 | End: 2024-12-25 | Stop reason: HOSPADM

## 2024-12-24 RX ORDER — SODIUM CHLORIDE, SODIUM LACTATE, POTASSIUM CHLORIDE, CALCIUM CHLORIDE 600; 310; 30; 20 MG/100ML; MG/100ML; MG/100ML; MG/100ML
INJECTION, SOLUTION INTRAVENOUS CONTINUOUS PRN
Status: DISCONTINUED | OUTPATIENT
Start: 2024-12-24 | End: 2024-12-25 | Stop reason: HOSPADM

## 2024-12-24 RX ORDER — METOCLOPRAMIDE HYDROCHLORIDE 5 MG/ML
10 INJECTION INTRAMUSCULAR; INTRAVENOUS EVERY 6 HOURS PRN
Status: DISCONTINUED | OUTPATIENT
Start: 2024-12-24 | End: 2024-12-25 | Stop reason: HOSPADM

## 2024-12-24 RX ORDER — CITRIC ACID/SODIUM CITRATE 334-500MG
30 SOLUTION, ORAL ORAL
Status: DISCONTINUED | OUTPATIENT
Start: 2024-12-24 | End: 2024-12-25 | Stop reason: HOSPADM

## 2024-12-24 RX ORDER — PENICILLIN G 3000000 [IU]/50ML
3 INJECTION, SOLUTION INTRAVENOUS EVERY 4 HOURS
Status: DISCONTINUED | OUTPATIENT
Start: 2024-12-24 | End: 2024-12-25 | Stop reason: HOSPADM

## 2024-12-24 RX ORDER — LOPERAMIDE HYDROCHLORIDE 2 MG/1
2 CAPSULE ORAL
Status: DISCONTINUED | OUTPATIENT
Start: 2024-12-24 | End: 2024-12-25 | Stop reason: HOSPADM

## 2024-12-24 RX ORDER — NALOXONE HYDROCHLORIDE 0.4 MG/ML
0.2 INJECTION, SOLUTION INTRAMUSCULAR; INTRAVENOUS; SUBCUTANEOUS
Status: DISCONTINUED | OUTPATIENT
Start: 2024-12-24 | End: 2024-12-25 | Stop reason: HOSPADM

## 2024-12-24 RX ORDER — TRANEXAMIC ACID 10 MG/ML
1 INJECTION, SOLUTION INTRAVENOUS EVERY 30 MIN PRN
Status: DISCONTINUED | OUTPATIENT
Start: 2024-12-24 | End: 2024-12-25 | Stop reason: HOSPADM

## 2024-12-24 RX ORDER — LIDOCAINE 40 MG/G
CREAM TOPICAL
Status: DISCONTINUED | OUTPATIENT
Start: 2024-12-24 | End: 2024-12-25 | Stop reason: HOSPADM

## 2024-12-24 RX ORDER — OXYTOCIN/0.9 % SODIUM CHLORIDE 30/500 ML
100-340 PLASTIC BAG, INJECTION (ML) INTRAVENOUS CONTINUOUS PRN
Status: DISCONTINUED | OUTPATIENT
Start: 2024-12-24 | End: 2024-12-26 | Stop reason: HOSPADM

## 2024-12-24 RX ORDER — METHYLERGONOVINE MALEATE 0.2 MG/ML
200 INJECTION INTRAVENOUS
Status: DISCONTINUED | OUTPATIENT
Start: 2024-12-24 | End: 2024-12-25 | Stop reason: HOSPADM

## 2024-12-24 RX ORDER — MISOPROSTOL 200 UG/1
800 TABLET ORAL
Status: DISCONTINUED | OUTPATIENT
Start: 2024-12-24 | End: 2024-12-25 | Stop reason: HOSPADM

## 2024-12-24 RX ORDER — KETOROLAC TROMETHAMINE 30 MG/ML
30 INJECTION, SOLUTION INTRAMUSCULAR; INTRAVENOUS
Status: DISCONTINUED | OUTPATIENT
Start: 2024-12-24 | End: 2024-12-26 | Stop reason: HOSPADM

## 2024-12-24 RX ADMIN — SODIUM CHLORIDE, POTASSIUM CHLORIDE, SODIUM LACTATE AND CALCIUM CHLORIDE 1000 ML: 600; 310; 30; 20 INJECTION, SOLUTION INTRAVENOUS at 18:15

## 2024-12-24 RX ADMIN — SODIUM CHLORIDE, POTASSIUM CHLORIDE, SODIUM LACTATE AND CALCIUM CHLORIDE: 600; 310; 30; 20 INJECTION, SOLUTION INTRAVENOUS at 22:59

## 2024-12-24 RX ADMIN — PENICILLIN G 3 MILLION UNITS: 3000000 INJECTION, SOLUTION INTRAVENOUS at 22:26

## 2024-12-24 RX ADMIN — Medication 1 MILLI-UNITS/MIN: at 22:58

## 2024-12-24 RX ADMIN — PENICILLIN G POTASSIUM 5 MILLION UNITS: 5000000 POWDER, FOR SOLUTION INTRAMUSCULAR; INTRAPLEURAL; INTRATHECAL; INTRAVENOUS at 18:19

## 2024-12-24 ASSESSMENT — ACTIVITIES OF DAILY LIVING (ADL)
ADLS_ACUITY_SCORE: 16
ADLS_ACUITY_SCORE: 42
ADLS_ACUITY_SCORE: 16

## 2024-12-24 NOTE — PROGRESS NOTES
Outpatient Note:    Patient Name:  Bob Olvera  :      1995  MRN:      [id number]      Assessment:   39w3d   Rule out early vs false labor   No cervical change  GBS positive    Plan:   -discussed early labor status, current labor pattern and fetal position. Offered observation here or discharge to home until labor progress; elects discharge home.  - Discharge to home undelivered. Reviewed warning signs including decreased fetal movement, leaking of fluid, vaginal bleeding, or signs of labor. Reviewed how to contact on-call CNM. Follow-up in clinic with CNM as scheduled or sooner as needed. All questions answered. Agrees with plan.     Subjective:  Bob Olvera is a 29 year old  at 39w3d , with an EDC of Estimated Date of Delivery: Dec 28, 2024  who presented to [facility] for evaluation of labor. Notes regular Ucs started at 0600, some seem stronger than others, has had increased mucousy discharge but no leaking fluid. Noted clear with pink streaks of mucous on underwear without saturation of cloth. Denies leaking of fluid, bleeding, or changes in fetal movement.     Prenatal record reviewed. Followed by [facility] with consistent AP care starting at 11 weeks GA. Noted risk factors:   Patient Active Problem List   Diagnosis    Screening for malignant neoplasm of cervix    Encounter for supervision of other normal pregnancy, third trimester    History of gestational diabetes    Anemia during pregnancy in first trimester    Elevated hemoglobin A1c    Iron deficiency anemia secondary to inadequate dietary iron intake    Impaired glucose in pregnancy, antepartum    Positive GBS test     .     Objective:  Vital signs:          Electronic Fetal Monitoring:  Baseline rate 135, normal  Variability moderate  Accelerations present  Decelerations not present    Assessment: Category I EFM interpretation suggests absence of concern for fetal metabolic acidemia at  this time due to accelerations present, decelerations absent, heart rate: normal baseline, and variability: moderate    Uterine Activity irregular.    Strip reviewed at bedside    Uterine contractions: regular q 6-12 minutes    Physical Exam:   Abdomen: SIUP, vertex by Leopold's, abdomen non-tender  SVE: 1/40%/-3 posterior soft, scant streaks of blood in mucous  Sterile speculum exam: deferred  Legs: no edema, moves all freely    Repeat exam at 1400: no change, palpated intact BOW, sutures palpated, with UC during exam      No intake or output data in the 24 hours ending 12/24/24 1302    Results:    No results found for this or any previous visit (from the past 24 hours).    Provider: Dena Troy, ANIKET, APRN, CNM    30 minutes on the date of the encounter doing chart review, review of test results, interpretation of tests, patient visit, documentation, and physical exam        Date:  12/24/2024  Time:  1:02 PM

## 2024-12-25 PROBLEM — Z37.9 NORMAL LABOR: Status: RESOLVED | Noted: 2024-12-24 | Resolved: 2024-12-25

## 2024-12-25 PROBLEM — Z36.89 ENCOUNTER FOR TRIAGE IN PREGNANT PATIENT: Status: RESOLVED | Noted: 2024-12-24 | Resolved: 2024-12-25

## 2024-12-25 LAB — T PALLIDUM AB SER QL: NONREACTIVE

## 2024-12-25 PROCEDURE — 250N000013 HC RX MED GY IP 250 OP 250 PS 637: Performed by: MIDWIFE

## 2024-12-25 PROCEDURE — 59400 OBSTETRICAL CARE: CPT | Performed by: MIDWIFE

## 2024-12-25 PROCEDURE — 250N000009 HC RX 250: Performed by: ADVANCED PRACTICE MIDWIFE

## 2024-12-25 PROCEDURE — 250N000013 HC RX MED GY IP 250 OP 250 PS 637: Performed by: ADVANCED PRACTICE MIDWIFE

## 2024-12-25 PROCEDURE — 999N000016 HC STATISTIC ATTENDANCE AT DELIVERY

## 2024-12-25 PROCEDURE — 722N000001 HC LABOR CARE VAGINAL DELIVERY SINGLE

## 2024-12-25 PROCEDURE — 120N000001 HC R&B MED SURG/OB

## 2024-12-25 PROCEDURE — 0HQ9XZZ REPAIR PERINEUM SKIN, EXTERNAL APPROACH: ICD-10-PCS | Performed by: MIDWIFE

## 2024-12-25 RX ORDER — MODIFIED LANOLIN
OINTMENT (GRAM) TOPICAL
Status: DISCONTINUED | OUTPATIENT
Start: 2024-12-25 | End: 2024-12-26 | Stop reason: HOSPADM

## 2024-12-25 RX ORDER — OXYCODONE HYDROCHLORIDE 5 MG/1
5 TABLET ORAL EVERY 4 HOURS PRN
Status: DISCONTINUED | OUTPATIENT
Start: 2024-12-25 | End: 2024-12-26 | Stop reason: HOSPADM

## 2024-12-25 RX ORDER — BISACODYL 10 MG
10 SUPPOSITORY, RECTAL RECTAL DAILY PRN
Status: DISCONTINUED | OUTPATIENT
Start: 2024-12-25 | End: 2024-12-26 | Stop reason: HOSPADM

## 2024-12-25 RX ORDER — NALOXONE HYDROCHLORIDE 0.4 MG/ML
0.2 INJECTION, SOLUTION INTRAMUSCULAR; INTRAVENOUS; SUBCUTANEOUS
Status: DISCONTINUED | OUTPATIENT
Start: 2024-12-25 | End: 2024-12-26 | Stop reason: HOSPADM

## 2024-12-25 RX ORDER — IBUPROFEN 800 MG/1
800 TABLET, FILM COATED ORAL EVERY 6 HOURS PRN
Status: DISCONTINUED | OUTPATIENT
Start: 2024-12-25 | End: 2024-12-26 | Stop reason: HOSPADM

## 2024-12-25 RX ORDER — NALOXONE HYDROCHLORIDE 0.4 MG/ML
0.4 INJECTION, SOLUTION INTRAMUSCULAR; INTRAVENOUS; SUBCUTANEOUS
Status: DISCONTINUED | OUTPATIENT
Start: 2024-12-25 | End: 2024-12-26 | Stop reason: HOSPADM

## 2024-12-25 RX ORDER — HYDROCORTISONE 25 MG/G
CREAM TOPICAL 3 TIMES DAILY PRN
Status: DISCONTINUED | OUTPATIENT
Start: 2024-12-25 | End: 2024-12-26 | Stop reason: HOSPADM

## 2024-12-25 RX ORDER — DOCUSATE SODIUM 100 MG/1
100 CAPSULE, LIQUID FILLED ORAL DAILY
Status: DISCONTINUED | OUTPATIENT
Start: 2024-12-25 | End: 2024-12-26 | Stop reason: HOSPADM

## 2024-12-25 RX ORDER — ACETAMINOPHEN 325 MG/1
650 TABLET ORAL EVERY 4 HOURS PRN
Status: DISCONTINUED | OUTPATIENT
Start: 2024-12-25 | End: 2024-12-26 | Stop reason: HOSPADM

## 2024-12-25 RX ORDER — ONDANSETRON 4 MG/1
4 TABLET, FILM COATED ORAL EVERY 6 HOURS PRN
Status: DISCONTINUED | OUTPATIENT
Start: 2024-12-25 | End: 2024-12-26 | Stop reason: HOSPADM

## 2024-12-25 RX ADMIN — Medication 340 ML/HR: at 00:22

## 2024-12-25 RX ADMIN — IBUPROFEN 800 MG: 800 TABLET, FILM COATED ORAL at 03:31

## 2024-12-25 RX ADMIN — ACETAMINOPHEN 650 MG: 325 TABLET ORAL at 14:31

## 2024-12-25 RX ADMIN — ACETAMINOPHEN 650 MG: 325 TABLET ORAL at 01:26

## 2024-12-25 RX ADMIN — ACETAMINOPHEN 650 MG: 325 TABLET ORAL at 21:20

## 2024-12-25 RX ADMIN — DOCUSATE SODIUM 100 MG: 100 CAPSULE, LIQUID FILLED ORAL at 10:38

## 2024-12-25 RX ADMIN — LIDOCAINE HYDROCHLORIDE 20 ML: 10 INJECTION, SOLUTION EPIDURAL; INFILTRATION; INTRACAUDAL; PERINEURAL at 00:31

## 2024-12-25 RX ADMIN — IBUPROFEN 800 MG: 800 TABLET, FILM COATED ORAL at 11:12

## 2024-12-25 RX ADMIN — BENZOCAINE AND LEVOMENTHOL: 200; 5 SPRAY TOPICAL at 06:39

## 2024-12-25 ASSESSMENT — ACTIVITIES OF DAILY LIVING (ADL)
ADLS_ACUITY_SCORE: 16
ADLS_ACUITY_SCORE: 22
ADLS_ACUITY_SCORE: 22
ADLS_ACUITY_SCORE: 16
ADLS_ACUITY_SCORE: 22
ADLS_ACUITY_SCORE: 16
ADLS_ACUITY_SCORE: 22
ADLS_ACUITY_SCORE: 16
ADLS_ACUITY_SCORE: 22
ADLS_ACUITY_SCORE: 16

## 2024-12-25 NOTE — PROGRESS NOTES
Labor Progress Note:    Patient Name:  Bob Olvera  :      1995  MRN:      [id number]      Assessment:    SIUP at 39 3/7 weeks  Active labor with dysfunctional labor pattern  SROM x 4.5 hours  Cat 2 FHT  GBS positive- 2nd dose of abx infusing now      Plan:   Initiate low dose oxytocin per protocol.  Continuous EFM per protocol. Anticipate NSVB.    Subjective:  Bob Olvera is coping well with contractions, is frustrated with slow progress.  Has tolerated multiple position changes including hands/knees, squatting, side lying release x 2, welchers position, currently sitting on birthing stool.  Feeling a lot of pressure and significant low back pain with contractions.  Continuous low back pressure applied does give relief.  Continues to drink fluids PO.  Consents to IV fluid flush with Cat 2 FHT.  Urinating without difficulty.  Discussed augmentation of labor with pitocin.  Initially after discussion of risks, benefits, alternatives, she declines as she has anxiety about pitocin after her experience during her 3rd birth with pitocin and fetal distress.  She discussed further with her , and at 22:30 she requests initiation of pitocin augmentation, via low dose protocol.        Objective:  /57   Temp 98  F (36.7  C) (Oral)   Resp 18   LMP 2024 (Exact Date)     FHR: 135, moderate variability, accels present, intermittent variables    Uterine contractions: every 1-10 minutes, has a periods of 4 contractions every minute and then no contractions for 10 minutes.    SVE:  8+ cm/85%/0/mid/soft and stretchy      Provider: NALLELY Munoz, SILVA    Date:  2024  Time:  10:33 PM    Total time spent with patient:180 min, >50% time spent counseling and coordination of care.

## 2024-12-25 NOTE — PLAN OF CARE
Problem: Breastfeeding  Goal: Effective Breastfeeding  12/25/2024 0315 by Kallie Peerz, RN  Outcome: Progressing  12/25/2024 0314 by Kallie Perez RN  Outcome: Progressing     Problem: Postpartum (Vaginal Delivery)  Goal: Effective Urinary Elimination  12/25/2024 0315 by Kallie Perez RN  Outcome: Progressing  12/25/2024 0314 by Kallie Perez RN  Outcome: Progressing     Problem: Postpartum (Vaginal Delivery)  Goal: Optimal Pain Control and Function  12/25/2024 0315 by Kallie Perez RN  Outcome: Progressing  12/25/2024 0314 by Kallie Perez RN  Outcome: Progressing   Goal Outcome Evaluation:         Pt recovering well. Pt reports right-middle low side cramping. Heat pack given to help, alongside ibuprofen and tylenol for pain relief. Baby breastfeeding on demand every 1-2 hours. Pain controlled with ibuprofen and tylenol. Up independently/SBA, voiding without difficulty. Postpartum assessment WNL, see flowsheets for more information. Significant other at bedside, supportive. Caregiver education and support on-going.    Kallie Perez, RN

## 2024-12-25 NOTE — PROGRESS NOTES
"Labor Progress Note:    Patient Name:  Bob Olvera  :      1995  MRN:      [id number]      Assessment:    SIUP at 39 3/7 weeks  Cat 1 and Cat 2 FHT  Active labor  Dysfunctional labor   SROM x 2 hours, clear fluid  GBS positive x 1 dose abx    Plan:   Continue position changes.  Continue PO hydration.  IV abx as indicated for GBS positive status.  Revisit pitocin augmentation in 1 hour if no change in contraction pattern or maternal symptoms.  Anticipate NSVB.     Subjective:  Bob Olvera is coping well with contractions.  Has tried semifowlers position, standing, squatting and now hands/knees.  Feeling increased pain in low back.  Contractions initially were every 4-5 minutes, now are every 10 minutes.  She is feeling pelvic pressure with contractions, but not between.  She continues to leak clear fluid.  Offered VE to assess labor, Bob consents and discussed no change in cervix since admission.  Offered augmentation with pitocin, Bob requests to wait \"a bit more time\".        Objective:  BP 94/49   Temp 98.1  F (36.7  C) (Oral)   Resp 15   LMP 2024 (Exact Date)     FHR: 135, moderate variability.  Periods of Cat 2 with occasional variables, with resolution to Cat 1 now.      Uterine contractions: every 10 minutes, strong to palpation x  seconds.      SVE:  6 cm/70%/-1/soft/posterior cervix      Provider:  NALLELY Munoz CNM    Date:  2024  Time:  8:08 PM    Total time spent with patient:120 minutes, >50% time spent counseling and coordination of care.     "

## 2024-12-25 NOTE — PLAN OF CARE
Goal Outcome Evaluation:    Pt VSS. Pt voiding in adequate amounts.  Taking PRN pain meds as requests.  at bedside.

## 2024-12-25 NOTE — H&P
Date: 2024  Time: 6:08 PM    Admission H&P  Bob Olvera,  1995, MRN [id number]    [facility]   Encounter for triage in pregnant patient  Encounter for supervision of other normal pregnancy, third trimester    PCP: Dena Troy, [phone]          Extended Emergency Contact Information  Primary Emergency Contact: Duy Caruso  Address: [address]           [id number]           [place] [address] [place] [employer]  Mobile Phone: [phone]  Relation: Spouse  Secondary Emergency Contact: Monet Lantigua  Address: [id number]           [place] [address] [place]  Mobile Phone: [phone]  Relation: Cousin       Chief Complaint: Normal labor  amniotic fluid leaking       HPI:      Bob Olvera, is a 29 year old,  at 39w3d, LMP Patient's last menstrual period was 2024 (exact date).  , Estimated Date of Delivery: Dec 28, 2024, admitted to [facility] on 2024 at 1800 secondary to: onset of contractions @ 0600, increasing in intensity following SROM @ 1715 for clear fluid.     Prenatal History:  Bob Olvera began care with [facility] the  [facility] on 2024 at 11 weeks gestation with regular care thereafter for a total of 12 visits. Her care was uncomplicated.      Pre-pregnant weight:  125 lbs   Pre-pregnant BMI: 23    Total weight gain:  31 lbs    Labs:  Prenatal Office Visit on 2024   Component Date Value Ref Range Status    Group B Strep PCR 2024 Positive (A)  Negative Final    ALERT: Streptococcus agalactiae (Group B Streptococcus) has a high rate of resistance to clindamycin. Therefore, clindamycin is not recommended for treatment unless susceptibility testing has been performed.    Hemoglobin 2024 10.8 (L)  11.7 - 15.7 g/dL Final    Ferritin 2024 21  6 - 175 ng/mL Final    Iron 2024 59  37 - 145 ug/dL Final    Iron Binding Capacity 2024 396  240  - 430 ug/dL Final    Iron Sat Index 12/02/2024 15  15 - 46 % Final    WBC Count 12/02/2024 5.7  4.0 - 11.0 10e3/uL Final    RBC Count 12/02/2024 4.22  3.80 - 5.20 10e6/uL Final    Hemoglobin 12/02/2024 11.5 (L)  11.7 - 15.7 g/dL Final    Hematocrit 12/02/2024 35.7  35.0 - 47.0 % Final    MCV 12/02/2024 85  78 - 100 fL Final    MCH 12/02/2024 27.3  26.5 - 33.0 pg Final    MCHC 12/02/2024 32.2  31.5 - 36.5 g/dL Final    RDW 12/02/2024 13.7  10.0 - 15.0 % Final    Platelet Count 12/02/2024 200  150 - 450 10e3/uL Final   Prenatal Office Visit on 11/08/2024   Component Date Value Ref Range Status    Color Urine 11/08/2024 Yellow  Colorless, Straw, Light Yellow, Yellow Final    Appearance Urine 11/08/2024 Cloudy (A)  Clear Final    Glucose Urine 11/08/2024 Negative  Negative mg/dL Final    Bilirubin Urine 11/08/2024 Negative  Negative Final    Ketones Urine 11/08/2024 Negative  Negative mg/dL Final    Specific Gravity Urine 11/08/2024 1.020  1.005 - 1.030 Final    Blood Urine 11/08/2024 Negative  Negative Final    pH Urine 11/08/2024 7.0  5.0 - 8.0 Final    Protein Albumin Urine 11/08/2024 Negative  Negative mg/dL Final    Urobilinogen Urine 11/08/2024 0.2  0.2, 1.0 E.U./dL Final    Nitrite Urine 11/08/2024 Negative  Negative Final    Leukocyte Esterase Urine 11/08/2024 Moderate (A)  Negative Final    Culture 11/08/2024 <10,000 CFU/mL Mixture of Urogenital Rozina   Final    Hemoglobin 11/08/2024 11.0 (L)  11.7 - 15.7 g/dL Final   Prenatal Office Visit on 10/11/2024   Component Date Value Ref Range Status    Treponema Antibody Total 10/11/2024 Nonreactive  Nonreactive Final    WBC Count 10/11/2024 7.3  4.0 - 11.0 10e3/uL Final    RBC Count 10/11/2024 4.11  3.80 - 5.20 10e6/uL Final    Hemoglobin 10/11/2024 10.7 (L)  11.7 - 15.7 g/dL Final    Hematocrit 10/11/2024 34.1 (L)  35.0 - 47.0 % Final    MCV 10/11/2024 83  78 - 100 fL Final    MCH 10/11/2024 26.0 (L)  26.5 - 33.0 pg Final    MCHC 10/11/2024 31.4 (L)  31.5 - 36.5  g/dL Final    RDW 10/11/2024 16.9 (H)  10.0 - 15.0 % Final    Platelet Count 10/11/2024 193  150 - 450 10e3/uL Final   Lab on 09/20/2024   Component Date Value Ref Range Status    Gestational GTT Fasting 09/20/2024 87  60 - 94 mg/dL Final    Gestational GTT 1 Hr Post Dose 09/20/2024 181 (H)  60 - 179 mg/dL Final    Gestational GTT 2 Hr Post Dose 09/20/2024 118  60 - 154 mg/dL Final    Gestational GTT 3 Hr Post Dose 09/20/2024 95  60 - 139 mg/dL Final   Prenatal Office Visit on 09/13/2024   Component Date Value Ref Range Status    Glu Gest Screen 1hr 50g 09/13/2024 131 (H)  70 - 129 mg/dL Final    WBC Count 09/13/2024 7.5  4.0 - 11.0 10e3/uL Final    RBC Count 09/13/2024 4.00  3.80 - 5.20 10e6/uL Final    Hemoglobin 09/13/2024 10.1 (L)  11.7 - 15.7 g/dL Final    Hematocrit 09/13/2024 32.4 (L)  35.0 - 47.0 % Final    MCV 09/13/2024 81  78 - 100 fL Final    MCH 09/13/2024 25.3 (L)  26.5 - 33.0 pg Final    MCHC 09/13/2024 31.2 (L)  31.5 - 36.5 g/dL Final    RDW 09/13/2024 19.6 (H)  10.0 - 15.0 % Final    Platelet Count 09/13/2024 212  150 - 450 10e3/uL Final   Prenatal Office Visit on 08/09/2024   Component Date Value Ref Range Status    Hemoglobin 08/09/2024 8.5 (L)  11.7 - 15.7 g/dL Final   Prenatal Office Visit on 07/12/2024   Component Date Value Ref Range Status    Glu Gest Screen 1hr 50g 07/12/2024 101  70 - 129 mg/dL Final    Hemoglobin 07/12/2024 7.9 (LL)  11.7 - 15.7 g/dL Final    Platelet Assessment 07/12/2024 Automated Count Confirmed. Platelet morphology is normal.  Automated Count Confirmed. Platelet morphology is normal. Final    Elliptocytes 07/12/2024 Slight (A)  None Seen Final    RBC Morphology 07/12/2024 Confirmed RBC Indices   Final   Prenatal Office Visit on 06/14/2024   Component Date Value Ref Range Status    Hepatitis B Surface Antigen 06/14/2024 Nonreactive  Nonreactive Final    WBC Count 06/14/2024 6.4  4.0 - 11.0 10e3/uL Final    RBC Count 06/14/2024 4.18  3.80 - 5.20 10e6/uL Final     Hemoglobin 06/14/2024 9.0 (L)  11.7 - 15.7 g/dL Final    Hematocrit 06/14/2024 29.2 (L)  35.0 - 47.0 % Final    MCV 06/14/2024 70 (L)  78 - 100 fL Final    MCH 06/14/2024 21.5 (L)  26.5 - 33.0 pg Final    MCHC 06/14/2024 30.8 (L)  31.5 - 36.5 g/dL Final    RDW 06/14/2024 16.6 (H)  10.0 - 15.0 % Final    Platelet Count 06/14/2024 270  150 - 450 10e3/uL Final    Rubella Tika IgG Instrument Value 06/14/2024 5.99  <0.90 Index Final    Rubella Antibody IgG 06/14/2024 Positive   Final    Suggests previous exposure or immunization and probable immunity.    HIV Antigen Antibody Combo 06/14/2024 Nonreactive  Nonreactive Final    Negative HIV-1 p24 antigen and HIV-1/2 antibody screening test results usually indicate the absence of HIV-1 and HIV-2 infection. However, such negative results do not rule-out acute HIV infection.  If acute HIV-1 or HIV-2 infection is suspected, detection of HIV-1 or HIV-2 RNA  is recommended.     Treponema Antibody Total 06/14/2024 Nonreactive  Nonreactive Final    Hepatitis C Antibody 06/14/2024 Nonreactive  Nonreactive Final    A nonreactive screening test result does not exclude the possibility of exposure to or infection with HCV. Nonreactive screening test results in individuals with prior exposure to HCV may be due to antibody levels below the limit of detection of this assay or lack of reactivity to the HCV antigens used in this assay. Patients with recent HCV infections (<3 months from time of exposure) may have false-negative HCV antibody results due to the time needed for seroconversion (average of 8 to 9 weeks).    Culture 06/14/2024 No Growth   Final    ABO/RH(D) 06/14/2024 AB POS   Final    Antibody Screen 06/14/2024 Negative  Negative Final    SPECIMEN EXPIRATION DATE 06/14/2024 [id number]   Final    Hemoglobin A1C 06/14/2024 6.2 (H)  <5.7 % Final    Normal <5.7%   Prediabetes 5.7-6.4%    Diabetes 6.5% or higher     Note: Adopted from ADA consensus guidelines.    Ferritin  06/14/2024 11  6 - 175 ng/mL Final    Iron 06/14/2024 34 (L)  37 - 145 ug/dL Final    Iron Binding Capacity 06/14/2024 391  240 - 430 ug/dL Final    Iron Sat Index 06/14/2024 9 (L)  15 - 46 % Final   Office Visit on 03/07/2024   Component Date Value Ref Range Status    Trichomonas 03/07/2024 Absent  Absent Final    Yeast 03/07/2024 Present (A)  Absent Final    Clue Cells 03/07/2024 Absent  Absent Final    WBCs/high power field 03/07/2024 4+ (A)  None Final    Chlamydia Trachomatis 03/07/2024 Negative  Negative Final    Negative for C. trachomatis rRNA by transcription mediated amplification.   A negative result by transcription mediated amplification does not preclude the presence of infection because results are dependent on proper and adequate collection, absence of inhibitors and sufficient rRNA to be detected.    Neisseria gonorrhoeae 03/07/2024 Negative  Negative Final    Negative for N. gonorrhoeae rRNA by transcription mediated amplification. A negative result by transcription mediated amplification does not preclude the presence of C. trachomatis infection because results are dependent on proper and adequate collection, absence of inhibitors and sufficient rRNA to be detected.    Color Urine 03/07/2024 Yellow  Colorless, Straw, Light Yellow, Yellow Final    Appearance Urine 03/07/2024 Clear  Clear Final    Glucose Urine 03/07/2024 Negative  Negative mg/dL Final    Bilirubin Urine 03/07/2024 Negative  Negative Final    Ketones Urine 03/07/2024 Negative  Negative mg/dL Final    Specific Gravity Urine 03/07/2024 1.032 (H)  1.001 - 1.030 Final    Blood Urine 03/07/2024 Negative  Negative Final    pH Urine 03/07/2024 7.0  5.0 - 7.0 Final    Protein Albumin Urine 03/07/2024 30 (A)  Negative mg/dL Final    Urobilinogen Urine 03/07/2024 <2.0  <2.0 mg/dL Final    Nitrite Urine 03/07/2024 Negative  Negative Final    Leukocyte Esterase Urine 03/07/2024 500 Connor/uL (A)  Negative Final    Mucus Urine 03/07/2024 Present  (A)  None Seen /LPF Final    RBC Urine 2024 2  <=2 /HPF Final    WBC Urine 2024 10 (H)  <=5 /HPF Final    Squamous Epithelials Urine 2024 5 (H)  <=1 /HPF Final    Culture 2024 10,000-50,000 CFU/mL Mixture of Urogenital Rozina   Final       Pertinent Radiology:  See imaging tab    OB HISTORY  OB History    Para Term  AB Living   5 4 4 0 0 4   SAB IAB Ectopic Multiple Live Births   0 0 0 0 4      # Outcome Date GA Lbr Mike/2nd Weight Sex Type Anes PTL Lv   5 Current            4 Term 10/26/21 40w2d / 00:06 3.78 kg (8 lb 5.3 oz) F Vag-Spont None N NIKI      Name: ELEONORA WILLSONEMILEJUWAN      Apgar1: 5  Apgar5: 9   3 Term 20 40w6d 13:08 / 00:09 4.03 kg (8 lb 14.2 oz) F Vag-Spont None N NIKI      Birth Comments: episiotomy, fetal intolerance      Complications: Dysfunctional Labor      Name: ELIZABETH AVILA      Apgar1: 2  Apgar5: 5   2 Term 18 39w0d 04:30 / 00:14 3.544 kg (7 lb 13 oz) M Vag-Spont None N NIKI      Birth Comments: 3rd degree lac      Complications: Fetal Intolerance      Name: Austin Avila      Apgar1: 7  Apgar5: 9   1 Term 16 42w0d 08:00 / 00:35 3.459 kg (7 lb 10 oz) F Vag-Spont None N NIKI      Birth Comments: PPH due to anterior/periclitoral and perineal lacerations. 2nd degree, BF x 8 months. No PPD.       Name: Cecile      Apgar1: 9  Apgar5: 9         Medical History  Past Medical History:   Diagnosis Date    Anemia affecting pregnancy 2021    9/10/2021 receiving iron infusions.  Formatting of this note might be different from the original. 21: Hgb 10.7:  Encouraged once daily iron supplementation. Consider hgb electrophoresis at next visit due to low MCV.    Encounter for screening for cervical cancer      NIL with [facility]    GERD (gastroesophageal reflux disease)     Gestational diabetes     2nd baby only         Surgical History  She  has a past surgical history that includes Stomach surgery (age 5) and Middle ear surgery (10/10,  "2/11).       Social History  Reviewed, and she  reports that she has never smoked. She has never been exposed to tobacco smoke. She has never used smokeless tobacco. She reports that she does not drink alcohol and does not use drugs.  Partner: Duy  Education level: college graduate  Occupation: [employer]      Family History  Reviewed, and family history includes No Known Problems in her brother, brother, brother, brother, daughter, daughter, daughter, father, maternal aunt, maternal uncle, mother, paternal aunt, paternal grandmother, paternal uncle, sister, sister, sister, sister, sister, and son.          No Known Allergies   Medications Prior to Admission   Medication Sig Dispense Refill Last Dose/Taking    cholecalciferol (VITAMIN D3) 25 mcg (1000 units) capsule Take 1 capsule by mouth daily       cyanocobalamin (VITAMIN B-12) 1000 MCG tablet Take 1 tablet (1,000 mcg) by mouth daily. 90 tablet 3     cyanocobalamin (VITAMIN B-12) 1000 MCG tablet Take 1 tablet (1,000 mcg) by mouth every other day. 30 tablet 3     ferrous sulfate 45 MG TBCR CR tablet Take 1 tablet (45 mg) by mouth every other day. 30 tablet 3     Prenatal Vit-Fe Fumarate-FA (PRENATAL MULTIVITAMIN  PLUS IRON) 27-1 MG TABS Take by mouth daily       vitamin C (ASCORBIC ACID) 250 MG tablet Take 2 tablets (500 mg) by mouth daily. 90 tablet 3     vitamin C (ASCORBIC ACID) 250 MG tablet Take 1 tablet (250 mg) by mouth every other day. 30 tablet 3         Review of Systems:  A comprehensive review of systems was negative.   Physical Exam:  Temp:  [98.5  F (36.9  C)] 98.5  F (36.9  C)  Resp:  [16] 16  BP: (117)/(68) 117/68    LMP 03/23/2024 (Exact Date)     Height: 5' 1\"  General appearance:  laboring  Psych: AAO x3  Skin: Pink, warm & dry  HEENT: unremarkable  Cardiovascular:  RRR, S1, S2, no extra sounds or murmurs  Respiratory:  breath sounds CTA bilaterally, anteriorly and posteriorly  Abdomen: soft, NT, gravid  Leopolds: vertex         EFW:<4500 grams " (AGA)     FHR:Baseline: 145 bpm, Variability: Moderate (6 - 25 bpm), Accelerations: absent and Decelerations: Variable: stas to 120    Uterine contractions: Frequency: Every 2-3 minutes, Duration: 90 seconds and Intensity: strong    SVE:7/75/-1 mid soft; sutures palpated    Extremeties: no edema moves all freely      Membrane Status: SROM  clear amniotic fluid, leaking fluid visualized with exam        Notable AP/IP factors to date:  1.)anemia in pregnancy requiring IV iron infusion therapy  2.)Elevated hgb A1C with normal GCT screening; history GDM  3.)GBS positive status  4.) Grandmultiparity      Impression:  Bob Olvera is a 29 year old year old at 39w3d weeks, Category II FHTs, moderate variability.   ROM x 1 hour  GBS positive status     Plan:  [facility]  2.  Encourage position changes  3.  Labor support PRN.  4.  continuous fetal monitoring until category I  5.  Anticipate Spontaneous vaginal birth  6. GBS prophylaxis per protocol  7. Plan active management 3rd stage for PPH risk of grand multiparity    Total time with patient:  30 minutes at bedside and in the [facility] obtaining and clarifying the above history, performing the physical exam, education and counseling and developing this plan of care.  >50% spent on counseling and coordination of care.    Provider: Dena Troy CNM, NALLELY, SILVA

## 2024-12-25 NOTE — L&D DELIVERY NOTE
OB Vaginal Delivery Note    Bob Olvera MRN# [id number]   Age: 29 year old YOB: 1995     Bob labored in multiple positions after consenting to pitocin augmentation at 22:30.  Pitocin reached a maximum of 2 mU.  Persistent rim on right side of cervix, FHT Cat 2 with baseline in 130 and variables to 70's with occasional contractions. Difficulty tracing FHT, Bob provided consent for IFSE.  This was placed without difficulty.  Contraction pattern improved to every 3-4 minutes, with occasional coupling.  Bob felt pressure, and spontaneous urge to push with vertex at +2.  Bob pushed with great effort in right tilt, fetus pushed to crown and FHT audible in 70's.  NNP called for delivery.  Bob delivered the head with next contraction, tight nuchal cord noted with loop of cord below  shoulder.  Nuchal cord reduced, anterior shoulder delivered with ease and posterior shoulder delivered with ease.  Body of baby took great effort from Bob to birth who pushed twice for NSVB of female infant at 00:19.  Infant delivered directly to maternal abdomen with poor tone.  Tactile stimulation provided by RN, Cord immediately clamped x 2 and cut by CNM.  Infant transferred to warmer for NNP evaluation, with spontaneous lusty cry. Apgars 7/9. Placenta delivered via Ducan, appears grossly intact with 3VC. Trailing membranes were gently teased out with ring forceps.  Pitocin opened up. QBL 150cc. Fundus firm and midline.  Bleeding slowed after delivery of placenta.  Inspection reveals 1st degree laceration with edema of tissue, which was repaired under 1% lidocaine with 3-0 vicryl.  Bob and baby are recovering and stable at the beginning of the 4th stage.          GA: 39w4d  GP:   Labor Complications: None  EBL:   mL  Delivery QBL: 150 mL  Delivery Type: Vaginal, Spontaneous  ROM to Delivery Time: (Delivered) Hours: 7 Minutes: 3  [place] Weight: 4.06 kg (8 lb 15.2 oz)   1 Minute 5 Minute 10 Minute    Apgar Totals: 7   9        KALLIE OLIVEIRA;CATHY RACHEL;LEONA BHANDARI    Delivery Details:  Bob Olvera, a 29 year old  female delivered a viable infant with apgars of 7  and 9 . Patient was fully dilated and pushing after   hours   minutes in active labor. Delivery was via vaginal, spontaneous to a sterile field under local anesthesia. Infant delivered in   right occiput anterior position. Anterior and posterior shoulders delivered without difficulty. The cord was clamped, cut twice and 3 vessels were noted. Cord blood was obtained in routine fashion with the following disposition: discard.      Cord complications: nuchal  Placenta delivered at 2024 12:22 AM. Placental disposition was Hospital disposal. Fundal massage performed and fundus found to be firm.     Episiotomy: none   Perineum, vagina, cervix were inspected, and the following lacerations were noted:   Perineal lacerations: 1st               Any lacerations were repaired in the usual fashion using 3-0 vicryl.    Excellent hemostasis was noted. Needle count correct. Infant and patient in delivery room in good and stable condition.        Wade, Female-Bob [[id number]]      Rupture date/time: 24 1715   Rupture type: Spontaneous Rupture of Membranes  Fluid color: Clear     Augmentation: Oxytocin  Indications for augmentation: Ineffective Contraction Pattern       Delivery/Placenta Date and Time      Delivery Date: 24 Delivery Time: 12:18 AM   Placenta Date/Time: 2024 12:22 AM  Oxytocin given at the time of delivery: after delivery of baby  Delivering clinician: Jyothi Bermudez CNM   Other personnel present at delivery:  Provider Role   Kallie Oliveira, RN Delivery Nurse   Cathy Rachel, RN Registered Nurse   Leona Bhandari, RN Registered Nurse             Vaginal Counts       Initial count performed by 2 team members:  Two Team Members   Aidan Oliveira RN         Needles Suture  Needles Sponges (RETIRED) Instruments   Initial counts 1 1 5    Added to count       Relief counts       Final counts 1 1 5            Placed during labor Accounted for at the end of labor   FSE Yes Yes   IUPC No NA   Cervidil No NA                  Final count performed by 2 team members:  Two Team Members   Aidan Bhandari RN      Final count correct?: Yes  Pre-Birth Team Brief: Complete  Post-Birth Team Debrief: Complete       Apgars    Living status: Living   1 Minute 5 Minute 10 Minute 15 Minute 20 Minute   Skin color: 0  1       Heart rate: 2  2       Reflex irritability: 2  2       Muscle tone: 1  2       Respiratory effort: 2  2       Total: 7  9       Apgars assigned by: MIO BROWNING CNP       Cord      Vessels: 3 Vessels    Cord Complications: Nuchal   Nuchal Intervention: reduced         Nuchal cord description: tight nuchal cord         Cord Blood Disposition: Discard    Gases Sent?: No    Delayed cord clamping?: Yes    Cord Clamping Delay (seconds): 31-60 seconds    Stem cell collection?: No           Wright Resuscitation    Methods: Suctioning, Oximetry   Care at Delivery: Asked by JENA VERMA CNM to attend the delivery of this 39 4/7 week term, female secondary to fetal distress.  Infant was born vaginally at 0018 hours on 2024 in vertex presentation with spontaneous cry and respirations. Infant was placed on mothers abdomen for delayed cord clamping. Infant was brought to the radiant warmer, dried, stimulated and bulb suctioned.  Infant continued to be vigorous with strong cry, quickly becoming pink and well perfused. Pulse oximetry monitored, always in appropriate range for minutes of life and into 90's by 5 minutes of life. Infant required only bulb suctioning for resuscitation. Apgar scores were 7 and 9 at one and five minutes respectively. Exam was remarkable for LGA.     Infant remained with parents and  delivery staff.     Mio Magaña CNP on  2024 at 12:31 AM             Measurements      Weight: 8 lb 15.2 oz           Labor Events and Shoulder Dystocia    Fetal Tracing Prior to Delivery: Category 2  Shoulder dystocia present?: Neg       Delivery (Maternal) (Provider to Complete) ([id number])    Episiotomy: None  Perineal lacerations: 1st    Repair suture: 3-0 Chromic  Number of repair packets: 1  Genital tract inspection done: Pos       Blood Loss  Mother: Bob Olvera #[id number]     Start of Mother's Information      Delivery Blood Loss   Intrapartum & Postpartum: 248 - 24    Delivery Admission: 248 - 24         Intrapartum & Postpartum Delivery Admission    Delivery QBL (mL) Hospital Encounter 150 mL 150 mL    Total  150 mL 150 mL               End of Mother's Information  Mother: Bob Olvera #[id number]                Delivery - Provider to Complete ([id number])    Delivering clinician: Jyothi Bermudez CNM  Delivery Type (Choose the 1 that will go to the Birth History): Vaginal, Spontaneous                         Other personnel:  Provider Role   Kallie Perez, RN Delivery Nurse   Cathy Rachel RN Registered Nurse   Leona Bhandari RN Registered Nurse                    Placenta    Date/Time: 2024 12:22 AM  Removal: Spontaneous  Disposition: Hospital disposal             Anesthesia    Method: Local                    Presentation and Position      Position: Right Occiput Anterior                     Jyothi Bermudez CNM

## 2024-12-25 NOTE — PROGRESS NOTES
Vaginal Delivery Day of Delivery    Patient Name:  Bob Olvera  :      1995  MRN:      [id number]    Assessment:    Stable DOD. S/P   breastfeeding independently    Plan:   Routine PP cares today.  Rest and self cares discussed; RN reviewed recommendations to establish successful breastfeeding and also reviewed recommendations for timely scheduled pain management.  Plans 1 day stay with discharge tomorrow.      Subjective:  Bob has had visitors all day without break for rest. The baby girl is well and being fed by breast. Breastfeeding going well.  Bleeding light, no clots seen. Utilizing tylenol and ibuprofen but noting difficulty staying timely with dosing to avoid cramping with feeding. Also noting some nausea today. Voiding and ambulating without difficulty.      Objective:  BP 94/50 (BP Location: Left arm, Patient Position: Semi-Peralta's)   Pulse 72   Temp 98.4  F (36.9  C) (Oral)   Resp 16   LMP 2024 (Exact Date)   Breastfeeding Unknown     No Exam today.    Hemoglobin   Date Value Ref Range Status   2024 12.0 11.7 - 15.7 g/dL Final   ]     Immunization History   Administered Date(s) Administered    COVID-19 MONOVALENT 12+ (Pfizer) 2022    COVID-19 Monovalent 12+ (Pfizer ) 2022    Influenza Vaccine >6 months,quad, PF 10/23/2015, 12/15/2017, 10/08/2019, 2021    TDAP (Adacel,Boostrix) 2016, 2018, 2020, 2021, 10/11/2024         Provider:  Dena Troy, DNP, APRN, CNM  [facility]  Midwifery      Date:  2024  Time:  9:36 AM    Patient Name:  Bob Olvera  :  1995  MRN:  [id number]

## 2024-12-26 VITALS
HEART RATE: 69 BPM | DIASTOLIC BLOOD PRESSURE: 55 MMHG | BODY MASS INDEX: 26.91 KG/M2 | OXYGEN SATURATION: 98 % | WEIGHT: 142.4 LBS | RESPIRATION RATE: 16 BRPM | TEMPERATURE: 98.3 F | SYSTOLIC BLOOD PRESSURE: 112 MMHG

## 2024-12-26 PROCEDURE — 250N000013 HC RX MED GY IP 250 OP 250 PS 637: Performed by: MIDWIFE

## 2024-12-26 RX ORDER — IBUPROFEN 200 MG
800 TABLET ORAL EVERY 6 HOURS PRN
COMMUNITY
Start: 2024-12-26

## 2024-12-26 RX ORDER — ACETAMINOPHEN 325 MG/1
650 TABLET ORAL EVERY 4 HOURS PRN
COMMUNITY
Start: 2024-12-26

## 2024-12-26 RX ADMIN — ACETAMINOPHEN 650 MG: 325 TABLET ORAL at 08:13

## 2024-12-26 RX ADMIN — DOCUSATE SODIUM 100 MG: 100 CAPSULE, LIQUID FILLED ORAL at 08:13

## 2024-12-26 ASSESSMENT — ACTIVITIES OF DAILY LIVING (ADL)
ADLS_ACUITY_SCORE: 22
ADLS_ACUITY_SCORE: 27
ADLS_ACUITY_SCORE: 27
ADLS_ACUITY_SCORE: 22
ADLS_ACUITY_SCORE: 27
ADLS_ACUITY_SCORE: 27
ADLS_ACUITY_SCORE: 22

## 2024-12-26 NOTE — CARE PLAN
Assumed care of pt at 1100. Discharge orders in.     Data: Vital signs stable, assessments wnl.   Voiding independently.  Bleeding and fundus assessment wnl.  Instructed of signs/symptoms to look for and report per discharge instructions.   Discharge outcomes on care plan met.   Post-partum pain control plan reviewed.  Action: Review of care plan, teaching, and discharge instructions done. ID bands matched. PPMA completed.  Response: Mother states understanding of post-partum s&s of worsening condition. All questions about self-care addressed.

## 2024-12-26 NOTE — DISCHARGE INSTRUCTIONS
New Mother Care    Postpartum Appointment:  You should have your postpartum appointment at six weeksafter delivery.  If you had a  birth, you should have an appointment at two weeks with the physician who performed your surgery, and six weeks with your provider.       Lactation Appointment:   If you have questions or want to make an appointment call [phone].    Postpartum Changes:  You have experienced manyphysical and emotional changes during your pregnancy.  After delivery, you will notice other changes.  Here are some tips for common experiences after your baby is born.    Sign/Symptom Cause Suggestions Danger signals and when to call your provider   Mood Swings Hormonal changes, stress, fatigue Rest.  Ask for help. Eat a healthy diet and donot discontinue medications unless directed to by your provider. A feeling of sadness ordepression that lasts longer than a week, or not enough energy to care for yourself or your baby. If you feel like you may hurt yourself or someone else call 911   Headaches Hormonalchanges, stress, fatigue Tylenol(Acetaminophen) or Ibuprofen over the counter as directed. Drinkplenty of water each day. Rest. If headache does not go away after taking medicine and resting.If you have blurry or double vision.   Engorged Breasts Swelling with more fluid and breast milk production two to five days after delivery.  May occur whether or not you are breastfeeding. Heat or ice for comfort.  If breastfeeding, nurse frequently.  Use supportive bra without underwire.  Should improve in one to two days. Any hard, red, painful area in your breast that does not go away with massage or nursing your baby, along with a fever of 100.4 or bodychills   After pains/ Cramping Cramping of uterus, oftenwith nursing which stimulates the uterus to tighten.  Stronger with subsequent babies (second or more). Use non-aspirin pain reliever (ibuprofen or acetaminophen), especially before breastfeeding.  Empty  your bladder frequently (at least every 2 hours while awake). Abdomen that is tender to the touch (other than usual tenderness around a  incision) Fever or body chills with a temperature of 100.4 degreesor higher. Constant back pain, abdominal or pelvic pain.   Increased vaginal bleeding Too much physical activity; breastfeeding (due to uterine contractions). Rest more.  Avoid use of tampons.  Redness and amount of vaginal discharge (bleeding) decreases by three to four weeks after delivery.   Bright red bleeding that soaks through a pad in one hour or less, especially with clots of whitish tissue. Blood clots that are golf ball sized or larger. Bad smelling orgreenish vaginal discharge. Feeling faint.   Perineal discomfort and hemorrhoids Swelling from delivery; episiotomy or laceration (tearing); stitches. Use sanitary pads, not tampons. Ice, non-aspirin pain reliever, witch hazel pads, warm tub soaks, stool softener, high fiber diet,Kegel exercises. Stitches are absorbed.  Healing in two to three weeks.  Stitches that havebecome painful, red or  or have a pus-like discharge (with or without a fever)   Increased sweating and urinating Body losing extra fluid from pregnancy; hormonal shifts. Empty bladder more often, especially before breastfeeding; increase water intake; improves withinthree to four days. Pain or burning when passing urine, or not being able to empty your bladder   Constipation Change in abdominal pressure and swelling after delivery; hormonal changes. Increase fluids and fiber in diet; Metamucil or stool softener as needed.  Smooth move tea -Celestial tea that you can get over the counter    Skin coloring  Hair Thickness  Swelling Skindarkening and pregnancy rash due to hormonal changes; extra hair growth during pregnancy; increased fluids from pregnancy and birth causes swelling. Darker skin coloring and stretch marks with fade after delivery (no treatment needed).  Extra hair  will be lost in two to four months.  Pregnancy swelling resolves in oneto four weeks. Continue to hydrate.    Legpain/swelling/sciatica pain Fluid shifts, water retention and nerve irritation due to extra weightand pressure during pregnancy. It is common for your legs and feet to swell in the first few daysafter delivery. Continue drinking lots of water and limit high sale and sodium. Sciatica pain may continue after delivery. You may use heator ice, pain relievers and  position changes for comfort. Deep pain in your legs or behind your calf when, when you point your toes toward your nose. Areas of redness or warmthlike a sunburn, especially if there is a  painful bump along with it. Swelling that is markedly different from one leg to another.  If you feel short of breath, or cannot take a deep breath in without pain..   Postpartum Exercises:  These exercisesmay be started soon after delivery.  If you feel tired or uncomfortable, stop and try these exercises after resting.  Check for any separation in your stomach wall before doing exercises that involve twisting or stress onyour stomach muscles.  Place your fingers in the center of your upper abdomen and lift your head and shoulders up in a partial sit-up.  If you feel a separation in your muscle wall, it is too soon to do sit ups.   Tighten and relax your pelvicfloor muscles often.  Breathe deeply while laying on your back.  As you breathe out, lift just your head up and pull the sides of your stomach toward the middle with your hands.  Thenbreathe in as you put your head down.  This will help to close the separation of your stomach.  Tilt your pelvis back and forth.  Alternate this with full body stretches.  Move your feet back and forth, then in circular motions.   While lying on your back, slide your heels toward your hips and bend your knees one at a time,then together.  While lying flat on the floor, bend your knees and raise your legs one at a time.  When  the stomach wall separation has closed,you can progress to straight curl-ups, diagonal curl-ups, and side leg lifts.    ******(After a  Birth--- linked to the post-op C/S orders as a closed box to flow into the AVS>  Inaddition to the instruction after a vaginal delivery, follow these guidelines:  Check your incision each day for signs of infection: redness, drainage, warmth or discomfort.  Contact your midwife or OB who performed your surgery if you have any of these signs orheavy vaginal bleeding.  Check with your midwife or surgeon before taking tub baths.  You may start driving a car two weeks after delivery.  Gradually increase your physical activity and exercise level according to how comfortable or tired you feel.  During the first days afterdelivery, try deep breathing, bending and stretching your feet in up-and-down circular motions, extending and tightening your legs while crossed at the ankles, and doing isometric exercises while lying down.  Next, try pelvic lifts with bent knees, bending and straightening your knees separately and together.  After checking for the abdominal rectus (stomachwall) separation, advance to back arching, twisting to each side, and reaching for your knees with pillow support.  Straight curl-ups, diagonal curl-ups and side leg lifts can then be added.    ********(If Postpartum Anemia -  open box the provider would checkon the PP D/C orders to flow onto the AVS  Helpful tips to increase the iron levels in your blood:  Take prenatal vitamins as recommended and extra ironsupplement which you can buy over the counter (ferrous sulfate, ferrous gluconate, Slow Fe or slow release iron, Floradix, or liquid chlorophyll)  Consume a vitamin C source with supplement to improve absorption (citrusfruits, potatoes, tomatoes, broccoli, green leafy vegetables, strawberries or 500 mg table of vitamin C)  Avoid taking in dairy products or an antacid near the same time as you take your iron  supplement  If stomachupset occurs, try taking supplement immediately after a meal instead of on an empty stomach or take it less frequently, just don't stop taking it completely.  If constipation occurs take a stool softener or fibersupplement daily.    ******* (If Diabetes-  closed box to the PP Diabetes order set  Follow up per postpartum gestational diabetes order set    *******(If hypertensionin pregnancy--add link) Could also be a closed box on the new HTN orders. Byadding *** the provider would need to fill this blank in when they are first putting in this orderset. Since this is a superset, the orders could be in there awhile. Maybe this would be better as an open box on the PP D/Corders since the provider might have a better idea when the patient should follow up???   If you are on medication for high blood pressure or a new medicationwas started while you were in the hospital continue as directed by your provider. Follow up in ***. Call if severe headache, visual changes, severe abdominal pain, nausea, vomiting or chest pain.    *****(If history of mental health issues- can we add this linked to PP D/C): open box  Please call to check in with your provider at 2 weeks postpartum, or you can make an appointment to see your provider at 2 weeks postpartum.   Call if you are feelingsadness or depression that lasts longer than a week, or not enough energy to care for yourself or your baby at any time postpartum.   If you have any feeling of hurting yourself or feel like you may hurt someone call 911    Reminders  Healthy nutrition is still important for your recovery and breastfeeding.  Continue your prenatal vitamins if you are breastfeeding.  It is important for your health andyour baby's health to stay smoke-free.  Babies exposed to smoke are sick more often.  Family planni

## 2024-12-26 NOTE — PROGRESS NOTES
Outreach Note for [facility]    Bob Dalenatalia  [id number]  1995    Discharge follow-up plan discussed with patient, needs assessed. Pt requests all follow-up through clinic/physician, declines home care visit, unless medically indicated and ordered by physician, and declines follow-up phone call.   No further needs identified at this time.

## 2024-12-26 NOTE — LACTATION NOTE
This note was copied from a baby's chart.  Rounded on family for lactation support as lactation resource nurse.    Pt reported no issues with latching,  her 4 other kids with no issues.     Pt did not have home breast pump. With prescription, Spectra s2 was given to her. Fitted for flange sizes (24mm both sides).      Educated/reviewed signs of milk transfer with gentle tug at the breast, audible swallows and wet and soiled diapers per the education folder I & O.     Reviewed use of education folder for self learning, lactation and community support, indicators to call MD and maternal/family well being.    Provided education and a resource/teaching sheet with QR codes for video support/education for:  Hand expressing breastmilk  Achieving a Deep Asymmetrical Latch  Breastfeeding Positions  How to Choose a breast pump flange size   Side Lying paced bottle feeding (if supplementation is needed)  Application of a Nipple Shield  Treating Engorgement  Post Partum Support in the community

## 2024-12-26 NOTE — PLAN OF CARE
Goal Outcome Evaluation:      Plan of Care Reviewed With: patient, spouse    Overall Patient Progress: improvingOverall Patient Progress: improving    Outcome Evaluation: VSS, Eager to discharge home today. Denies questions or concerns at this time, would like baby to have a bath before discharge. Postpartum education ongoing.

## 2024-12-26 NOTE — PLAN OF CARE
Goal Outcome Evaluation:      Plan of Care Reviewed With: patient    Overall Patient Progress: improving      Problem: Postpartum (Vaginal Delivery)  Goal: Successful Parent Role Transition  Outcome: Adequate for Care Transition

## 2024-12-26 NOTE — DISCHARGE SUMMARY
HISTORY OF PRESENT ILLNESS  Humberto Ramirez is a 21 y.o. female. Thyroid Problem   The history is provided by the patient. This is a chronic problem. Episode onset: Presents for f/u of hypothyroidism. Recently, she started having increased anxiety and difficulty going to work which is c/w symptoms she had prior to her diagnosis of hypothyroidism. She takes synthroid 75mcg/day and denies missing any doses. The problem occurs constantly. The problem has been gradually worsening. Nothing aggravates the symptoms. Nothing relieves the symptoms. Review of Systems   Psychiatric/Behavioral: Negative for depression, hallucinations and suicidal ideas. The patient is nervous/anxious. Physical Exam   Constitutional: She appears well-developed and well-nourished. Psychiatric: She has a normal mood and affect. Her behavior is normal. Judgment and thought content normal.       ASSESSMENT and PLAN  Hypothyroidism:  Will recheck TSH, T4 at this visit. Physician Discharge Summary     Patient ID:  Bob Olvera  [id number]  29 year old  1995    Admit date: 2024    Discharge date and time: 2024     Admitting Physician: Dena Troy CNM     Discharge Physician: Flora Cortes DNP,APRN,CNM    Admission Diagnoses: Encounter for triage in pregnant patient  Encounter for supervision of other normal pregnancy, third trimester    Discharge Diagnoses: S/P     Admission Condition: good    Discharged Condition: good    Indication for Admission: normal labor    Hospital Course: uncomplicated    Consults: none    Discharge Exam:  Blood pressure (P) 111/66, pulse 69, temperature (P) 98.4  F (36.9  C), temperature source (P) Oral, resp. rate (P) 17, last menstrual period 2024, SpO2 98%, unknown if currently breastfeeding.   General appearance: alert  Abdomen: normal findings: soft, non-tender and fundus firm at U-1  Pelvic: perineum intact, lochia scant  Extremities: Homans sign is negative, no sign of DVT    Disposition: home    Patient Instructions:   Current Discharge Medication List        START taking these medications    Details   acetaminophen (TYLENOL) 325 MG tablet Take 2 tablets (650 mg) by mouth every 4 hours as needed for mild pain or fever (greater than or equal to 38 C /100.4 F (oral) or 38.5 C/ 101.4 F (core).).    Associated Diagnoses:  (normal spontaneous vaginal delivery)      ibuprofen (ADVIL/MOTRIN) 200 MG tablet Take 4 tablets (800 mg) by mouth every 6 hours as needed for other (cramping).    Associated Diagnoses:  (normal spontaneous vaginal delivery)           CONTINUE these medications which have NOT CHANGED    Details   cholecalciferol (VITAMIN D3) 25 mcg (1000 units) capsule Take 1 capsule by mouth daily      !! cyanocobalamin (VITAMIN B-12) 1000 MCG tablet Take 1 tablet (1,000 mcg) by mouth daily.  Qty: 90 tablet, Refills: 3    Associated Diagnoses: Iron deficiency anemia secondary to inadequate dietary iron intake       !! cyanocobalamin (VITAMIN B-12) 1000 MCG tablet Take 1 tablet (1,000 mcg) by mouth every other day.  Qty: 30 tablet, Refills: 3    Associated Diagnoses: Iron deficiency anemia secondary to inadequate dietary iron intake; Anemia during pregnancy in first trimester      ferrous sulfate 45 MG TBCR CR tablet Take 1 tablet (45 mg) by mouth every other day.  Qty: 30 tablet, Refills: 3    Associated Diagnoses: Iron deficiency anemia secondary to inadequate dietary iron intake; Anemia during pregnancy in first trimester      Prenatal Vit-Fe Fumarate-FA (PRENATAL MULTIVITAMIN  PLUS IRON) 27-1 MG TABS Take by mouth daily      !! vitamin C (ASCORBIC ACID) 250 MG tablet Take 2 tablets (500 mg) by mouth daily.  Qty: 90 tablet, Refills: 3    Associated Diagnoses: Iron deficiency anemia secondary to inadequate dietary iron intake      !! vitamin C (ASCORBIC ACID) 250 MG tablet Take 1 tablet (250 mg) by mouth every other day.  Qty: 30 tablet, Refills: 3    Associated Diagnoses: Iron deficiency anemia secondary to inadequate dietary iron intake; Anemia during pregnancy in first trimester       !! - Potential duplicate medications found. Please discuss with provider.        Activity: activity as tolerated  Diet: regular diet  Wound Care: none needed    Follow-up with CNM in  2 and 6 weeks      Signed:  Flora Cortes DNP,APRN,SILVA  12/26/2024  8:49 AM

## 2024-12-30 ENCOUNTER — MEDICAL CORRESPONDENCE (OUTPATIENT)
Dept: HEALTH INFORMATION MANAGEMENT | Facility: CLINIC | Age: 29
End: 2024-12-30
Payer: COMMERCIAL

## 2025-02-07 PROBLEM — Z34.83 ENCOUNTER FOR SUPERVISION OF OTHER NORMAL PREGNANCY, THIRD TRIMESTER: Status: RESOLVED | Noted: 2021-04-22 | Resolved: 2025-02-07

## 2025-02-20 PROBLEM — O99.810 IMPAIRED GLUCOSE IN PREGNANCY, ANTEPARTUM: Status: RESOLVED | Noted: 2024-09-13 | Resolved: 2025-02-20

## 2025-02-20 PROBLEM — R73.09 ELEVATED HEMOGLOBIN A1C: Status: RESOLVED | Noted: 2024-06-15 | Resolved: 2025-02-20

## 2025-02-20 PROBLEM — Z86.32 HISTORY OF GESTATIONAL DIABETES: Status: RESOLVED | Noted: 2024-06-14 | Resolved: 2025-02-20

## 2025-02-20 PROBLEM — B95.1 POSITIVE GBS TEST: Status: RESOLVED | Noted: 2024-12-03 | Resolved: 2025-02-20

## 2025-04-04 ENCOUNTER — ANCILLARY PROCEDURE (OUTPATIENT)
Dept: GENERAL RADIOLOGY | Facility: CLINIC | Age: 30
End: 2025-04-04
Attending: NURSE PRACTITIONER
Payer: COMMERCIAL

## 2025-04-04 DIAGNOSIS — M25.561 ACUTE PAIN OF RIGHT KNEE: ICD-10-CM

## 2025-04-04 PROCEDURE — 73562 X-RAY EXAM OF KNEE 3: CPT | Mod: TC | Performed by: STUDENT IN AN ORGANIZED HEALTH CARE EDUCATION/TRAINING PROGRAM

## 2025-04-16 NOTE — PROGRESS NOTES
PHYSICAL THERAPY EVALUATION  Type of Visit: Evaluation       Fall Risk Screen:  Have you fallen 2 or more times in the past year?: No  Have you fallen and had an injury in the past year?: No    Subjective         Presenting condition or subjective complaint:    Pt presents with acute pain of L knee. Pt reports no JERE, and pain has been present for a few weeks now on the lateral side. No worse with movement or when changing positions but can go up into thigh.   Date of onset: 04/04/25    Relevant medical history:     Dates & types of surgery: none    Prior diagnostic imaging/testing results: X-ray     IMPRESSION: No fracture. Mild lateral patellar tilt. Joint spaces are maintained. No effusion.   Prior therapy history for the same diagnosis, illness or injury: No        Living Environment  Social support: With a significant other or spouse   Type of home: Apartment/condo   Stairs to enter the home: No       Ramp: No   Stairs inside the home: No       Help at home: None  Equipment owned:       Employment: No    Hobbies/Interests:      Patient goals for therapy: standing for a bit long and sitting on floor difficult to get up faster    Pain assessment: Pain present     Objective   KNEE EVALUATION  PAIN: Pain Level at Rest: 0/10  Pain Level with Use: 7/10  Pain Location: lateral knee and up into thigh   Pain Quality: Sharp  Pain is Exacerbated By: standing, walking, getting up after sitting for long periods of time  Pain is Relieved By: rest    GAIT:  Gait Deviations: Antalgic  Toe out on R foot     ROM:   (Degrees) Left AROM Left PROM  Right AROM Right PROM   Knee Flexion 122  115    Knee Extension Hyper ext 5  Hyper ext 5      STRENGTH:   Pain: - none + mild ++ moderate +++ severe  Strength Scale: 0-5/5 Left Right   Knee Flexion 4* 5   Knee Extension 4* 5   Quad Set     Hip   Flex L: 3+/5 *, R: 5/5   Abd L: 3-/5*, R: 4+/5   Ext: 3+/5 B  SPECIAL TESTS:    Left Right   Apley's (Meniscus)     Zeynep's (Meniscus)  Negative  Negative    Mandy's (ITB/TFL) Positive Negative    Patellar Apprehension Test Negative  Negative    Patella Tracking     Ligamentous Stability     Anterior Drawer (ACL) Negative,   Negative,     Posterior Drawer (PCL) Negative,   Negative,     Prone Dial Test at 30 Deg and 90 Deg (PCL/PLC)     Valgus Stress Testing at 0 Deg and 30 Deg Negative,   Negative,     Varus Stress Testing at 0 Deg and 30 Deg  Negative,   Negative,       PALPATION:  tender to palpation along L IT band and anterior knee   JOINT MOBILITY:  knee hypermobile into ext    Assessment & Plan   CLINICAL IMPRESSIONS  Medical Diagnosis: Acute pain of left knee    Treatment Diagnosis: Acute pain of left knee   Impression/Assessment: Patient is a 30 year old female with acute pain of L knee complaints.  The following significant findings have been identified: Pain, Decreased ROM/flexibility, Decreased joint mobility, Decreased strength, and Decreased activity tolerance. These impairments interfere with their ability to perform recreational activities, household mobility, and community mobility as compared to previous level of function.     Clinical Decision Making (Complexity):  Clinical Presentation: Stable/Uncomplicated  Clinical Presentation Rationale: based on medical and personal factors listed in PT evaluation  Clinical Decision Making (Complexity): Low complexity    PLAN OF CARE  Treatment Interventions:  Interventions: Gait Training, Manual Therapy, Neuromuscular Re-education, Therapeutic Activity, Therapeutic Exercise, Self-Care/Home Management    Long Term Goals     PT Goal 1  Goal Identifier: walking  Goal Description: Pt will be able to walk for 30 minutes without increase in symptoms  Rationale: to maximize safety and independence with performance of ADLs and functional tasks  Target Date: 06/26/25  PT Goal 2  Goal Identifier: stairs  Goal Description: pt will be able to complete stairs reciprocally without increase in  symptoms  Rationale: to maximize safety and independence with performance of ADLs and functional tasks  Target Date: 06/26/25      Frequency of Treatment: 1x/week for 4 weeks decreasing to every other week  Duration of Treatment: 10 weeks    Education Assessment:   Learner/Method: Patient;Listening;Reading;Demonstration;Pictures/Video;No Barriers to Learning  Education Comments: Educated pt on presenting problem, plan of care, and HEP    Risks and benefits of evaluation/treatment have been explained.   Patient/Family/caregiver agrees with Plan of Care.     Evaluation Time:     PT Eval, Low Complexity Minutes (29322): 22     Signing Clinician: RAYMOND Restrepo [facility]                                                                                   OUTPATIENT PHYSICAL THERAPY      PLAN OF TREATMENT FOR OUTPATIENT REHABILITATION   Patient's Last Name, First Name, Bob Burnham YOB: 1995   Provider's Name   [facility]   Medical Record No.  [id number]     Onset Date: 04/04/25  Start of Care Date: 04/17/25     Medical Diagnosis:  Acute pain of left knee      PT Treatment Diagnosis:  Acute pain of left knee Plan of Treatment  Frequency/Duration: 1x/week for 4 weeks decreasing to every other week/ 10 weeks    Certification date from 04/17/25 to 06/26/25         See note for plan of treatment details and functional goals     Lore Amaro PT                         I CERTIFY THE NEED FOR THESE SERVICES FURNISHED UNDER        THIS PLAN OF TREATMENT AND WHILE UNDER MY CARE     (Physician attestation of this document indicates review and certification of the therapy plan).              Referring Provider:  Danuta BRADFORD Morning    Initial Assessment  See Epic Evaluation- Start of Care Date: 04/17/25

## 2025-04-17 ENCOUNTER — THERAPY VISIT (OUTPATIENT)
Dept: PHYSICAL THERAPY | Facility: REHABILITATION | Age: 30
End: 2025-04-17
Attending: NURSE PRACTITIONER
Payer: COMMERCIAL

## 2025-04-17 DIAGNOSIS — M25.561 ACUTE PAIN OF RIGHT KNEE: ICD-10-CM

## 2025-04-17 ASSESSMENT — ACTIVITIES OF DAILY LIVING (ADL)
GOING_DOWN_1_FLIGHT_OF_STAIRS: MODERATE DIFFICULTY
ADL_SCORE(%): 0
WALKING_FOR_APPROXIMATELY_10_MINUTES: SLIGHT DIFFICULTY
KNEE_ACTIVITY_OF_DAILY_LIVING_SUM: 53
PAIN: THE SYMPTOM AFFECTS MY ACTIVITY SLIGHTLY
DEEP SQUATTING: MODERATE DIFFICULTY
HOW_WOULD_YOU_RATE_YOUR_CURRENT_LEVEL_OF_FUNCTION?: NEARLY NORMAL
RISE FROM A CHAIR: ACTIVITY IS SOMEWHAT DIFFICULT
ROLLING_OVER_IN_BED: MODERATE DIFFICULTY
SPORTS_TOTAL_ITEM_SCORE: 0
WALKING_INITIALLY: SLIGHT DIFFICULTY
JUMPING: MODERATE DIFFICULTY
SIT WITH YOUR KNEE BENT: ACTIVITY IS SOMEWHAT DIFFICULT
GOING UP 1 FLIGHT OF STAIRS: MODERATE DIFFICULTY
WALKING_DOWN_STEEP_HILLS: MODERATE DIFFICULTY
GETTING_INTO_AND_OUT_OF_AN_AVERAGE_CAR: SLIGHT DIFFICULTY
ROLLING OVER IN BED: MODERATE DIFFICULTY
RAW_SCORE: 53
WALKING_15_MINUTES_OR_GREATER: MODERATE DIFFICULTY
WEAKNESS: THE SYMPTOM AFFECTS MY ACTIVITY SLIGHTLY
SPORTS_SCORE(%): 0
STAND: ACTIVITY IS NOT DIFFICULT
LIGHT_TO_MODERATE_WORK: MODERATE DIFFICULTY
WALKING_UP_STEEP_HILLS: MODERATE DIFFICULTY
GOING_UP_1_FLIGHT_OF_STAIRS: MODERATE DIFFICULTY
LIMPING: THE SYMPTOM AFFECTS MY ACTIVITY SLIGHTLY
ADL_COUNT: 17
SWELLING: I DO NOT HAVE THE SYMPTOM
GETTING INTO AND OUT OF AN AVERAGE CAR: SLIGHT DIFFICULTY
GO DOWN STAIRS: ACTIVITY IS MINIMALLY DIFFICULT
SITTING_FOR_15_MINUTES: SLIGHT DIFFICULTY
WALKING_APPROXIMATELY_10_MINUTES: SLIGHT DIFFICULTY
LIMPING: THE SYMPTOM AFFECTS MY ACTIVITY SLIGHTLY
RECREATIONAL_ACTIVITIES: SLIGHT DIFFICULTY
ADL_HIGHEST_POTENTIAL_SCORE: 68
DEEP_SQUATTING: MODERATE DIFFICULTY
KNEE_ACTIVITY_OF_DAILY_LIVING_SCORE: 75.71
PLEASE_INDICATE_YOR_PRIMARY_REASON_FOR_REFERRAL_TO_THERAPY:: HIP
WALKING_15_MINUTES_OR_GREATER: MODERATE DIFFICULTY
TWISTING/PIVOTING ON INVOLVED LEG: SLIGHT DIFFICULTY
ADL_TOTAL_ITEM_SCORE: 0
SWELLING: I DO NOT HAVE THE SYMPTOM
HOS_ADL_ITEM_SCORE_TOTAL: 40
STIFFNESS: I DO NOT HAVE THE SYMPTOM
HOS_ADL_SCORE(%): 58.82
STAND: ACTIVITY IS NOT DIFFICULT
GETTING_INTO_AND_OUT_OF_A_BATHTUB: MODERATE DIFFICULTY
STEPPING UP AND DOWN CURBS: SLIGHT DIFFICULTY
WALK: ACTIVITY IS MINIMALLY DIFFICULT
HEAVY_WORK: MODERATE DIFFICULTY
PUTTING_ON_SOCKS_AND_SHOES: SLIGHT DIFFICULTY
SPORTS_COUNT: 9
SIT WITH YOUR KNEE BENT: ACTIVITY IS SOMEWHAT DIFFICULT
GO UP STAIRS: ACTIVITY IS MINIMALLY DIFFICULT
PLEASE_INDICATE_YOR_PRIMARY_REASON_FOR_REFERRAL_TO_THERAPY:: KNEE
CUTTING/LATERAL_MOVEMENTS: SLIGHT DIFFICULTY
PAIN: THE SYMPTOM AFFECTS MY ACTIVITY SLIGHTLY
WALKING_DOWN_STEEP_HILLS: MODERATE DIFFICULTY
SPORTS_HIGHEST_POTENTIAL_SCORE: 36
GOING DOWN 1 FLIGHT OF STAIRS: MODERATE DIFFICULTY
STIFFNESS: I DO NOT HAVE THE SYMPTOM
HEAVY_WORK: MODERATE DIFFICULTY
AS_A_RESULT_OF_YOUR_KNEE_INJURY,_HOW_WOULD_YOU_RATE_YOUR_CURRENT_LEVEL_OF_DAILY_ACTIVITY?: NEARLY NORMAL
GIVING WAY, BUCKLING OR SHIFTING OF KNEE: THE SYMPTOM AFFECTS MY ACTIVITY SLIGHTLY
AS_A_RESULT_OF_YOUR_KNEE_INJURY,_HOW_WOULD_YOU_RATE_YOUR_CURRENT_LEVEL_OF_DAILY_ACTIVITY?: NEARLY NORMAL
PUTTING ON SOCKS AND SHOES: SLIGHT DIFFICULTY
HOW_WOULD_YOU_RATE_THE_OVERALL_FUNCTION_OF_YOUR_KNEE_DURING_YOUR_USUAL_DAILY_ACTIVITIES?: NEARLY NORMAL
SITTING FOR 15 MINUTES: SLIGHT DIFFICULTY
GO DOWN STAIRS: ACTIVITY IS MINIMALLY DIFFICULT
ABILITY_TO_PERFORM_ACTIVITY_WITH_YOUR_NORMAL_TECHNIQUE: MODERATE DIFFICULTY
STANDING FOR 15 MINUTES: MODERATE DIFFICULTY
TWISTING/PIVOTING_ON_INVOLVED_LEG: SLIGHT DIFFICULTY
WALKING_INITIALLY: SLIGHT DIFFICULTY
GO UP STAIRS: ACTIVITY IS MINIMALLY DIFFICULT
GETTING_INTO_AND_OUT_OF_A_BATHTUB: MODERATE DIFFICULTY
HOS_ADL_HIGHEST_POTENTIAL_SCORE: 68
STEPPING_UP_AND_DOWN_CURBS: SLIGHT DIFFICULTY
SQUAT: ACTIVITY IS MINIMALLY DIFFICULT
SQUAT: ACTIVITY IS MINIMALLY DIFFICULT
LOW_IMPACT_ACTIVITIES_LIKE_FAST_WALKING: MODERATE DIFFICULTY
LIGHT_TO_MODERATE_WORK: MODERATE DIFFICULTY
STARTING_AND_STOPPING_QUICKLY: SLIGHT DIFFICULTY
RECREATIONAL ACTIVITIES: SLIGHT DIFFICULTY
WEAKNESS: THE SYMPTOM AFFECTS MY ACTIVITY SLIGHTLY
WALKING_UP_STEEP_HILLS: MODERATE DIFFICULTY
RISE FROM A CHAIR: ACTIVITY IS SOMEWHAT DIFFICULT
HOW_WOULD_YOU_RATE_THE_OVERALL_FUNCTION_OF_YOUR_KNEE_DURING_YOUR_USUAL_DAILY_ACTIVITIES?: NEARLY NORMAL
GIVING WAY, BUCKLING OR SHIFTING OF KNEE: THE SYMPTOM AFFECTS MY ACTIVITY SLIGHTLY
KNEEL ON THE FRONT OF YOUR KNEE: ACTIVITY IS MINIMALLY DIFFICULT
WALK: ACTIVITY IS MINIMALLY DIFFICULT
KNEEL ON THE FRONT OF YOUR KNEE: ACTIVITY IS MINIMALLY DIFFICULT
STANDING_FOR_15_MINUTES: MODERATE DIFFICULTY

## 2025-04-30 ENCOUNTER — MEDICAL CORRESPONDENCE (OUTPATIENT)
Dept: PEDIATRICS | Facility: CLINIC | Age: 30
End: 2025-04-30
Payer: COMMERCIAL

## 2025-05-05 ENCOUNTER — THERAPY VISIT (OUTPATIENT)
Dept: PHYSICAL THERAPY | Facility: REHABILITATION | Age: 30
End: 2025-05-05
Payer: COMMERCIAL

## 2025-05-05 DIAGNOSIS — M25.561 ACUTE PAIN OF RIGHT KNEE: Primary | ICD-10-CM

## 2025-05-05 PROCEDURE — 97110 THERAPEUTIC EXERCISES: CPT | Mod: GP

## 2025-05-05 PROCEDURE — 97112 NEUROMUSCULAR REEDUCATION: CPT | Mod: GP

## 2025-05-05 PROCEDURE — 97140 MANUAL THERAPY 1/> REGIONS: CPT | Mod: GP

## 2025-05-12 ENCOUNTER — THERAPY VISIT (OUTPATIENT)
Dept: PHYSICAL THERAPY | Facility: REHABILITATION | Age: 30
End: 2025-05-12
Payer: COMMERCIAL

## 2025-05-12 DIAGNOSIS — M25.562 ACUTE PAIN OF LEFT KNEE: Primary | ICD-10-CM

## 2025-05-12 PROBLEM — M25.561 ACUTE PAIN OF RIGHT KNEE: Status: ACTIVE | Noted: 2025-05-12

## 2025-05-12 PROCEDURE — 97110 THERAPEUTIC EXERCISES: CPT | Mod: GP

## 2025-05-12 PROCEDURE — 97140 MANUAL THERAPY 1/> REGIONS: CPT | Mod: GP

## 2025-05-12 PROCEDURE — 97112 NEUROMUSCULAR REEDUCATION: CPT | Mod: GP

## 2025-05-19 ENCOUNTER — THERAPY VISIT (OUTPATIENT)
Dept: PHYSICAL THERAPY | Facility: REHABILITATION | Age: 30
End: 2025-05-19
Payer: COMMERCIAL

## 2025-05-19 DIAGNOSIS — M25.562 ACUTE PAIN OF LEFT KNEE: Primary | ICD-10-CM

## 2025-05-19 PROCEDURE — 97140 MANUAL THERAPY 1/> REGIONS: CPT | Mod: GP

## 2025-05-19 PROCEDURE — 97112 NEUROMUSCULAR REEDUCATION: CPT | Mod: GP

## 2025-05-19 PROCEDURE — 97110 THERAPEUTIC EXERCISES: CPT | Mod: GP

## 2025-06-04 ENCOUNTER — THERAPY VISIT (OUTPATIENT)
Dept: PHYSICAL THERAPY | Facility: REHABILITATION | Age: 30
End: 2025-06-04
Payer: COMMERCIAL

## 2025-06-04 DIAGNOSIS — M25.562 ACUTE PAIN OF LEFT KNEE: Primary | ICD-10-CM

## 2025-06-04 PROCEDURE — 97110 THERAPEUTIC EXERCISES: CPT | Mod: GP

## 2025-06-04 PROCEDURE — 97140 MANUAL THERAPY 1/> REGIONS: CPT | Mod: GP

## 2025-06-04 PROCEDURE — 97112 NEUROMUSCULAR REEDUCATION: CPT | Mod: GP
